# Patient Record
Sex: FEMALE | Race: WHITE | NOT HISPANIC OR LATINO | Employment: FULL TIME | URBAN - METROPOLITAN AREA
[De-identification: names, ages, dates, MRNs, and addresses within clinical notes are randomized per-mention and may not be internally consistent; named-entity substitution may affect disease eponyms.]

---

## 2017-02-28 ENCOUNTER — CONVERSION ENCOUNTER (OUTPATIENT)
Dept: FAMILY MEDICINE CLINIC | Facility: CLINIC | Age: 47
End: 2017-02-28

## 2017-03-01 ENCOUNTER — CONVERSION ENCOUNTER (OUTPATIENT)
Dept: FAMILY MEDICINE CLINIC | Facility: CLINIC | Age: 47
End: 2017-03-01

## 2017-03-01 LAB
BASOPHILS # BLD AUTO: ABNORMAL 10*3/MM3 (ref 0–200)
BASOPHILS NFR BLD AUTO: 0.5 %
BUN SERPL-MCNC: 11 MG/DL (ref 7–25)
BUN/CREAT SERPL: ABNORMAL (ref 6–22)
CALCIUM SERPL-MCNC: 9 MG/DL (ref 8.6–10.2)
CHLORIDE SERPL-SCNC: 103 MMOL/L (ref 98–110)
CO2 CONTENT VENOUS: 27 MMOL/L (ref 20–31)
CONV NEUTROPHILS/100 LEUKOCYTES IN BODY FLUID BY MANUAL COUNT: 55.1 %
CREAT UR-MCNC: 0.57 MG/DL (ref 0.5–1.1)
EOSINOPHIL # BLD AUTO: 1.7 %
EOSINOPHIL # BLD AUTO: ABNORMAL 10*3/MM3 (ref 15–500)
ERYTHROCYTE [DISTWIDTH] IN BLOOD BY AUTOMATED COUNT: 13.6 % (ref 11–15)
FERRITIN SERPL-MCNC: 70 NG/ML (ref 10–232)
GLUCOSE SERPL-MCNC: 105 MG/DL (ref 65–99)
HCT VFR BLD AUTO: 40.5 % (ref 35–45)
HGB BLD-MCNC: 13.1 G/DL (ref 11.7–15.5)
LYMPHOCYTES # BLD AUTO: ABNORMAL 10*3/MM3 (ref 850–3900)
LYMPHOCYTES NFR BLD AUTO: 36.9 %
MCH RBC QN AUTO: 29.1 PG (ref 27–33)
MCHC RBC AUTO-ENTMCNC: ABNORMAL % (ref 32–36)
MCV RBC AUTO: 89.9 FL (ref 80–100)
MONOCYTES # BLD AUTO: ABNORMAL 10*3/MICROLITER (ref 200–950)
MONOCYTES NFR BLD AUTO: 5.8 %
NEUTROPHILS # BLD AUTO: ABNORMAL 10*3/MM3 (ref 1500–7800)
PLATELET # BLD AUTO: ABNORMAL 10*3/MM3 (ref 140–400)
PMV BLD AUTO: 10.7 FL (ref 7.5–12.5)
POTASSIUM SERPL-SCNC: 4.3 MMOL/L (ref 3.5–5.3)
RBC # BLD AUTO: ABNORMAL 10*6/MM3 (ref 3.8–5.1)
SODIUM SERPL-SCNC: 138 MMOL/L (ref 135–146)
TSH SERPL-ACNC: 0.59 MICROINTL UNITS/ML
WBC # BLD AUTO: ABNORMAL K/UL (ref 3.8–10.8)

## 2018-01-29 ENCOUNTER — HOSPITAL ENCOUNTER (OUTPATIENT)
Dept: MAMMOGRAPHY | Facility: HOSPITAL | Age: 48
Discharge: HOME | End: 2018-01-29
Attending: OBSTETRICS & GYNECOLOGY | Admitting: OBSTETRICS & GYNECOLOGY

## 2018-02-07 ENCOUNTER — HOSPITAL ENCOUNTER (OUTPATIENT)
Dept: MAMMOGRAPHY | Facility: HOSPITAL | Age: 48
Discharge: HOME | End: 2018-02-07
Attending: OBSTETRICS & GYNECOLOGY | Admitting: OBSTETRICS & GYNECOLOGY

## 2018-03-12 ENCOUNTER — CONVERSION ENCOUNTER (OUTPATIENT)
Dept: FAMILY MEDICINE CLINIC | Facility: CLINIC | Age: 48
End: 2018-03-12

## 2018-10-02 ENCOUNTER — CONVERSION ENCOUNTER (OUTPATIENT)
Dept: FAMILY MEDICINE CLINIC | Facility: CLINIC | Age: 48
End: 2018-10-02

## 2018-11-09 ENCOUNTER — ON CAMPUS - OUTPATIENT (AMBULATORY)
Dept: URBAN - METROPOLITAN AREA HOSPITAL 2 | Facility: HOSPITAL | Age: 48
End: 2018-11-09

## 2018-11-09 ENCOUNTER — OFFICE (AMBULATORY)
Dept: URBAN - METROPOLITAN AREA PATHOLOGY 4 | Facility: PATHOLOGY | Age: 48
End: 2018-11-09

## 2018-11-09 VITALS
SYSTOLIC BLOOD PRESSURE: 132 MMHG | DIASTOLIC BLOOD PRESSURE: 95 MMHG | SYSTOLIC BLOOD PRESSURE: 111 MMHG | OXYGEN SATURATION: 98 % | RESPIRATION RATE: 16 BRPM | RESPIRATION RATE: 18 BRPM | OXYGEN SATURATION: 99 % | HEART RATE: 94 BPM | DIASTOLIC BLOOD PRESSURE: 52 MMHG | SYSTOLIC BLOOD PRESSURE: 115 MMHG | SYSTOLIC BLOOD PRESSURE: 122 MMHG | HEART RATE: 76 BPM | DIASTOLIC BLOOD PRESSURE: 76 MMHG | OXYGEN SATURATION: 100 % | DIASTOLIC BLOOD PRESSURE: 27 MMHG | DIASTOLIC BLOOD PRESSURE: 59 MMHG | SYSTOLIC BLOOD PRESSURE: 92 MMHG | HEART RATE: 84 BPM | HEART RATE: 92 BPM | TEMPERATURE: 97.5 F | SYSTOLIC BLOOD PRESSURE: 107 MMHG | HEIGHT: 66 IN | WEIGHT: 172 LBS | HEART RATE: 81 BPM | DIASTOLIC BLOOD PRESSURE: 92 MMHG

## 2018-11-09 DIAGNOSIS — K20.0 EOSINOPHILIC ESOPHAGITIS: ICD-10-CM

## 2018-11-09 DIAGNOSIS — K29.50 UNSPECIFIED CHRONIC GASTRITIS WITHOUT BLEEDING: ICD-10-CM

## 2018-11-09 DIAGNOSIS — K31.7 POLYP OF STOMACH AND DUODENUM: ICD-10-CM

## 2018-11-09 DIAGNOSIS — R13.10 DYSPHAGIA, UNSPECIFIED: ICD-10-CM

## 2018-11-09 LAB
GI HISTOLOGY: A. SELECT: (no result)
GI HISTOLOGY: B. UNSPECIFIED: (no result)
GI HISTOLOGY: PDF REPORT: (no result)

## 2018-11-09 PROCEDURE — 43450 DILATE ESOPHAGUS 1/MULT PASS: CPT | Performed by: INTERNAL MEDICINE

## 2018-11-09 PROCEDURE — 43239 EGD BIOPSY SINGLE/MULTIPLE: CPT | Performed by: INTERNAL MEDICINE

## 2018-11-09 PROCEDURE — 88305 TISSUE EXAM BY PATHOLOGIST: CPT | Performed by: INTERNAL MEDICINE

## 2018-11-09 RX ORDER — PANTOPRAZOLE SODIUM 40 MG/1
40 TABLET, DELAYED RELEASE ORAL
Qty: 90 | Refills: 3 | Status: COMPLETED
Start: 2018-11-09 | End: 2024-06-10

## 2018-12-31 ENCOUNTER — CONVERSION ENCOUNTER (OUTPATIENT)
Dept: FAMILY MEDICINE CLINIC | Facility: CLINIC | Age: 48
End: 2018-12-31

## 2019-02-19 ENCOUNTER — CONVERSION ENCOUNTER (OUTPATIENT)
Dept: FAMILY MEDICINE CLINIC | Facility: CLINIC | Age: 49
End: 2019-02-19

## 2019-02-19 ENCOUNTER — HOSPITAL ENCOUNTER (OUTPATIENT)
Dept: FAMILY MEDICINE CLINIC | Facility: CLINIC | Age: 49
Setting detail: SPECIMEN
Discharge: HOME | End: 2019-02-19
Attending: NURSE PRACTITIONER | Admitting: NURSE PRACTITIONER

## 2019-02-19 LAB
ALBUMIN SERPL-MCNC: 3.7 G/DL (ref 3.5–4.8)
ALBUMIN/GLOB SERPL: 1.2 {RATIO} (ref 1–1.7)
ALP SERPL-CCNC: 69 IU/L (ref 32–91)
ALT SERPL-CCNC: 18 IU/L (ref 14–54)
ANION GAP SERPL CALC-SCNC: 12.4 MMOL/L (ref 10–20)
AST SERPL-CCNC: 16 IU/L (ref 15–41)
BASOPHILS # BLD AUTO: 0.1 10*3/UL (ref 0–0.2)
BASOPHILS NFR BLD AUTO: 1 % (ref 0–2)
BILIRUB SERPL-MCNC: 0.2 MG/DL (ref 0.3–1.2)
BUN SERPL-MCNC: 9 MG/DL (ref 8–20)
BUN/CREAT SERPL: 15 (ref 5.4–26.2)
CALCIUM SERPL-MCNC: 8.9 MG/DL (ref 8.9–10.3)
CHLORIDE SERPL-SCNC: 102 MMOL/L (ref 101–111)
CONV CO2: 25 MMOL/L (ref 22–32)
CONV TOTAL PROTEIN: 6.9 G/DL (ref 6.1–7.9)
CREAT UR-MCNC: 0.6 MG/DL (ref 0.4–1)
DIFFERENTIAL METHOD BLD: (no result)
EOSINOPHIL # BLD AUTO: 0.1 10*3/UL (ref 0–0.3)
EOSINOPHIL # BLD AUTO: 2 % (ref 0–3)
ERYTHROCYTE [DISTWIDTH] IN BLOOD BY AUTOMATED COUNT: 14.3 % (ref 11.5–14.5)
GLOBULIN UR ELPH-MCNC: 3.2 G/DL (ref 2.5–3.8)
GLUCOSE SERPL-MCNC: 99 MG/DL (ref 65–99)
HCT VFR BLD AUTO: 40.7 % (ref 35–49)
HGB BLD-MCNC: 13.3 G/DL (ref 12–15)
LYMPHOCYTES # BLD AUTO: 1.8 10*3/UL (ref 0.8–4.8)
LYMPHOCYTES NFR BLD AUTO: 20 % (ref 18–42)
MCH RBC QN AUTO: 30.4 PG (ref 26–32)
MCHC RBC AUTO-ENTMCNC: 32.7 G/DL (ref 32–36)
MCV RBC AUTO: 93 FL (ref 80–94)
MONOCYTES # BLD AUTO: 0.5 10*3/UL (ref 0.1–1.3)
MONOCYTES NFR BLD AUTO: 6 % (ref 2–11)
NEUTROPHILS # BLD AUTO: 6.2 10*3/UL (ref 2.3–8.6)
NEUTROPHILS NFR BLD AUTO: 71 % (ref 50–75)
NRBC BLD AUTO-RTO: 0 /100{WBCS}
NRBC/RBC NFR BLD MANUAL: 0 10*3/UL
PLATELET # BLD AUTO: 309 10*3/UL (ref 150–450)
PMV BLD AUTO: 9.5 FL (ref 7.4–10.4)
POTASSIUM SERPL-SCNC: 4.4 MMOL/L (ref 3.6–5.1)
RBC # BLD AUTO: 4.37 10*6/UL (ref 4–5.4)
SODIUM SERPL-SCNC: 135 MMOL/L (ref 136–144)
WBC # BLD AUTO: 8.7 10*3/UL (ref 4.5–11.5)

## 2019-02-20 LAB
ANA SER QL IA: ABNORMAL

## 2019-06-04 VITALS
HEART RATE: 88 BPM | DIASTOLIC BLOOD PRESSURE: 81 MMHG | BODY MASS INDEX: 28.19 KG/M2 | OXYGEN SATURATION: 97 % | HEIGHT: 66 IN | WEIGHT: 173 LBS | HEART RATE: 79 BPM | DIASTOLIC BLOOD PRESSURE: 80 MMHG | SYSTOLIC BLOOD PRESSURE: 124 MMHG | HEART RATE: 96 BPM | HEART RATE: 76 BPM | WEIGHT: 175.4 LBS | RESPIRATION RATE: 16 BRPM | SYSTOLIC BLOOD PRESSURE: 110 MMHG | DIASTOLIC BLOOD PRESSURE: 86 MMHG | SYSTOLIC BLOOD PRESSURE: 110 MMHG | RESPIRATION RATE: 16 BRPM | RESPIRATION RATE: 16 BRPM | BODY MASS INDEX: 28.16 KG/M2 | RESPIRATION RATE: 16 BRPM | BODY MASS INDEX: 27.8 KG/M2 | HEIGHT: 66 IN | HEART RATE: 87 BPM | BODY MASS INDEX: 28.28 KG/M2 | DIASTOLIC BLOOD PRESSURE: 86 MMHG | SYSTOLIC BLOOD PRESSURE: 110 MMHG | HEIGHT: 66 IN | SYSTOLIC BLOOD PRESSURE: 139 MMHG | WEIGHT: 175.2 LBS | WEIGHT: 155.5 LBS | WEIGHT: 176 LBS | DIASTOLIC BLOOD PRESSURE: 82 MMHG | RESPIRATION RATE: 12 BRPM | HEIGHT: 66 IN

## 2019-07-22 ENCOUNTER — TELEPHONE (OUTPATIENT)
Dept: FAMILY MEDICINE CLINIC | Facility: CLINIC | Age: 49
End: 2019-07-22

## 2019-07-22 ENCOUNTER — CLINICAL SUPPORT (OUTPATIENT)
Dept: FAMILY MEDICINE CLINIC | Facility: CLINIC | Age: 49
End: 2019-07-22

## 2019-07-22 DIAGNOSIS — M54.5 LOW BACK PAIN, UNSPECIFIED BACK PAIN LATERALITY, UNSPECIFIED CHRONICITY, WITH SCIATICA PRESENCE UNSPECIFIED: Primary | ICD-10-CM

## 2019-07-22 DIAGNOSIS — M54.9 BACK PAIN, UNSPECIFIED BACK LOCATION, UNSPECIFIED BACK PAIN LATERALITY, UNSPECIFIED CHRONICITY: Primary | ICD-10-CM

## 2019-07-22 PROCEDURE — 96372 THER/PROPH/DIAG INJ SC/IM: CPT | Performed by: NURSE PRACTITIONER

## 2019-07-22 RX ORDER — DULOXETIN HYDROCHLORIDE 60 MG/1
CAPSULE, DELAYED RELEASE ORAL
Qty: 30 CAPSULE | Refills: 0 | Status: SHIPPED | OUTPATIENT
Start: 2019-07-22 | End: 2019-09-10 | Stop reason: SDUPTHER

## 2019-07-22 RX ORDER — METHYLPREDNISOLONE ACETATE 80 MG/ML
120 INJECTION, SUSPENSION INTRA-ARTICULAR; INTRALESIONAL; INTRAMUSCULAR; SOFT TISSUE ONCE
Status: DISCONTINUED | OUTPATIENT
Start: 2019-07-22 | End: 2020-08-16

## 2019-07-22 RX ORDER — METHYLPREDNISOLONE 4 MG/1
TABLET ORAL
Qty: 1 EACH | Refills: 0 | Status: SHIPPED | OUTPATIENT
Start: 2019-07-22 | End: 2019-11-13

## 2019-07-22 RX ORDER — CYCLOBENZAPRINE HCL 10 MG
10 TABLET ORAL 3 TIMES DAILY PRN
Qty: 30 TABLET | Refills: 0 | Status: SHIPPED | OUTPATIENT
Start: 2019-07-22 | End: 2019-11-13

## 2019-07-22 RX ORDER — METHYLPREDNISOLONE ACETATE 80 MG/ML
120 INJECTION, SUSPENSION INTRA-ARTICULAR; INTRALESIONAL; INTRAMUSCULAR; SOFT TISSUE ONCE
Status: COMPLETED | OUTPATIENT
Start: 2019-07-22 | End: 2019-07-22

## 2019-07-22 RX ADMIN — METHYLPREDNISOLONE ACETATE 120 MG: 80 INJECTION, SUSPENSION INTRA-ARTICULAR; INTRALESIONAL; INTRAMUSCULAR; SOFT TISSUE at 17:02

## 2019-07-22 NOTE — TELEPHONE ENCOUNTER
Gave message to patient at 3:56pm and put on MISC schedule for today.      Elizabeth - please send rx's in.

## 2019-07-22 NOTE — TELEPHONE ENCOUNTER
Patient wanted to be seen today but you are full.  Her back has gone out and she has had to leave work.  Can she come in today under MISC for a steroid shot?  She said that and steroid pills and pain medication usually help, with the shot helping the most.  Please advise.

## 2019-08-29 ENCOUNTER — TELEPHONE (OUTPATIENT)
Dept: FAMILY MEDICINE CLINIC | Facility: CLINIC | Age: 49
End: 2019-08-29

## 2019-08-29 NOTE — TELEPHONE ENCOUNTER
Олег has bronchitis.  She has had it so many times in the past that she knows that is what is wrong.  She has a productive cough of yellow/green mucus, sinus headache, chest hurts, cannot stop coughing and she is wheezing.  Usually you give her a Z-Pack and Hydromet.  Can you call something in for her?  She already has an inhaler.

## 2019-08-30 RX ORDER — AZITHROMYCIN 250 MG/1
TABLET, FILM COATED ORAL
Qty: 60 TABLET | Refills: 0 | Status: SHIPPED | OUTPATIENT
Start: 2019-08-30 | End: 2019-11-13

## 2019-09-10 RX ORDER — DULOXETIN HYDROCHLORIDE 60 MG/1
CAPSULE, DELAYED RELEASE ORAL
Qty: 90 CAPSULE | Refills: 0 | Status: SHIPPED | OUTPATIENT
Start: 2019-09-10 | End: 2020-02-12

## 2019-11-11 ENCOUNTER — TELEPHONE (OUTPATIENT)
Dept: FAMILY MEDICINE CLINIC | Facility: CLINIC | Age: 49
End: 2019-11-11

## 2019-11-11 ENCOUNTER — CLINICAL SUPPORT (OUTPATIENT)
Dept: FAMILY MEDICINE CLINIC | Facility: CLINIC | Age: 49
End: 2019-11-11

## 2019-11-11 DIAGNOSIS — M54.9 BACK PAIN, UNSPECIFIED BACK LOCATION, UNSPECIFIED BACK PAIN LATERALITY, UNSPECIFIED CHRONICITY: Primary | ICD-10-CM

## 2019-11-11 PROCEDURE — 96372 THER/PROPH/DIAG INJ SC/IM: CPT | Performed by: NURSE PRACTITIONER

## 2019-11-11 RX ORDER — METHYLPREDNISOLONE ACETATE 80 MG/ML
80 INJECTION, SUSPENSION INTRA-ARTICULAR; INTRALESIONAL; INTRAMUSCULAR; SOFT TISSUE ONCE
Status: COMPLETED | OUTPATIENT
Start: 2019-11-11 | End: 2019-11-11

## 2019-11-11 RX ADMIN — METHYLPREDNISOLONE ACETATE 80 MG: 80 INJECTION, SUSPENSION INTRA-ARTICULAR; INTRALESIONAL; INTRAMUSCULAR; SOFT TISSUE at 16:40

## 2019-11-11 NOTE — TELEPHONE ENCOUNTER
Patient is wanting to come in under MISC today for a steroid shot for her back.  Is this okay?  What do you want her to have?

## 2019-11-13 ENCOUNTER — OFFICE VISIT (OUTPATIENT)
Dept: FAMILY MEDICINE CLINIC | Facility: CLINIC | Age: 49
End: 2019-11-13

## 2019-11-13 VITALS
SYSTOLIC BLOOD PRESSURE: 120 MMHG | RESPIRATION RATE: 16 BRPM | TEMPERATURE: 97.8 F | DIASTOLIC BLOOD PRESSURE: 78 MMHG | BODY MASS INDEX: 27.8 KG/M2 | HEIGHT: 66 IN | HEART RATE: 76 BPM | WEIGHT: 173 LBS

## 2019-11-13 DIAGNOSIS — M54.32 LEFT SIDED SCIATICA: ICD-10-CM

## 2019-11-13 DIAGNOSIS — M54.16 LUMBAR BACK PAIN WITH RADICULOPATHY AFFECTING LEFT LOWER EXTREMITY: Primary | ICD-10-CM

## 2019-11-13 PROBLEM — J45.901 ASTHMA EXACERBATION: Status: ACTIVE | Noted: 2017-02-28

## 2019-11-13 PROBLEM — R42 DIZZINESS: Status: ACTIVE | Noted: 2017-02-28

## 2019-11-13 PROBLEM — L25.5 CONTACT DERMATITIS DUE TO PLANTS, EXCEPT FOOD: Status: ACTIVE | Noted: 2018-10-02

## 2019-11-13 PROBLEM — M51.27 LUMBAGO-SCIATICA DUE TO DISPLACEMENT OF LUMBAR INTERVERTEBRAL DISC: Status: ACTIVE | Noted: 2018-12-31

## 2019-11-13 PROBLEM — R09.81 HEAD CONGESTION: Status: ACTIVE | Noted: 2019-01-14

## 2019-11-13 PROBLEM — R23.3 PETECHIAE: Status: ACTIVE | Noted: 2019-02-19

## 2019-11-13 PROBLEM — M54.6 THORACIC BACK PAIN: Status: ACTIVE | Noted: 2018-03-12

## 2019-11-13 PROBLEM — R52 PAIN: Status: ACTIVE | Noted: 2018-03-12

## 2019-11-13 PROBLEM — R21 SKIN RASH: Status: ACTIVE | Noted: 2019-02-19

## 2019-11-13 PROBLEM — R13.10 DYSPHAGIA: Status: ACTIVE | Noted: 2018-10-02

## 2019-11-13 PROBLEM — F41.9 ANXIETY: Status: ACTIVE | Noted: 2018-10-02

## 2019-11-13 PROBLEM — K20.90 ESOPHAGITIS: Status: ACTIVE | Noted: 2018-10-05

## 2019-11-13 PROBLEM — J06.9 UPPER RESPIRATORY TRACT INFECTION: Status: ACTIVE | Noted: 2017-02-28

## 2019-11-13 PROCEDURE — 99214 OFFICE O/P EST MOD 30 MIN: CPT | Performed by: NURSE PRACTITIONER

## 2019-11-13 RX ORDER — PREDNISONE 20 MG/1
TABLET ORAL
Qty: 25 TABLET | Refills: 0 | Status: SHIPPED | OUTPATIENT
Start: 2019-11-13 | End: 2020-07-28

## 2019-11-13 RX ORDER — NORGESTIMATE AND ETHINYL ESTRADIOL 7DAYSX3 28
1 KIT ORAL DAILY
Refills: 4 | COMMUNITY
Start: 2019-08-14

## 2019-11-13 RX ORDER — BACLOFEN 10 MG/1
10 TABLET ORAL 3 TIMES DAILY
Qty: 30 TABLET | Refills: 0 | Status: SHIPPED | OUTPATIENT
Start: 2019-11-13 | End: 2020-01-24

## 2019-11-13 RX ORDER — BUSPIRONE HYDROCHLORIDE 5 MG/1
5 TABLET ORAL 2 TIMES DAILY PRN
COMMUNITY
Start: 2018-10-02

## 2019-11-13 RX ORDER — HYDROCODONE BITARTRATE AND ACETAMINOPHEN 7.5; 325 MG/1; MG/1
1 TABLET ORAL EVERY 6 HOURS PRN
Qty: 20 TABLET | Refills: 0 | Status: SHIPPED | OUTPATIENT
Start: 2019-11-13 | End: 2020-10-02 | Stop reason: SDUPTHER

## 2019-11-13 NOTE — PROGRESS NOTES
"Subjective   Gene KAREN Fields is a 48 y.o. female.     Chief Complaint   Patient presents with   • Back Pain       /78 (BP Location: Left arm, Patient Position: Sitting, Cuff Size: Adult)   Pulse 76   Temp 97.8 °F (36.6 °C) (Oral)   Resp 16   Ht 167.6 cm (66\")   Wt 78.5 kg (173 lb)   BMI 27.92 kg/m²     BP Readings from Last 3 Encounters:   11/13/19 120/78   02/19/19 110/86   12/31/18 139/81       Wt Readings from Last 3 Encounters:   11/13/19 78.5 kg (173 lb)   02/19/19 79.5 kg (175 lb 3.2 oz)   12/31/18 79.8 kg (175 lb 16 oz)       Pt comes in today with c/o low back pain and sciatica. Pain is worse on left. Pain is worse when walking and sitting. No specific injury. Started on Friday (about 5 days ago). Flares up on her about every 4-5 months.   Had x-ray done last year.  Has been to chiropractor in the past  Went to PT years ago when she was pregnant.  Has a TENS unit at home, but doesn't seem to help much  She came in on Monday and received a steroid injection. Usually has to take oral steroids with it.   We have discussed PT and MRI in the past, but she is concerned about cost due to high deductible insurance.        The following portions of the patient's history were reviewed and updated as appropriate: allergies, current medications, past family history, past medical history, past social history, past surgical history and problem list.    Review of Systems   Musculoskeletal: Positive for back pain.   Neurological: Positive for numbness.       Objective   Physical Exam   Constitutional: She is oriented to person, place, and time. She appears well-developed and well-nourished.   Eyes: Pupils are equal, round, and reactive to light.   Cardiovascular: Normal rate and regular rhythm.   Pulmonary/Chest: Effort normal and breath sounds normal.   Musculoskeletal:        Lumbar back: She exhibits decreased range of motion and tenderness.   Straight leg pos   Neurological: She is alert and oriented to person, " place, and time.         Diagnoses and all orders for this visit:    1. Lumbar back pain with radiculopathy affecting left lower extremity (Primary)  -     predniSONE (DELTASONE) 20 MG tablet; 3 tabs daily x 4 days, 2 tabs daily x 4 days, 1 tab daily x 4 days  Dispense: 25 tablet; Refill: 0  -     HYDROcodone-acetaminophen (NORCO) 7.5-325 MG per tablet; Take 1 tablet by mouth Every 6 (Six) Hours As Needed for Moderate Pain .  Dispense: 20 tablet; Refill: 0  -     baclofen (LIORESAL) 10 MG tablet; Take 1 tablet by mouth 3 (Three) Times a Day.  Dispense: 30 tablet; Refill: 0    2. Left sided sciatica  -     predniSONE (DELTASONE) 20 MG tablet; 3 tabs daily x 4 days, 2 tabs daily x 4 days, 1 tab daily x 4 days  Dispense: 25 tablet; Refill: 0  -     HYDROcodone-acetaminophen (NORCO) 7.5-325 MG per tablet; Take 1 tablet by mouth Every 6 (Six) Hours As Needed for Moderate Pain .  Dispense: 20 tablet; Refill: 0  -     baclofen (LIORESAL) 10 MG tablet; Take 1 tablet by mouth 3 (Three) Times a Day.  Dispense: 30 tablet; Refill: 0    prednisone taper  Will try baclofen. Didn't tolerate flexeril in the past  norco prn  She is going to check on cost of MRI  Cont. Stretches  Ice/moist heat  During this office visit, we discussed the pertinent aspects of the visit and treatment recommendations. Pt verbalizes understanding. Follow up was discussed. Patient was given the opportunity to ask questions and discuss other concerns.       Return if symptoms worsen or fail to improve.

## 2020-01-21 ENCOUNTER — TELEPHONE (OUTPATIENT)
Dept: FAMILY MEDICINE CLINIC | Facility: CLINIC | Age: 50
End: 2020-01-21

## 2020-01-21 RX ORDER — AMOXICILLIN 875 MG/1
875 TABLET, COATED ORAL 2 TIMES DAILY
Qty: 14 TABLET | Refills: 0 | Status: SHIPPED | OUTPATIENT
Start: 2020-01-21 | End: 2020-07-31

## 2020-01-21 NOTE — TELEPHONE ENCOUNTER
Patient left vm stating that she has a sinus infection and ear infection. Symptoms: sinus pressure, ear pain, headache, congestion, colored mucus. Patient is requesting antibiotics.

## 2020-01-24 DIAGNOSIS — M54.16 LUMBAR BACK PAIN WITH RADICULOPATHY AFFECTING LEFT LOWER EXTREMITY: ICD-10-CM

## 2020-01-24 DIAGNOSIS — M54.32 LEFT SIDED SCIATICA: ICD-10-CM

## 2020-01-24 RX ORDER — BACLOFEN 10 MG/1
TABLET ORAL
Qty: 30 TABLET | Refills: 0 | Status: SHIPPED | OUTPATIENT
Start: 2020-01-24 | End: 2020-07-31 | Stop reason: ALTCHOICE

## 2020-02-12 RX ORDER — DULOXETIN HYDROCHLORIDE 60 MG/1
CAPSULE, DELAYED RELEASE ORAL
Qty: 90 CAPSULE | Refills: 0 | Status: SHIPPED | OUTPATIENT
Start: 2020-02-12 | End: 2020-07-01

## 2020-07-01 RX ORDER — DULOXETIN HYDROCHLORIDE 60 MG/1
CAPSULE, DELAYED RELEASE ORAL
Qty: 90 CAPSULE | Refills: 0 | Status: SHIPPED | OUTPATIENT
Start: 2020-07-01 | End: 2020-10-01

## 2020-07-27 ENCOUNTER — APPOINTMENT (OUTPATIENT)
Dept: GENERAL RADIOLOGY | Facility: HOSPITAL | Age: 50
End: 2020-07-27

## 2020-07-27 ENCOUNTER — HOSPITAL ENCOUNTER (EMERGENCY)
Facility: HOSPITAL | Age: 50
Discharge: HOME | End: 2020-07-27
Admitting: EMERGENCY MEDICINE

## 2020-07-27 VITALS
BODY MASS INDEX: 28.31 KG/M2 | TEMPERATURE: 99 F | HEART RATE: 92 BPM | OXYGEN SATURATION: 97 % | DIASTOLIC BLOOD PRESSURE: 49 MMHG | HEIGHT: 66 IN | RESPIRATION RATE: 18 BRPM | WEIGHT: 176.15 LBS | SYSTOLIC BLOOD PRESSURE: 113 MMHG

## 2020-07-27 DIAGNOSIS — R05.9 COUGH: ICD-10-CM

## 2020-07-27 DIAGNOSIS — Z20.822 EXPOSURE TO COVID-19 VIRUS: Primary | ICD-10-CM

## 2020-07-27 DIAGNOSIS — R50.9 FEVER, UNSPECIFIED FEVER CAUSE: ICD-10-CM

## 2020-07-27 DIAGNOSIS — B34.9 VIRAL ILLNESS: ICD-10-CM

## 2020-07-27 DIAGNOSIS — J02.9 PHARYNGITIS, UNSPECIFIED ETIOLOGY: ICD-10-CM

## 2020-07-27 LAB
ALBUMIN SERPL-MCNC: 3.7 G/DL (ref 3.5–5.2)
ALBUMIN/GLOB SERPL: 1.3 G/DL
ALP SERPL-CCNC: 110 U/L (ref 39–117)
ALT SERPL W P-5'-P-CCNC: 14 U/L (ref 1–33)
ANION GAP SERPL CALCULATED.3IONS-SCNC: 12 MMOL/L (ref 5–15)
AST SERPL-CCNC: 17 U/L (ref 1–32)
BASOPHILS # BLD AUTO: 0 10*3/MM3 (ref 0–0.2)
BASOPHILS NFR BLD AUTO: 0.7 % (ref 0–1.5)
BILIRUB SERPL-MCNC: 0.3 MG/DL (ref 0–1.2)
BUN SERPL-MCNC: 7 MG/DL (ref 6–20)
BUN SERPL-MCNC: ABNORMAL MG/DL
BUN/CREAT SERPL: ABNORMAL
CALCIUM SPEC-SCNC: 8.7 MG/DL (ref 8.6–10.5)
CHLORIDE SERPL-SCNC: 99 MMOL/L (ref 98–107)
CO2 SERPL-SCNC: 23 MMOL/L (ref 22–29)
CREAT SERPL-MCNC: 0.63 MG/DL (ref 0.57–1)
CRP SERPL-MCNC: 6.39 MG/DL (ref 0–0.5)
D DIMER PPP FEU-MCNC: 0.5 MCGFEU/ML (ref 0.17–0.59)
DEPRECATED RDW RBC AUTO: 44.2 FL (ref 37–54)
EOSINOPHIL # BLD AUTO: 0.1 10*3/MM3 (ref 0–0.4)
EOSINOPHIL NFR BLD AUTO: 0.8 % (ref 0.3–6.2)
ERYTHROCYTE [DISTWIDTH] IN BLOOD BY AUTOMATED COUNT: 14.1 % (ref 12.3–15.4)
GFR SERPL CREATININE-BSD FRML MDRD: 100 ML/MIN/1.73
GLOBULIN UR ELPH-MCNC: 2.8 GM/DL
GLUCOSE SERPL-MCNC: 109 MG/DL (ref 65–99)
HCT VFR BLD AUTO: 35.7 % (ref 34–46.6)
HGB BLD-MCNC: 11.8 G/DL (ref 12–15.9)
LDH SERPL-CCNC: 168 U/L (ref 135–214)
LYMPHOCYTES # BLD AUTO: 1.6 10*3/MM3 (ref 0.7–3.1)
LYMPHOCYTES NFR BLD AUTO: 25.1 % (ref 19.6–45.3)
MCH RBC QN AUTO: 29.7 PG (ref 26.6–33)
MCHC RBC AUTO-ENTMCNC: 33.1 G/DL (ref 31.5–35.7)
MCV RBC AUTO: 89.9 FL (ref 79–97)
MONOCYTES # BLD AUTO: 0.7 10*3/MM3 (ref 0.1–0.9)
MONOCYTES NFR BLD AUTO: 11.4 % (ref 5–12)
NEUTROPHILS NFR BLD AUTO: 4.1 10*3/MM3 (ref 1.7–7)
NEUTROPHILS NFR BLD AUTO: 62 % (ref 42.7–76)
NRBC BLD AUTO-RTO: 0 /100 WBC (ref 0–0.2)
NT-PROBNP SERPL-MCNC: 270.3 PG/ML (ref 0–450)
PLATELET # BLD AUTO: 204 10*3/MM3 (ref 140–450)
PMV BLD AUTO: 9.2 FL (ref 6–12)
POTASSIUM SERPL-SCNC: 3.8 MMOL/L (ref 3.5–5.2)
PROCALCITONIN SERPL-MCNC: 0.05 NG/ML (ref 0–0.25)
PROT SERPL-MCNC: 6.5 G/DL (ref 6–8.5)
RBC # BLD AUTO: 3.98 10*6/MM3 (ref 3.77–5.28)
S PYO AG THROAT QL: NEGATIVE
SARS-COV-2 RNA PNL SPEC NAA+PROBE: NOT DETECTED
SODIUM SERPL-SCNC: 134 MMOL/L (ref 136–145)
TROPONIN T SERPL-MCNC: <0.01 NG/ML (ref 0–0.03)
WBC # BLD AUTO: 6.6 10*3/MM3 (ref 3.4–10.8)

## 2020-07-27 PROCEDURE — 86140 C-REACTIVE PROTEIN: CPT | Performed by: PHYSICIAN ASSISTANT

## 2020-07-27 PROCEDURE — 83880 ASSAY OF NATRIURETIC PEPTIDE: CPT | Performed by: PHYSICIAN ASSISTANT

## 2020-07-27 PROCEDURE — 80053 COMPREHEN METABOLIC PANEL: CPT | Performed by: PHYSICIAN ASSISTANT

## 2020-07-27 PROCEDURE — 84145 PROCALCITONIN (PCT): CPT | Performed by: PHYSICIAN ASSISTANT

## 2020-07-27 PROCEDURE — 85025 COMPLETE CBC W/AUTO DIFF WBC: CPT | Performed by: PHYSICIAN ASSISTANT

## 2020-07-27 PROCEDURE — 84484 ASSAY OF TROPONIN QUANT: CPT | Performed by: PHYSICIAN ASSISTANT

## 2020-07-27 PROCEDURE — 99284 EMERGENCY DEPT VISIT MOD MDM: CPT

## 2020-07-27 PROCEDURE — 71045 X-RAY EXAM CHEST 1 VIEW: CPT

## 2020-07-27 PROCEDURE — 87040 BLOOD CULTURE FOR BACTERIA: CPT | Performed by: PHYSICIAN ASSISTANT

## 2020-07-27 PROCEDURE — 83615 LACTATE (LD) (LDH) ENZYME: CPT | Performed by: PHYSICIAN ASSISTANT

## 2020-07-27 PROCEDURE — 85379 FIBRIN DEGRADATION QUANT: CPT | Performed by: PHYSICIAN ASSISTANT

## 2020-07-27 PROCEDURE — 93005 ELECTROCARDIOGRAM TRACING: CPT | Performed by: PHYSICIAN ASSISTANT

## 2020-07-27 PROCEDURE — 87651 STREP A DNA AMP PROBE: CPT | Performed by: PHYSICIAN ASSISTANT

## 2020-07-27 PROCEDURE — 87635 SARS-COV-2 COVID-19 AMP PRB: CPT | Performed by: PHYSICIAN ASSISTANT

## 2020-07-27 RX ORDER — ALBUTEROL SULFATE 90 UG/1
2 AEROSOL, METERED RESPIRATORY (INHALATION) EVERY 4 HOURS PRN
Qty: 1 INHALER | Refills: 0 | Status: SHIPPED | OUTPATIENT
Start: 2020-07-27 | End: 2021-03-02 | Stop reason: SDUPTHER

## 2020-07-27 RX ORDER — SODIUM CHLORIDE 0.9 % (FLUSH) 0.9 %
10 SYRINGE (ML) INJECTION AS NEEDED
Status: DISCONTINUED | OUTPATIENT
Start: 2020-07-27 | End: 2020-07-27 | Stop reason: HOSPADM

## 2020-07-27 RX ORDER — BENZONATATE 200 MG/1
200 CAPSULE ORAL 3 TIMES DAILY PRN
Qty: 30 CAPSULE | Refills: 0 | Status: SHIPPED | OUTPATIENT
Start: 2020-07-27 | End: 2020-07-31 | Stop reason: ALTCHOICE

## 2020-07-27 RX ORDER — BROMPHENIRAMINE MALEATE, PSEUDOEPHEDRINE HYDROCHLORIDE, AND DEXTROMETHORPHAN HYDROBROMIDE 2; 30; 10 MG/5ML; MG/5ML; MG/5ML
5 SYRUP ORAL 4 TIMES DAILY PRN
Qty: 473 ML | Refills: 0 | Status: SHIPPED | OUTPATIENT
Start: 2020-07-27 | End: 2020-07-31 | Stop reason: ALTCHOICE

## 2020-07-27 RX ADMIN — SODIUM CHLORIDE 500 ML: 900 INJECTION, SOLUTION INTRAVENOUS at 11:04

## 2020-07-27 NOTE — ED PROVIDER NOTES
Subjective   History of Present Illness  Patient is a 49-year-old female who presents with complaints of sore throat for the past week.  Patient states it is worse when she tries to swallow she denies any voice change or trouble handling her oral secretions.  Patient ports over the past several days she has had body aches and chills and fever T-max 102 yesterday.  Patient states she last took ibuprofen at around 4 AM this morning.  Patient also reports associated dry cough intermittently but denies any hemoptysis.  Patient also reports a minute intermittent gradual onset generalized headache denies thunderclap or worst headache of her life denies any one-sided numbness weakness slurred speech or facial droop.  Patient also reports last night she started getting some chest tightness with deep breaths.  Denies any heart palpitations or leg swelling.  Patient states she did recently travel to [place] to visit with her daughter.  Pertinent negatives include abdominal pain, nausea or vomiting, diarrhea or constipation, urinary symptoms include dysuria or hematuria.  Patient denies any other alleviating or exacerbating factors of her symptoms.      Review of Systems   Constitutional: Positive for chills, fatigue and fever. Negative for activity change, appetite change, diaphoresis and unexpected weight change.   HENT: Positive for sore throat. Negative for congestion, ear discharge, ear pain, postnasal drip, sinus pressure, sinus pain, sneezing, trouble swallowing and voice change.    Eyes: Negative for photophobia and visual disturbance.   Respiratory: Positive for cough, chest tightness and shortness of breath. Negative for apnea, choking, wheezing and stridor.    Cardiovascular: Negative.    Gastrointestinal: Negative.    Genitourinary: Negative.    Musculoskeletal: Positive for myalgias. Negative for neck stiffness.   Skin: Negative for rash and wound.   Neurological: Positive for headaches. Negative for  dizziness, tremors, seizures, syncope, facial asymmetry, speech difficulty, weakness, light-headedness and numbness.       Past Medical History:   Diagnosis Date   • Asthma 12/9/2013   • Hyperlipidemia 12/9/2013   • Lumbar radiculopathy 3/12/2018   • Thoracic back pain 3/12/2018       No Known Allergies    History reviewed. No pertinent surgical history.    No family history on file.    Social History     Socioeconomic History   • Marital status:      Spouse name: Not on file   • Number of children: Not on file   • Years of education: Not on file   • Highest education level: Not on file   Substance and Sexual Activity   • Alcohol use: Yes   • Drug use: No   • Sexual activity: Defer           Objective   Physical Exam   Constitutional: She is oriented to person, place, and time. She appears well-developed and well-nourished.  Non-toxic appearance. She does not appear ill. No distress.   HENT:   Head: Normocephalic and atraumatic.   Right Ear: Tympanic membrane, external ear and ear canal normal.   Left Ear: Tympanic membrane, external ear and ear canal normal.   Nose: Nose normal.   Mouth/Throat: Uvula is midline and mucous membranes are normal. Normal dentition. Posterior oropharyngeal erythema present. No oropharyngeal exudate, posterior oropharyngeal edema or tonsillar abscesses. No tonsillar exudate.   Eyes: Pupils are equal, round, and reactive to light. EOM are normal. No scleral icterus.   Neck: Normal range of motion. No neck rigidity.   Cardiovascular: Normal rate, regular rhythm, normal heart sounds and intact distal pulses. Exam reveals no gallop and no friction rub.   No murmur heard.  Pulmonary/Chest: Effort normal and breath sounds normal. No stridor. No respiratory distress. She has no wheezes. She has no rales. She exhibits no tenderness.   Abdominal: Soft. Bowel sounds are normal. She exhibits no distension and no mass. There is no tenderness. There is no rebound and no guarding. No hernia.  "  Lymphadenopathy:        Head (right side): No submental, no submandibular, no tonsillar, no preauricular, no posterior auricular and no occipital adenopathy present.        Head (left side): No submental, no submandibular, no tonsillar, no preauricular, no posterior auricular and no occipital adenopathy present.     She has no cervical adenopathy.   Neurological: She is alert and oriented to person, place, and time. No cranial nerve deficit or sensory deficit. GCS eye subscore is 4. GCS verbal subscore is 5. GCS motor subscore is 6.   Normal and equal sensation strength throughout moving all extremities freely.   Skin: Skin is warm. Capillary refill takes less than 2 seconds. No rash noted. No erythema. No pallor.   Psychiatric: She has a normal mood and affect. Her behavior is normal.   Nursing note and vitals reviewed.      Procedures           ED Course    /75   Pulse 98   Temp 99 °F (37.2 °C) (Oral)   Resp 18   Ht 167.6 cm (66\")   Wt 79.9 kg (176 lb 2.4 oz)   LMP 07/20/2020   SpO2 97%   BMI 28.43 kg/m²   Medications   sodium chloride 0.9 % flush 10 mL (has no administration in time range)   sodium chloride 0.9 % bolus 500 mL (500 mL Intravenous New Bag 7/27/20 1104)     Labs Reviewed   COMPREHENSIVE METABOLIC PANEL - Abnormal; Notable for the following components:       Result Value    Glucose 109 (*)     Sodium 134 (*)     All other components within normal limits    Narrative:     GFR Normal >60  Chronic Kidney Disease <60  Kidney Failure <15     C-REACTIVE PROTEIN - Abnormal; Notable for the following components:    C-Reactive Protein 6.39 (*)     All other components within normal limits   CBC WITH AUTO DIFFERENTIAL - Abnormal; Notable for the following components:    Hemoglobin 11.8 (*)     All other components within normal limits   RAPID STREP A SCREEN - Normal   COVID-19,ABBOTT IN-HOUSE,NP SWAB (NO TRANSPORT MEDIA) 2 HR TAT - Normal    Narrative:     Fact sheet for providers: " "https://www.fda.gov/media/011214/download     Fact sheet for patients: https://www.fda.gov/media/354394/download   LACTATE DEHYDROGENASE - Normal   PROCALCITONIN - Normal    Narrative:     As a Marker for Sepsis (Non-Neonates):   1. <0.5 ng/mL represents a low risk of severe sepsis and/or septic shock.  1. >2 ng/mL represents a high risk of severe sepsis and/or septic shock.    As a Marker for Lower Respiratory Tract Infections that require antibiotic therapy:  PCT on Admission     Antibiotic Therapy             6-12 Hrs later  > 0.5                Strongly Recommended            >0.25 - <0.5         Recommended  0.1 - 0.25           Discouraged                   Remeasure/reassess PCT  <0.1                 Strongly Discouraged          Remeasure/reassess PCT      As 28 day mortality risk marker: \"Change in Procalcitonin Result\" (> 80 % or <=80 %) if Day 0 (or Day 1) and Day 4 values are available. Refer to http://www.[employer]calculator[employer]/   Change in PCT <=80 %   A decrease of PCT levels below or equal to 80 % defines a positive change in PCT test result representing a higher risk for 28-day all-cause mortality of patients diagnosed with severe sepsis or septic shock.  Change in PCT > 80 %   A decrease of PCT levels of more than 80 % defines a negative change in PCT result representing a lower risk for 28-day all-cause mortality of patients diagnosed with severe sepsis or septic shock.                Results may be falsely decreased if patient taking Biotin.    TROPONIN (IN-HOUSE) - Normal    Narrative:     Troponin T Reference Range:  <= 0.03 ng/mL-   Negative for AMI  >0.03 ng/mL-     Abnormal for myocardial necrosis.  Clinicians would have to utilize clinical acumen, EKG, Troponin and serial changes to determine if it is an Acute Myocardial Infarction or myocardial injury due to an underlying chronic condition.       Results may be falsely decreased if patient taking Biotin.     BNP (IN-HOUSE) - Normal    " Narrative:     Among patients with dyspnea, NT-proBNP is highly sensitive for the detection of acute congestive heart failure. In addition NT-proBNP of <300 pg/ml effectively rules out acute congestive heart failure with 99% negative predictive value.    Results may be falsely decreased if patient taking Biotin.     BUN - Normal   D-DIMER, QUANTITATIVE - Normal    Narrative:     Reference Range  --------------------------------------------------------------------     < 0.50   Negative Predictive Value  0.50-0.59   Indeterminate    >= 0.60   Probable VTE             A very low percentage of patients with DVT may yield D-Dimer results   below the cut-off of 0.50 MCGFEU/mL.  This is known to be more   prevalent in patients with distal DVT.             Results of this test should always be interpreted in conjunction with   the patient's medical history, clinical presentation and other   findings.  Clinical diagnosis should not be based on the result of   INNOVANCE D-Dimer alone.   BLOOD CULTURE   BLOOD CULTURE   CBC AND DIFFERENTIAL    Narrative:     The following orders were created for panel order CBC & Differential.  Procedure                               Abnormality         Status                     ---------                               -----------         ------                     CBC Auto Differential[[id number]]        Abnormal            Final result                 Please view results for these tests on the individual orders.     Xr Chest Ap    Result Date: 7/27/2020  No acute cardiopulmonary abnormality.  Electronically Signed ByJann Ribeiro On:7/27/2020 9:11 AM This report was finalized on [id number] by  Vimal Ribeiro, .                                           [facility]  Number of Diagnoses or Management Options  Cough:   Exposure to COVID-19 virus:   Fever, unspecified fever cause:   Pharyngitis, unspecified etiology:   Viral illness:   Diagnosis management comments: Chart Review:  Comorbidity: Asthma,  hyperlipidemia, anxiety  Differentials: Strep pharyngitis, peritonsillar abscess, pneumonia, bronchitis, asthma exacerbation, viral illness, PE, ACS, effusion, pneumothorax     ;this list is not all inclusive and does not constitute the entirety of considered causes  ECG: Interpreted by myself and Dr. Mcallister shows sinus Tachycardia rate 114 probable left atrial enlargement no previous EKG to review  Labs: C shows RBC 6.6 hemoglobin 1.8 hematocrit 35.7 platelets 204.  CMP shows glucose 109 BUN 7 creatinine 0.63 sodium 134 potassium 3.8.  Troponin within normal limits.  .  Procalcitonin 0.05.  COVID not detected on swab rapid strep was negative.  CRP 6.39. blood cultures pending.  D-dimer within normal limits as above 0.50  Imaging: Was interpreted by physician and reviewed by myself:  Xr Chest Ap  Result Date: 7/27/2020  No acute cardiopulmonary abnormality.  Electronically Signed By-Vimal Ribeiro On:7/27/2020 9:11 AM This report was finalized on [id number] by  Vimal Ribeiro, .      Disposition/Treatment:  Appropriate PPE was worn during exam and throughout all encounters with the patient.  While in the ED IV was placed and labs were obtained.  Patient was afebrile and appeared nontoxic while in the ED lab results were fairly unremarkable COVID and strep are both negative CBC showed no signs of severe infection with a normal WBC.  Chest x-ray showed no signs of pneumonia or other cardiopulmonary abnormalities.  Troponin and proBNP were unremarkable.  EKG showed sinus tachycardia otherwise fairly unremarkable.  Calcitonin LDH were within normal limits CRP was elevated at 6.39.  D-dimer within normal limits.  Patient symptoms likely secondary to viral illness patient was advised to do warm salt water gargles 4 times a day as needed for sore throat and follow-up with advanced ENT if it continues.  Patient will be given Tessalon Perles of Bromfed and albuterol inhaler for home.  She was advised to self  quarantining until she is fever free for 24 hours.  Lab results and findings were discussed with the patient and family at bedside who voiced understanding of discharge instructions along with signs and symptoms requiring return to the ED.  Upon discharged patient was in stable condition with followup for a revaluation.        Amount and/or Complexity of Data Reviewed  Clinical lab tests: reviewed  Tests in the radiology section of CPT®: reviewed  Tests in the medicine section of CPT®: reviewed        Final diagnoses:   Exposure to COVID-19 virus   Viral illness   Pharyngitis, unspecified etiology   Cough   Fever, unspecified fever cause            Kylee Giron PA  07/27/20 1142

## 2020-07-27 NOTE — DISCHARGE INSTRUCTIONS
Use Bromfed and Tessalon Perles as needed for cough.  Use albuterol inhaler as needed for shortness of breath.  Drink plenty of clear fluids and get plenty of rest over the next 2 to 3 days.    Do warm salt water gargles 2-3 times daily as needed for sore throat.  You may also use over-the-counter throat lozenges or Chloraseptic throat spray to help with your pain.    Take Tylenol or ibuprofen as needed for fever or pain.  Self quarantine until you are fever free for 24 hours without help of any medication.    Follow-up with your primary care provider in 3-5 days.  If you do not have a primary care provider call [phone] for help in finding one, or you may follow up with [facility] at [phone].    Return to ED for any new or worsening symptoms.

## 2020-07-28 ENCOUNTER — EPISODE CHANGES (OUTPATIENT)
Dept: CASE MANAGEMENT | Facility: OTHER | Age: 50
End: 2020-07-28

## 2020-07-28 ENCOUNTER — E-VISIT (OUTPATIENT)
Dept: FAMILY MEDICINE CLINIC | Facility: CLINIC | Age: 50
End: 2020-07-28

## 2020-07-28 ENCOUNTER — TELEPHONE (OUTPATIENT)
Dept: FAMILY MEDICINE CLINIC | Facility: CLINIC | Age: 50
End: 2020-07-28

## 2020-07-28 ENCOUNTER — PATIENT OUTREACH (OUTPATIENT)
Dept: CASE MANAGEMENT | Facility: OTHER | Age: 50
End: 2020-07-28

## 2020-07-28 DIAGNOSIS — J45.41 MODERATE PERSISTENT ASTHMA WITH EXACERBATION: Primary | ICD-10-CM

## 2020-07-28 PROCEDURE — 99442 PR PHYS/QHP TELEPHONE EVALUATION 11-20 MIN: CPT | Performed by: INTERNAL MEDICINE

## 2020-07-28 RX ORDER — PREDNISONE 10 MG/1
TABLET ORAL
Qty: 40 TABLET | Refills: 0 | Status: SHIPPED | OUTPATIENT
Start: 2020-07-28 | End: 2020-08-20 | Stop reason: SDUPTHER

## 2020-07-28 NOTE — TELEPHONE ENCOUNTER
Gave message to patient at 5:35pm and put on Dr Lizarraga's schedule for Friday at 8:45am.  She said she might have to go back to the ER in the meantime.

## 2020-07-28 NOTE — PROGRESS NOTES
Yes, sorry, I was trying to figure out where to see her.  I will send in Rxs for steroids and hydromet today, and let's have her follow up Friday at 8:45am if she can make it.            Documentation        Ciara Cornelius RegSched Rep routed conversation to You 55 minutes ago (4:32 PM)      Ciara Cornelius, Andres Grayson 55 minutes ago (4:32 PM)         Patient called back because she hasn't heard anything from us.  She said she is having trouble taking a deep breath.  She was tested at ER for Covid yesterday and it was negative.  Said the Bromfed the ER gave her is not the same as the Hydromet that you usually give her.  Can you send in Hydromet?  Also, can you send in a steroid for her?  Is there a time you can see her this week?  She wants to see you too.         Documentation        Elizabeth Maldonado MA routed conversation to You 7 hours ago (9:57 AM)       Georgette Mccollum routed conversation to [facility] 8 hours ago (9:20 AM)      Georgette Mccollum 8 hours ago (9:20 AM)         PATIENT WENT TO THE ER YESTERDAY AND WAS HAVING TROUBLE BREATHING. SHE IS STILL HAVING ISSUES AND IS CONCERNED, SHE TESTED NEGATIVE FOR COVID, SHE WOULD LIKE TO KNOW IF SHE CAN COME IN FOR AN APPOINTMENT TODAY AND IF DR. BROOKS WILL CALL HER IN STERO[place] TO HELP WITH HER BREATHING.      PLEASE ADVISE      PATIENT CALLBACK [phone]

## 2020-07-28 NOTE — TELEPHONE ENCOUNTER
Patient called back because she hasn't heard anything from us.  She said she is having trouble taking a deep breath.  She was tested at ER for Covid yesterday and it was negative.  Said the Bromfed the ER gave her is not the same as the Hydromet that you usually give her.  Can you send in Hydromet?  Also, can you send in a steroid for her?  Is there a time you can see her this week?  She wants to see you too.

## 2020-07-28 NOTE — TELEPHONE ENCOUNTER
PATIENT WENT TO THE ER YESTERDAY AND WAS HAVING TROUBLE BREATHING. SHE IS STILL HAVING ISSUES AND IS CONCERNED, SHE TESTED NEGATIVE FOR COVID, SHE WOULD LIKE TO KNOW IF SHE CAN COME IN FOR AN APPOINTMENT TODAY AND IF DR. BROOKS WILL CALL HER IN STERO[place] TO HELP WITH HER BREATHING.     PLEASE ADVISE     PATIENT CALLBACK [phone]

## 2020-07-28 NOTE — TELEPHONE ENCOUNTER
Yes, sorry, I was trying to figure out where to see her.  I will send in Rxs for steroids and hydromet today, and let's have her follow up Friday at 8:45am if she can make it.

## 2020-07-28 NOTE — OUTREACH NOTE
"ED Potential Covid Discharge Follow-up  Talked with patient. Discussed 7/27/20 ED visit regarding viral illness; fever; pharyngitis and exposure to COVID.  Patient received COVID 19 test results as negative. Patient compliant with ED recommendations; using albuterol inhaler as directed and has contacted PCP for further recommendations.  Patient reports symptoms of:Pressure to chest; sharp pain when attempting deep breath; episodes of SOB (using inhaler but does not alleviate SOB); fever of 99.0 (improved from 102 yesterday/taking Tylenol); body aches; back pain; sore throat; decrease in appetite(tolerataing fluids as she is \"extremely thirsty\").   Patient reports no barriers in obtaining : food; medication and has transportation.   Patient lives with spouse and family; independent with ADL's; receiving assistance from spouse as needed.Patient states to be compliant with medications; monitoring of temperature and symptoms.    Reviewed with patient: ED recommendations; COVID precautions; quarantine education; education regarding being free of fever for 24  hours without use of Tylenol; hydration;  24/7 Nurse Triage Line; COVID 19 information telephone line;  ACM contact information;  My Chart; Telehealth appointment availability; monitor symptoms and be reevaluated as needed; and PCP contact  for follow up and recommendations.Patient verbalized understanding. She has contacted PCP and awaiting return call. She states to appreciate phone call. No further questions or concerns voiced at this time.     Tabitha Velásquez RN  Ambulatory     7/28/2020, 13:51      "

## 2020-07-31 ENCOUNTER — OFFICE VISIT (OUTPATIENT)
Dept: FAMILY MEDICINE CLINIC | Facility: CLINIC | Age: 50
End: 2020-07-31

## 2020-07-31 VITALS
WEIGHT: 173.8 LBS | RESPIRATION RATE: 18 BRPM | SYSTOLIC BLOOD PRESSURE: 136 MMHG | BODY MASS INDEX: 28.05 KG/M2 | HEART RATE: 94 BPM | OXYGEN SATURATION: 96 % | DIASTOLIC BLOOD PRESSURE: 84 MMHG | TEMPERATURE: 97.3 F

## 2020-07-31 DIAGNOSIS — E55.9 VITAMIN D DEFICIENCY: ICD-10-CM

## 2020-07-31 DIAGNOSIS — J45.41 MODERATE PERSISTENT ASTHMA WITH EXACERBATION: Primary | ICD-10-CM

## 2020-07-31 DIAGNOSIS — Z00.00 PREVENTATIVE HEALTH CARE: ICD-10-CM

## 2020-07-31 DIAGNOSIS — K21.9 GASTROESOPHAGEAL REFLUX DISEASE, ESOPHAGITIS PRESENCE NOT SPECIFIED: ICD-10-CM

## 2020-07-31 PROCEDURE — 99214 OFFICE O/P EST MOD 30 MIN: CPT | Performed by: INTERNAL MEDICINE

## 2020-07-31 RX ORDER — BACLOFEN 10 MG/1
10 TABLET ORAL 3 TIMES DAILY PRN
COMMUNITY
End: 2020-10-02 | Stop reason: SDUPTHER

## 2020-07-31 RX ORDER — OMEPRAZOLE 40 MG/1
40 CAPSULE, DELAYED RELEASE ORAL DAILY
Qty: 90 CAPSULE | Refills: 1 | Status: ON HOLD | OUTPATIENT
Start: 2020-07-31 | End: 2022-02-10

## 2020-08-01 LAB
BACTERIA SPEC AEROBE CULT: NORMAL
BACTERIA SPEC AEROBE CULT: NORMAL

## 2020-08-16 NOTE — PROGRESS NOTES
Subjective   Gideon Fields is a 49 y.o. female.     Pt has had gradually worsening cough, SOA, chest tightness  She went to ED, was tested for covid  But was not given any other treatment  She was discharged home  And called here the next day bc she was feeling worse  Pt was prescribed steroid taper, hydromet  And advised to follow up today  But go back to ER if worsens in meantime    Pt has improved - less SOA  Less cough, less chest tightness  And voice is starting to come back  She was starting to have panic attacks bc she couldn't breathe  But that is improving as well    However, in the meantime  She's had more trouble with acid reflux       The following portions of the patient's history were reviewed and updated as appropriate: allergies, current medications, past family history, past medical history, past social history, past surgical history and problem list.    Review of Systems   Constitutional: Negative for fatigue and fever.   HENT: Negative for congestion, ear pain, rhinorrhea and sore throat.    Eyes: Negative for blurred vision and itching.   Respiratory: Negative for cough and shortness of breath.    Cardiovascular: Negative for chest pain and palpitations.   Gastrointestinal: Negative for abdominal pain, diarrhea and vomiting.   Endocrine: Negative for polydipsia and polyuria.   Genitourinary: Negative for dysuria, frequency, hematuria and urgency.   Musculoskeletal: Negative for joint swelling and myalgias.   Skin: Negative for rash and skin lesions.   Neurological: Negative for dizziness, numbness and headache.   Psychiatric/Behavioral: Negative for depressed mood. The patient is not nervous/anxious.          Current Outpatient Medications:   •  albuterol sulfate  (90 Base) MCG/ACT inhaler, Inhale 2 puffs Every 4 (Four) Hours As Needed for Wheezing., Disp: 1 inhaler, Rfl: 0  •  baclofen (LIORESAL) 10 MG tablet, Take 10 mg by mouth 3 (Three) Times a Day As Needed for Muscle Spasms., Disp: ,  Rfl:   •  busPIRone (BUSPAR) 5 MG tablet, Take 5 mg by mouth 2 (Two) Times a Day As Needed., Disp: , Rfl:   •  DULoxetine (CYMBALTA) 60 MG capsule, TAKE 1 CAPSULE BY MOUTH DAILY, Disp: 90 capsule, Rfl: 0  •  HYDROcodone-acetaminophen (NORCO) 7.5-325 MG per tablet, Take 1 tablet by mouth Every 6 (Six) Hours As Needed for Moderate Pain ., Disp: 20 tablet, Rfl: 0  •  HYDROcodone-homatropine (HYCODAN) 5-1.5 MG/5ML syrup, Take 5 mL by mouth Every 8 (Eight) Hours As Needed for Cough., Disp: 240 mL, Rfl: 0  •  predniSONE (DELTASONE) 10 MG tablet, 40mg daily x 4 days, 30mg daily x 4 days, 20mg daily x 4 days, 10mg daily x 4 days, Disp: 40 tablet, Rfl: 0  •  TRI-SPRINTEC 0.18/0.215/0.25 MG-35 MCG per tablet, Take 1 tablet by mouth Daily., Disp: , Rfl: 4  •  omeprazole (PrilOSEC) 40 MG capsule, Take 1 capsule by mouth Daily., Disp: 90 capsule, Rfl: 1  No current facility-administered medications for this visit.     Objective   /84 (BP Location: Left arm, Patient Position: Sitting, Cuff Size: Adult)   Pulse 94   Temp 97.3 °F (36.3 °C) (Temporal)   Resp 18   Wt 78.8 kg (173 lb 12.8 oz)   LMP 07/20/2020   SpO2 96%   BMI 28.05 kg/m²   Physical Exam   Constitutional: She is oriented to person, place, and time. She appears well-developed and well-nourished. No distress.   HENT:   Head: Normocephalic and atraumatic.   Right Ear: External ear normal.   Left Ear: External ear normal.   Mouth/Throat: Oropharynx is clear and moist. No oropharyngeal exudate.   Eyes: Conjunctivae and EOM are normal. Right eye exhibits no discharge. Left eye exhibits no discharge. No scleral icterus.   Neck: Normal range of motion. Neck supple. No thyromegaly present.   Cardiovascular: Normal rate, regular rhythm and normal heart sounds. Exam reveals no gallop and no friction rub.   No murmur heard.  Pulmonary/Chest: Effort normal and breath sounds normal. No respiratory distress. She has no wheezes. She has no rales.   Abdominal: Soft. Bowel  sounds are normal. She exhibits no distension. There is no tenderness. There is no guarding.   Musculoskeletal: Normal range of motion. She exhibits no edema or deformity.   Lymphadenopathy:     She has no cervical adenopathy.   Neurological: She is alert and oriented to person, place, and time. No cranial nerve deficit.   Skin: Skin is warm and dry. No rash noted. She is not diaphoretic. No erythema.   Psychiatric: She has a normal mood and affect. Her behavior is normal. Thought content normal.   Vitals reviewed.        Assessment/Plan   Problems Addressed this Visit        Respiratory    Asthma exacerbation - Primary      Other Visit Diagnoses     Gastroesophageal reflux disease, esophagitis presence not specified        Relevant Medications    omeprazole (PrilOSEC) 40 MG capsule    Preventative health care        Relevant Orders    CBC Auto Differential    Comprehensive Metabolic Panel    Lipid Panel    TSH    Vitamin D 25 Hydroxy    Vitamin D deficiency        Relevant Orders    Vitamin D 25 Hydroxy        Pt is improving  Continue albuterol prn  Finish steroids  rx prilosec - gerd may be because of steroids  Pt is due for physical - would like to get labs done today since she has been fasting         Procedures

## 2020-08-18 ENCOUNTER — TELEPHONE (OUTPATIENT)
Dept: FAMILY MEDICINE CLINIC | Facility: CLINIC | Age: 50
End: 2020-08-18

## 2020-08-18 NOTE — TELEPHONE ENCOUNTER
GENE CALLED IN AND STATED SHE SAY HER 2 WEEKS AGO AND GIVEN A PRESCRIPTION FOR A STEROID AND SYMPTOMS ARE BACK  COUGH , SHORTNESS IN BREATH , SORE THROAT AND WANTED TO KNOW IF SHE COULD GET AN ANTIBIOTIC  OR IF SHE NEEDS ANOTHER STEROID .       SENT TO [facility] #[id number], [place] - [address] AT SEC OF [facility] [address]  - [phone]  - [phone]   [phone]      PATIENT CALL BACK [phone]

## 2020-08-20 ENCOUNTER — TELEPHONE (OUTPATIENT)
Dept: FAMILY MEDICINE CLINIC | Facility: CLINIC | Age: 50
End: 2020-08-20

## 2020-08-20 ENCOUNTER — E-VISIT (OUTPATIENT)
Dept: FAMILY MEDICINE CLINIC | Facility: CLINIC | Age: 50
End: 2020-08-20

## 2020-08-20 DIAGNOSIS — J45.41 MODERATE PERSISTENT ASTHMA WITH EXACERBATION: Primary | ICD-10-CM

## 2020-08-20 PROCEDURE — 99423 OL DIG E/M SVC 21+ MIN: CPT | Performed by: INTERNAL MEDICINE

## 2020-08-20 RX ORDER — MONTELUKAST SODIUM 10 MG/1
10 TABLET ORAL NIGHTLY
Qty: 90 TABLET | Refills: 0 | Status: SHIPPED | OUTPATIENT
Start: 2020-08-20 | End: 2020-10-02 | Stop reason: SDUPTHER

## 2020-08-20 RX ORDER — PREDNISONE 10 MG/1
TABLET ORAL
Qty: 40 TABLET | Refills: 0 | Status: SHIPPED | OUTPATIENT
Start: 2020-08-20 | End: 2020-10-02

## 2020-08-20 RX ORDER — BUDESONIDE AND FORMOTEROL FUMARATE DIHYDRATE 160; 4.5 UG/1; UG/1
2 AEROSOL RESPIRATORY (INHALATION)
Qty: 10.2 G | Refills: 1 | Status: SHIPPED | OUTPATIENT
Start: 2020-08-20 | End: 2020-08-20

## 2020-08-20 NOTE — PROGRESS NOTES
GENE CALLED IN AND STATED SHE SAY HER 2 WEEKS AGO AND GIVEN A PRESCRIPTION FOR A STEROID AND SYMPTOMS ARE BACK  COUGH , SHORTNESS IN BREATH , SORE THROAT AND WANTED TO KNOW IF SHE COULD GET AN ANTIBIOTIC  OR IF SHE NEEDS ANOTHER STEROID .         SENT TO [facility] #[id number] - [place], [place] - [address] AT SEC OF [place] [address] & [facility] [address]  - [phone]  - [phone]   [phone]        PATIENT CALL BACK [phone]                     1:12 PM   Cara Almanza RegSched Rep routed this conversation to [facility]               2:06 PM   Elizabeth Maldonado MA routed this conversation to Me    August 20, 2020   Me           9:13 AM   Note      Addended by: CAL BROOKS on: 8/20/2020 09:13 AM       Modules accepted: Orders        Me   to Ciara Cornelius RegSched Rep          9:14 AM   Note      Let's try steroids since her last episode was an asthma exacerbation.  Also, i'm going to start her on singulair and symbicort to see if that will help prevent asthma exacerbations.  She may not need to be on those meds forever, but for right now, if they work, would be better than what she's going through now.  Make sure to brush teeth/tongue after using symbicort inhaler.

## 2020-08-20 NOTE — TELEPHONE ENCOUNTER
Patient said her insurance is not covering the Symbicort without a PA and even then she said it would be too expensive.  Over $200 and she cannot do that.  Is there a different inhaler you can call in?  She has Proventil that she is using.

## 2020-08-21 NOTE — TELEPHONE ENCOUNTER
Gave message to patient at 9:10am.  She said it is still over $100.  She is going to try to find a coupon for it today.  Otherwise, she might have to go without.

## 2020-08-28 ENCOUNTER — TELEPHONE (OUTPATIENT)
Dept: FAMILY MEDICINE CLINIC | Facility: CLINIC | Age: 50
End: 2020-08-28

## 2020-08-28 RX ORDER — BENZONATATE 200 MG/1
200 CAPSULE ORAL 3 TIMES DAILY PRN
Qty: 30 CAPSULE | Refills: 0 | Status: SHIPPED | OUTPATIENT
Start: 2020-08-28 | End: 2020-10-02

## 2020-08-28 RX ORDER — AZITHROMYCIN 250 MG/1
TABLET, FILM COATED ORAL
Qty: 6 TABLET | Refills: 0 | Status: SHIPPED | OUTPATIENT
Start: 2020-08-28 | End: 2020-10-02

## 2020-08-28 NOTE — TELEPHONE ENCOUNTER
PATIENT IS CALLING BACK IN SHE STATES THAT DOCTOR TODD USUALLY PRESCRIBES THE HYDROMET COUGH SYRUP FOR HER.    THIS IS WHAT SEEMS TO HELP THE COUGHING. SHE MEANT TO SPECIFY THIS WHEN SHE CALLED EARLIER.    CAN SOMEONE PLEASE CALL THIS IN TO HER PHARMACY.    CALLBACK NUMBER IS:  [phone]       CONFIRMED PHARMACY:  [facility] #[id number] [place] [address] [address] AT SEC OF [facility] [address] [facility] [address]  - [phone] [facility] [phone] FX

## 2020-08-28 NOTE — TELEPHONE ENCOUNTER
Tell them we called in an antibiotic and a cough pill, if not improving will need to see her physician

## 2020-09-30 ENCOUNTER — TELEPHONE (OUTPATIENT)
Dept: FAMILY MEDICINE CLINIC | Facility: CLINIC | Age: 50
End: 2020-09-30

## 2020-10-01 RX ORDER — DULOXETIN HYDROCHLORIDE 60 MG/1
CAPSULE, DELAYED RELEASE ORAL
Qty: 90 CAPSULE | Refills: 0 | Status: SHIPPED | OUTPATIENT
Start: 2020-10-01 | End: 2021-01-03

## 2020-10-02 ENCOUNTER — OFFICE VISIT (OUTPATIENT)
Dept: FAMILY MEDICINE CLINIC | Facility: CLINIC | Age: 50
End: 2020-10-02

## 2020-10-02 ENCOUNTER — LAB (OUTPATIENT)
Dept: LAB | Facility: HOSPITAL | Age: 50
End: 2020-10-02

## 2020-10-02 ENCOUNTER — HOSPITAL ENCOUNTER (OUTPATIENT)
Dept: CT IMAGING | Facility: HOSPITAL | Age: 50
Discharge: HOME | End: 2020-10-02

## 2020-10-02 VITALS
SYSTOLIC BLOOD PRESSURE: 138 MMHG | DIASTOLIC BLOOD PRESSURE: 80 MMHG | OXYGEN SATURATION: 98 % | HEIGHT: 66 IN | RESPIRATION RATE: 16 BRPM | BODY MASS INDEX: 28.12 KG/M2 | TEMPERATURE: 96.6 F | WEIGHT: 175 LBS | HEART RATE: 97 BPM

## 2020-10-02 DIAGNOSIS — M51.27 LUMBAGO-SCIATICA DUE TO DISPLACEMENT OF LUMBAR INTERVERTEBRAL DISC: ICD-10-CM

## 2020-10-02 DIAGNOSIS — F33.1 MODERATE EPISODE OF RECURRENT MAJOR DEPRESSIVE DISORDER (HCC): ICD-10-CM

## 2020-10-02 DIAGNOSIS — M54.16 LUMBAR BACK PAIN WITH RADICULOPATHY AFFECTING LEFT LOWER EXTREMITY: ICD-10-CM

## 2020-10-02 DIAGNOSIS — M54.32 LEFT SIDED SCIATICA: ICD-10-CM

## 2020-10-02 DIAGNOSIS — J45.41 MODERATE PERSISTENT ASTHMA WITH EXACERBATION: ICD-10-CM

## 2020-10-02 DIAGNOSIS — Z00.00 PREVENTATIVE HEALTH CARE: ICD-10-CM

## 2020-10-02 DIAGNOSIS — R53.82 CHRONIC FATIGUE: ICD-10-CM

## 2020-10-02 DIAGNOSIS — F41.9 ANXIETY: ICD-10-CM

## 2020-10-02 DIAGNOSIS — R05.9 COUGH: ICD-10-CM

## 2020-10-02 DIAGNOSIS — R05.9 COUGH: Primary | ICD-10-CM

## 2020-10-02 LAB
BASOPHILS # BLD AUTO: 0.05 10*3/MM3 (ref 0–0.2)
BASOPHILS NFR BLD AUTO: 0.5 % (ref 0–1.5)
CHROMATIN AB SERPL-ACNC: <10 IU/ML (ref 0–14)
CRP SERPL-MCNC: 0.44 MG/DL (ref 0–0.5)
D-LACTATE SERPL-SCNC: 1.2 MMOL/L (ref 0.5–2)
DEPRECATED RDW RBC AUTO: 43.5 FL (ref 37–54)
EOSINOPHIL # BLD AUTO: 0.39 10*3/MM3 (ref 0–0.4)
EOSINOPHIL NFR BLD AUTO: 4.2 % (ref 0.3–6.2)
ERYTHROCYTE [DISTWIDTH] IN BLOOD BY AUTOMATED COUNT: 13.5 % (ref 12.3–15.4)
ERYTHROCYTE [SEDIMENTATION RATE] IN BLOOD: 40 MM/HR (ref 0–20)
HCT VFR BLD AUTO: 38.6 % (ref 34–46.6)
HGB BLD-MCNC: 13 G/DL (ref 12–15.9)
IMM GRANULOCYTES # BLD AUTO: 0.03 10*3/MM3 (ref 0–0.05)
IMM GRANULOCYTES NFR BLD AUTO: 0.3 % (ref 0–0.5)
LYMPHOCYTES # BLD AUTO: 2.03 10*3/MM3 (ref 0.7–3.1)
LYMPHOCYTES NFR BLD AUTO: 22.1 % (ref 19.6–45.3)
MCH RBC QN AUTO: 29.5 PG (ref 26.6–33)
MCHC RBC AUTO-ENTMCNC: 33.7 G/DL (ref 31.5–35.7)
MCV RBC AUTO: 87.7 FL (ref 79–97)
MONOCYTES # BLD AUTO: 0.68 10*3/MM3 (ref 0.1–0.9)
MONOCYTES NFR BLD AUTO: 7.4 % (ref 5–12)
NEUTROPHILS NFR BLD AUTO: 6.02 10*3/MM3 (ref 1.7–7)
NEUTROPHILS NFR BLD AUTO: 65.5 % (ref 42.7–76)
NRBC BLD AUTO-RTO: 0 /100 WBC (ref 0–0.2)
PLATELET # BLD AUTO: 295 10*3/MM3 (ref 140–450)
PMV BLD AUTO: 12.8 FL (ref 6–12)
PROCALCITONIN SERPL-MCNC: 0.02 NG/ML (ref 0–0.25)
RBC # BLD AUTO: 4.4 10*6/MM3 (ref 3.77–5.28)
WBC # BLD AUTO: 9.2 10*3/MM3 (ref 3.4–10.8)

## 2020-10-02 PROCEDURE — 86235 NUCLEAR ANTIGEN ANTIBODY: CPT

## 2020-10-02 PROCEDURE — 86431 RHEUMATOID FACTOR QUANT: CPT

## 2020-10-02 PROCEDURE — 84145 PROCALCITONIN (PCT): CPT

## 2020-10-02 PROCEDURE — 36415 COLL VENOUS BLD VENIPUNCTURE: CPT

## 2020-10-02 PROCEDURE — 86140 C-REACTIVE PROTEIN: CPT

## 2020-10-02 PROCEDURE — C9803 HOPD COVID-19 SPEC COLLECT: HCPCS

## 2020-10-02 PROCEDURE — 71250 CT THORAX DX C-: CPT

## 2020-10-02 PROCEDURE — 83605 ASSAY OF LACTIC ACID: CPT

## 2020-10-02 PROCEDURE — 85652 RBC SED RATE AUTOMATED: CPT

## 2020-10-02 PROCEDURE — 86225 DNA ANTIBODY NATIVE: CPT

## 2020-10-02 PROCEDURE — 85025 COMPLETE CBC W/AUTO DIFF WBC: CPT

## 2020-10-02 PROCEDURE — U0004 COV-19 TEST NON-CDC HGH THRU: HCPCS

## 2020-10-02 PROCEDURE — 86038 ANTINUCLEAR ANTIBODIES: CPT

## 2020-10-02 PROCEDURE — 86200 CCP ANTIBODY: CPT

## 2020-10-02 PROCEDURE — 99214 OFFICE O/P EST MOD 30 MIN: CPT | Performed by: INTERNAL MEDICINE

## 2020-10-02 PROCEDURE — 83516 IMMUNOASSAY NONANTIBODY: CPT

## 2020-10-02 RX ORDER — DULOXETIN HYDROCHLORIDE 30 MG/1
30 CAPSULE, DELAYED RELEASE ORAL DAILY
Qty: 90 CAPSULE | Refills: 0 | Status: SHIPPED | OUTPATIENT
Start: 2020-10-02 | End: 2020-10-15

## 2020-10-02 RX ORDER — BACLOFEN 10 MG/1
10 TABLET ORAL 3 TIMES DAILY PRN
Qty: 90 TABLET | Refills: 0 | Status: SHIPPED | OUTPATIENT
Start: 2020-10-02 | End: 2021-03-02 | Stop reason: SDUPTHER

## 2020-10-02 RX ORDER — HYDROCODONE BITARTRATE AND ACETAMINOPHEN 7.5; 325 MG/1; MG/1
1 TABLET ORAL EVERY 6 HOURS PRN
Qty: 20 TABLET | Refills: 0 | Status: SHIPPED | OUTPATIENT
Start: 2020-10-02 | End: 2021-01-13

## 2020-10-02 RX ORDER — MONTELUKAST SODIUM 10 MG/1
10 TABLET ORAL NIGHTLY
Qty: 90 TABLET | Refills: 0 | Status: SHIPPED | OUTPATIENT
Start: 2020-10-02 | End: 2021-03-02 | Stop reason: SDUPTHER

## 2020-10-02 NOTE — PROGRESS NOTES
Subjective   Gene KAREN Fields is a 49 y.o. female.     Pt is here for follow up of cough  She was last seen here in July  Did better with steroids, but then cough came back  She was given another round of steroids, advair, and singulair  And still had cough, so was then sent in a zpak  hydromet does help  Tessalon perles does not help    She had a clear cxr in the past  No hemoptysis    She has a sister and a half sister with autoimmune diseases  And so she is worried that she has developed one as well    Her current medications:  Vit d  Vit c  Multivitamin  Zyrtec  singulair  Omeprazole  Duloxetine  Inhaler  hydromet  Does have albuterol neb    Unfortunately, pt's  was laid off  And her last day of ins is 11/1  Her mood has not been great - would like to increase duloxetine    No fever  No chest pain - does still have tightness  Does still have wheezing  Good PO intake  Trying to stay hydrated    She hasn't been having as much trouble with back pain  bc steroids have helped  But would like refill to have in case it does flare, and if she doesn't have ins during this time       The following portions of the patient's history were reviewed and updated as appropriate: allergies, current medications, past family history, past medical history, past social history, past surgical history and problem list.    Review of Systems   Constitutional: Positive for activity change and fatigue. Negative for fever.   HENT: Negative for congestion, ear pain, rhinorrhea and sore throat.    Eyes: Negative for blurred vision and itching.   Respiratory: Positive for cough, shortness of breath and wheezing.    Cardiovascular: Negative for chest pain and palpitations.   Gastrointestinal: Negative for abdominal pain, diarrhea and vomiting.   Endocrine: Negative for polydipsia and polyuria.   Genitourinary: Negative for dysuria, frequency, hematuria and urgency.   Musculoskeletal: Negative for joint swelling and myalgias.   Skin: Positive  "for rash. Negative for skin lesions.   Neurological: Negative for dizziness, numbness and headache.   Psychiatric/Behavioral: Negative for depressed mood. The patient is not nervous/anxious.          Current Outpatient Medications:   •  albuterol sulfate  (90 Base) MCG/ACT inhaler, Inhale 2 puffs Every 4 (Four) Hours As Needed for Wheezing., Disp: 1 inhaler, Rfl: 0  •  baclofen (LIORESAL) 10 MG tablet, Take 10 mg by mouth 3 (Three) Times a Day As Needed for Muscle Spasms., Disp: , Rfl:   •  busPIRone (BUSPAR) 5 MG tablet, Take 5 mg by mouth 2 (Two) Times a Day As Needed., Disp: , Rfl:   •  DULoxetine (CYMBALTA) 60 MG capsule, TAKE 1 CAPSULE BY MOUTH DAILY, Disp: 90 capsule, Rfl: 0  •  fluticasone-salmeterol (Advair Diskus) 250-50 MCG/DOSE DISKUS, Inhale 1 puff 2 (Two) Times a Day., Disp: 60 each, Rfl: 2  •  HYDROcodone-acetaminophen (NORCO) 7.5-325 MG per tablet, Take 1 tablet by mouth Every 6 (Six) Hours As Needed for Moderate Pain ., Disp: 20 tablet, Rfl: 0  •  HYDROcodone-homatropine (HYCODAN) 5-1.5 MG/5ML syrup, Take 5 mL by mouth Every 8 (Eight) Hours As Needed for Cough., Disp: 240 mL, Rfl: 0  •  montelukast (Singulair) 10 MG tablet, Take 1 tablet by mouth Every Night., Disp: 90 tablet, Rfl: 0  •  omeprazole (PrilOSEC) 40 MG capsule, Take 1 capsule by mouth Daily., Disp: 90 capsule, Rfl: 1  •  TRI-SPRINTEC 0.18/0.215/0.25 MG-35 MCG per tablet, Take 1 tablet by mouth Daily., Disp: , Rfl: 4    Objective   /80 (BP Location: Left arm, Patient Position: Sitting, Cuff Size: Adult)   Pulse 97   Temp 96.6 °F (35.9 °C) (Temporal)   Resp 16   Ht 167.6 cm (66\")   Wt 79.4 kg (175 lb)   LMP 10/01/2020 (Exact Date)   SpO2 98%   Breastfeeding No   BMI 28.25 kg/m²   Physical Exam  Vitals signs reviewed.   Constitutional:       General: She is not in acute distress.     Appearance: She is well-developed. She is not diaphoretic.   HENT:      Head: Normocephalic and atraumatic.      Right Ear: Tympanic " membrane, ear canal and external ear normal.      Left Ear: Tympanic membrane, ear canal and external ear normal.      Nose: Nose normal.      Mouth/Throat:      Pharynx: No oropharyngeal exudate.   Eyes:      General: No scleral icterus.        Right eye: No discharge.         Left eye: No discharge.      Conjunctiva/sclera: Conjunctivae normal.   Neck:      Musculoskeletal: Normal range of motion and neck supple.      Thyroid: No thyromegaly.   Cardiovascular:      Rate and Rhythm: Normal rate and regular rhythm.      Heart sounds: Normal heart sounds. No murmur. No friction rub. No gallop.    Pulmonary:      Effort: Pulmonary effort is normal. No respiratory distress.      Breath sounds: Normal breath sounds. No wheezing or rales.   Musculoskeletal: Normal range of motion.         General: No deformity.   Lymphadenopathy:      Cervical: No cervical adenopathy.   Skin:     General: Skin is warm and dry.      Findings: No erythema or rash.   Neurological:      Mental Status: She is alert and oriented to person, place, and time.      Cranial Nerves: No cranial nerve deficit.   Psychiatric:         Behavior: Behavior normal.         Thought Content: Thought content normal.           Assessment/Plan   Problems Addressed this Visit        Respiratory    Asthma exacerbation    Relevant Medications    montelukast (Singulair) 10 MG tablet    HYDROcodone-homatropine (HYCODAN) 5-1.5 MG/5ML syrup       Nervous and Auditory    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    Relevant Orders    C-reactive Protein (Completed)    Sedimentation Rate (Completed)    Cyclic Citrul Peptide Antibody, IgG / IgA    Rheumatoid Factor (Completed)    VENKATA       Other    Anxiety    Depression    Relevant Medications    DULoxetine (CYMBALTA) 30 MG capsule    Fatigue    Relevant Orders    C-reactive Protein (Completed)    Sedimentation Rate (Completed)    Cyclic Citrul Peptide Antibody, IgG / IgA    Rheumatoid Factor  (Completed)    VENKATA    CT Chest Without Contrast (Completed)      Other Visit Diagnoses     Cough    -  Primary    Relevant Medications    HYDROcodone-homatropine (HYCODAN) 5-1.5 MG/5ML syrup    Other Relevant Orders    C-reactive Protein (Completed)    Sedimentation Rate (Completed)    Lactic Acid, Plasma (Completed)    Procalcitonin (Completed)    COVID-19,[facility], NP SWAB IN [facility] VIRAL TRANSPORT MEDIA 24-30 HR TAT - Swab, Nasopharynx    CT Chest Without Contrast (Completed)    Lumbar back pain with radiculopathy affecting left lower extremity        Relevant Medications    HYDROcodone-acetaminophen (NORCO) 7.5-325 MG per tablet    Left sided sciatica        Relevant Medications    HYDROcodone-acetaminophen (NORCO) 7.5-325 MG per tablet      Diagnoses       Codes Comments    Cough    -  Primary ICD-10-CM: R05  ICD-9-CM: 786.2     Chronic fatigue     ICD-10-CM: R53.82  ICD-9-CM: 780.79     Moderate persistent asthma with exacerbation     ICD-10-CM: J45.41  ICD-9-CM: 493.92     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     ICD-10-CM: M51.27  ICD-9-CM: 722.10     Lumbar back pain with radiculopathy affecting left lower extremity     ICD-10-CM: M54.16  ICD-9-CM: 724.4     Left sided sciatica     ICD-10-CM: M54.32  ICD-9-CM: 724.3     Moderate episode of recurrent major depressive disorder (CMS/HCC)     ICD-10-CM: F33.1  ICD-9-CM: 296.32     Anxiety     ICD-10-CM: F41.9  ICD-9-CM: 300.00         Check labs  Check CT  Refill hydromet and singulair  Increase duloxetine to 90mg  Refill hydrocodone and baclofen  May need to refer pt to pulm  I do not think this is autoimmune related  I would have expected prednisone to help more  If that were the case         Procedures

## 2020-10-03 LAB — SARS-COV-2 RNA NOSE QL NAA+PROBE: NOT DETECTED

## 2020-10-04 LAB — CCP IGA+IGG SERPL IA-ACNC: 3 UNITS (ref 0–19)

## 2020-10-05 ENCOUNTER — TELEPHONE (OUTPATIENT)
Dept: FAMILY MEDICINE CLINIC | Facility: CLINIC | Age: 50
End: 2020-10-05

## 2020-10-05 DIAGNOSIS — R91.1 LUNG NODULE: ICD-10-CM

## 2020-10-05 DIAGNOSIS — J45.909 ASTHMA, UNSPECIFIED ASTHMA SEVERITY, UNSPECIFIED WHETHER COMPLICATED, UNSPECIFIED WHETHER PERSISTENT: ICD-10-CM

## 2020-10-05 DIAGNOSIS — R05.3 CHRONIC COUGH: Primary | ICD-10-CM

## 2020-10-05 LAB — ANA SER QL: POSITIVE

## 2020-10-05 NOTE — TELEPHONE ENCOUNTER
SPOKE WITH PT. SHE SAID IT IS HARD TO GET IN AND HER INSURANCE IS ABOUT TO BE OUT. SHE REALLY HAD QUESTIONS REGARDING LAB RESULTS. I TOLD HER SHE STILL HAD 1 PENDING AND AS SOON AS YOU MADE NOTES ON IT I WOULD CALL WITH THE RESULTS. SHE VERBALIZED UNDERSTANDING. INFORMED DR. BROOKS VERBALLY

## 2020-10-05 NOTE — TELEPHONE ENCOUNTER
PATIENT CALLED STATING THAT SHE SAW THE RESULTS, VIA [employer], OF THE BLOOD WORK AND THE CT SCAN OF HER CHEST THAT SHE HAD PERFORMED LAST FRIDAY (10/2/20), AND SHE IS PARTICULARLY CONCERNED ABOUT SOME OF THE THINGS SHE SAW AND WOULD LIKE A CALL BACK FROM DR. BROOKS AS SOON AS POSSIBLE TO FURTHER DISCUSS THESE RESULTS.    THE PATIENT STATED THAT SHE IS ESPECIALLY CONCERNED ABOUT THE RESULTS OF HER CHEST CT, WHICH SHE SAW HAD A NOTATION ON IT THAT A BRONCHOSCOPY WITH A TISSUE SAMPLE IS RECOMMENDED.    PLEASE CALL THE PATIENT [phone] WHEN THIS MESSAGE HAS BEEN RECEIVED AND ADVISE.

## 2020-10-05 NOTE — TELEPHONE ENCOUNTER
I already added a result note to the CT - please convey those results  I am waiting for the labs to all come back so I have a better idea of what is going on  If she wants to talk with me personally, please have her make another appt

## 2020-10-06 NOTE — TELEPHONE ENCOUNTER
PATIENT CALLED COMPLAINING OF PRODUCTIVE COUGH FOR A MONTH, TRIED  STEROIDS BUT DON'T SEEM TO WORK. IF COULD BE POSSIBLE TO PRESCRIBE AN ANTIBIOTIC AND SOMETHING ELSE TO HELP WITH COUGH    [id number] [address] AT [facility] & [address] - [phone] [facility] [phone] FX          no

## 2020-10-07 LAB
CENTROMERE B AB SER-ACNC: <0.2 AI (ref 0–0.9)
CHROMATIN AB SERPL-ACNC: 0.3 AI (ref 0–0.9)
DSDNA AB SER-ACNC: 2 IU/ML (ref 0–9)
ENA JO1 AB SER-ACNC: <0.2 AI (ref 0–0.9)
ENA RNP AB SER-ACNC: <0.2 AI (ref 0–0.9)
ENA SCL70 AB SER-ACNC: <0.2 AI (ref 0–0.9)
ENA SM AB SER-ACNC: <0.2 AI (ref 0–0.9)
ENA SM+RNP AB SER-ACNC: <0.2 AI (ref 0–0.9)
ENA SS-A AB SER-ACNC: <0.2 AI (ref 0–0.9)
ENA SS-B AB SER-ACNC: <0.2 AI (ref 0–0.9)
Lab: NORMAL
RIBOSOMAL P AB SER-ACNC: <0.2 AI (ref 0–0.9)

## 2020-10-07 NOTE — TELEPHONE ENCOUNTER
I literally just got the final results of her labs.  I added a result note to it already.  Yes, she should be concerned about the CT and so needs to be seen within the next few months.  I would encourage her to call a few times a week for cancellations and also apply for a different ins, possibly through marketplace ASAP

## 2020-10-07 NOTE — TELEPHONE ENCOUNTER
Patient called back today since she didn't hear back from us.  She does not need to speak to you directly since she was just seen and your next available is not until November.  She would like to know how concerned she should be about the CT results?  Cannot get in to see Pulmonologist until November. Also does she need to do anything about her abnormal blood test?  Emily can call her back at [phone].

## 2020-10-08 ENCOUNTER — PATIENT MESSAGE (OUTPATIENT)
Dept: FAMILY MEDICINE CLINIC | Facility: CLINIC | Age: 50
End: 2020-10-08

## 2020-10-08 ENCOUNTER — TELEPHONE (OUTPATIENT)
Dept: FAMILY MEDICINE CLINIC | Facility: CLINIC | Age: 50
End: 2020-10-08

## 2020-10-08 DIAGNOSIS — R91.1 LUNG NODULE: Primary | ICD-10-CM

## 2020-10-08 NOTE — TELEPHONE ENCOUNTER
----- Message from Gideon Fields sent at 10/8/2020 10:34 AM EDT -----  Regarding: RE:Referral to Pulmonary  Contact: [phone]  I called [place] [facility] appointment desk - they didn't have any notes yet on the call - they need to know which doctor you spoke with and a referral to their office before they can schedule.  Thanks Gideon Fields

## 2020-10-08 NOTE — TELEPHONE ENCOUNTER
I sent the pt a message through the portal this morning, but if you want to also call the patient - I spoke with one of the doctors at [facility], and they'll work her in to be seen soon. I told him about the loss of ins by 11/1. I don't know if that group is in her ins network, but that's the only way I know how to get her seen sooner.

## 2020-10-09 ENCOUNTER — TELEPHONE (OUTPATIENT)
Dept: FAMILY MEDICINE CLINIC | Facility: CLINIC | Age: 50
End: 2020-10-09

## 2020-10-09 NOTE — TELEPHONE ENCOUNTER
Emily, The hospital is the only place that does the whole body scan. And the order has been sent to the hospital scheduling should be calling the patient to schedule. If she wants to call and schedule she can call [phone].  And on her insurance she doesn't need preauthorization for it.                Thanks, Fariha

## 2020-10-09 NOTE — TELEPHONE ENCOUNTER
PATIENT IS CALLING CLARA NEWBY TO GIVE HER A CALL BACK ABOUT  HER PET SCAN THAT SHE NEEDS DONE    PATIENT IS NEEDING TO KNOW WERE THE APPOINTMENT IS SCHEDULE AND WHEN      PATIENT IS ALSO NEEDING TO KNOW IF HER INSURANCE COMPANY WAS CALLED AND IF THE PET SCAN WAS APPROVED AND WHAT IS THE APPROVAL CODE    PATIENT NEEDS THE PET SCAN DONE BEFORE 10-16-20      PLEASE CONTACT @[phone]

## 2020-10-13 ENCOUNTER — HOSPITAL ENCOUNTER (OUTPATIENT)
Dept: PET IMAGING | Facility: HOSPITAL | Age: 50
Discharge: HOME | End: 2020-10-13
Admitting: INTERNAL MEDICINE

## 2020-10-13 DIAGNOSIS — R91.1 LUNG NODULE: ICD-10-CM

## 2020-10-13 PROCEDURE — A9552 F18 FDG: HCPCS | Performed by: INTERNAL MEDICINE

## 2020-10-13 PROCEDURE — 78815 PET IMAGE W/CT SKULL-THIGH: CPT

## 2020-10-13 PROCEDURE — 0 FLUDEOXYGLUCOSE F18 SOLUTION: Performed by: INTERNAL MEDICINE

## 2020-10-13 RX ADMIN — FLUDEOXYGLUCOSE F18 1 DOSE: 300 INJECTION INTRAVENOUS at 11:44

## 2020-10-14 PROBLEM — R91.1 LUNG NODULE SEEN ON IMAGING STUDY: Status: ACTIVE | Noted: 2020-10-02

## 2020-10-15 ENCOUNTER — TELEPHONE (OUTPATIENT)
Dept: FAMILY MEDICINE CLINIC | Facility: CLINIC | Age: 50
End: 2020-10-15

## 2020-10-15 ENCOUNTER — OFFICE VISIT (OUTPATIENT)
Dept: FAMILY MEDICINE CLINIC | Facility: CLINIC | Age: 50
End: 2020-10-15

## 2020-10-15 VITALS
DIASTOLIC BLOOD PRESSURE: 84 MMHG | OXYGEN SATURATION: 99 % | HEIGHT: 66 IN | TEMPERATURE: 97.3 F | RESPIRATION RATE: 18 BRPM | BODY MASS INDEX: 28.25 KG/M2 | SYSTOLIC BLOOD PRESSURE: 148 MMHG | WEIGHT: 175.8 LBS | HEART RATE: 103 BPM

## 2020-10-15 DIAGNOSIS — R91.1 LUNG NODULE: Primary | ICD-10-CM

## 2020-10-15 DIAGNOSIS — F41.9 ANXIETY: ICD-10-CM

## 2020-10-15 DIAGNOSIS — F33.1 MODERATE EPISODE OF RECURRENT MAJOR DEPRESSIVE DISORDER (HCC): ICD-10-CM

## 2020-10-15 DIAGNOSIS — R05.3 CHRONIC COUGH: ICD-10-CM

## 2020-10-15 PROCEDURE — 99214 OFFICE O/P EST MOD 30 MIN: CPT | Performed by: INTERNAL MEDICINE

## 2020-10-15 RX ORDER — VITAMIN B COMPLEX
1000 TABLET ORAL DAILY
COMMUNITY

## 2020-10-15 RX ORDER — DIAZEPAM 2 MG/1
2-4 TABLET ORAL EVERY 12 HOURS PRN
Qty: 30 TABLET | Refills: 1 | Status: SHIPPED | OUTPATIENT
Start: 2020-10-15 | End: 2020-11-03

## 2020-10-15 RX ORDER — MULTIPLE VITAMINS W/ MINERALS TAB 9MG-400MCG
1 TAB ORAL DAILY
COMMUNITY

## 2020-10-15 RX ORDER — ASCORBIC ACID 500 MG
500 TABLET ORAL DAILY
COMMUNITY

## 2020-10-15 NOTE — PROGRESS NOTES
Subjective   Gideon Fields is a 49 y.o. female.     Pt is here for follow up of cough, depression  CT scan showed an endobronchial lesion  13 mm right upper lobe nodule  Pt has appt with pulm tomorrow  Had PET scan earlier this week  Cough has actually improved  No fever or night sweats    Since her last visit, she decided to just stay on cymbalta 60mg  She is going to stay on COBRA so she'll have insurance  She has developed a lot of anxiety and insomnia  Though she actually does feel a little better knowing there's a plan       The following portions of the patient's history were reviewed and updated as appropriate: allergies, current medications, past family history, past medical history, past social history, past surgical history and problem list.    Review of Systems   Constitutional: Negative for fatigue and fever.   HENT: Negative for congestion, ear pain, rhinorrhea and sore throat.    Eyes: Negative for blurred vision and itching.   Respiratory: Positive for cough. Negative for shortness of breath.    Cardiovascular: Negative for chest pain and palpitations.   Gastrointestinal: Negative for abdominal pain, diarrhea and vomiting.   Endocrine: Negative for polydipsia and polyuria.   Genitourinary: Negative for dysuria, frequency, hematuria and urgency.   Musculoskeletal: Negative for joint swelling and myalgias.   Skin: Negative for rash and skin lesions.   Neurological: Negative for dizziness, numbness and headache.   Psychiatric/Behavioral: Positive for sleep disturbance, depressed mood and stress. The patient is nervous/anxious.          Current Outpatient Medications:   •  albuterol sulfate  (90 Base) MCG/ACT inhaler, Inhale 2 puffs Every 4 (Four) Hours As Needed for Wheezing., Disp: 1 inhaler, Rfl: 0  •  baclofen (LIORESAL) 10 MG tablet, Take 1 tablet by mouth 3 (Three) Times a Day As Needed for Muscle Spasms., Disp: 90 tablet, Rfl: 0  •  busPIRone (BUSPAR) 5 MG tablet, Take 5 mg by mouth 2 (Two)  "Times a Day As Needed., Disp: , Rfl:   •  Calcium Acetate, Phos Binder, (CALCIUM ACETATE PO), Take 1 tablet by mouth Daily., Disp: , Rfl:   •  Cholecalciferol (Vitamin D) 25 MCG (1000 UT) tablet, Take 1,000 Units by mouth Daily., Disp: , Rfl:   •  DULoxetine (CYMBALTA) 60 MG capsule, TAKE 1 CAPSULE BY MOUTH DAILY, Disp: 90 capsule, Rfl: 0  •  fluticasone-salmeterol (Advair Diskus) 250-50 MCG/DOSE DISKUS, Inhale 1 puff 2 (Two) Times a Day., Disp: 60 each, Rfl: 2  •  HYDROcodone-acetaminophen (NORCO) 7.5-325 MG per tablet, Take 1 tablet by mouth Every 6 (Six) Hours As Needed for Moderate Pain ., Disp: 20 tablet, Rfl: 0  •  HYDROcodone-homatropine (HYCODAN) 5-1.5 MG/5ML syrup, Take 5 mL by mouth Every 8 (Eight) Hours As Needed for Cough., Disp: 240 mL, Rfl: 0  •  montelukast (Singulair) 10 MG tablet, Take 1 tablet by mouth Every Night., Disp: 90 tablet, Rfl: 0  •  Multiple Vitamins-Minerals (multivitamin with minerals) tablet tablet, Take 1 tablet by mouth Daily., Disp: , Rfl:   •  omeprazole (PrilOSEC) 40 MG capsule, Take 1 capsule by mouth Daily., Disp: 90 capsule, Rfl: 1  •  TRI-SPRINTEC 0.18/0.215/0.25 MG-35 MCG per tablet, Take 1 tablet by mouth Daily., Disp: , Rfl: 4  •  vitamin C (ASCORBIC ACID) 500 MG tablet, Take 500 mg by mouth Daily., Disp: , Rfl:     Objective   /84 (BP Location: Left arm, Patient Position: Sitting, Cuff Size: Adult)   Pulse 103   Temp 97.3 °F (36.3 °C) (Temporal)   Resp 18   Ht 167.6 cm (66\")   Wt 79.7 kg (175 lb 12.8 oz)   LMP 10/01/2020 (Exact Date)   SpO2 99%   Breastfeeding No   BMI 28.37 kg/m²   Physical Exam  Vitals signs reviewed.   Constitutional:       General: She is not in acute distress.     Appearance: She is well-developed. She is not diaphoretic.   HENT:      Head: Normocephalic and atraumatic.      Right Ear: External ear normal.      Left Ear: External ear normal.      Nose: Nose normal.      Mouth/Throat:      Pharynx: No oropharyngeal exudate.   Eyes:      " General: No scleral icterus.        Right eye: No discharge.         Left eye: No discharge.      Conjunctiva/sclera: Conjunctivae normal.   Neck:      Musculoskeletal: Normal range of motion and neck supple.      Thyroid: No thyromegaly.   Cardiovascular:      Rate and Rhythm: Normal rate and regular rhythm.      Heart sounds: Normal heart sounds. No murmur. No friction rub. No gallop.    Pulmonary:      Effort: Pulmonary effort is normal. No respiratory distress.      Breath sounds: Normal breath sounds. No wheezing or rales.   Musculoskeletal: Normal range of motion.         General: No deformity.   Lymphadenopathy:      Cervical: No cervical adenopathy.   Skin:     General: Skin is warm and dry.      Findings: No erythema or rash.   Neurological:      Mental Status: She is alert and oriented to person, place, and time.      Cranial Nerves: No cranial nerve deficit.   Psychiatric:         Behavior: Behavior normal.         Thought Content: Thought content normal.           Assessment/Plan   Problems Addressed this Visit        Other    Anxiety    Relevant Medications    diazePAM (Valium) 2 MG tablet    Depression    Relevant Medications    diazePAM (Valium) 2 MG tablet      Other Visit Diagnoses     Lung nodule    -  Primary    Relevant Orders    Ambulatory Referral to Oncology    Chronic cough          Diagnoses       Codes Comments    Lung nodule    -  Primary ICD-10-CM: R91.1  ICD-9-CM: 793.11     Chronic cough     ICD-10-CM: R05  ICD-9-CM: 786.2     Moderate episode of recurrent major depressive disorder (CMS/HCC)     ICD-10-CM: F33.1  ICD-9-CM: 296.32     Anxiety     ICD-10-CM: F41.9  ICD-9-CM: 300.00           PET scan is suggestive of primary lung malignancy  No metastases  Discussed that PET is not definitive - will need bronch and biopsy  If it is cancer, pt would like to go to [facility]  So will go ahead and place referral  Continue cymbalta  Pt has had valium in the past for dental appts -  will send in Rx for valium to use PRN       Procedures

## 2020-10-16 ENCOUNTER — OFFICE VISIT (OUTPATIENT)
Dept: PULMONOLOGY | Facility: HOSPITAL | Age: 50
End: 2020-10-16

## 2020-10-16 VITALS
HEIGHT: 66 IN | WEIGHT: 175 LBS | HEART RATE: 90 BPM | TEMPERATURE: 98.3 F | OXYGEN SATURATION: 99 % | RESPIRATION RATE: 17 BRPM | DIASTOLIC BLOOD PRESSURE: 78 MMHG | SYSTOLIC BLOOD PRESSURE: 130 MMHG | BODY MASS INDEX: 28.12 KG/M2

## 2020-10-16 DIAGNOSIS — R91.1 NODULE OF UPPER LOBE OF RIGHT LUNG: Primary | ICD-10-CM

## 2020-10-16 PROCEDURE — G0463 HOSPITAL OUTPT CLINIC VISIT: HCPCS

## 2020-10-16 NOTE — PROGRESS NOTES
10/16/2020     Gideon Fields  1970      Chief Complaint   Patient presents with   • Lung Nodule     New Pt (Ref by Dr. Lizarraga)         Subjective   This is a 49-year-old female lifelong non-smoker with a history of asthma who is referred to us for the evaluation of right upper lobe nodule.  Patient apparently has been struggling with a worsening cough for the last 2 months along with mucus.  She denies any fever or chills or any weight loss.  Patient was started on Advair as well as albuterol for worsening of asthma however has not felt any significant relief.  A CT scan of the chest was done on 10/2/2020 that showed a 13 mm right upper lobe nodule appears to be endobronchial without any other concerning nodules or adenopathy or effusion.  Patient had a PET scan that showed FDG of 6.2 SUV involving the above-mentioned nodule with no other uptake.  Patient is a lifelong non-smoker and denies any other personal history of malignancy.        Review of System  General: Denies fevers or chills.  Denies any weakness or fatigue.  Denies any weight loss or weight gain.  HEENT: Denies sore throat, rhinorrhea, ear pain.  Denies any change in vision.   LUNGS: As above.  Denies any hemoptysis.  CARDIAC: Denies any chest pain, palpitations. Denies edema  ABD: Denies any abdominal pain.  Denies any N/V/D  PSYCH: Negative for suicidal ideas. Denies anxiety or depression  All other systems reviewed and negative unless stated in HPI       Objective    Vitals:    10/16/20 1241   BP: 130/78   Pulse: 90   Resp: 17   Temp: 98.3 °F (36.8 °C)   SpO2: 99%       General: NAD, alert and oriented x 4  Cardiac: S1, S2, RRR, no murmur, no edema  Pulmonary: CTA bilaterally, no wheezing   Abdomen: soft, non-tender, non-distended  : Voids independently, no CVA tenderness   Musculoskeletal: Moves all extremities, equal strength bilaterally  Neuro: alert and oriented x 3, CN 2 - 12 grossly intact  Skin: warm, dry, and intact          Current  Outpatient Medications:   •  albuterol sulfate  (90 Base) MCG/ACT inhaler, Inhale 2 puffs Every 4 (Four) Hours As Needed for Wheezing., Disp: 1 inhaler, Rfl: 0  •  baclofen (LIORESAL) 10 MG tablet, Take 1 tablet by mouth 3 (Three) Times a Day As Needed for Muscle Spasms., Disp: 90 tablet, Rfl: 0  •  busPIRone (BUSPAR) 5 MG tablet, Take 5 mg by mouth 2 (Two) Times a Day As Needed., Disp: , Rfl:   •  Calcium Acetate, Phos Binder, (CALCIUM ACETATE PO), Take 1 tablet by mouth Daily., Disp: , Rfl:   •  Cholecalciferol (Vitamin D) 25 MCG (1000 UT) tablet, Take 1,000 Units by mouth Daily., Disp: , Rfl:   •  diazePAM (Valium) 2 MG tablet, Take 1-2 tablets by mouth Every 12 (Twelve) Hours As Needed for Anxiety., Disp: 30 tablet, Rfl: 1  •  DULoxetine (CYMBALTA) 60 MG capsule, TAKE 1 CAPSULE BY MOUTH DAILY, Disp: 90 capsule, Rfl: 0  •  fluticasone-salmeterol (Advair Diskus) 250-50 MCG/DOSE DISKUS, Inhale 1 puff 2 (Two) Times a Day., Disp: 60 each, Rfl: 2  •  HYDROcodone-acetaminophen (NORCO) 7.5-325 MG per tablet, Take 1 tablet by mouth Every 6 (Six) Hours As Needed for Moderate Pain ., Disp: 20 tablet, Rfl: 0  •  HYDROcodone-homatropine (HYCODAN) 5-1.5 MG/5ML syrup, Take 5 mL by mouth Every 8 (Eight) Hours As Needed for Cough., Disp: 240 mL, Rfl: 0  •  montelukast (Singulair) 10 MG tablet, Take 1 tablet by mouth Every Night., Disp: 90 tablet, Rfl: 0  •  Multiple Vitamins-Minerals (multivitamin with minerals) tablet tablet, Take 1 tablet by mouth Daily., Disp: , Rfl:   •  omeprazole (PrilOSEC) 40 MG capsule, Take 1 capsule by mouth Daily., Disp: 90 capsule, Rfl: 1  •  TRI-SPRINTEC 0.18/0.215/0.25 MG-35 MCG per tablet, Take 1 tablet by mouth Daily., Disp: , Rfl: 4  •  vitamin C (ASCORBIC ACID) 500 MG tablet, Take 500 mg by mouth Daily., Disp: , Rfl:     Social History     Socioeconomic History   • Marital status:      Spouse name: Not on file   • Number of children: Not on file   • Years of education: Not on file    • Highest education level: Not on file   Tobacco Use   • Smoking status: Never Smoker   • Smokeless tobacco: Never Used   Substance and Sexual Activity   • Alcohol use: Yes     Comment: Couple times a week   • Drug use: No   • Sexual activity: Defer       Past Medical History:   Diagnosis Date   • Asthma 12/9/2013   • Hyperlipidemia 12/9/2013   • Lumbar radiculopathy 3/12/2018   • Lung nodule seen on imaging study 10/2/2020   • Thoracic back pain 3/12/2018               Diagnoses and all orders for this visit:    1. Nodule of upper lobe of right lung (Primary)  -     Case Request  -     CT chest wo contrast; Future    -Reviewed the CT scan of the chest as well as PET scan with the patient in detail.  Images reviewed with the patient.  [facility] SPN malignant CV risk score is 54.1% which is intermediate probability for malignancy.  At this point would recommend bronchoscopy and biopsy of the nodule under navigational guidance.  Patient is instructed to have a CT scan of the chest under navigational protocol on the day of procedure.  Further management after biopsy results.  All questions answered.    2.  Asthma  -Currently using albuterol as needed and was prescribed Advair by primary care however not taking Advair consistently.  Will evaluate asthma after right upper lobe nodule evaluation is completed    Return to clinic in 2 weeks      Nichol Jaquez MD

## 2020-10-19 ENCOUNTER — LAB (OUTPATIENT)
Dept: LAB | Facility: HOSPITAL | Age: 50
End: 2020-10-19

## 2020-10-19 PROBLEM — R91.1 NODULE OF UPPER LOBE OF RIGHT LUNG: Status: ACTIVE | Noted: 2020-10-19

## 2020-10-19 PROCEDURE — C9803 HOPD COVID-19 SPEC COLLECT: HCPCS

## 2020-10-19 PROCEDURE — U0004 COV-19 TEST NON-CDC HGH THRU: HCPCS

## 2020-10-20 LAB — SARS-COV-2 RNA RESP QL NAA+PROBE: NOT DETECTED

## 2020-10-21 ENCOUNTER — ANESTHESIA EVENT (OUTPATIENT)
Dept: GASTROENTEROLOGY | Facility: HOSPITAL | Age: 50
End: 2020-10-21

## 2020-10-21 ENCOUNTER — ANESTHESIA (OUTPATIENT)
Dept: GASTROENTEROLOGY | Facility: HOSPITAL | Age: 50
End: 2020-10-21

## 2020-10-21 ENCOUNTER — HOSPITAL ENCOUNTER (OUTPATIENT)
Facility: HOSPITAL | Age: 50
Setting detail: HOSPITAL OUTPATIENT SURGERY
Discharge: HOME | End: 2020-10-21
Attending: INTERNAL MEDICINE | Admitting: INTERNAL MEDICINE

## 2020-10-21 ENCOUNTER — HOSPITAL ENCOUNTER (OUTPATIENT)
Dept: CT IMAGING | Facility: HOSPITAL | Age: 50
Discharge: HOME | End: 2020-10-21

## 2020-10-21 ENCOUNTER — HOSPITAL ENCOUNTER (OUTPATIENT)
Dept: GENERAL RADIOLOGY | Facility: HOSPITAL | Age: 50
Discharge: HOME | End: 2020-10-21

## 2020-10-21 VITALS
BODY MASS INDEX: 28.12 KG/M2 | HEIGHT: 66 IN | RESPIRATION RATE: 15 BRPM | SYSTOLIC BLOOD PRESSURE: 126 MMHG | DIASTOLIC BLOOD PRESSURE: 64 MMHG | HEART RATE: 87 BPM | WEIGHT: 175 LBS | OXYGEN SATURATION: 99 % | TEMPERATURE: 97.9 F

## 2020-10-21 DIAGNOSIS — R91.1 NODULE OF UPPER LOBE OF RIGHT LUNG: ICD-10-CM

## 2020-10-21 DIAGNOSIS — Z98.890 S/P BRONCHOSCOPY WITH BRONCHOALVEOLAR LAVAGE: ICD-10-CM

## 2020-10-21 LAB

## 2020-10-21 PROCEDURE — 88172 CYTP DX EVAL FNA 1ST EA SITE: CPT | Performed by: INTERNAL MEDICINE

## 2020-10-21 PROCEDURE — 25010000002 DEXAMETHASONE PER 1 MG: Performed by: ANESTHESIOLOGY

## 2020-10-21 PROCEDURE — 87116 MYCOBACTERIA CULTURE: CPT | Performed by: INTERNAL MEDICINE

## 2020-10-21 PROCEDURE — 71250 CT THORAX DX C-: CPT

## 2020-10-21 PROCEDURE — 88305 TISSUE EXAM BY PATHOLOGIST: CPT | Performed by: INTERNAL MEDICINE

## 2020-10-21 PROCEDURE — 87798 DETECT AGENT NOS DNA AMP: CPT | Performed by: INTERNAL MEDICINE

## 2020-10-21 PROCEDURE — 87252 VIRUS INOCULATION TISSUE: CPT | Performed by: INTERNAL MEDICINE

## 2020-10-21 PROCEDURE — 25010000002 MIDAZOLAM PER 1 MG: Performed by: ANESTHESIOLOGY

## 2020-10-21 PROCEDURE — 25010000002 FENTANYL CITRATE (PF) 250 MCG/5ML SOLUTION: Performed by: ANESTHESIOLOGY

## 2020-10-21 PROCEDURE — 87176 TISSUE HOMOGENIZATION CULTR: CPT | Performed by: INTERNAL MEDICINE

## 2020-10-21 PROCEDURE — 88177 CYTP FNA EVAL EA ADDL: CPT | Performed by: INTERNAL MEDICINE

## 2020-10-21 PROCEDURE — 87496 CYTOMEG DNA AMP PROBE: CPT | Performed by: INTERNAL MEDICINE

## 2020-10-21 PROCEDURE — 88173 CYTOPATH EVAL FNA REPORT: CPT | Performed by: INTERNAL MEDICINE

## 2020-10-21 PROCEDURE — 87102 FUNGUS ISOLATION CULTURE: CPT | Performed by: INTERNAL MEDICINE

## 2020-10-21 PROCEDURE — 25010000002 PROPOFOL 200 MG/20ML EMULSION: Performed by: ANESTHESIOLOGY

## 2020-10-21 PROCEDURE — 87205 SMEAR GRAM STAIN: CPT | Performed by: INTERNAL MEDICINE

## 2020-10-21 PROCEDURE — 87071 CULTURE AEROBIC QUANT OTHER: CPT | Performed by: INTERNAL MEDICINE

## 2020-10-21 PROCEDURE — 87206 SMEAR FLUORESCENT/ACID STAI: CPT | Performed by: INTERNAL MEDICINE

## 2020-10-21 PROCEDURE — 94640 AIRWAY INHALATION TREATMENT: CPT

## 2020-10-21 PROCEDURE — 0100U HC BIOFIRE FILMARRAY RESP PANEL 2: CPT | Performed by: INTERNAL MEDICINE

## 2020-10-21 PROCEDURE — 25010000002 ONDANSETRON PER 1 MG: Performed by: ANESTHESIOLOGY

## 2020-10-21 PROCEDURE — 87070 CULTURE OTHR SPECIMN AEROBIC: CPT | Performed by: INTERNAL MEDICINE

## 2020-10-21 PROCEDURE — 88108 CYTOPATH CONCENTRATE TECH: CPT | Performed by: INTERNAL MEDICINE

## 2020-10-21 PROCEDURE — 76000 FLUOROSCOPY <1 HR PHYS/QHP: CPT

## 2020-10-21 RX ORDER — LIDOCAINE 50 MG/G
OINTMENT TOPICAL AS NEEDED
Status: DISCONTINUED | OUTPATIENT
Start: 2020-10-21 | End: 2020-10-21 | Stop reason: HOSPADM

## 2020-10-21 RX ORDER — MIDAZOLAM HYDROCHLORIDE 1 MG/ML
INJECTION INTRAMUSCULAR; INTRAVENOUS AS NEEDED
Status: DISCONTINUED | OUTPATIENT
Start: 2020-10-21 | End: 2020-10-21 | Stop reason: SURG

## 2020-10-21 RX ORDER — EPINEPHRINE 0.1 MG/ML
SYRINGE (ML) INJECTION
Status: DISCONTINUED
Start: 2020-10-21 | End: 2020-10-21 | Stop reason: WASHOUT

## 2020-10-21 RX ORDER — SODIUM CHLORIDE 9 MG/ML
1000 INJECTION, SOLUTION INTRAVENOUS CONTINUOUS
Status: DISCONTINUED | OUTPATIENT
Start: 2020-10-21 | End: 2020-10-21 | Stop reason: HOSPADM

## 2020-10-21 RX ORDER — SODIUM CHLORIDE 0.9 % (FLUSH) 0.9 %
10 SYRINGE (ML) INJECTION AS NEEDED
Status: DISCONTINUED | OUTPATIENT
Start: 2020-10-21 | End: 2020-10-21 | Stop reason: HOSPADM

## 2020-10-21 RX ORDER — OXYCODONE HYDROCHLORIDE 5 MG/1
5 TABLET ORAL ONCE AS NEEDED
Status: DISCONTINUED | OUTPATIENT
Start: 2020-10-21 | End: 2020-10-21 | Stop reason: HOSPADM

## 2020-10-21 RX ORDER — FENTANYL CITRATE 50 UG/ML
50 INJECTION, SOLUTION INTRAMUSCULAR; INTRAVENOUS
Status: DISCONTINUED | OUTPATIENT
Start: 2020-10-21 | End: 2020-10-21 | Stop reason: HOSPADM

## 2020-10-21 RX ORDER — MAGNESIUM HYDROXIDE 1200 MG/15ML
LIQUID ORAL
Status: DISCONTINUED
Start: 2020-10-21 | End: 2020-10-21 | Stop reason: HOSPADM

## 2020-10-21 RX ORDER — LIDOCAINE 50 MG/G
OINTMENT TOPICAL
Status: DISCONTINUED
Start: 2020-10-21 | End: 2020-10-21 | Stop reason: HOSPADM

## 2020-10-21 RX ORDER — ROCURONIUM BROMIDE 10 MG/ML
INJECTION, SOLUTION INTRAVENOUS AS NEEDED
Status: DISCONTINUED | OUTPATIENT
Start: 2020-10-21 | End: 2020-10-21 | Stop reason: SURG

## 2020-10-21 RX ORDER — ONDANSETRON 2 MG/ML
4 INJECTION INTRAMUSCULAR; INTRAVENOUS ONCE AS NEEDED
Status: COMPLETED | OUTPATIENT
Start: 2020-10-21 | End: 2020-10-21

## 2020-10-21 RX ORDER — PROPOFOL 10 MG/ML
INJECTION, EMULSION INTRAVENOUS AS NEEDED
Status: DISCONTINUED | OUTPATIENT
Start: 2020-10-21 | End: 2020-10-21 | Stop reason: SURG

## 2020-10-21 RX ORDER — LIDOCAINE HYDROCHLORIDE 20 MG/ML
INJECTION, SOLUTION EPIDURAL; INFILTRATION; INTRACAUDAL; PERINEURAL AS NEEDED
Status: DISCONTINUED | OUTPATIENT
Start: 2020-10-21 | End: 2020-10-21 | Stop reason: SURG

## 2020-10-21 RX ORDER — LIDOCAINE HYDROCHLORIDE 10 MG/ML
INJECTION, SOLUTION EPIDURAL; INFILTRATION; INTRACAUDAL; PERINEURAL
Status: DISCONTINUED
Start: 2020-10-21 | End: 2020-10-21 | Stop reason: WASHOUT

## 2020-10-21 RX ORDER — DIPHENHYDRAMINE HYDROCHLORIDE 50 MG/ML
12.5 INJECTION INTRAMUSCULAR; INTRAVENOUS
Status: DISCONTINUED | OUTPATIENT
Start: 2020-10-21 | End: 2020-10-21 | Stop reason: HOSPADM

## 2020-10-21 RX ORDER — DEXAMETHASONE SODIUM PHOSPHATE 4 MG/ML
INJECTION, SOLUTION INTRA-ARTICULAR; INTRALESIONAL; INTRAMUSCULAR; INTRAVENOUS; SOFT TISSUE AS NEEDED
Status: DISCONTINUED | OUTPATIENT
Start: 2020-10-21 | End: 2020-10-21 | Stop reason: SURG

## 2020-10-21 RX ORDER — FENTANYL CITRATE 50 UG/ML
INJECTION, SOLUTION INTRAMUSCULAR; INTRAVENOUS AS NEEDED
Status: DISCONTINUED | OUTPATIENT
Start: 2020-10-21 | End: 2020-10-21 | Stop reason: SURG

## 2020-10-21 RX ORDER — IPRATROPIUM BROMIDE AND ALBUTEROL SULFATE 2.5; .5 MG/3ML; MG/3ML
3 SOLUTION RESPIRATORY (INHALATION) ONCE
Status: COMPLETED | OUTPATIENT
Start: 2020-10-21 | End: 2020-10-21

## 2020-10-21 RX ADMIN — FENTANYL CITRATE 100 MCG: 50 INJECTION INTRAMUSCULAR; INTRAVENOUS at 12:11

## 2020-10-21 RX ADMIN — IPRATROPIUM BROMIDE AND ALBUTEROL SULFATE 3 ML: .5; 3 SOLUTION RESPIRATORY (INHALATION) at 14:15

## 2020-10-21 RX ADMIN — PROPOFOL 30 MG: 10 INJECTION, EMULSION INTRAVENOUS at 12:13

## 2020-10-21 RX ADMIN — ROCURONIUM BROMIDE 40 MG: 10 INJECTION, SOLUTION INTRAVENOUS at 12:12

## 2020-10-21 RX ADMIN — LIDOCAINE HYDROCHLORIDE 80 MG: 20 INJECTION, SOLUTION EPIDURAL; INFILTRATION; INTRACAUDAL; PERINEURAL at 12:12

## 2020-10-21 RX ADMIN — PROPOFOL 120 MG: 10 INJECTION, EMULSION INTRAVENOUS at 12:12

## 2020-10-21 RX ADMIN — DEXAMETHASONE SODIUM PHOSPHATE 4 MG: 4 INJECTION, SOLUTION INTRAMUSCULAR; INTRAVENOUS at 12:37

## 2020-10-21 RX ADMIN — GLYCOPYRROLATE 0.4 MG: 0.2 INJECTION, SOLUTION INTRAMUSCULAR; INTRAVITREAL at 12:45

## 2020-10-21 RX ADMIN — MIDAZOLAM 2 MG: 1 INJECTION INTRAMUSCULAR; INTRAVENOUS at 12:11

## 2020-10-21 RX ADMIN — PROPOFOL 50 MG: 10 INJECTION, EMULSION INTRAVENOUS at 12:19

## 2020-10-21 RX ADMIN — ONDANSETRON 4 MG: 2 INJECTION INTRAMUSCULAR; INTRAVENOUS at 13:16

## 2020-10-21 NOTE — OP NOTE
Pre-op Diagnosis: Nodule of upper lobe of right lung [R91.1]    Post-op Diagnosis: Nodule of upper lobe of right lung [R91.1]    Surgeon: Nichol Jaquez MD    Anesthesia: General anesthesia    Operation: Flexible fiberoptic bronchoscopy, diagnostic BAL, biopsy right upper lobe under navigational guidance     Findings: See below    Specimen:   Right upper lobe FNA  Right upper lobe BAL    Estimated Blood Loss: Minimal      Complications: None immediately    Indications and History:  The patient with right upper lobe nodule diagnosed as part of work-up of chronic cough.  The risks, benefits, complications, treatment options and expected outcomes were discussed with the patient and informed consent was obtained. The possibilities of reaction to medication, pulmonary aspiration, pneumothorax, bleeding, respiratory failure and failure to diagnose a condition and creating a complication requiring transfusion or operation were discussed with the patient who freely signed the consent.      Description of Procedure:  The navigational mapping was completed prior to the procedure using navigational software.    After the induction of anesthesia, the bronchoscope was passed through the endotracheal tube . The scope was then passed into the trachea. Careful inspection of the tracheal lumen was accomplished.     An entire bronchial exam was performed including the right and left bronchi with the following findings:     Endobronchial findings:   Trachea: Normal mucosa  Deidre: Normal mucosa  Right main bronchus: Normal mucosa  Right upper lobe bronchus: Mildly edematous mucosa with clear secretions that were suctioned clear.  Easy to bleed and suctioning.  No endobronchial lesions noted.  Right middle lobe bronchus: Normal mucosa.  Increased secretions that were suctioned clear.  Mucosa easy to bleed.  Right lower lobe bronchus: Normal mucosa.  Clear secretions were noted that were suctioned clear.  Left main bronchus: Normal  mucosa.  Creations were noted in the left mainstem that were suctioned clear.  Left upper lobe bronchus: Edematous mucosa which is easy to bleed.  Left lower lobe bronchus: Mildly edematous mucosa which is easy to bleed.      After inspection the navigational probe was inserted.  Virtual registration was completed in all segments.  After completion of registration the locating guide was advanced following the navigational directions until the target was reached. ( It was technically difficult due to acute angle in the apical segment).  Radial EBUS probe was inserted and was noted to have some abnormal mucosa at the target site.  Using size 19 FNA needle, multiple biopsies were obtained under fluoroscopy.  The specimen was sent for cell block as well as cultures.  After biopsy specimen was obtained a bronchoalveolar lavage was done at right upper lobe by instilling total of 90 cc normal saline and 30 cc of bloody return was obtained.    Any residual blood was suctioned clear and the scope was removed.    The Patient tolerated the procedure well. There were no immediate complications.    THIS DOCUMENT HAS BEEN ELECTRONICALLY SIGNED BY  Nichol Jaquez MD  13:50 EDT

## 2020-10-21 NOTE — H&P
PULMONARY/ CRITICAL CARE/ SLEEP MEDICINE ADMISSION H&P NOTE        Patient Name:  Gideon Fields    :  1970    Medical Record:  [id number]    PRIMARY CARE PHYSICIAN     Kenyetta Lizarraga MD [facility]OPI  Gideon Fields is a 49 y.o. female who was referred for PET positive RUL nodule.   Patient is a lifelong non-smoker.  Patient has a history of asthma.      REVIEW OF SYSTEMS    Constitutional:  Denies fever or chills   Eyes:  Denies change in visual acuity   HENT:  Denies nasal congestion or sore throat   Respiratory:  Denies cough or shortness of breath   Cardiovascular:  Denies chest pain or edema   GI:  Denies abdominal pain, nausea, vomiting, bloody stools or diarrhea   :  Denies dysuria   Musculoskeletal:  Denies back pain or joint pain   Integument:  Denies rash   Neurologic:  Denies headache, focal weakness or sensory changes   Endocrine:  Denies polyuria or polydipsia   Lymphatic:  Denies swollen glands   Psychiatric:  Denies depression or anxiety     MEDICAL HISTORY    Past Medical History:   Diagnosis Date   • Anxiety    • Asthma 2013   • Lumbar radiculopathy 3/12/2018   • Lung nodule seen on imaging study 10/2/2020   • Thoracic back pain 3/12/2018        SURGICAL HISTORY    Past Surgical History:   Procedure Laterality Date   •  SECTION     • NASAL SEPTUM SURGERY      20 Years Ago        FAMILY HISTORY    Family History   Problem Relation Age of Onset   • Hypertension Mother    • Lung cancer Father    • Lupus Sister    • Asthma Other        SOCIAL HISTORY    Social History     Tobacco Use   • Smoking status: Never Smoker   • Smokeless tobacco: Never Used   Substance Use Topics   • Alcohol use: Yes     Comment: Couple times a week        ALLERGIES    No Known Allergies    MEDICATIONS    Scheduled Meds:  Continuous Infusions:No current facility-administered medications for this encounter.     PRN Meds:.      PHYSICAL EXAM    tMax 24 hrs:  Temp (24hrs), Av.9 °F (36.6 °C), Min:97.9 °F  (36.6 °C), Max:97.9 °F (36.6 °C)    Vitals Ranges:  Temp:  [97.9 °F (36.6 °C)] 97.9 °F (36.6 °C)  Heart Rate:  [93] 93  Resp:  [17] 17  BP: (130)/(55) 130/55  Intake and Output Last 3 Shifts:  No intake/output data recorded.    Constitutional:  Well developed, well nourished, no acute distress, non-toxic appearance   Eyes:  PERRL, conjunctivae normal   HENT:  Atraumatic, external ears normal, nose normal, oropharynx moist, no pharyngeal exudates. Neck- normal range of motion, no tenderness, supple   Respiratory:  No respiratory distress, normal breath sounds, no rales, no wheezing   Cardiovascular:  Normal rate, normal rhythm, no murmurs, no gallops, no rubs   GI:  Soft, nondistended, normal bowel sounds, nontender, no organomegaly, no mass, no rebound, no guarding   :  No costovertebral angle tenderness   Musculoskeletal:  No edema, no tenderness, no deformities. Back- no tenderness  Integument:  Well hydrated, no rash   Lymphatic:  No lymphadenopathy noted   Neurologic:  Alert & oriented x 3, CN 2-12 normal, normal motor function, normal sensory function, no focal deficits noted   Psychiatric:  Speech and behavior appropriate     LABS       Microbiology Results (last 10 days)     Procedure Component Value - Date/Time    COVID PRE-OP / PRE-PROCEDURE SCREENING ORDER (NO ISOLATION) - Swab, Nasopharynx [[id number]] Collected: 10/19/20 1432    Lab Status: Final result Specimen: Swab from Nasopharynx Updated: 10/20/20 1258    Narrative:      The following orders were created for panel order COVID PRE-OP / PRE-PROCEDURE SCREENING ORDER (NO ISOLATION) - Swab, Nasopharynx.  Procedure                               Abnormality         Status                     ---------                               -----------         ------                     DAJAID-YOLETTE Alston, NP ...[[id number]]                      Final result                 Please view results for these tests on the individual orders.    ALFREDO-YOLETTE Alston, NP  SWAB IN LEXAR VIRAL TRANSPORT MEDIA 24-30 HR TAT - Swab, Nasopharynx [[id number]] Collected: 10/19/20 1432    Lab Status: Final result Specimen: Swab from Nasopharynx Updated: 10/20/20 1258     SARS-CoV-2 MEDHAT Not Detected         CBC        BMP        CMP       TROPONIN        CoAg        Creatinine Clearance  CrCl cannot be calculated (Patient's most recent lab result is older than the maximum 30 days allowed.).    ABG      IMAGING & OTHER STUDIES    Imaging Results (Last 72 Hours)     ** No results found for the last 72 hours. **          ASSESSMENT      Nodule of upper lobe of right lung      1.  Right upper lobe solitary nodule with SPN malignancy score in the intermediate range  2.  Asthma    PLAN    See orders. The plan was discussed with the patient and/or family.   We will proceed with navigational guided bronchoscopy for the biopsy of the right upper lobe nodule today.  All questions answered.    THIS DOCUMENT HAS BEEN ELECTRONICALLY SIGNED BY  Nichol Jaquez MD  11:49 EDT

## 2020-10-21 NOTE — ANESTHESIA POSTPROCEDURE EVALUATION
Patient: Gideon Fields    Procedure Summary     Date: 10/21/20 Room / Location: [facility] ENDOSCOPY 2 / [facility] ENDOSCOPY    Anesthesia Start: 1210 Anesthesia Stop: 1354    Procedure: BRONCHOSCOPY WITH BRONCHOALVEOLAR LAVAGE, NAVIGATION WITH FINE NEEDLE ASPIRATION (N/A Bronchus) Diagnosis:       Nodule of upper lobe of right lung      (Nodule of upper lobe of right lung [R91.1])    Surgeon: Nichol Jaquez MD Provider: Danny Cardoso MD    Anesthesia Type: general ASA Status: 3          Anesthesia Type: general    Vitals  Vitals Value Taken Time   /53 10/21/20 1430   Temp 97.9 °F (36.6 °C) 10/21/20 1359   Pulse 81 10/21/20 1430   Resp 12 10/21/20 1430   SpO2 100 % 10/21/20 1430           Post Anesthesia Care and Evaluation    Patient location during evaluation: bedside  Patient participation: complete - patient participated  Level of consciousness: awake  Pain scale: See nurse's notes for pain score.  Pain management: adequate  Airway patency: patent  Anesthetic complications: No anesthetic complications  PONV Status: none  Cardiovascular status: acceptable  Respiratory status: acceptable  Hydration status: acceptable    Comments: Patient seen and examined postoperatively; vital signs stable; SpO2 greater than or equal to 90%; cardiopulmonary status stable; nausea/vomiting adequately controlled; pain adequately controlled; no apparent anesthesia complications; patient discharged from anesthesia care when discharge criteria were met

## 2020-10-21 NOTE — DISCHARGE INSTRUCTIONS
Do not drink alcohol, drive, operate any heavy machinery or power tools, or make any important/legal decisions for the next 24 hours.    Call you doctor immediately if you experience severe chest pain, shortness of breath, bleeding or coughing up blood, or fever over 101 F.    Diet: Nothing by mouth until      After a bronchoscopy, you may experience a scratchy throat. This will gradually get better. You may gargle with warm salt water for this after the time noted above is over.     A responsible adult should stay with you and you should rest quietly for the rest of the day. Follow up with MD as instructed.     No food or drink until 3:50pm

## 2020-10-21 NOTE — ANESTHESIA PREPROCEDURE EVALUATION
Anesthesia Evaluation     Patient summary reviewed and Nursing notes reviewed   NPO Solid Status: > 8 hours  NPO Liquid Status: > 8 hours           Airway   Mallampati: II  Comment: Small mouth, slightly short chin, decreased ROM AO joint  Dental      Pulmonary - normal exam   (+) asthma,recent URI,     ROS comment: Pulmonary nodule  Cardiovascular     Rhythm: regular  Rate: normal    (+) hyperlipidemia,       Neuro/Psych  (+) dizziness/light headedness, numbness,       ROS Comment: Lumbar radiculopathy  GI/Hepatic/Renal/Endo      Musculoskeletal     Abdominal    Substance History       Comment: narc-tolerant   OB/GYN          Other                        Anesthesia Plan    ASA 3     general     intravenous induction     Anesthetic plan, all risks, benefits, and alternatives have been provided, discussed and informed consent has been obtained with: patient.

## 2020-10-21 NOTE — ANESTHESIA PROCEDURE NOTES
Airway  Urgency: elective    Date/Time: 10/21/2020 12:14 PM  End Time:10/21/2020 12:15 PM    General Information and Staff    Patient location during procedure: OR    Indications and Patient Condition  Indications for airway management: airway protection    Preoxygenated: yes  Mask difficulty assessment: 1 - vent by mask    Final Airway Details  Final airway type: endotracheal airway      Successful airway: ETT    Successful intubation technique: direct laryngoscopy  Facilitating devices/methods: intubating stylet and cricoid pressure  Endotracheal tube insertion site: oral  Blade: Demar  Blade size: 3  ETT size (mm): 8.5  Cormack-Lehane Classification: grade IIb - view of arytenoids or posterior of glottis only  Measured from: lips  Number of attempts at approach: 1  Assessment: lips, teeth, and gum same as pre-op and atraumatic intubation

## 2020-10-22 LAB
LAB AP CASE REPORT: NORMAL
LAB AP CASE REPORT: NORMAL
Lab: NORMAL
PATH REPORT.FINAL DX SPEC: NORMAL
PATH REPORT.FINAL DX SPEC: NORMAL
PATH REPORT.GROSS SPEC: NORMAL
PATH REPORT.GROSS SPEC: NORMAL

## 2020-10-23 LAB
BACTERIA SPEC AEROBE CULT: NORMAL
GRAM STN SPEC: NORMAL

## 2020-10-24 LAB
BACTERIA SPEC AEROBE CULT: NORMAL
GRAM STN SPEC: NORMAL
GRAM STN SPEC: NORMAL

## 2020-10-26 ENCOUNTER — TELEPHONE (OUTPATIENT)
Dept: PULMONOLOGY | Facility: HOSPITAL | Age: 50
End: 2020-10-26

## 2020-10-26 LAB
P JIROVECII DNA # SPEC NAA+PROBE: NEGATIVE {COPIES}/ML
SPECIMEN SOURCE: NORMAL

## 2020-10-26 NOTE — TELEPHONE ENCOUNTER
LM on pt's vm to return call.  Biopsy showed no evidence of malignancy.  Culture results are preliminary, but as of now, no infection/fungus identified.    Patient returned call, discussed above results.  She will keep scheduled f/up appt.

## 2020-10-27 DIAGNOSIS — R91.1 LUNG NODULE: Primary | ICD-10-CM

## 2020-10-27 LAB
CMV DNA SPEC QL NAA+PROBE: NEGATIVE
SPECIMEN SOURCE: NORMAL

## 2020-10-30 LAB — VIRUS SPEC CULT: NORMAL

## 2020-11-03 ENCOUNTER — TELEPHONE (OUTPATIENT)
Dept: FAMILY MEDICINE CLINIC | Facility: CLINIC | Age: 50
End: 2020-11-03

## 2020-11-03 DIAGNOSIS — F41.9 ANXIETY: Primary | ICD-10-CM

## 2020-11-03 RX ORDER — DIAZEPAM 5 MG/1
5 TABLET ORAL DAILY PRN
Qty: 30 TABLET | Refills: 1 | Status: ON HOLD | OUTPATIENT
Start: 2020-11-03 | End: 2022-02-11

## 2020-11-03 RX ORDER — ONDANSETRON 4 MG/1
4 TABLET, FILM COATED ORAL EVERY 8 HOURS PRN
Qty: 20 TABLET | Refills: 1 | Status: ON HOLD | OUTPATIENT
Start: 2020-11-03 | End: 2022-02-10

## 2020-11-03 NOTE — TELEPHONE ENCOUNTER
We can increase valium to 5mg once a day prn and send in zofran  Just let me know if she's ok with that and i'll send in Rxs

## 2020-11-03 NOTE — TELEPHONE ENCOUNTER
Patient wanted to advise she is seeing the surgeon for the spot on her lung on 11/9/20. Would like someone to call her back to discuss taking an increased dose of Valium and possibly see about taking something for nausea.    Callback# [phone]

## 2020-11-17 ENCOUNTER — TRANSCRIBE ORDERS (OUTPATIENT)
Dept: ADMINISTRATIVE | Facility: HOSPITAL | Age: 50
End: 2020-11-17

## 2020-11-17 DIAGNOSIS — Z01.818 OTHER SPECIFIED PRE-OPERATIVE EXAMINATION: Primary | ICD-10-CM

## 2020-11-17 DIAGNOSIS — R91.1 LUNG NODULE: Primary | ICD-10-CM

## 2020-11-18 DIAGNOSIS — J45.41 MODERATE PERSISTENT ASTHMA WITH EXACERBATION: ICD-10-CM

## 2020-11-18 DIAGNOSIS — R05.9 COUGH: ICD-10-CM

## 2020-11-18 LAB
FUNGUS WND CULT: NORMAL
FUNGUS WND CULT: NORMAL

## 2020-12-02 LAB
MYCOBACTERIUM SPEC CULT: NORMAL
MYCOBACTERIUM SPEC CULT: NORMAL
NIGHT BLUE STAIN TISS: NORMAL
NIGHT BLUE STAIN TISS: NORMAL

## 2020-12-04 ENCOUNTER — APPOINTMENT (OUTPATIENT)
Dept: LAB | Facility: HOSPITAL | Age: 50
End: 2020-12-04

## 2020-12-07 ENCOUNTER — APPOINTMENT (OUTPATIENT)
Dept: RESPIRATORY THERAPY | Facility: HOSPITAL | Age: 50
End: 2020-12-07

## 2021-01-03 RX ORDER — DULOXETIN HYDROCHLORIDE 60 MG/1
CAPSULE, DELAYED RELEASE ORAL
Qty: 90 CAPSULE | Refills: 0 | Status: SHIPPED | OUTPATIENT
Start: 2021-01-03 | End: 2021-01-13

## 2021-01-13 ENCOUNTER — OFFICE VISIT (OUTPATIENT)
Dept: FAMILY MEDICINE CLINIC | Facility: CLINIC | Age: 51
End: 2021-01-13

## 2021-01-13 ENCOUNTER — HOSPITAL ENCOUNTER (OUTPATIENT)
Dept: GENERAL RADIOLOGY | Facility: HOSPITAL | Age: 51
Discharge: HOME | End: 2021-01-13

## 2021-01-13 ENCOUNTER — TRANSCRIBE ORDERS (OUTPATIENT)
Dept: ADMINISTRATIVE | Facility: HOSPITAL | Age: 51
End: 2021-01-13

## 2021-01-13 VITALS
SYSTOLIC BLOOD PRESSURE: 120 MMHG | DIASTOLIC BLOOD PRESSURE: 67 MMHG | WEIGHT: 163 LBS | TEMPERATURE: 96.9 F | HEIGHT: 65 IN | HEART RATE: 116 BPM | RESPIRATION RATE: 17 BRPM | OXYGEN SATURATION: 98 % | BODY MASS INDEX: 27.16 KG/M2

## 2021-01-13 DIAGNOSIS — F33.1 MODERATE EPISODE OF RECURRENT MAJOR DEPRESSIVE DISORDER (HCC): ICD-10-CM

## 2021-01-13 DIAGNOSIS — Z80.1 FHX: LUNG CANCER: Primary | ICD-10-CM
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
## 2021-01-13 DIAGNOSIS — M54.6 ACUTE RIGHT-SIDED THORACIC BACK PAIN: ICD-10-CM

## 2021-01-13 DIAGNOSIS — Z90.2 HISTORY OF LOBECTOMY OF LUNG: Primary | ICD-10-CM

## 2021-01-13 PROCEDURE — 71100 X-RAY EXAM RIBS UNI 2 VIEWS: CPT

## 2021-01-13 PROCEDURE — 73502 X-RAY EXAM HIP UNI 2-3 VIEWS: CPT

## 2021-01-13 PROCEDURE — 99214 OFFICE O/P EST MOD 30 MIN: CPT | Performed by: INTERNAL MEDICINE

## 2021-01-13 RX ORDER — DULOXETIN HYDROCHLORIDE 60 MG/1
60 CAPSULE, DELAYED RELEASE ORAL 2 TIMES DAILY
Qty: 180 CAPSULE | Refills: 2 | Status: SHIPPED | OUTPATIENT
Start: 2021-01-13 | End: 2021-03-02 | Stop reason: SDUPTHER

## 2021-02-01 ENCOUNTER — HOSPITAL ENCOUNTER (EMERGENCY)
Facility: HOSPITAL | Age: 51
Discharge: HOME | End: 2021-02-02
Admitting: EMERGENCY MEDICINE

## 2021-02-01 DIAGNOSIS — R10.9 RIGHT FLANK PAIN: Primary | ICD-10-CM

## 2021-02-01 PROCEDURE — 85025 COMPLETE CBC W/AUTO DIFF WBC: CPT | Performed by: NURSE PRACTITIONER

## 2021-02-01 PROCEDURE — 99283 EMERGENCY DEPT VISIT LOW MDM: CPT

## 2021-02-01 PROCEDURE — 80053 COMPREHEN METABOLIC PANEL: CPT | Performed by: NURSE PRACTITIONER

## 2021-02-01 RX ORDER — ONDANSETRON 2 MG/ML
4 INJECTION INTRAMUSCULAR; INTRAVENOUS ONCE
Status: COMPLETED | OUTPATIENT
Start: 2021-02-01 | End: 2021-02-02

## 2021-02-01 RX ORDER — HYDROMORPHONE HCL 110MG/55ML
0.5 PATIENT CONTROLLED ANALGESIA SYRINGE INTRAVENOUS ONCE
Status: COMPLETED | OUTPATIENT
Start: 2021-02-01 | End: 2021-02-02

## 2021-02-01 RX ORDER — LIDOCAINE 50 MG/G
1 PATCH TOPICAL ONCE
Status: DISCONTINUED | OUTPATIENT
Start: 2021-02-01 | End: 2021-02-02 | Stop reason: HOSPADM

## 2021-02-01 RX ORDER — SODIUM CHLORIDE 0.9 % (FLUSH) 0.9 %
10 SYRINGE (ML) INJECTION AS NEEDED
Status: DISCONTINUED | OUTPATIENT
Start: 2021-02-01 | End: 2021-02-02 | Stop reason: HOSPADM

## 2021-02-02 ENCOUNTER — APPOINTMENT (OUTPATIENT)
Dept: CT IMAGING | Facility: HOSPITAL | Age: 51
End: 2021-02-02

## 2021-02-02 VITALS
BODY MASS INDEX: 26.22 KG/M2 | TEMPERATURE: 98 F | WEIGHT: 163.14 LBS | DIASTOLIC BLOOD PRESSURE: 54 MMHG | HEART RATE: 84 BPM | RESPIRATION RATE: 16 BRPM | SYSTOLIC BLOOD PRESSURE: 118 MMHG | HEIGHT: 66 IN | OXYGEN SATURATION: 98 %

## 2021-02-02 LAB
ALBUMIN SERPL-MCNC: 4.1 G/DL (ref 3.5–5.2)
ALBUMIN/GLOB SERPL: 1.2 G/DL
ALP SERPL-CCNC: 286 U/L (ref 39–117)
ALT SERPL W P-5'-P-CCNC: 20 U/L (ref 1–33)
ANION GAP SERPL CALCULATED.3IONS-SCNC: 11 MMOL/L (ref 5–15)
AST SERPL-CCNC: 22 U/L (ref 1–32)
B-HCG UR QL: NEGATIVE
BASOPHILS # BLD AUTO: 0.1 10*3/MM3 (ref 0–0.2)
BASOPHILS NFR BLD AUTO: 1.3 % (ref 0–1.5)
BILIRUB SERPL-MCNC: 0.3 MG/DL (ref 0–1.2)
BILIRUB UR QL STRIP: ABNORMAL
BUN SERPL-MCNC: 10 MG/DL (ref 6–20)
BUN/CREAT SERPL: 14.1 (ref 7–25)
CALCIUM SPEC-SCNC: 9.3 MG/DL (ref 8.6–10.5)
CHLORIDE SERPL-SCNC: 100 MMOL/L (ref 98–107)
CLARITY UR: CLEAR
CO2 SERPL-SCNC: 26 MMOL/L (ref 22–29)
COLOR UR: ABNORMAL
CREAT SERPL-MCNC: 0.71 MG/DL (ref 0.57–1)
DEPRECATED RDW RBC AUTO: 41.1 FL (ref 37–54)
EOSINOPHIL # BLD AUTO: 1 10*3/MM3 (ref 0–0.4)
EOSINOPHIL NFR BLD AUTO: 11.1 % (ref 0.3–6.2)
ERYTHROCYTE [DISTWIDTH] IN BLOOD BY AUTOMATED COUNT: 13.4 % (ref 12.3–15.4)
GFR SERPL CREATININE-BSD FRML MDRD: 87 ML/MIN/1.73
GLOBULIN UR ELPH-MCNC: 3.5 GM/DL
GLUCOSE SERPL-MCNC: 119 MG/DL (ref 65–99)
GLUCOSE UR STRIP-MCNC: NEGATIVE MG/DL
HCT VFR BLD AUTO: 38.2 % (ref 34–46.6)
HGB BLD-MCNC: 12.9 G/DL (ref 12–15.9)
HGB UR QL STRIP.AUTO: NEGATIVE
HOLD SPECIMEN: NORMAL
KETONES UR QL STRIP: ABNORMAL
LEUKOCYTE ESTERASE UR QL STRIP.AUTO: NEGATIVE
LYMPHOCYTES # BLD AUTO: 2 10*3/MM3 (ref 0.7–3.1)
LYMPHOCYTES NFR BLD AUTO: 21.9 % (ref 19.6–45.3)
MCH RBC QN AUTO: 29.4 PG (ref 26.6–33)
MCHC RBC AUTO-ENTMCNC: 33.7 G/DL (ref 31.5–35.7)
MCV RBC AUTO: 87.3 FL (ref 79–97)
MONOCYTES # BLD AUTO: 0.6 10*3/MM3 (ref 0.1–0.9)
MONOCYTES NFR BLD AUTO: 7.2 % (ref 5–12)
NEUTROPHILS NFR BLD AUTO: 5.3 10*3/MM3 (ref 1.7–7)
NEUTROPHILS NFR BLD AUTO: 58.5 % (ref 42.7–76)
NITRITE UR QL STRIP: NEGATIVE
NRBC BLD AUTO-RTO: 0.1 /100 WBC (ref 0–0.2)
PH UR STRIP.AUTO: 6 [PH] (ref 5–8)
PLATELET # BLD AUTO: 329 10*3/MM3 (ref 140–450)
PMV BLD AUTO: 9.2 FL (ref 6–12)
POTASSIUM SERPL-SCNC: 3.9 MMOL/L (ref 3.5–5.2)
PROT SERPL-MCNC: 7.6 G/DL (ref 6–8.5)
PROT UR QL STRIP: NEGATIVE
RBC # BLD AUTO: 4.38 10*6/MM3 (ref 3.77–5.28)
SODIUM SERPL-SCNC: 137 MMOL/L (ref 136–145)
SP GR UR STRIP: 1.03 (ref 1–1.03)
UROBILINOGEN UR QL STRIP: ABNORMAL
WBC # BLD AUTO: 9 10*3/MM3 (ref 3.4–10.8)

## 2021-02-02 PROCEDURE — 96375 TX/PRO/DX INJ NEW DRUG ADDON: CPT

## 2021-02-02 PROCEDURE — 81003 URINALYSIS AUTO W/O SCOPE: CPT | Performed by: NURSE PRACTITIONER

## 2021-02-02 PROCEDURE — 25010000002 ONDANSETRON PER 1 MG: Performed by: NURSE PRACTITIONER

## 2021-02-02 PROCEDURE — 96374 THER/PROPH/DIAG INJ IV PUSH: CPT

## 2021-02-02 PROCEDURE — 81025 URINE PREGNANCY TEST: CPT | Performed by: NURSE PRACTITIONER

## 2021-02-02 PROCEDURE — 0 IOPAMIDOL PER 1 ML: Performed by: NURSE PRACTITIONER

## 2021-02-02 PROCEDURE — 25010000002 HYDROMORPHONE PER 4 MG: Performed by: NURSE PRACTITIONER

## 2021-02-02 PROCEDURE — 74177 CT ABD & PELVIS W/CONTRAST: CPT

## 2021-02-02 RX ORDER — LIDOCAINE 50 MG/G
1 PATCH TOPICAL EVERY 24 HOURS
Qty: 6 EACH | Refills: 0 | Status: SHIPPED | OUTPATIENT
Start: 2021-02-02 | End: 2022-11-11

## 2021-02-02 RX ORDER — HYDROCODONE BITARTRATE AND ACETAMINOPHEN 7.5; 325 MG/1; MG/1
1 TABLET ORAL EVERY 6 HOURS PRN
Qty: 10 TABLET | Refills: 0 | Status: SHIPPED | OUTPATIENT
Start: 2021-02-02 | End: 2021-05-06 | Stop reason: SDUPTHER

## 2021-02-02 RX ADMIN — ONDANSETRON 4 MG: 2 INJECTION, SOLUTION INTRAMUSCULAR; INTRAVENOUS at 00:05

## 2021-02-02 RX ADMIN — LIDOCAINE 1 PATCH: 50 PATCH TOPICAL at 00:05

## 2021-02-02 RX ADMIN — IOPAMIDOL 100 ML: 755 INJECTION, SOLUTION INTRAVENOUS at 01:27

## 2021-02-02 RX ADMIN — HYDROMORPHONE HYDROCHLORIDE 0.5 MG: 2 INJECTION, SOLUTION INTRAMUSCULAR; INTRAVENOUS; SUBCUTANEOUS at 00:05

## 2021-02-02 NOTE — ED PROVIDER NOTES
Subjective   Patient is a 50-year-old white female with history of asthma, right lung nodule with recent partial lobectomy in the right lung 2 months ago.  Patient states since her surgery she has had persistent pain in her right lower back and flank that has gradually worsened.  She reports her pain is worse with movement and palpation.  She states the pain is caused her to be nauseous.  She denies any fever chills, chest pain or shortness of breath.  She denies any cough or hemoptysis.  She denies any vomiting diarrhea dysuria frequency urgency or difficulty urinating.  She denies any radiation of her pain.  She states that she has been taking some pain medication with minor relief.  She also states that she has been using a heating pad with moderate relief.          Review of Systems   Constitutional: Negative for chills and fever.   Respiratory: Negative for cough and shortness of breath.    Cardiovascular: Negative for chest pain and leg swelling.   Gastrointestinal: Positive for nausea. Negative for abdominal pain, diarrhea and vomiting.   Genitourinary: Positive for flank pain. Negative for decreased urine volume, difficulty urinating, dysuria, frequency and urgency.   Musculoskeletal: Positive for back pain.       Past Medical History:   Diagnosis Date   • Anxiety    • Asthma 2013   • Lumbar radiculopathy 3/12/2018   • Lung nodule seen on imaging study 10/2/2020   • Thoracic back pain 3/12/2018       No Known Allergies    Past Surgical History:   Procedure Laterality Date   • BRONCHOSCOPY N/A 10/21/2020    Procedure: BRONCHOSCOPY WITH BRONCHOALVEOLAR LAVAGE, NAVIGATION WITH FINE NEEDLE ASPIRATION;  Surgeon: Nichol Jaquez MD;  Location: [facility];  Service: Pulmonary;  Laterality: N/A;  POST RUL NODULE   •  SECTION     • NASAL SEPTUM SURGERY      20 Years Ago       Family History   Problem Relation Age of Onset   • Hypertension Mother    • Lung cancer Father    • Lupus Sister    •  Asthma Other        Social History     Socioeconomic History   • Marital status:      Spouse name: Not on file   • Number of children: Not on file   • Years of education: Not on file   • Highest education level: Not on file   Tobacco Use   • Smoking status: Never Smoker   • Smokeless tobacco: Never Used   Substance and Sexual Activity   • Alcohol use: Yes     Comment: Couple times a week   • Drug use: No   • Sexual activity: Defer           Objective   Physical Exam  Vital signs and triage nurse note reviewed.  Constitutional: Awake, alert; well-developed and well-nourished. No acute distress is noted.  HEENT: Normocephalic, atraumatic; pupils are PERRL with intact EOM; oropharynx is pink and moist without exudate or erythema.  No drooling or pooling of oral secretions.  Neck: Supple, full range of motion without pain; no cervical lymphadenopathy. Normal phonation.  Cardiovascular: Regular rate and rhythm, normal S1-S2.  No murmur noted.  Pulmonary: Respiratory effort regular nonlabored, breath sounds clear to auscultation all fields.  Abdomen: Soft, nontender, nondistended with normoactive bowel sounds; no rebound or guarding.  Musculoskeletal: Independent range of motion of all extremities with no palpable tenderness or edema.  There is tenderness over the right lower back and flank area that reproduces the patient's pain.  There is also pain elicited with range of motion.  There is no crepitus or subcutaneous emphysema noted.  No erythema or rash.  Neuro: Alert oriented x3, speech is clear and appropriate, GCS 15.    Skin: Flesh tone, warm, dry, intact; no erythematous or petechial rash or lesion.  There are well approximated well-healing surgical incisions noted over the right posterior thorax.  No redness or drainage.      Procedures           ED Course      Labs Reviewed   COMPREHENSIVE METABOLIC PANEL - Abnormal; Notable for the following components:       Result Value    Glucose 119 (*)     Alkaline  Phosphatase 286 (*)     All other components within normal limits    Narrative:     GFR Normal >60  Chronic Kidney Disease <60  Kidney Failure <15     URINALYSIS W/ CULTURE IF INDICATED - Abnormal; Notable for the following components:    Color, UA Dark Yellow (*)     Ketones, UA Trace (*)     Bilirubin, UA Small (1+) (*)     All other components within normal limits    Narrative:     Urine microscopic not indicated.   CBC WITH AUTO DIFFERENTIAL - Abnormal; Notable for the following components:    Eosinophil % 11.1 (*)     Eosinophils, Absolute 1.00 (*)     All other components within normal limits   PREGNANCY, URINE - Normal   CBC AND DIFFERENTIAL    Narrative:     The following orders were created for panel order CBC & Differential.  Procedure                               Abnormality         Status                     ---------                               -----------         ------                     CBC Auto Differential[[id number]]        Abnormal            Final result                 Please view results for these tests on the individual orders.   EXTRA TUBES    Narrative:     The following orders were created for panel order Extra Tubes.  Procedure                               Abnormality         Status                     ---------                               -----------         ------                     Gold Top - SST[[id number]]                                   Final result                 Please view results for these tests on the individual orders.   GOLD TOP - SST     No radiology results for the last day  Medications   sodium chloride 0.9 % flush 10 mL (has no administration in time range)   lidocaine (LIDODERM) 5 % 1 patch (1 patch Transdermal Medication Applied 2/2/21 0005)   HYDROmorphone (DILAUDID) injection 0.5 mg (0.5 mg Intravenous Given 2/2/21 0005)   ondansetron (ZOFRAN) injection 4 mg (4 mg Intravenous Given 2/2/21 0005)   iopamidol (ISOVUE-370) 76 % injection 100 mL (100 mL  Intravenous Given 2/2/21 0127)                                          MDM  Number of Diagnoses or Management Options  Right flank pain:   Diagnosis management comments: Comorbidities: Asthma, right lung nodule with recent partial lobectomy in the right lobe 2 months ago  Differentials: Muscle strain or spasm, infection, abscess, kidney stone, pyelonephritis, UTI,;this list is not all inclusive and does not constitute the entirety of considered causes  Discussion with provider:  Radiology interpretation: X-rays reviewed by me and interpreted by radiologist: As above  Lab interpretation: Labs viewed by me significant for: As above    Patient had IV established.  She had labs and CT obtained.  She was given Dilaudid for pain as well as a Lidoderm patch placed.    She has a grossly unremarkable ED work-up today.  She reports improvement in her symptoms after pain medication was given.  She is remained well-appearing throughout her ED stay.  She is in no acute distress.  She has stable vital signs.  She is afebrile.    Diagnosis and treatment plan discussed with patient.  Patient agreeable to plan.   I discussed findings with patient who voices understanding of discharge instructions, signs and symptoms requiring return to ED; discharged improved and in stable condition with follow up for re-evaluation.  Prescription for Lidoderm patches.         Amount and/or Complexity of Data Reviewed  Clinical lab tests: reviewed and ordered  Tests in the radiology section of CPT®: reviewed and ordered    Patient Progress  Patient progress: stable      Final diagnoses:   Right flank pain            Gail Stephens APRN  02/02/21 0322

## 2021-02-02 NOTE — DISCHARGE INSTRUCTIONS
Take medication as prescribed.  Drink plenty of fluids.  Follow-up with your thoracic surgeon and your primary care provider.  Return for new or worsening symptoms.

## 2021-02-07 PROBLEM — Z90.2 HISTORY OF LOBECTOMY OF LUNG: Status: ACTIVE | Noted: 2021-02-07

## 2021-03-02 ENCOUNTER — OFFICE VISIT (OUTPATIENT)
Dept: FAMILY MEDICINE CLINIC | Facility: CLINIC | Age: 51
End: 2021-03-02

## 2021-03-02 VITALS
TEMPERATURE: 96.9 F | OXYGEN SATURATION: 96 % | HEART RATE: 100 BPM | HEIGHT: 66 IN | RESPIRATION RATE: 17 BRPM | DIASTOLIC BLOOD PRESSURE: 62 MMHG | WEIGHT: 162.6 LBS | SYSTOLIC BLOOD PRESSURE: 100 MMHG | BODY MASS INDEX: 26.13 KG/M2

## 2021-03-02 DIAGNOSIS — M54.6 CHRONIC RIGHT-SIDED THORACIC BACK PAIN: Primary | ICD-10-CM

## 2021-03-02 DIAGNOSIS — G89.29 CHRONIC RIGHT-SIDED THORACIC BACK PAIN: Primary | ICD-10-CM

## 2021-03-02 DIAGNOSIS — M54.50 LUMBAR BACK PAIN: ICD-10-CM

## 2021-03-02 PROCEDURE — 96372 THER/PROPH/DIAG INJ SC/IM: CPT | Performed by: NURSE PRACTITIONER

## 2021-03-02 PROCEDURE — 99214 OFFICE O/P EST MOD 30 MIN: CPT | Performed by: NURSE PRACTITIONER

## 2021-03-02 RX ORDER — DULOXETIN HYDROCHLORIDE 60 MG/1
60 CAPSULE, DELAYED RELEASE ORAL 2 TIMES DAILY
Qty: 180 CAPSULE | Refills: 2 | Status: SHIPPED | OUTPATIENT
Start: 2021-03-02 | End: 2021-12-01

## 2021-03-02 RX ORDER — CHLORAL HYDRATE 500 MG
1000 CAPSULE ORAL
COMMUNITY

## 2021-03-02 RX ORDER — MONTELUKAST SODIUM 10 MG/1
10 TABLET ORAL NIGHTLY
Qty: 90 TABLET | Refills: 0 | Status: SHIPPED | OUTPATIENT
Start: 2021-03-02 | End: 2021-05-03

## 2021-03-02 RX ORDER — BACLOFEN 10 MG/1
10 TABLET ORAL 3 TIMES DAILY PRN
Qty: 90 TABLET | Refills: 0 | Status: SHIPPED | OUTPATIENT
Start: 2021-03-02 | End: 2021-03-29

## 2021-03-02 RX ORDER — METHYLPREDNISOLONE ACETATE 80 MG/ML
120 INJECTION, SUSPENSION INTRA-ARTICULAR; INTRALESIONAL; INTRAMUSCULAR; SOFT TISSUE ONCE
Status: COMPLETED | OUTPATIENT
Start: 2021-03-02 | End: 2021-03-02

## 2021-03-02 RX ORDER — ALBUTEROL SULFATE 90 UG/1
2 AEROSOL, METERED RESPIRATORY (INHALATION) EVERY 4 HOURS PRN
Qty: 18 G | Refills: 3 | Status: SHIPPED | OUTPATIENT
Start: 2021-03-02 | End: 2021-07-15

## 2021-03-02 RX ORDER — VIT C/B6/B5/MAGNESIUM/HERB 173 50-5-6-5MG
500 CAPSULE ORAL DAILY
COMMUNITY

## 2021-03-02 RX ADMIN — METHYLPREDNISOLONE ACETATE 120 MG: 80 INJECTION, SUSPENSION INTRA-ARTICULAR; INTRALESIONAL; INTRAMUSCULAR; SOFT TISSUE at 08:55

## 2021-03-02 NOTE — PROGRESS NOTES
"Subjective   Gene KAREN Fields is a 50 y.o. female.     Chief Complaint   Patient presents with   • Back Pain       /62   Pulse 100   Temp 96.9 °F (36.1 °C)   Resp 17   Ht 167.6 cm (66\")   Wt 73.8 kg (162 lb 9.6 oz)   LMP 02/02/2021   SpO2 96%   BMI 26.24 kg/m²     BP Readings from Last 3 Encounters:   03/02/21 100/62   02/02/21 118/54   01/13/21 120/67       Wt Readings from Last 3 Encounters:   03/02/21 73.8 kg (162 lb 9.6 oz)   02/01/21 74 kg (163 lb 2.3 oz)   01/13/21 73.9 kg (163 lb)       Pt comes in today with c/o ongoing right sided thoracic pain. Pain has been ongoing since surgery 12/3/20. Had right upper lobectomy.   Called the surgeon, but never heard back from them. Went to ER on 2/1 and was elevated more for flank pain and had normal CT abd/pelvis. Xray of the back was not done.   Pt is interested in getting x-ray.   Sitting and heating pad helps with pain relief. Anything can exacerbate. Pain ranges from 2/10-8/10. Takes baclofen prn, norco prn, and using otc lidocaine patches.   After surgery was having a lot of upper back and shoulder pain, related to nerve pain. Was started on gabapentin, but didn't tolerate it.   Also with chronic lower back pain. Typically gets sciatica pain, but that hasn't been bothersome lately.        The following portions of the patient's history were reviewed and updated as appropriate: allergies, current medications, past family history, past medical history, past social history, past surgical history and problem list.    Review of Systems   Respiratory: Negative for cough and shortness of breath.    Musculoskeletal: Positive for back pain.   Neurological: Negative for numbness.       Objective   Physical Exam  Constitutional:       Appearance: She is well-developed.   Eyes:      Pupils: Pupils are equal, round, and reactive to light.   Cardiovascular:      Rate and Rhythm: Normal rate and regular rhythm.   Pulmonary:      Effort: Pulmonary effort is normal.      " Breath sounds: Normal breath sounds.   Musculoskeletal:      Thoracic back: She exhibits tenderness. She exhibits normal range of motion and no bony tenderness.      Lumbar back: She exhibits decreased range of motion and tenderness.        Arms:    Neurological:      Mental Status: She is alert and oriented to person, place, and time.           Diagnoses and all orders for this visit:    1. Chronic right-sided thoracic back pain (Primary)  -     XR Spine Thoracic 2 View (In Office)  -     methylPREDNISolone acetate (DEPO-medrol) injection 120 mg    2. Lumbar back pain  -     XR Spine Lumbar 2 or 3 View; Future  -     methylPREDNISolone acetate (DEPO-medrol) injection 120 mg    Other orders  -     DULoxetine (Cymbalta) 60 MG capsule; Take 1 capsule by mouth 2 (Two) Times a Day.  Dispense: 180 capsule; Refill: 2  -     montelukast (Singulair) 10 MG tablet; Take 1 tablet by mouth Every Night.  Dispense: 90 tablet; Refill: 0  -     baclofen (LIORESAL) 10 MG tablet; Take 1 tablet by mouth 3 (Three) Times a Day As Needed for Muscle Spasms.  Dispense: 90 tablet; Refill: 0  -     albuterol sulfate  (90 Base) MCG/ACT inhaler; Inhale 2 puffs Every 4 (Four) Hours As Needed for Wheezing.  Dispense: 18 g; Refill: 3      Refill meds  xrays at [facility]  Depo medrol inj today  Cont meds as prescribed  Heat  Follow up with surgeon.   During this office visit, we discussed the pertinent aspects of the visit and treatment recommendations. Pt verbalizes understanding. Follow up was discussed. Patient was given the opportunity to ask questions and discuss other concerns.     Return if symptoms worsen or fail to improve.

## 2021-03-04 ENCOUNTER — TELEPHONE (OUTPATIENT)
Dept: FAMILY MEDICINE CLINIC | Facility: CLINIC | Age: 51
End: 2021-03-04

## 2021-03-04 DIAGNOSIS — Z12.11 SCREEN FOR COLON CANCER: Primary | ICD-10-CM

## 2021-03-04 NOTE — PROGRESS NOTES
Let pt know that I haven't seen results of her thoracic spine x-ray, but lumbar spine showed some degenerative changes with joint space narrowing.

## 2021-03-04 NOTE — TELEPHONE ENCOUNTER
Caller: Gideon Fields    Relationship: Self    Best call back number: [phone]      What test was performed: X RAY OF BACK    When was the test performed: 3/2/2021    Where was the test performed: [facility]

## 2021-03-04 NOTE — TELEPHONE ENCOUNTER
PT STATED SHE FORGOT TO MENTION A REFERRAL TO HAVE A COLONOSCOPY WHILE AT HER 3/2/2021 APPT AND WOULD LIKE ONE ENTERED.     GENE ALSO WOULD LIKE TO DISCUSS THE COVID VACCINE AND IF THIS IS GOOD FOR HER TO HAVE.    [phone]

## 2021-03-08 ENCOUNTER — TELEPHONE (OUTPATIENT)
Dept: FAMILY MEDICINE CLINIC | Facility: CLINIC | Age: 51
End: 2021-03-08

## 2021-03-09 NOTE — TELEPHONE ENCOUNTER
Caller: Gideon Fields    Relationship: Self    Best call back number: [phone]     Caller requesting test results: XRAY    What test was performed: XRAY OF LUMBAR    When was the test performed: LAST WEEK       Where was the test performed: [facility]    Additional notes:    MS. FIELDS WOULD LIKE A CALL BACK REGARDING RESULTS

## 2021-03-11 ENCOUNTER — PATIENT MESSAGE (OUTPATIENT)
Dept: FAMILY MEDICINE CLINIC | Facility: CLINIC | Age: 51
End: 2021-03-11

## 2021-03-11 DIAGNOSIS — M54.50 LUMBAR BACK PAIN: Primary | ICD-10-CM

## 2021-03-11 DIAGNOSIS — M54.6 CHRONIC RIGHT-SIDED THORACIC BACK PAIN: ICD-10-CM

## 2021-03-11 DIAGNOSIS — G89.29 CHRONIC RIGHT-SIDED THORACIC BACK PAIN: ICD-10-CM

## 2021-03-11 NOTE — TELEPHONE ENCOUNTER
From: Gideon Fields  To: NEO Juarez  Sent: 3/11/2021 12:57 PM EST  Subject: Test Results Question    Kathy called me on Monday 3/8 with results from the xrays on my back. I had some more questions but never heard back. The lumbar shows mild levoconvex scoliosis and this is new to me. Sacralizaiton of the left L5 is also new to me. The degenerative disk and narrowing were on my last xray 2 years ago. Could this be causing my increased back pain? And now that we have the xrays what are the next steps as far as treatment? Also I had requested a referral for colonoscopy and haven't heard back on that. My main concern is the back pain it is more intense and frequent than it has ever been and I need to get it under control. Thank you for your time and I look forward to your call [phone] Gideon Fields

## 2021-03-12 NOTE — TELEPHONE ENCOUNTER
Spoke with pt-she received paperwork from [facility] and is good to go there and is awaiting call from PT-she will let us know if she does not hear from them by tues of next week.

## 2021-03-17 ENCOUNTER — TREATMENT (OUTPATIENT)
Dept: PHYSICAL THERAPY | Facility: CLINIC | Age: 51
End: 2021-03-17

## 2021-03-17 ENCOUNTER — APPOINTMENT (OUTPATIENT)
Dept: CT IMAGING | Facility: HOSPITAL | Age: 51
End: 2021-03-17

## 2021-03-17 DIAGNOSIS — M54.6 CHRONIC RIGHT-SIDED THORACIC BACK PAIN: Primary | ICD-10-CM

## 2021-03-17 DIAGNOSIS — G89.29 CHRONIC RIGHT-SIDED THORACIC BACK PAIN: Primary | ICD-10-CM

## 2021-03-17 DIAGNOSIS — M54.40 BILATERAL LOW BACK PAIN WITH SCIATICA, SCIATICA LATERALITY UNSPECIFIED, UNSPECIFIED CHRONICITY: ICD-10-CM

## 2021-03-17 PROCEDURE — 97162 PT EVAL MOD COMPLEX 30 MIN: CPT | Performed by: PHYSICAL THERAPIST

## 2021-03-17 NOTE — PROGRESS NOTES
Physical Therapy Initial Evaluation and Plan of Care    Patient: Gideon Fields   : 1970  Diagnosis/ICD-10 Code:  Chronic right-sided thoracic back pain [M54.6, G89.29]  Referring practitioner: NEO Recinos  Date of Initial Visit: 3/17/2021  Today's Date: 3/17/2021  Patient seen for 1 sessions           Subjective Questionnaire: Oswestry: 40%      Subjective Evaluation    History of Present Illness  Onset date: LBP - several yrs; thoracic pain 6 months ago.  Mechanism of injury: 49 y/o female with hx of chronic LBP and recent onset of thoracic pain beginning ~6 months ago with worsening of condition after lung surgery (removal of upper R Lobes secondary to CA).   C/o pain across LB (swithches from R to L side and goes down back of leg intermittently -  describes as shooting pains or electrical shocks). Thoracic pain mid to upper back and more to the right side and shoots up into Neck and R shoulder - much worse after surgery. Reports pain can run from L-S region up to thoracic spine and R shoulder.      Patient Occupation:  Quality of life: good    Pain  Current pain ratin  At best pain ratin  At worst pain rating: 10  Quality: sharp, discomfort and burning (electrical, shooting pain)  Relieving factors: medications, ice, heat, rest and change in position (baclofen)  Aggravating factors: keyboarding, prolonged positioning, standing and movement (bending over; prolonged standing or sitting)  Progression: worsening (in last 3 months)    Social Support  Lives in: multiple-level home (steps: bi-level 12 steps)  Lives with: spouse (one son at home and two dtrs)    Hand dominance: right    Diagnostic Tests  MRI studies: abnormal  CT scan: abnormal    Treatments  Previous treatment: physical therapy and chiropractic (13 yrs ago)  Patient Goals  Patient goals for therapy: decreased pain and increased motion  Patient goal: be able to get back to normal life            Objective         Special Questions      Additional Special Questions  Night sweats - MD aware - may be menopausal? Being monitored.      Postural Observations  Seated posture: poor  Standing posture: poor  Correction of posture: makes symptoms better    Additional Postural Observation Details  Well-healed incisions from previous lung surgery at lateral torso and ribcage R     Tenderness     Additional Tenderness Details  Tender from sacrum and surrounding tissue, paraspinals from lumbar to thoracic spine, R ribcage and surrounding tissue.    Neurological Testing     Sensation   Cervical/Thoracic   Left   Intact: light touch    Right   Intact: light touch    Lumbar   Left   Intact: light touch    Right   Intact: light touch    Reflexes   Left   Patellar (L4): normal (2+)  Achilles (S1): trace (1+)    Right   Patellar (L4): normal (2+)  Achilles (S1): trace (1+)    Active Range of Motion   Cervical/Thoracic Spine     Thoracic   Flexion: Active thoracic flexion: min to mod. with pain  Extension: Active thoracic extension: min to mod.   Left rotation: Active left thoracic rotation: mod.   Right rotation: Active right thoracic rotation: min. with pain    Lumbar   Flexion: Active lumbar flexion: mod. with pain  Extension: Active lumbar extension: min to mod.   Left lateral flexion: Active left lumbar lateral flexion: min.   Right lateral flexion: Active right lumbar lateral flexion: min  with pain  Left rotation: Active left lumbar rotation: min.   Right rotation: Active right lumbar rotation: min. with pain    Additional Active Range of Motion Details  Thoracic rotation : reproduced pain on opposite side  Lumbar extension decreases LBP and thoracic pain.    Muscle Activation   Patient able to activate left transverse abdominals and right transverse abdominals.     Additional Muscle Activation Details  Fair activation with functional transfers and in hooklying.     Tests     Additional Tests Details  Lumbar distraction reduced lumbar  pain in hooklying          Assessment & Plan     Assessment  Impairments: abnormal muscle firing, abnormal muscle tone, abnormal or restricted ROM, activity intolerance, lacks appropriate home exercise program, pain with function and weight-bearing intolerance  Assessment details: Pt presents to PT with symptoms consistent with thoracic and lumbar dysfunction. Thoracic pain may be exacerbated from recent surgery. Pt is appropriate for the skilled PT interventions to address the deficits noted above; has the potential to benefit from PT. Will be seen until patient plateaus, goals met, or is d/c'd per MD.      Prognosis: good  Functional Limitations: carrying objects, lifting, sleeping, walking, pulling, uncomfortable because of pain, moving in bed, sitting, standing, stooping and unable to perform repetitive tasks  Goals  Plan Goals: SHORT TERM GOALS: Time for Goal Achievement: 4 weeks    1.  Patient to be compliant w/ the HEP and tolerate progression.                            2.  Pain level < 6/10 at worst with mentioned activities to improve function.  3.  Increased lumbar and thoracic AROM to by 25% in all planes to allow for increased ease with sit-stand transfers and functional activities    LONG TERM GOALS: Time for Goal Achievement: D/C  1.  Outcome survey to show significant improvement  2.  Pain level < 2/10 with all listed activities to return to normal.  3.  Lumbar and thoracic AROM to WFL to allow for return to household, work & recreational activities w/o increase in symptoms.  4.  (B) LE and lower abdominal strength to 5/5 to allow for pushing, pulling and activities to occur without pain (driving, sitting, household  & Job requirements)        Plan  Therapy options: will be seen for skilled physical therapy services  Planned modality interventions: electrical stimulation/Russian stimulation, cryotherapy, TENS, traction, ultrasound and thermotherapy (hydrocollator packs)  Other planned modality  interventions: dry needling; aquatic  Planned therapy interventions: manual therapy, abdominal trunk stabilization, ADL retraining, neuromuscular re-education, spinal/joint mobilization, soft tissue mobilization, strengthening, stretching, therapeutic activities, body mechanics training, flexibility, functional ROM exercises, home exercise program and postural training  Frequency: 2x week  Duration in visits: 12  Treatment plan discussed with: patient        History # of Personal Factors and/or Comorbidities: MODERATE (1-2)  Examination of Body System(s): # of elements: MODERATE (3)  Clinical Presentation: EVOLVING  Clinical Decision Making: MODERATE      Timed:         Manual Therapy:         mins  71195;     Therapeutic Exercise:         mins  71502;     Neuromuscular Maggie:        mins  46135;    Therapeutic Activity:          mins  00967;     Gait Training:           mins  58244;     Ultrasound:          mins  85900;    Ionto                                  mins   62036  Self Care                            mins   07300  Aquatic                               mins 29058      Un-Timed:  Electrical Stimulation:        mins  30718 ( );  Dry Needling          mins self-pay  Traction          mins 56859  Low Eval         Mins  06254  Mod Eval     45     Mins  39228  High Eval                            Mins  54080  Re-Eval                               mins  93882        Timed Treatment:      mins   Total Treatment:     45   mins    PT SIGNATURE: José Luis Kingsley, YOAN   DATE TREATMENT INITIATED: 3/17/2021    Initial Certification  Certification Period: 6/15/2021  I certify that the therapy services are furnished while this patient is under my care.  The services outlined above are required by this patient, and will be reviewed every 90 days.     PHYSICIAN: Junie Le, APRN      DATE:     Please sign and return via fax to [phone].. Thank you, [facility].

## 2021-03-23 ENCOUNTER — HOSPITAL ENCOUNTER (OUTPATIENT)
Dept: MAMMOGRAPHY | Facility: HOSPITAL | Age: 51
Discharge: HOME | End: 2021-03-23

## 2021-03-23 DIAGNOSIS — R92.1 CALCIFICATION OF RIGHT BREAST: ICD-10-CM

## 2021-03-23 DIAGNOSIS — R92.8 ABNORMAL MAMMOGRAM OF RIGHT BREAST: ICD-10-CM

## 2021-03-23 PROCEDURE — 25010000003 LIDOCAINE 1 % SOLUTION: Performed by: OBSTETRICS & GYNECOLOGY

## 2021-03-23 PROCEDURE — 88305 TISSUE EXAM BY PATHOLOGIST: CPT | Performed by: OBSTETRICS & GYNECOLOGY

## 2021-03-23 PROCEDURE — A4648 IMPLANTABLE TISSUE MARKER: HCPCS

## 2021-03-23 PROCEDURE — 76098 X-RAY EXAM SURGICAL SPECIMEN: CPT

## 2021-03-23 RX ORDER — LIDOCAINE HYDROCHLORIDE AND EPINEPHRINE 10; 10 MG/ML; UG/ML
15 INJECTION, SOLUTION INFILTRATION; PERINEURAL ONCE
Status: COMPLETED | OUTPATIENT
Start: 2021-03-23 | End: 2021-03-23

## 2021-03-23 RX ORDER — LIDOCAINE HYDROCHLORIDE 10 MG/ML
3 INJECTION, SOLUTION INFILTRATION; PERINEURAL ONCE
Status: COMPLETED | OUTPATIENT
Start: 2021-03-23 | End: 2021-03-23

## 2021-03-23 RX ADMIN — LIDOCAINE HYDROCHLORIDE 2.5 ML: 10 INJECTION, SOLUTION INFILTRATION; PERINEURAL at 13:25

## 2021-03-23 RX ADMIN — LIDOCAINE HYDROCHLORIDE AND EPINEPHRINE 5 ML: 10; 10 INJECTION, SOLUTION INFILTRATION; PERINEURAL at 13:26

## 2021-03-24 LAB
LAB AP CASE REPORT: NORMAL
PATH REPORT.FINAL DX SPEC: NORMAL
PATH REPORT.GROSS SPEC: NORMAL

## 2021-03-26 ENCOUNTER — TREATMENT (OUTPATIENT)
Dept: PHYSICAL THERAPY | Facility: CLINIC | Age: 51
End: 2021-03-26

## 2021-03-26 DIAGNOSIS — M54.40 BILATERAL LOW BACK PAIN WITH SCIATICA, SCIATICA LATERALITY UNSPECIFIED, UNSPECIFIED CHRONICITY: ICD-10-CM

## 2021-03-26 DIAGNOSIS — G89.29 CHRONIC RIGHT-SIDED THORACIC BACK PAIN: Primary | ICD-10-CM

## 2021-03-26 DIAGNOSIS — M54.6 CHRONIC RIGHT-SIDED THORACIC BACK PAIN: Primary | ICD-10-CM

## 2021-03-26 PROCEDURE — 97110 THERAPEUTIC EXERCISES: CPT | Performed by: PHYSICAL THERAPIST

## 2021-03-26 PROCEDURE — 97014 ELECTRIC STIMULATION THERAPY: CPT | Performed by: PHYSICAL THERAPIST

## 2021-03-26 NOTE — PROGRESS NOTES
Physical Therapy Daily Progress Note    VISIT#: 2    Subjective   Gideon Fields reports: performing elbow press up and has been helping: pain level down; walking more.      Objective     See Exercise, Manual, and Modality Logs for complete treatment.     Patient Education: updated HEP    Access Code: [email or web address]  URL: [email or web address]  Date: 03/26/2021  Prepared by: Russell Kingsley    Exercises  Supine Piriformis Stretch with Foot on Ground - 1 x daily - 7 x weekly - 1 sets - 3 reps - 20 sec hold  Hooklying Hamstring Stretch - 1 x daily - 7 x weekly - 3 sets - 10 reps - 20 sec hold    Assessment/Plan - no complications from HEP or interventions; decreased pain post-therapy.      Progress per Plan of Care            Timed:         Manual Therapy:         mins  13447;     Therapeutic Exercise:    30     mins  43099;     Neuromuscular Maggie:        mins  66295;    Therapeutic Activity:          mins  87134;     Gait Training:           mins  29535;     Ultrasound:          mins  04794;    Ionto                                   mins   62145  Self Care                            mins   85525  Canalith Repos                   mins  4209  Aquatic                               mins 83933    Un-Timed:  Electrical Stimulation:    15     mins  83681 ( );  Dry Needling         mins self-pay  Traction          mins 85014  Low Eval          Mins  95795  Mod Eval          Mins  71581  High Eval                            Mins  36796  Re-Eval                               mins  13039    Timed Treatment:  30    mins   Total Treatment:     45   mins    José Luis Kingsley PT

## 2021-03-29 RX ORDER — BACLOFEN 10 MG/1
TABLET ORAL
Qty: 90 TABLET | Refills: 0 | Status: SHIPPED | OUTPATIENT
Start: 2021-03-29 | End: 2022-09-21 | Stop reason: ALTCHOICE

## 2021-04-08 ENCOUNTER — TREATMENT (OUTPATIENT)
Dept: PHYSICAL THERAPY | Facility: CLINIC | Age: 51
End: 2021-04-08

## 2021-04-08 DIAGNOSIS — M54.40 BILATERAL LOW BACK PAIN WITH SCIATICA, SCIATICA LATERALITY UNSPECIFIED, UNSPECIFIED CHRONICITY: ICD-10-CM

## 2021-04-08 DIAGNOSIS — M54.6 CHRONIC RIGHT-SIDED THORACIC BACK PAIN: Primary | ICD-10-CM

## 2021-04-08 DIAGNOSIS — G89.29 CHRONIC RIGHT-SIDED THORACIC BACK PAIN: Primary | ICD-10-CM

## 2021-04-08 PROCEDURE — 97014 ELECTRIC STIMULATION THERAPY: CPT | Performed by: PHYSICAL THERAPIST

## 2021-04-08 PROCEDURE — 97110 THERAPEUTIC EXERCISES: CPT | Performed by: PHYSICAL THERAPIST

## 2021-04-08 NOTE — PROGRESS NOTES
Physical Therapy Daily Progress Note    VISIT#: 3    Subjective   Gideon Fields reports: stretches and everything helping tremendously. More soreness R thoracic around around surgery - would like to decrease visits due to progress and spread out if possible.     Objective     See Exercise, Manual, and Modality Logs for complete treatment.     Patient Education: updated HEP    Access Code: [email or web address]  URL: [email or web address]  Date: 04/08/2021  Prepared by: Russell Kingsley    Exercises  Standing Row with Anchored Resistance - 1 x daily - 3 x weekly - 2 sets - 10 reps  Shoulder extension with resistance - Neutral - 1 x daily - 3 x weekly - 2 sets - 10 reps  Standing Shoulder External Rotation with Resistance - 1 x daily - 3 x weekly - 2 sets - 10 reps  Standing Lat Pull Down with Resistance - Elbows Bent - 1 x daily - 3 x weekly - 2 sets - 10 reps    Assessment/Plan - mild discomfort with strengthening but resolved with IFC and MH.      Progress per Plan of Care - see x 1 visit and place on hold with HEP x 2 weeks if patient continues to improve.            Timed:         Manual Therapy:         mins  26839;     Therapeutic Exercise:    30     mins  10880;     Neuromuscular Maggie:        mins  98320;    Therapeutic Activity:          mins  08492;     Gait Training:           mins  26363;     Ultrasound:          mins  77329;    Ionto                                   mins   59893  Self Care                            mins   22243  Canalith Repos                   mins  4209  Aquatic                               mins 39789    Un-Timed:  Electrical Stimulation:    15     mins  26983 ( );  Dry Needling         mins self-pay  Traction          mins 91483  Low Eval          Mins  56436  Mod Eval          Mins  87953  High Eval                            Mins  85421  Re-Eval                               mins  59113    Timed Treatment:  30    mins   Total Treatment:     45   mins    José Luis Kingsley, PT

## 2021-04-14 ENCOUNTER — TREATMENT (OUTPATIENT)
Dept: PHYSICAL THERAPY | Facility: CLINIC | Age: 51
End: 2021-04-14

## 2021-04-14 DIAGNOSIS — M54.6 CHRONIC RIGHT-SIDED THORACIC BACK PAIN: Primary | ICD-10-CM

## 2021-04-14 DIAGNOSIS — M54.40 BILATERAL LOW BACK PAIN WITH SCIATICA, SCIATICA LATERALITY UNSPECIFIED, UNSPECIFIED CHRONICITY: ICD-10-CM

## 2021-04-14 DIAGNOSIS — G89.29 CHRONIC RIGHT-SIDED THORACIC BACK PAIN: Primary | ICD-10-CM

## 2021-04-14 PROCEDURE — 97014 ELECTRIC STIMULATION THERAPY: CPT | Performed by: PHYSICAL THERAPIST

## 2021-04-14 PROCEDURE — 97110 THERAPEUTIC EXERCISES: CPT | Performed by: PHYSICAL THERAPIST

## 2021-04-14 NOTE — PROGRESS NOTES
Physical Therapy Daily Progress Note    VISIT#: 4    Subjective   Gideon Fields reports: both upper and lower back feeling better: resolved with exercise.     Objective     See Exercise, Manual, and Modality Logs for complete treatment.     Patient Education: updated     Access Code: [email or web address]  URL: [email or web address]  Date: 04/14/2021  Prepared by: Russell Kingsley    Exercises  Thoracic Extension Mobilization with Noodle - 1 x daily - 3 x weekly - 1 sets - 1 reps - 1 min hold  Supine Thoracic Mobilization Towel Roll Vertical with Arm Stretch - 1 x daily - 3 x weekly - 1 sets - 1 reps - 1 min hold    Assessment/Plan - mild discomfort with strengthening but resolved with IFC and MH. Added thoracic mobilizations to HEP. Less tenderness to palpation in thoracic and lumbar regions.       Progress per Plan of Care -             Timed:         Manual Therapy:         mins  25819;     Therapeutic Exercise:    30     mins  13759;     Neuromuscular Maggie:        mins  30056;    Therapeutic Activity:          mins  55119;     Gait Training:           mins  52870;     Ultrasound:          mins  75181;    Ionto                                   mins   08522  Self Care                            mins   47954  Canalith Repos                   mins  4209  Aquatic                               mins 05076    Un-Timed:  Electrical Stimulation:    15     mins  88485 ( );  Dry Needling         mins self-pay  Traction          mins 53475  Low Eval          Mins  12266  Mod Eval          Mins  40769  High Eval                            Mins  00879  Re-Eval                               mins  42660    Timed Treatment:  30    mins   Total Treatment:     45   mins    José Luis Kingsley, PT

## 2021-04-27 ENCOUNTER — DOCUMENTATION (OUTPATIENT)
Dept: PHYSICAL THERAPY | Facility: CLINIC | Age: 51
End: 2021-04-27

## 2021-05-03 RX ORDER — MONTELUKAST SODIUM 10 MG/1
TABLET ORAL
Qty: 30 TABLET | Refills: 2 | Status: SHIPPED | OUTPATIENT
Start: 2021-05-03 | End: 2021-07-29

## 2021-05-06 ENCOUNTER — LAB (OUTPATIENT)
Dept: FAMILY MEDICINE CLINIC | Facility: CLINIC | Age: 51
End: 2021-05-06

## 2021-05-06 ENCOUNTER — OFFICE VISIT (OUTPATIENT)
Dept: FAMILY MEDICINE CLINIC | Facility: CLINIC | Age: 51
End: 2021-05-06

## 2021-05-06 VITALS
RESPIRATION RATE: 20 BRPM | HEART RATE: 99 BPM | TEMPERATURE: 97.1 F | BODY MASS INDEX: 25.78 KG/M2 | HEIGHT: 66 IN | WEIGHT: 160.4 LBS | OXYGEN SATURATION: 99 % | DIASTOLIC BLOOD PRESSURE: 84 MMHG | SYSTOLIC BLOOD PRESSURE: 138 MMHG

## 2021-05-06 DIAGNOSIS — M54.9 UPPER BACK PAIN: ICD-10-CM

## 2021-05-06 DIAGNOSIS — Z00.00 PREVENTATIVE HEALTH CARE: Primary | ICD-10-CM

## 2021-05-06 DIAGNOSIS — Z00.00 PREVENTATIVE HEALTH CARE: ICD-10-CM

## 2021-05-06 DIAGNOSIS — G89.18 PAIN ASSOCIATED WITH SURGICAL PROCEDURE: ICD-10-CM

## 2021-05-06 DIAGNOSIS — Z23 IMMUNIZATION DUE: ICD-10-CM

## 2021-05-06 DIAGNOSIS — E55.9 VITAMIN D DEFICIENCY: ICD-10-CM

## 2021-05-06 DIAGNOSIS — E78.5 HYPERLIPIDEMIA, UNSPECIFIED HYPERLIPIDEMIA TYPE: ICD-10-CM

## 2021-05-06 PROBLEM — R91.1 NODULE OF UPPER LOBE OF RIGHT LUNG: Status: RESOLVED | Noted: 2020-10-19 | Resolved: 2021-05-06

## 2021-05-06 PROBLEM — R13.10 DYSPHAGIA: Status: RESOLVED | Noted: 2018-10-02 | Resolved: 2021-05-06

## 2021-05-06 PROBLEM — R52 PAIN: Status: RESOLVED | Noted: 2018-03-12 | Resolved: 2021-05-06
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
## 2021-05-06 PROBLEM — K21.9 GASTROESOPHAGEAL REFLUX DISEASE: Status: ACTIVE | Noted: 2021-05-06

## 2021-05-06 PROBLEM — R09.81 HEAD CONGESTION: Status: RESOLVED | Noted: 2019-01-14 | Resolved: 2021-05-06

## 2021-05-06 PROBLEM — J45.901 ASTHMA EXACERBATION: Status: RESOLVED | Noted: 2017-02-28 | Resolved: 2021-05-06

## 2021-05-06 PROBLEM — R91.1 LUNG NODULE SEEN ON IMAGING STUDY: Status: RESOLVED | Noted: 2020-10-02 | Resolved: 2021-05-06

## 2021-05-06 PROBLEM — L25.5 CONTACT DERMATITIS DUE TO PLANTS, EXCEPT FOOD: Status: RESOLVED | Noted: 2018-10-02 | Resolved: 2021-05-06

## 2021-05-06 PROBLEM — R21 SKIN RASH: Status: RESOLVED | Noted: 2019-02-19 | Resolved: 2021-05-06

## 2021-05-06 PROBLEM — C34.11 MALIGNANT NEOPLASM OF UPPER LOBE, RIGHT BRONCHUS OR LUNG: Status: ACTIVE | Noted: 2021-03-24

## 2021-05-06 PROBLEM — K20.90 ESOPHAGITIS: Status: RESOLVED | Noted: 2018-10-05 | Resolved: 2021-05-06

## 2021-05-06 PROBLEM — R42 DIZZINESS: Status: RESOLVED | Noted: 2017-02-28 | Resolved: 2021-05-06

## 2021-05-06 PROBLEM — J06.9 UPPER RESPIRATORY TRACT INFECTION: Status: RESOLVED | Noted: 2017-02-28 | Resolved: 2021-05-06

## 2021-05-06 PROBLEM — R23.3 PETECHIAE: Status: RESOLVED | Noted: 2019-02-19 | Resolved: 2021-05-06

## 2021-05-06 PROBLEM — F41.8 MIXED ANXIETY DEPRESSIVE DISORDER: Status: ACTIVE | Noted: 2021-05-06

## 2021-05-06 LAB
25(OH)D3 SERPL-MCNC: 39.3 NG/ML (ref 30–100)
ALBUMIN SERPL-MCNC: 4 G/DL (ref 3.5–5.2)
ALBUMIN/GLOB SERPL: 1.2 G/DL
ALP SERPL-CCNC: 190 U/L (ref 39–117)
ALT SERPL W P-5'-P-CCNC: 25 U/L (ref 1–33)
ANION GAP SERPL CALCULATED.3IONS-SCNC: 10.2 MMOL/L (ref 5–15)
AST SERPL-CCNC: 23 U/L (ref 1–32)
BACTERIA UR QL AUTO: ABNORMAL /HPF
BASOPHILS # BLD AUTO: 0.06 10*3/MM3 (ref 0–0.2)
BASOPHILS NFR BLD AUTO: 0.7 % (ref 0–1.5)
BILIRUB SERPL-MCNC: <0.2 MG/DL (ref 0–1.2)
BILIRUB UR QL STRIP: NEGATIVE
BUN SERPL-MCNC: 10 MG/DL (ref 6–20)
BUN/CREAT SERPL: 15.9 (ref 7–25)
CALCIUM SPEC-SCNC: 9.3 MG/DL (ref 8.6–10.5)
CHLORIDE SERPL-SCNC: 102 MMOL/L (ref 98–107)
CHOLEST SERPL-MCNC: 234 MG/DL (ref 0–200)
CLARITY UR: CLEAR
CO2 SERPL-SCNC: 25.8 MMOL/L (ref 22–29)
COLOR UR: YELLOW
CREAT SERPL-MCNC: 0.63 MG/DL (ref 0.57–1)
DEPRECATED RDW RBC AUTO: 46.7 FL (ref 37–54)
EOSINOPHIL # BLD AUTO: 0.37 10*3/MM3 (ref 0–0.4)
EOSINOPHIL NFR BLD AUTO: 4.5 % (ref 0.3–6.2)
ERYTHROCYTE [DISTWIDTH] IN BLOOD BY AUTOMATED COUNT: 14 % (ref 12.3–15.4)
GFR SERPL CREATININE-BSD FRML MDRD: 100 ML/MIN/1.73
GLOBULIN UR ELPH-MCNC: 3.4 GM/DL
GLUCOSE SERPL-MCNC: 97 MG/DL (ref 65–99)
GLUCOSE UR STRIP-MCNC: NEGATIVE MG/DL
HCT VFR BLD AUTO: 41.1 % (ref 34–46.6)
HCV AB SER DONR QL: NORMAL
HDLC SERPL-MCNC: 64 MG/DL (ref 40–60)
HGB BLD-MCNC: 13.4 G/DL (ref 12–15.9)
HGB UR QL STRIP.AUTO: ABNORMAL
HYALINE CASTS UR QL AUTO: ABNORMAL /LPF
IMM GRANULOCYTES # BLD AUTO: 0.03 10*3/MM3 (ref 0–0.05)
IMM GRANULOCYTES NFR BLD AUTO: 0.4 % (ref 0–0.5)
KETONES UR QL STRIP: NEGATIVE
LDLC SERPL CALC-MCNC: 131 MG/DL (ref 0–100)
LDLC/HDLC SERPL: 1.97 {RATIO}
LEUKOCYTE ESTERASE UR QL STRIP.AUTO: NEGATIVE
LYMPHOCYTES # BLD AUTO: 1.94 10*3/MM3 (ref 0.7–3.1)
LYMPHOCYTES NFR BLD AUTO: 23.4 % (ref 19.6–45.3)
MCH RBC QN AUTO: 29.6 PG (ref 26.6–33)
MCHC RBC AUTO-ENTMCNC: 32.6 G/DL (ref 31.5–35.7)
MCV RBC AUTO: 90.7 FL (ref 79–97)
MONOCYTES # BLD AUTO: 0.6 10*3/MM3 (ref 0.1–0.9)
MONOCYTES NFR BLD AUTO: 7.2 % (ref 5–12)
NEUTROPHILS NFR BLD AUTO: 5.3 10*3/MM3 (ref 1.7–7)
NEUTROPHILS NFR BLD AUTO: 63.8 % (ref 42.7–76)
NITRITE UR QL STRIP: NEGATIVE
NRBC BLD AUTO-RTO: 0 /100 WBC (ref 0–0.2)
PH UR STRIP.AUTO: 5.5 [PH] (ref 5–8)
PLATELET # BLD AUTO: 354 10*3/MM3 (ref 140–450)
PMV BLD AUTO: 11.6 FL (ref 6–12)
POTASSIUM SERPL-SCNC: 4.7 MMOL/L (ref 3.5–5.2)
PROT SERPL-MCNC: 7.4 G/DL (ref 6–8.5)
PROT UR QL STRIP: NEGATIVE
RBC # BLD AUTO: 4.53 10*6/MM3 (ref 3.77–5.28)
RBC # UR: ABNORMAL /HPF
REF LAB TEST METHOD: ABNORMAL
SODIUM SERPL-SCNC: 138 MMOL/L (ref 136–145)
SP GR UR STRIP: 1.02 (ref 1–1.03)
SQUAMOUS #/AREA URNS HPF: ABNORMAL /HPF
TRIGL SERPL-MCNC: 221 MG/DL (ref 0–150)
TSH SERPL DL<=0.05 MIU/L-ACNC: 1.45 UIU/ML (ref 0.27–4.2)
UROBILINOGEN UR QL STRIP: ABNORMAL
VLDLC SERPL-MCNC: 39 MG/DL (ref 5–40)
WBC # BLD AUTO: 8.3 10*3/MM3 (ref 3.4–10.8)
WBC UR QL AUTO: ABNORMAL /HPF

## 2021-05-06 PROCEDURE — 36415 COLL VENOUS BLD VENIPUNCTURE: CPT

## 2021-05-06 PROCEDURE — 84443 ASSAY THYROID STIM HORMONE: CPT | Performed by: INTERNAL MEDICINE

## 2021-05-06 PROCEDURE — 82306 VITAMIN D 25 HYDROXY: CPT | Performed by: INTERNAL MEDICINE

## 2021-05-06 PROCEDURE — 99396 PREV VISIT EST AGE 40-64: CPT | Performed by: NURSE PRACTITIONER

## 2021-05-06 PROCEDURE — 90732 PPSV23 VACC 2 YRS+ SUBQ/IM: CPT | Performed by: NURSE PRACTITIONER

## 2021-05-06 PROCEDURE — 85025 COMPLETE CBC W/AUTO DIFF WBC: CPT | Performed by: NURSE PRACTITIONER

## 2021-05-06 PROCEDURE — 80061 LIPID PANEL: CPT | Performed by: INTERNAL MEDICINE

## 2021-05-06 PROCEDURE — 81001 URINALYSIS AUTO W/SCOPE: CPT

## 2021-05-06 PROCEDURE — 86803 HEPATITIS C AB TEST: CPT | Performed by: INTERNAL MEDICINE

## 2021-05-06 PROCEDURE — 80053 COMPREHEN METABOLIC PANEL: CPT | Performed by: INTERNAL MEDICINE

## 2021-05-06 PROCEDURE — 90471 IMMUNIZATION ADMIN: CPT | Performed by: NURSE PRACTITIONER

## 2021-05-06 RX ORDER — NORGESTIMATE AND ETHINYL ESTRADIOL 0.25-0.035
1 KIT ORAL DAILY
COMMUNITY
End: 2021-05-06

## 2021-05-06 RX ORDER — HYDROCODONE BITARTRATE AND ACETAMINOPHEN 7.5; 325 MG/1; MG/1
1 TABLET ORAL EVERY 6 HOURS PRN
Qty: 30 TABLET | Refills: 0 | Status: ON HOLD | OUTPATIENT
Start: 2021-05-06 | End: 2022-02-11

## 2021-05-06 NOTE — PROGRESS NOTES
"Subjective   Gideon Fields is a 50 y.o. female.     Chief Complaint   Patient presents with   • Annual Exam       /84 (BP Location: Right arm, Patient Position: Sitting, Cuff Size: Adult)   Pulse 99   Temp 97.1 °F (36.2 °C) (Temporal)   Resp 20   Ht 167.6 cm (66\")   Wt 72.8 kg (160 lb 6.4 oz)   SpO2 99%   BMI 25.89 kg/m²     BP Readings from Last 3 Encounters:   21 138/84   21 100/62   21 118/54       Wt Readings from Last 3 Encounters:   21 72.8 kg (160 lb 6.4 oz)   21 73.8 kg (162 lb 9.6 oz)   21 74 kg (163 lb 2.3 oz)       Pt comes in today for routine physical.   She had right upper lobectomy done last year and overall doing well. Still having some nerve pain. Was having pain in upper back as well but that has since improved greatly after going to PT.   No specific concerns today.   She is UTD on pap and mammogram  Colonoscopy is pending.        Past Surgical History:   Procedure Laterality Date   • BREAST BIOPSY Right     benign   • BRONCHOSCOPY N/A 10/21/2020    Procedure: BRONCHOSCOPY WITH BRONCHOALVEOLAR LAVAGE, NAVIGATION WITH FINE NEEDLE ASPIRATION;  Surgeon: Nichol Jaquez MD;  Location: [facility] ENDOSCOPY;  Service: Pulmonary;  Laterality: N/A;  POST RUL NODULE   •  SECTION      x3    • LUNG REMOVAL, PARTIAL Right 2020    right upper lobe    • NASAL SEPTUM SURGERY      20 Years Ago       Social History     Socioeconomic History   • Marital status:      Spouse name: Not on file   • Number of children: 3   • Years of education: Not on file   • Highest education level: Not on file   Tobacco Use   • Smoking status: Never Smoker   • Smokeless tobacco: Never Used   Vaping Use   • Vaping Use: Never used   Substance and Sexual Activity   • Alcohol use: Yes     Comment: Couple times a week   • Drug use: No   • Sexual activity: Yes         The following portions of the patient's history were reviewed and updated as appropriate: allergies, current " medications, past family history, past medical history, past social history, past surgical history and problem list.    Review of Systems   Constitutional: Negative for activity change, unexpected weight gain and unexpected weight loss.   Eyes: Negative for visual disturbance.   Respiratory: Negative for chest tightness and shortness of breath.    Cardiovascular: Negative for chest pain, palpitations and leg swelling.   Gastrointestinal: Negative for abdominal pain, blood in stool, constipation, diarrhea and indigestion.   Endocrine: Negative for polydipsia and polyuria.   Genitourinary: Negative for difficulty urinating.   Skin: Negative for skin lesions.   Neurological: Negative for headache.   Psychiatric/Behavioral: Negative for agitation, sleep disturbance and stress.       Objective   Physical Exam  Constitutional:       Appearance: She is well-developed.   HENT:      Head: Normocephalic.   Eyes:      Conjunctiva/sclera: Conjunctivae normal.      Pupils: Pupils are equal, round, and reactive to light.   Neck:      Thyroid: No thyromegaly.   Cardiovascular:      Rate and Rhythm: Normal rate and regular rhythm.      Heart sounds: No murmur heard.     Pulmonary:      Effort: Pulmonary effort is normal.      Breath sounds: Normal breath sounds.   Abdominal:      General: Bowel sounds are normal.      Palpations: Abdomen is soft. There is no mass.      Tenderness: There is no abdominal tenderness.   Musculoskeletal:      Cervical back: Neck supple.   Skin:     General: Skin is warm and dry.      Findings: No lesion.   Neurological:      Mental Status: She is alert and oriented to person, place, and time.   Psychiatric:         Behavior: Behavior normal.         Diagnoses and all orders for this visit:    1. Preventative health care (Primary)  -     Discontinue: pneumococcal polysaccharide 23-valent (PNEUMOVAX-23) vaccine 0.5 mL  -     Hepatitis C antibody; Future  -     Lipid panel; Future  -     Comprehensive  Metabolic Panel; Future  -     TSH; Future  -     CBC & Differential; Future  -     Vitamin D 25 Hydroxy; Future  -     Urinalysis With Culture If Indicated -; Future    2. Hyperlipidemia, unspecified hyperlipidemia type  -     Lipid panel; Future    3. Immunization due  -     Discontinue: pneumococcal polysaccharide 23-valent (PNEUMOVAX-23) vaccine 0.5 mL  -     Pneumococcal Polysaccharide Vaccine 23-Valent Greater Than or Equal To 3yo Subcutaneous / IM    4. Pain associated with surgical procedure  -     HYDROcodone-acetaminophen (NORCO) 7.5-325 MG per tablet; Take 1 tablet by mouth Every 6 (Six) Hours As Needed for Moderate Pain .  Dispense: 30 tablet; Refill: 0    5. Upper back pain  -     HYDROcodone-acetaminophen (NORCO) 7.5-325 MG per tablet; Take 1 tablet by mouth Every 6 (Six) Hours As Needed for Moderate Pain .  Dispense: 30 tablet; Refill: 0    Other orders  -     Cancel: Tdap Vaccine Greater Than or Equal To 6yo IM    PNA 23 today  Colonoscopy  Check labs  During this visit for their annual exam, we reviewed their personal history, social history and family history. We went over their medications and all the recommended health maintenance items for their age group. They were given the opportunity to ask questions and discuss other concerns.       Return in about 6 months (around 11/6/2021).

## 2021-05-09 NOTE — PROGRESS NOTES
Lipids were elevated. TC was 234 (should be below 200), trigs were 221 (should be below 150), and LDL was 131 (should be below 100).   Work on diet and avoid fried/fatty foods.   Lets repeat lipid panel and CMP  in 6 months.

## 2021-05-11 DIAGNOSIS — R74.8 ELEVATED ALKALINE PHOSPHATASE LEVEL: ICD-10-CM

## 2021-05-11 DIAGNOSIS — R31.9 HEMATURIA, UNSPECIFIED TYPE: Primary | ICD-10-CM

## 2021-05-11 NOTE — PROGRESS NOTES
Her alk phos has actually gone down. Sometime associated with liver disease, but her liver enzymes were normal.  We will cont to monitor this.     I would recommend she repeat her UA in about 1 month to see if the blood has resolved.

## 2021-07-15 RX ORDER — ALBUTEROL SULFATE 90 UG/1
AEROSOL, METERED RESPIRATORY (INHALATION)
Qty: 18 G | Refills: 3 | Status: SHIPPED | OUTPATIENT
Start: 2021-07-15 | End: 2022-02-10

## 2021-07-29 RX ORDER — MONTELUKAST SODIUM 10 MG/1
TABLET ORAL
Qty: 90 TABLET | Refills: 1 | Status: SHIPPED | OUTPATIENT
Start: 2021-07-29 | End: 2022-01-21

## 2021-10-22 NOTE — TELEPHONE ENCOUNTER
Gave message to patient and scheduled an appt with Dr Lizarraga on 10/2/2020 at 10am - 30 minutes.  
Patient has had productive cough for 2 months, now has sore throat.    Patient would like to be seen in office, sending for approval due to Covid symptoms.    Neg Covid test 7/27/20 in chart    Please advise patient if can be seen in office    [phone]    
Yes, that's fine. Looks like I have openings tomorrow and Friday. 30 min please.  
no

## 2021-10-27 ENCOUNTER — OFFICE VISIT (OUTPATIENT)
Dept: FAMILY MEDICINE CLINIC | Facility: CLINIC | Age: 51
End: 2021-10-27

## 2021-10-27 VITALS
DIASTOLIC BLOOD PRESSURE: 84 MMHG | TEMPERATURE: 98 F | HEART RATE: 100 BPM | SYSTOLIC BLOOD PRESSURE: 122 MMHG | BODY MASS INDEX: 26.03 KG/M2 | WEIGHT: 162 LBS | HEIGHT: 66 IN | OXYGEN SATURATION: 98 %

## 2021-10-27 DIAGNOSIS — C34.11 MALIGNANT NEOPLASM OF UPPER LOBE, RIGHT BRONCHUS OR LUNG (HCC): ICD-10-CM

## 2021-10-27 DIAGNOSIS — M54.50 LUMBAR BACK PAIN: ICD-10-CM

## 2021-10-27 DIAGNOSIS — R05.3 CHRONIC COUGH: Primary | ICD-10-CM

## 2021-10-27 PROCEDURE — 99214 OFFICE O/P EST MOD 30 MIN: CPT | Performed by: NURSE PRACTITIONER

## 2021-10-27 RX ORDER — METHYLPREDNISOLONE 4 MG/1
TABLET ORAL
Qty: 21 TABLET | Refills: 0 | Status: ON HOLD | OUTPATIENT
Start: 2021-10-27 | End: 2022-02-10

## 2021-10-27 RX ORDER — METHYLPREDNISOLONE 4 MG/1
TABLET ORAL
Qty: 21 TABLET | Refills: 0 | Status: SHIPPED | OUTPATIENT
Start: 2021-10-27 | End: 2021-10-27

## 2021-10-27 NOTE — PROGRESS NOTES
"Chief Complaint  persisitent cough for over a month (non product with only occasion yellow phlem) and Back Pain (mid to lower bacl with right sided spasms)  Subjective        Gideon Fields presents to [facility]  Pt comes in today with c/o ongoing cough. Had right upper lobectomy done in dec 2020, and has been dealing with both pain and cough since then. The surgeon feels her pain is associated with nerve damage. Has tried gabapentin in the past, and didn't tolerate it. Had negative side effects.  Cough is persistent. Worse at night. Takes hydromet to help her sleep. Just had a follow up with surgeon about 1 week ago and had repeat CT scan that was normal.   Also with pain in lumbar spine. Radiating across lower back. No pain in legs. No numbness or tingling. No bowel or bladder dysfunction.   Does take norco on occasion for upper back pain.   Has baclofen, but hasn't been taking it.   No recent injuries.        Objective     Vital Signs:   /84 (BP Location: Right arm, Patient Position: Sitting, Cuff Size: Adult)   Pulse 100   Temp 98 °F (36.7 °C) (Skin)   Ht 167.6 cm (66\")   Wt 73.5 kg (162 lb)   SpO2 98%   BMI 26.15 kg/m²       BP Readings from Last 3 Encounters:   10/27/21 122/84   05/06/21 138/84   03/02/21 100/62       Wt Readings from Last 3 Encounters:   10/27/21 73.5 kg (162 lb)   05/06/21 72.8 kg (160 lb 6.4 oz)   03/02/21 73.8 kg (162 lb 9.6 oz)     Physical Exam  Constitutional:       Appearance: She is well-developed.   Eyes:      Pupils: Pupils are equal, round, and reactive to light.   Cardiovascular:      Rate and Rhythm: Normal rate and regular rhythm.   Pulmonary:      Effort: Pulmonary effort is normal.      Breath sounds: Normal breath sounds.   Musculoskeletal:      Lumbar back: Spasms, tenderness and bony tenderness present. No swelling. Decreased range of motion. Negative right straight leg raise test and negative left straight leg raise test. "   Neurological:      Mental Status: She is alert and oriented to person, place, and time.        Result Review :                 Assessment and Plan    Diagnoses and all orders for this visit:    1. Chronic cough (Primary)  -     Ambulatory Referral to Pulmonology  -     Discontinue: methylPREDNISolone (MEDROL) 4 MG dose pack; Take as directed on package instructions.  Dispense: 21 tablet; Refill: 0  -     methylPREDNISolone (MEDROL) 4 MG dose pack; Take as directed on package instructions.  Dispense: 21 tablet; Refill: 0  -     HYDROcodone-homatropine (Hydromet) 5-1.5 MG/5ML syrup; Take 5 mL by mouth Every 6 (Six) Hours As Needed for Cough.  Dispense: 120 mL; Refill: 0    2. Malignant neoplasm of upper lobe, right bronchus or lung (HCC)  -     Ambulatory Referral to Pulmonology    3. Lumbar back pain  -     Discontinue: methylPREDNISolone (MEDROL) 4 MG dose pack; Take as directed on package instructions.  Dispense: 21 tablet; Refill: 0  -     methylPREDNISolone (MEDROL) 4 MG dose pack; Take as directed on package instructions.  Dispense: 21 tablet; Refill: 0    referral to pulm  Medrol dose pack  Restart baclofen  Stretches  Ice  During this office visit, we discussed the pertinent aspects of the visit and treatment recommendations. Pt verbalizes understanding. Follow up was discussed. Patient was given the opportunity to ask questions and discuss other concerns.         Follow Up   Return if symptoms worsen or fail to improve.  Patient was given instructions and counseling regarding her condition or for health maintenance advice. Please see specific information pulled into the AVS if appropriate.

## 2021-12-01 RX ORDER — DULOXETIN HYDROCHLORIDE 60 MG/1
CAPSULE, DELAYED RELEASE ORAL
Qty: 180 CAPSULE | Refills: 2 | Status: SHIPPED | OUTPATIENT
Start: 2021-12-01

## 2021-12-08 ENCOUNTER — OFFICE VISIT (OUTPATIENT)
Dept: PULMONOLOGY | Facility: HOSPITAL | Age: 51
End: 2021-12-08

## 2021-12-08 VITALS
HEART RATE: 101 BPM | BODY MASS INDEX: 26.03 KG/M2 | WEIGHT: 162 LBS | DIASTOLIC BLOOD PRESSURE: 79 MMHG | TEMPERATURE: 98.2 F | OXYGEN SATURATION: 98 % | HEIGHT: 66 IN | SYSTOLIC BLOOD PRESSURE: 119 MMHG | RESPIRATION RATE: 15 BRPM

## 2021-12-08 DIAGNOSIS — C34.11 MALIGNANT NEOPLASM OF UPPER LOBE OF RIGHT LUNG (HCC): ICD-10-CM

## 2021-12-08 DIAGNOSIS — Z90.2 HISTORY OF LOBECTOMY OF LUNG: ICD-10-CM

## 2021-12-08 DIAGNOSIS — Z01.812 PRE-PROCEDURE LAB EXAM: Primary | ICD-10-CM

## 2021-12-08 DIAGNOSIS — J45.909 ASTHMA, UNSPECIFIED ASTHMA SEVERITY, UNSPECIFIED WHETHER COMPLICATED, UNSPECIFIED WHETHER PERSISTENT: Primary | ICD-10-CM

## 2021-12-08 PROCEDURE — G0463 HOSPITAL OUTPT CLINIC VISIT: HCPCS

## 2021-12-08 RX ORDER — BUDESONIDE AND FORMOTEROL FUMARATE DIHYDRATE 160; 4.5 UG/1; UG/1
2 AEROSOL RESPIRATORY (INHALATION) 2 TIMES DAILY
Qty: 1 EACH | Refills: 11 | Status: SHIPPED | OUTPATIENT
Start: 2021-12-08 | End: 2023-01-27 | Stop reason: SDUPTHER

## 2021-12-08 NOTE — PROGRESS NOTES
SLEEP/PULMONARY  CLINIC NOTE      PATIENT IDENTIFICATION:  Name: Gideon Fields  Age: 50 y.o.  Sex: female  :  1970  MRN: [id number]    DATE OF CONSULTATION:  2021                     CHIEF COMPLAINT: Shortness of breath    History of Present Illness:   Gideon Fields is a 50 y.o. female unfortunate secondhand smoking has lung cancer non-small cell lung right upper lobe resected a year ago did not require to have any radiation or chemo, presented for follow-up  Patient states over the last few months having increasing shortness of breath and coughing and dyspnea on exertion no sputum hemoptysis no fever no chills no dizziness no lightheadedness  No change in his weight    Review of Systems:   Constitutional:  As above   Eyes: negative   ENT/oropharynx: negative   Cardiovascular: negative   Respiratory:  As above   Gastrointestinal: negative   Genitourinary: negative   Neurological: negative   Musculoskeletal: negative   Integument/breast: negative   Endocrine: negative   Allergic/Immunologic: negative     Past Medical History:  Past Medical History:   Diagnosis Date   • Anxiety    • Asthma 2013   • Lumbar radiculopathy 3/12/2018   • Lung nodule seen on imaging study 10/2/2020   • Thoracic back pain 3/12/2018       Past Surgical History:  Past Surgical History:   Procedure Laterality Date   • BREAST BIOPSY Right     benign   • BRONCHOSCOPY N/A 10/21/2020    Procedure: BRONCHOSCOPY WITH BRONCHOALVEOLAR LAVAGE, NAVIGATION WITH FINE NEEDLE ASPIRATION;  Surgeon: Nichol Jaquez MD;  Location: [facility] ENDOSCOPY;  Service: Pulmonary;  Laterality: N/A;  POST RUL NODULE   •  SECTION      x3    • LUNG REMOVAL, PARTIAL Right 2020    right upper lobe    • NASAL SEPTUM SURGERY      20 Years Ago        Family History:  Family History   Problem Relation Age of Onset   • Hypertension Mother    • Alzheimer's disease Mother    • Thyroid disease Mother    • Lung cancer Father    • Lupus Sister    •  "Asthma Other    • No Known Problems Brother    • Lupus Sister         Social History:   Social History     Tobacco Use   • Smoking status: Never Smoker   • Smokeless tobacco: Never Used   Substance Use Topics   • Alcohol use: Yes     Comment: Couple times a week        Allergies:  Allergies   Allergen Reactions   • Bee Venom Itching       Home Meds:  (Not in a hospital admission)      Objective:    Vitals Ranges:   Temp:  [98.2 °F (36.8 °C)] 98.2 °F (36.8 °C)  Heart Rate:  [101] 101  Resp:  [15] 15  BP: (119)/(79) 119/79  Body mass index is 26.15 kg/m².     Exam:  General Appearance:  WDWN    HEENT:   without obvious abnormality,  Conjunctiva/corneas clear,  Normal external ear canals, no drainage    Clear orsalmucosa,  Mallampati score 3    Neck:  Supple, symmetrical, trachea midline. No JVD.  Lungs:   Bilateral basal rhonchi bilaterally, respirations unlabored symmetrical wall movement.    Chest wall:  No tenderness or deformity.    Heart:  Regular rate and rhythm, S1 and S2 normal.  Extremities: Trace edema no clubbing or Cyanosis        Data Review:  All labs (24hrs): No results found for this or any previous visit (from the past 24 hour(s)).     Imaging:  Mammo Stereotactic Breast Specimen Right  Addendum: Pathology addendum:       Final pathology of \"Fibrocystic change with columnar cell alteration and   microcalcifications, Negative for carcinoma in situ and invasive   carcinoma\" is Concordant with imaging findings.       Recommend patient resume annual bilateral screening mammography and   clinical breast exams.       Electronically Signed By-Zay Aguilera MD On:3/26/2021 9:30 AM   This report was finalized on [id number] by  Zay Aguilera MD.  Narrative: PROCEDURE:  1. Biopsy of the right breast, percutaneous, vacuum assisted biopsy  device, using imaging guidance.  2. Upright stereotactic guidance for biopsy, imaging supervision and  interpretation.  3. Radiologic examination, surgical specimen.  4. " Image guided placement, metallic localization clip, percutaneous,  during breast biopsy.  5. Post clip right digital diagnostic mammogram     INDICATION: 50-year-old female presents for stereotactic guided core  needle biopsy of a grouping of suspicious calcifications within the  upper central right breast.     PHYSICIAN: Zay Aguilera M.D.     DESCRIPTION:  Written, informed consent was obtained, including  discussion of the risks, benefits and alternatives. A time out was  performed to verify correct patient and procedure.       Target: microcalcifications.  Location: Upper central right breast anterior third depth.  Approach: Superior..  Anesthesia: Local buffered Lidocaine, with and without epinephrine.  Needle: 8 G Mammotome Revolve vacuum-assisted core needle biopsy device.  Sample number:  6.  Specimen:  Positive.  Marker migration on post-procedure mammogram: The postbiopsy clip is  appropriately placed on the CC view, however there is partly 1-2 cm of  inferior clip migration on the LM view attributed to the accordion  effect.     Specimen was sent to surgical pathology.     The patient tolerated the procedure without complication and was given  instructions regarding post-procedure wound care.     Impression: 1. Successful right breast stereotactic, vacuum assisted core needle  biopsy.  2. Successful marker clip placement with confirmation by right digital  diagnostic mammogram, which demonstrates approximately 1-2 cm inferior  clip migration attributed to the accordion effect.  3. See addendum for radiology pathology correlation.     BI-RADS Final Assessment Category:  Post Procedure Mammograms for Marker  Placement.  Management Recommendation: Assess radiologic/pathologic concordance.     Electronically Signed By-Zay Aguilera MD On:3/23/2021 1:46 PM  This report was finalized on [id number] by  Zay Aguilera MD.  Mammo Post Clip Placement Right  Addendum: Pathology addendum:       Final pathology  "of \"Fibrocystic change with columnar cell alteration and   microcalcifications, Negative for carcinoma in situ and invasive   carcinoma\" is Concordant with imaging findings.       Recommend patient resume annual bilateral screening mammography and   clinical breast exams.       Electronically Signed By-Zay Aguilera MD On:3/26/2021 9:30 AM   This report was finalized on [id number] by  Zay Aguilera MD.  Narrative: PROCEDURE:  1. Biopsy of the right breast, percutaneous, vacuum assisted biopsy  device, using imaging guidance.  2. Upright stereotactic guidance for biopsy, imaging supervision and  interpretation.  3. Radiologic examination, surgical specimen.  4. Image guided placement, metallic localization clip, percutaneous,  during breast biopsy.  5. Post clip right digital diagnostic mammogram     INDICATION: 50-year-old female presents for stereotactic guided core  needle biopsy of a grouping of suspicious calcifications within the  upper central right breast.     PHYSICIAN: Zay Aguilera M.D.     DESCRIPTION:  Written, informed consent was obtained, including  discussion of the risks, benefits and alternatives. A time out was  performed to verify correct patient and procedure.       Target: microcalcifications.  Location: Upper central right breast anterior third depth.  Approach: Superior..  Anesthesia: Local buffered Lidocaine, with and without epinephrine.  Needle: 8 G Mammotome Revolve vacuum-assisted core needle biopsy device.  Sample number:  6.  Specimen:  Positive.  Marker migration on post-procedure mammogram: The postbiopsy clip is  appropriately placed on the CC view, however there is partly 1-2 cm of  inferior clip migration on the LM view attributed to the accordion  effect.     Specimen was sent to surgical pathology.     The patient tolerated the procedure without complication and was given  instructions regarding post-procedure wound care.     Impression: 1. Successful right breast " "stereotactic, vacuum assisted core needle  biopsy.  2. Successful marker clip placement with confirmation by right digital  diagnostic mammogram, which demonstrates approximately 1-2 cm inferior  clip migration attributed to the accordion effect.  3. See addendum for radiology pathology correlation.     BI-RADS Final Assessment Category:  Post Procedure Mammograms for Marker  Placement.  Management Recommendation: Assess radiologic/pathologic concordance.     Electronically Signed By-Zay Aguilera MD On:3/23/2021 1:46 PM  This report was finalized on [id number] by  Zay Aguilera MD.  Mammo Stereotactic Breast Biopsy Initial With & Without Device  Addendum: Pathology addendum:       Final pathology of \"Fibrocystic change with columnar cell alteration and   microcalcifications, Negative for carcinoma in situ and invasive   carcinoma\" is Concordant with imaging findings.       Recommend patient resume annual bilateral screening mammography and   clinical breast exams.       Electronically Signed By-Zay Aguilera MD On:3/26/2021 9:30 AM   This report was finalized on [id number] by  Zay Aguilera MD.  Narrative: PROCEDURE:  1. Biopsy of the right breast, percutaneous, vacuum assisted biopsy  device, using imaging guidance.  2. Upright stereotactic guidance for biopsy, imaging supervision and  interpretation.  3. Radiologic examination, surgical specimen.  4. Image guided placement, metallic localization clip, percutaneous,  during breast biopsy.  5. Post clip right digital diagnostic mammogram     INDICATION: 50-year-old female presents for stereotactic guided core  needle biopsy of a grouping of suspicious calcifications within the  upper central right breast.     PHYSICIAN: Zay Aguilera M.D.     DESCRIPTION:  Written, informed consent was obtained, including  discussion of the risks, benefits and alternatives. A time out was  performed to verify correct patient and procedure.       Target: " microcalcifications.  Location: Upper central right breast anterior third depth.  Approach: Superior..  Anesthesia: Local buffered Lidocaine, with and without epinephrine.  Needle: 8 G Mammotome Revolve vacuum-assisted core needle biopsy device.  Sample number:  6.  Specimen:  Positive.  Marker migration on post-procedure mammogram: The postbiopsy clip is  appropriately placed on the CC view, however there is partly 1-2 cm of  inferior clip migration on the LM view attributed to the accordion  effect.     Specimen was sent to surgical pathology.     The patient tolerated the procedure without complication and was given  instructions regarding post-procedure wound care.     Impression: 1. Successful right breast stereotactic, vacuum assisted core needle  biopsy.  2. Successful marker clip placement with confirmation by right digital  diagnostic mammogram, which demonstrates approximately 1-2 cm inferior  clip migration attributed to the accordion effect.  3. See addendum for radiology pathology correlation.     BI-RADS Final Assessment Category:  Post Procedure Mammograms for Marker  Placement.  Management Recommendation: Assess radiologic/pathologic concordance.     Electronically Signed By-Zay Aguilera MD On:3/23/2021 1:46 PM  This report was finalized on [id number] by  Zay Aguilera MD.       ASSESSMENT:  Diagnoses and all orders for this visit:    Asthma, unspecified asthma severity, unspecified whether complicated, unspecified whether persistent  -     Pulmonary Function Test; Future    History of lobectomy of lung    Malignant neoplasm of upper lobe of right lung (HCC)    Other orders  -     budesonide-formoterol (SYMBICORT) 160-4.5 MCG/ACT inhaler; Inhale 2 puffs 2 (Two) Times a Day.        PLAN:  We will get a PFT    Bronchodilator inhaled corticosteroid Symbicort    Education how to use inhalers    Encouraged to use incentive spirometer    Continue to exercise slowly as tolerated    Monitor for any  change in the color of the sputum    Avoid any exposure to fumes, gas or any irritant        Follow-up *2 weeks    Blanca Samayoa MD. D, ABSM.  12/8/2021  15:42 EST

## 2022-01-04 ENCOUNTER — LAB (OUTPATIENT)
Dept: LAB | Facility: HOSPITAL | Age: 52
End: 2022-01-04

## 2022-01-04 DIAGNOSIS — Z01.812 PRE-PROCEDURE LAB EXAM: ICD-10-CM

## 2022-01-04 LAB — SARS-COV-2 ORF1AB RESP QL NAA+PROBE: NOT DETECTED

## 2022-01-04 PROCEDURE — C9803 HOPD COVID-19 SPEC COLLECT: HCPCS

## 2022-01-04 PROCEDURE — U0004 COV-19 TEST NON-CDC HGH THRU: HCPCS

## 2022-01-04 NOTE — NURSING NOTE
Exercise Oximetry    Patient Name:Gideon Fields   MRN: [id number]   Date: 01/04/22             ROOM AIR BASELINE   SpO2% 98   Heart Rate 102   Blood Pressure      EXERCISE ON ROOM AIR SpO2% EXERCISE ON O2 @  LPM SpO2%   1 MINUTE 98 1 MINUTE    2 MINUTES 99 2 MINUTES    3 MINUTES 98 3 MINUTES    4 MINUTES 100 4 MINUTES    5 MINUTES 99 5 MINUTES    6 MINUTES 99 6 MINUTES               Distance Walked   Distance Walked   Dyspnea (Derick Scale)   Dyspnea (Derick Scale)   Fatigue (Derick Scale)   Fatigue (Derick Scale)   SpO2% Post Exercise  99 SpO2% Post Exercise   HR Post Exercise  103 HR Post Exercise   Time to Recovery  IMMEDIATELY Time to Recovery     Comments: PATIENT WALKED ON ROOM AIR, O2 SATS REMAINED 98% AND ABOVE THRU-OUT THE WALK

## 2022-01-06 ENCOUNTER — HOSPITAL ENCOUNTER (OUTPATIENT)
Dept: RESPIRATORY THERAPY | Facility: HOSPITAL | Age: 52
Discharge: HOME | End: 2022-01-06
Admitting: INTERNAL MEDICINE

## 2022-01-06 DIAGNOSIS — J45.909 ASTHMA, UNSPECIFIED ASTHMA SEVERITY, UNSPECIFIED WHETHER COMPLICATED, UNSPECIFIED WHETHER PERSISTENT: ICD-10-CM

## 2022-01-06 PROCEDURE — 94729 DIFFUSING CAPACITY: CPT

## 2022-01-06 PROCEDURE — 94060 EVALUATION OF WHEEZING: CPT

## 2022-01-06 PROCEDURE — 94727 GAS DIL/WSHOT DETER LNG VOL: CPT

## 2022-01-06 RX ORDER — ALBUTEROL SULFATE 90 UG/1
2 AEROSOL, METERED RESPIRATORY (INHALATION) ONCE
Status: COMPLETED | OUTPATIENT
Start: 2022-01-06 | End: 2022-01-06

## 2022-01-06 RX ADMIN — ALBUTEROL SULFATE 2 PUFF: 108 INHALANT RESPIRATORY (INHALATION) at 08:27

## 2022-01-21 RX ORDER — MONTELUKAST SODIUM 10 MG/1
TABLET ORAL
Qty: 90 TABLET | Refills: 1 | Status: SHIPPED | OUTPATIENT
Start: 2022-01-21 | End: 2022-08-10

## 2022-02-10 ENCOUNTER — APPOINTMENT (OUTPATIENT)
Dept: CT IMAGING | Facility: HOSPITAL | Age: 52
End: 2022-02-10

## 2022-02-10 ENCOUNTER — HOSPITAL ENCOUNTER (OUTPATIENT)
Facility: HOSPITAL | Age: 52
Discharge: HOME | End: 2022-02-12
Attending: EMERGENCY MEDICINE | Admitting: HOSPITALIST

## 2022-02-10 ENCOUNTER — APPOINTMENT (OUTPATIENT)
Dept: GENERAL RADIOLOGY | Facility: HOSPITAL | Age: 52
End: 2022-02-10

## 2022-02-10 ENCOUNTER — OFFICE VISIT (OUTPATIENT)
Dept: PULMONOLOGY | Facility: HOSPITAL | Age: 52
End: 2022-02-10

## 2022-02-10 VITALS
WEIGHT: 162 LBS | RESPIRATION RATE: 11 BRPM | OXYGEN SATURATION: 100 % | TEMPERATURE: 98.5 F | BODY MASS INDEX: 26.03 KG/M2 | HEART RATE: 101 BPM | SYSTOLIC BLOOD PRESSURE: 131 MMHG | DIASTOLIC BLOOD PRESSURE: 66 MMHG | HEIGHT: 66 IN

## 2022-02-10 DIAGNOSIS — R07.9 CHEST PAIN, UNSPECIFIED TYPE: ICD-10-CM

## 2022-02-10 DIAGNOSIS — J45.909 ASTHMA, UNSPECIFIED ASTHMA SEVERITY, UNSPECIFIED WHETHER COMPLICATED, UNSPECIFIED WHETHER PERSISTENT: Primary | ICD-10-CM

## 2022-02-10 DIAGNOSIS — C34.11 MALIGNANT NEOPLASM OF UPPER LOBE, RIGHT BRONCHUS OR LUNG: ICD-10-CM

## 2022-02-10 DIAGNOSIS — K21.9 GASTROESOPHAGEAL REFLUX DISEASE WITHOUT ESOPHAGITIS: ICD-10-CM

## 2022-02-10 DIAGNOSIS — R94.39 ABNORMAL NUCLEAR STRESS TEST: Primary | ICD-10-CM

## 2022-02-10 LAB
ANION GAP SERPL CALCULATED.3IONS-SCNC: 13 MMOL/L (ref 5–15)
APTT PPP: 28.2 SECONDS (ref 24–31)
BASOPHILS # BLD AUTO: 0.1 10*3/MM3 (ref 0–0.2)
BASOPHILS NFR BLD AUTO: 0.8 % (ref 0–1.5)
BUN SERPL-MCNC: 8 MG/DL (ref 6–20)
BUN/CREAT SERPL: 13.1 (ref 7–25)
CALCIUM SPEC-SCNC: 9.5 MG/DL (ref 8.6–10.5)
CHLORIDE SERPL-SCNC: 99 MMOL/L (ref 98–107)
CO2 SERPL-SCNC: 23 MMOL/L (ref 22–29)
CREAT SERPL-MCNC: 0.61 MG/DL (ref 0.57–1)
D DIMER PPP FEU-MCNC: 0.91 MG/L (FEU) (ref 0–0.59)
DEPRECATED RDW RBC AUTO: 43.8 FL (ref 37–54)
EOSINOPHIL # BLD AUTO: 0.2 10*3/MM3 (ref 0–0.4)
EOSINOPHIL NFR BLD AUTO: 1.9 % (ref 0.3–6.2)
ERYTHROCYTE [DISTWIDTH] IN BLOOD BY AUTOMATED COUNT: 14.1 % (ref 12.3–15.4)
GFR SERPL CREATININE-BSD FRML MDRD: 103 ML/MIN/1.73
GLUCOSE SERPL-MCNC: 98 MG/DL (ref 65–99)
HCT VFR BLD AUTO: 36.9 % (ref 34–46.6)
HGB BLD-MCNC: 12.8 G/DL (ref 12–15.9)
INR PPP: <0.93 (ref 0.93–1.1)
LYMPHOCYTES # BLD AUTO: 1.7 10*3/MM3 (ref 0.7–3.1)
LYMPHOCYTES NFR BLD AUTO: 18.7 % (ref 19.6–45.3)
MCH RBC QN AUTO: 30.4 PG (ref 26.6–33)
MCHC RBC AUTO-ENTMCNC: 34.7 G/DL (ref 31.5–35.7)
MCV RBC AUTO: 87.6 FL (ref 79–97)
MONOCYTES # BLD AUTO: 0.7 10*3/MM3 (ref 0.1–0.9)
MONOCYTES NFR BLD AUTO: 7.6 % (ref 5–12)
NEUTROPHILS NFR BLD AUTO: 6.6 10*3/MM3 (ref 1.7–7)
NEUTROPHILS NFR BLD AUTO: 71 % (ref 42.7–76)
NRBC BLD AUTO-RTO: 0.1 /100 WBC (ref 0–0.2)
PLATELET # BLD AUTO: 316 10*3/MM3 (ref 140–450)
PMV BLD AUTO: 8.4 FL (ref 6–12)
POTASSIUM SERPL-SCNC: 4 MMOL/L (ref 3.5–5.2)
PROTHROMBIN TIME: 10.1 SECONDS (ref 9.6–11.7)
RBC # BLD AUTO: 4.21 10*6/MM3 (ref 3.77–5.28)
SARS-COV-2 RNA PNL SPEC NAA+PROBE: NOT DETECTED
SODIUM SERPL-SCNC: 135 MMOL/L (ref 136–145)
TROPONIN T SERPL-MCNC: <0.01 NG/ML (ref 0–0.03)
TROPONIN T SERPL-MCNC: <0.01 NG/ML (ref 0–0.03)
WBC NRBC COR # BLD: 9.3 10*3/MM3 (ref 3.4–10.8)

## 2022-02-10 PROCEDURE — 25010000002 MORPHINE PER 10 MG: Performed by: EMERGENCY MEDICINE

## 2022-02-10 PROCEDURE — 80048 BASIC METABOLIC PNL TOTAL CA: CPT | Performed by: EMERGENCY MEDICINE

## 2022-02-10 PROCEDURE — 99284 EMERGENCY DEPT VISIT MOD MDM: CPT

## 2022-02-10 PROCEDURE — 94799 UNLISTED PULMONARY SVC/PX: CPT

## 2022-02-10 PROCEDURE — 85730 THROMBOPLASTIN TIME PARTIAL: CPT | Performed by: EMERGENCY MEDICINE

## 2022-02-10 PROCEDURE — 0 MORPHINE SULFATE 4 MG/ML SOLUTION: Performed by: EMERGENCY MEDICINE

## 2022-02-10 PROCEDURE — G0378 HOSPITAL OBSERVATION PER HR: HCPCS

## 2022-02-10 PROCEDURE — 71275 CT ANGIOGRAPHY CHEST: CPT

## 2022-02-10 PROCEDURE — 87635 SARS-COV-2 COVID-19 AMP PRB: CPT | Performed by: EMERGENCY MEDICINE

## 2022-02-10 PROCEDURE — 0 IOPAMIDOL PER 1 ML: Performed by: EMERGENCY MEDICINE

## 2022-02-10 PROCEDURE — 25010000002 ONDANSETRON PER 1 MG: Performed by: EMERGENCY MEDICINE

## 2022-02-10 PROCEDURE — 93005 ELECTROCARDIOGRAM TRACING: CPT

## 2022-02-10 PROCEDURE — 94640 AIRWAY INHALATION TREATMENT: CPT

## 2022-02-10 PROCEDURE — 96375 TX/PRO/DX INJ NEW DRUG ADDON: CPT

## 2022-02-10 PROCEDURE — 36415 COLL VENOUS BLD VENIPUNCTURE: CPT | Performed by: EMERGENCY MEDICINE

## 2022-02-10 PROCEDURE — G0463 HOSPITAL OUTPT CLINIC VISIT: HCPCS

## 2022-02-10 PROCEDURE — 84484 ASSAY OF TROPONIN QUANT: CPT | Performed by: EMERGENCY MEDICINE

## 2022-02-10 PROCEDURE — 96374 THER/PROPH/DIAG INJ IV PUSH: CPT

## 2022-02-10 PROCEDURE — 93005 ELECTROCARDIOGRAM TRACING: CPT | Performed by: EMERGENCY MEDICINE

## 2022-02-10 PROCEDURE — 96376 TX/PRO/DX INJ SAME DRUG ADON: CPT

## 2022-02-10 PROCEDURE — 85025 COMPLETE CBC W/AUTO DIFF WBC: CPT | Performed by: EMERGENCY MEDICINE

## 2022-02-10 PROCEDURE — 85379 FIBRIN DEGRADATION QUANT: CPT | Performed by: EMERGENCY MEDICINE

## 2022-02-10 PROCEDURE — C9803 HOPD COVID-19 SPEC COLLECT: HCPCS

## 2022-02-10 PROCEDURE — 85610 PROTHROMBIN TIME: CPT | Performed by: EMERGENCY MEDICINE

## 2022-02-10 RX ORDER — NALOXONE HCL 0.4 MG/ML
0.4 VIAL (ML) INJECTION
Status: DISCONTINUED | OUTPATIENT
Start: 2022-02-10 | End: 2022-02-12 | Stop reason: HOSPADM

## 2022-02-10 RX ORDER — NITROGLYCERIN 0.4 MG/1
0.4 TABLET SUBLINGUAL
Status: DISCONTINUED | OUTPATIENT
Start: 2022-02-10 | End: 2022-02-12 | Stop reason: HOSPADM

## 2022-02-10 RX ORDER — ALBUTEROL SULFATE 2.5 MG/3ML
2.5 SOLUTION RESPIRATORY (INHALATION) EVERY 4 HOURS PRN
Status: DISCONTINUED | OUTPATIENT
Start: 2022-02-10 | End: 2022-02-12 | Stop reason: HOSPADM

## 2022-02-10 RX ORDER — ALBUTEROL SULFATE 90 UG/1
AEROSOL, METERED RESPIRATORY (INHALATION)
Qty: 18 G | Refills: 3 | Status: SHIPPED | OUTPATIENT
Start: 2022-02-10 | End: 2022-11-11 | Stop reason: SDUPTHER

## 2022-02-10 RX ORDER — NITROGLYCERIN 0.4 MG/1
0.4 TABLET SUBLINGUAL
Status: DISCONTINUED | OUTPATIENT
Start: 2022-02-10 | End: 2022-02-10 | Stop reason: SDUPTHER

## 2022-02-10 RX ORDER — BUDESONIDE AND FORMOTEROL FUMARATE DIHYDRATE 160; 4.5 UG/1; UG/1
2 AEROSOL RESPIRATORY (INHALATION)
Status: DISCONTINUED | OUTPATIENT
Start: 2022-02-10 | End: 2022-02-12 | Stop reason: HOSPADM

## 2022-02-10 RX ORDER — DIAZEPAM 5 MG/1
5 TABLET ORAL DAILY PRN
Status: DISCONTINUED | OUTPATIENT
Start: 2022-02-10 | End: 2022-02-12

## 2022-02-10 RX ORDER — MORPHINE SULFATE 2 MG/ML
1 INJECTION, SOLUTION INTRAMUSCULAR; INTRAVENOUS EVERY 4 HOURS PRN
Status: DISCONTINUED | OUTPATIENT
Start: 2022-02-10 | End: 2022-02-11

## 2022-02-10 RX ORDER — ONDANSETRON 2 MG/ML
4 INJECTION INTRAMUSCULAR; INTRAVENOUS EVERY 6 HOURS PRN
Status: DISCONTINUED | OUTPATIENT
Start: 2022-02-10 | End: 2022-02-12 | Stop reason: HOSPADM

## 2022-02-10 RX ORDER — MORPHINE SULFATE 4 MG/ML
4 INJECTION, SOLUTION INTRAMUSCULAR; INTRAVENOUS ONCE
Status: COMPLETED | OUTPATIENT
Start: 2022-02-10 | End: 2022-02-10

## 2022-02-10 RX ORDER — SODIUM CHLORIDE 0.9 % (FLUSH) 0.9 %
10 SYRINGE (ML) INJECTION AS NEEDED
Status: DISCONTINUED | OUTPATIENT
Start: 2022-02-10 | End: 2022-02-12 | Stop reason: HOSPADM

## 2022-02-10 RX ORDER — ACETAMINOPHEN 325 MG/1
650 TABLET ORAL EVERY 4 HOURS PRN
Status: DISCONTINUED | OUTPATIENT
Start: 2022-02-10 | End: 2022-02-12 | Stop reason: HOSPADM

## 2022-02-10 RX ORDER — ASPIRIN 325 MG
325 TABLET ORAL ONCE
Status: COMPLETED | OUTPATIENT
Start: 2022-02-10 | End: 2022-02-10

## 2022-02-10 RX ORDER — DULOXETIN HYDROCHLORIDE 30 MG/1
60 CAPSULE, DELAYED RELEASE ORAL 2 TIMES DAILY
Status: DISCONTINUED | OUTPATIENT
Start: 2022-02-10 | End: 2022-02-12 | Stop reason: HOSPADM

## 2022-02-10 RX ORDER — CHOLECALCIFEROL (VITAMIN D3) 125 MCG
5 CAPSULE ORAL NIGHTLY PRN
Status: DISCONTINUED | OUTPATIENT
Start: 2022-02-10 | End: 2022-02-12 | Stop reason: HOSPADM

## 2022-02-10 RX ORDER — ONDANSETRON 2 MG/ML
4 INJECTION INTRAMUSCULAR; INTRAVENOUS ONCE
Status: COMPLETED | OUTPATIENT
Start: 2022-02-10 | End: 2022-02-10

## 2022-02-10 RX ORDER — BACLOFEN 10 MG/1
10 TABLET ORAL 3 TIMES DAILY PRN
Status: DISCONTINUED | OUTPATIENT
Start: 2022-02-10 | End: 2022-02-12 | Stop reason: HOSPADM

## 2022-02-10 RX ADMIN — MORPHINE SULFATE 1 MG: 2 INJECTION, SOLUTION INTRAMUSCULAR; INTRAVENOUS at 23:24

## 2022-02-10 RX ADMIN — ASPIRIN 325 MG ORAL TABLET 325 MG: 325 PILL ORAL at 17:23

## 2022-02-10 RX ADMIN — ONDANSETRON 4 MG: 2 INJECTION INTRAMUSCULAR; INTRAVENOUS at 23:24

## 2022-02-10 RX ADMIN — NITROGLYCERIN 0.4 MG: 0.4 TABLET SUBLINGUAL at 17:23

## 2022-02-10 RX ADMIN — DIAZEPAM 5 MG: 5 TABLET ORAL at 20:11

## 2022-02-10 RX ADMIN — DULOXETINE 60 MG: 30 CAPSULE, DELAYED RELEASE ORAL at 20:11

## 2022-02-10 RX ADMIN — IOPAMIDOL 100 ML: 755 INJECTION, SOLUTION INTRAVENOUS at 16:25

## 2022-02-10 RX ADMIN — MORPHINE SULFATE 4 MG: 4 INJECTION INTRAVENOUS at 15:33

## 2022-02-10 RX ADMIN — Medication 10 ML: at 20:11

## 2022-02-10 RX ADMIN — ONDANSETRON 4 MG: 2 INJECTION INTRAMUSCULAR; INTRAVENOUS at 15:33

## 2022-02-10 RX ADMIN — BACLOFEN 10 MG: 10 TABLET ORAL at 20:11

## 2022-02-10 RX ADMIN — BUDESONIDE AND FORMOTEROL FUMARATE DIHYDRATE 2 PUFF: 160; 4.5 AEROSOL RESPIRATORY (INHALATION) at 20:34

## 2022-02-10 NOTE — PROGRESS NOTES
SLEEP/PULMONARY  CLINIC NOTE      PATIENT IDENTIFICATION:  Name: Gideon Fields  Age: 51 y.o.  Sex: female  :  1970  MRN: [id number]    DATE OF CONSULTATION:  2/10/2022                     CHIEF COMPLAINT: Chest pain    History of Present Illness:   Gideon Fields is a 51 y.o. female patient woke up this morning at 4:00 with left-sided chest pain radiating to her shoulder and her arm, increased with movement, and improved some with Tylenol 8 7 to 6 out of 10 and feeling nauseous no vomiting no dizziness no lightheadedness or potation no abdominal pain  Patient has been coughing for a while, but no sputum's  Patient history of asthma she had a PFT which showed relatively normal except improved with bronchodilator      Review of Systems:   Constitutional: negative   Eyes: negative   ENT/oropharynx: negative   Cardiovascular:  As above   Respiratory:  As above   Gastrointestinal: negative   Genitourinary: negative   Neurological: negative   Musculoskeletal: negative   Integument/breast: negative   Endocrine: negative   Allergic/Immunologic: negative     Past Medical History:  Past Medical History:   Diagnosis Date   • Anxiety    • Asthma 2013   • Lumbar radiculopathy 3/12/2018   • Lung nodule seen on imaging study 10/2/2020   • Malignant neoplasm of upper lobe of right lung (HCC) 2021   • Thoracic back pain 3/12/2018       Past Surgical History:  Past Surgical History:   Procedure Laterality Date   • BREAST BIOPSY Right     benign   • BRONCHOSCOPY N/A 10/21/2020    Procedure: BRONCHOSCOPY WITH BRONCHOALVEOLAR LAVAGE, NAVIGATION WITH FINE NEEDLE ASPIRATION;  Surgeon: Nichol Jaquez MD;  Location: [facility] ENDOSCOPY;  Service: Pulmonary;  Laterality: N/A;  POST RUL NODULE   •  SECTION      x3    • LUNG REMOVAL, PARTIAL Right 2020    right upper lobe    • NASAL SEPTUM SURGERY      20 Years Ago        Family History:  Family History   Problem Relation Age of Onset   • Hypertension Mother   "  • Alzheimer's disease Mother    • Thyroid disease Mother    • Lung cancer Father    • Lupus Sister    • Asthma Other    • No Known Problems Brother    • Lupus Sister         Social History:   Social History     Tobacco Use   • Smoking status: Never Smoker   • Smokeless tobacco: Never Used   Substance Use Topics   • Alcohol use: Yes     Comment: Couple times a week        Allergies:  Allergies   Allergen Reactions   • Bee Venom Itching       Home Meds:  (Not in a hospital admission)      Objective:    Vitals Ranges:   Temp:  [98.5 °F (36.9 °C)-98.7 °F (37.1 °C)] 98.7 °F (37.1 °C)  Heart Rate:  [100-101] 100  Resp:  [11-20] 20  BP: (131-147)/(62-66) 147/62  Body mass index is 26.15 kg/m².     Exam:  General Appearance:  WDWN    HEENT:   without obvious abnormality,  Conjunctiva/corneas clear,  Normal external ear canals, no drainage    Clear orsalmucosa,  Mallampati score 3    Neck:  Supple, symmetrical, trachea midline. No JVD.  Lungs:   Bilateral basal rhonchi bilaterally, respirations unlabored symmetrical wall movement.    Chest wall:  No tenderness or deformity.    Heart:  Regular rate and rhythm, S1 and S2 normal.  Extremities: Trace edema no clubbing or Cyanosis        Data Review:  All labs (24hrs):   Recent Results (from the past 24 hour(s))   ECG 12 Lead    Collection Time: 02/10/22  1:22 PM   Result Value Ref Range    QT Interval 351 ms        Imaging:  Pulmonary Function Test  Interpretation pulmonary function test    PATIENT IDENTIFICATION:  Name: Gideon Fields  Age: 51 y.o.  Sex: female  :  1970  MRN: [id number]  Date Of Test: 2022  Indication: Asthma     Weight 166 lb Height 60\" BSA 1.85    1. The spirometry showing the patient has   normal FEV1 FVC ratio a normal   value, significant improvement with bronchodilator matching ATS criteria    2. Lung volumes within normal limits.    3. Diffusion capacity  decreased to.  77 of predicted corrected to normal  For ventilated alveoli   4. " Volume loops normal    Impression: This pulmonary function test showing that  the patient has   significant improvement with bronchodilator which reflect reversible   airway obstruction consistent with asthma    Blanca Samayoa MD. MICHELLE, ABSM.  1/19/2022  17:02 EST            ASSESSMENT:  Diagnoses and all orders for this visit:    Asthma, unspecified asthma severity, unspecified whether complicated, unspecified whether persistent    Malignant neoplasm of upper lobe, right bronchus or lung (HCC)    Gastroesophageal reflux disease without esophagitis        PLAN:  Patient was sent to the ER for further evaluation in the meanwhile continue with    Bronchodilator inhaled corticosteroid    Education how to use inhalers    Encouraged to use incentive spirometer    Continue to exercise slowly as tolerated    Monitor for any change in the color of the sputum    Avoid any exposure to fumes, gas or any irritant        Treating FÉLIX will improve BP control    It is recommended to keep thyroid function optimized to improve quality of sleep    Follow-up 6 months    Blanca Samayoa MD. D, ABSM.  2/10/2022  13:34 EST

## 2022-02-10 NOTE — ED PROVIDER NOTES
Subjective   Chief complaint: Chest pain    51-year-old female presents with chest pain.  Patient states the pain started around 4 AM this morning and has been constant.  Pain is all throughout her chest and goes into the left shoulder and left arm.  She has had associated shortness of breath.  Pain is worse with deep breaths as well.  She has had diaphoresis and nausea.  She denies any other alleviating or exacerbating factors.  She denies any cardiac history.      History provided by:  Patient      Review of Systems   Constitutional: Positive for diaphoresis. Negative for fever.   HENT: Negative for congestion and sore throat.    Eyes: Negative for redness.   Respiratory: Positive for shortness of breath. Negative for cough.    Cardiovascular: Positive for chest pain.   Gastrointestinal: Positive for nausea. Negative for abdominal pain, diarrhea and vomiting.   Genitourinary: Negative for dysuria.   Musculoskeletal: Negative for back pain.   Skin: Negative for rash.   Neurological: Negative for dizziness and headaches.   Psychiatric/Behavioral: Negative for confusion.       Past Medical History:   Diagnosis Date   • Anxiety    • Asthma 2013   • Lumbar radiculopathy 3/12/2018   • Lung nodule seen on imaging study 10/2/2020   • Malignant neoplasm of upper lobe of right lung (HCC) 2021   • Thoracic back pain 3/12/2018       Allergies   Allergen Reactions   • Bee Venom Itching       Past Surgical History:   Procedure Laterality Date   • BREAST BIOPSY Right     benign   • BRONCHOSCOPY N/A 10/21/2020    Procedure: BRONCHOSCOPY WITH BRONCHOALVEOLAR LAVAGE, NAVIGATION WITH FINE NEEDLE ASPIRATION;  Surgeon: Nichol Jaquez MD;  Location: [facility] ENDOSCOPY;  Service: Pulmonary;  Laterality: N/A;  POST RUL NODULE   •  SECTION      x3    • LUNG REMOVAL, PARTIAL Right 2020    right upper lobe    • NASAL SEPTUM SURGERY      20 Years Ago       Family History   Problem Relation Age of Onset   •  "Hypertension Mother    • Alzheimer's disease Mother    • Thyroid disease Mother    • Lung cancer Father    • Lupus Sister    • Asthma Other    • No Known Problems Brother    • Lupus Sister        Social History     Socioeconomic History   • Marital status:    • Number of children: 3   Tobacco Use   • Smoking status: Never Smoker   • Smokeless tobacco: Never Used   Vaping Use   • Vaping Use: Never used   Substance and Sexual Activity   • Alcohol use: Yes     Comment: Couple times a week   • Drug use: No   • Sexual activity: Yes       /68   Pulse 91   Temp 98.7 °F (37.1 °C) (Oral)   Resp 20   Ht 167.6 cm (66\")   Wt 74 kg (163 lb 2.3 oz)   LMP 01/30/2022   SpO2 98%   BMI 26.33 kg/m²       Objective   Physical Exam  Vitals and nursing note reviewed.   Constitutional:       Appearance: She is well-developed.   HENT:      Head: Normocephalic and atraumatic.   Eyes:      Pupils: Pupils are equal, round, and reactive to light.   Cardiovascular:      Rate and Rhythm: Normal rate and regular rhythm.      Heart sounds: Normal heart sounds.   Pulmonary:      Effort: Pulmonary effort is normal. No respiratory distress.      Breath sounds: Normal breath sounds.   Abdominal:      General: Bowel sounds are normal.      Palpations: Abdomen is soft.      Tenderness: There is no abdominal tenderness.   Musculoskeletal:         General: Normal range of motion.      Cervical back: Normal range of motion and neck supple.   Skin:     General: Skin is warm and dry.   Neurological:      General: No focal deficit present.      Mental Status: She is alert and oriented to person, place, and time.         Procedures           ED Course      My interpretation of EKG shows sinus rhythm, rate of 98, no ST elevation     Results for orders placed or performed during the hospital encounter of 02/10/22   Basic Metabolic Panel    Specimen: Blood   Result Value Ref Range    Glucose 98 65 - 99 mg/dL    BUN 8 6 - 20 mg/dL    " Creatinine 0.61 0.57 - 1.00 mg/dL    Sodium 135 (L) 136 - 145 mmol/L    Potassium 4.0 3.5 - 5.2 mmol/L    Chloride 99 98 - 107 mmol/L    CO2 23.0 22.0 - 29.0 mmol/L    Calcium 9.5 8.6 - 10.5 mg/dL    eGFR Non African Amer 103 >60 mL/min/1.73    BUN/Creatinine Ratio 13.1 7.0 - 25.0    Anion Gap 13.0 5.0 - 15.0 mmol/L   Protime-INR    Specimen: Blood   Result Value Ref Range    Protime 10.1 9.6 - 11.7 Seconds    INR <0.93 (L) 0.93 - 1.10   aPTT    Specimen: Blood   Result Value Ref Range    PTT 28.2 24.0 - 31.0 seconds   Troponin    Specimen: Blood   Result Value Ref Range    Troponin T <0.010 0.000 - 0.030 ng/mL   D-dimer, Quantitative    Specimen: Blood   Result Value Ref Range    D-Dimer, Quantitative 0.91 (H) 0.00 - 0.59 mg/L (FEU)   CBC Auto Differential    Specimen: Blood   Result Value Ref Range    WBC 9.30 3.40 - 10.80 10*3/mm3    RBC 4.21 3.77 - 5.28 10*6/mm3    Hemoglobin 12.8 12.0 - 15.9 g/dL    Hematocrit 36.9 34.0 - 46.6 %    MCV 87.6 79.0 - 97.0 fL    MCH 30.4 26.6 - 33.0 pg    MCHC 34.7 31.5 - 35.7 g/dL    RDW 14.1 12.3 - 15.4 %    RDW-SD 43.8 37.0 - 54.0 fl    MPV 8.4 6.0 - 12.0 fL    Platelets 316 140 - 450 10*3/mm3    Neutrophil % 71.0 42.7 - 76.0 %    Lymphocyte % 18.7 (L) 19.6 - 45.3 %    Monocyte % 7.6 5.0 - 12.0 %    Eosinophil % 1.9 0.3 - 6.2 %    Basophil % 0.8 0.0 - 1.5 %    Neutrophils, Absolute 6.60 1.70 - 7.00 10*3/mm3    Lymphocytes, Absolute 1.70 0.70 - 3.10 10*3/mm3    Monocytes, Absolute 0.70 0.10 - 0.90 10*3/mm3    Eosinophils, Absolute 0.20 0.00 - 0.40 10*3/mm3    Basophils, Absolute 0.10 0.00 - 0.20 10*3/mm3    nRBC 0.1 0.0 - 0.2 /100 WBC   ECG 12 Lead   Result Value Ref Range    QT Interval 351 ms     CT Chest Pulmonary Embolism    Result Date: 2/10/2022  1.There are no findings of pulmonary embolus. 2.There is a small right pleural effusion with subsegmental atelectasis and possible wedge resection of right upper lobe nodule. 3.There is a single mildly enlarged mediastinal lymph  node. This is nonspecific. A follow-up CT chest in 6 months time should be performed.    Electronically Signed By-Josette Rosales MD On:2/10/2022 4:37 PM This report was finalized on [id number] by  Josette Rosales MD.                                          MDM   Patient had the above evaluation.  Results were discussed with the patient.  EKG shows no acute ischemia.  Troponin is negative.  D-dimer was mildly elevated at 0.91.  CT PE protocol was obtained which showed no PE.  There is a small right pleural effusion and a single mildly enlarged mediastinal lymph node.  Patient states she has never had a cardiac work-up before.  She will be placed in observation for further cardiac monitoring, serial troponins, stress test in the morning.  Patient is agreeable to this plan.      Final diagnoses:   Chest pain, unspecified type       ED Disposition  ED Disposition     ED Disposition Condition Comment    Decision to Admit            No follow-up provider specified.       Medication List      No changes were made to your prescriptions during this visit.          Marty Mcallister MD  02/10/22 8524

## 2022-02-11 ENCOUNTER — APPOINTMENT (OUTPATIENT)
Dept: NUCLEAR MEDICINE | Facility: HOSPITAL | Age: 52
End: 2022-02-11

## 2022-02-11 PROBLEM — R94.39 ABNORMAL NUCLEAR STRESS TEST: Status: ACTIVE | Noted: 2022-02-10

## 2022-02-11 PROBLEM — J30.2 SEASONAL ALLERGIES: Status: ACTIVE | Noted: 2021-10-05

## 2022-02-11 LAB
ANION GAP SERPL CALCULATED.3IONS-SCNC: 12 MMOL/L (ref 5–15)
BASOPHILS # BLD AUTO: 0 10*3/MM3 (ref 0–0.2)
BASOPHILS NFR BLD AUTO: 0.7 % (ref 0–1.5)
BH CV REST NUCLEAR ISOTOPE DOSE: 7.8 MCI
BH CV STRESS BP STAGE 2: NORMAL
BH CV STRESS BP STAGE 3: NORMAL
BH CV STRESS COMMENTS STAGE 1: NORMAL
BH CV STRESS COMMENTS STAGE 2: NORMAL
BH CV STRESS DOSE REGADENOSON STAGE 1: 0.4
BH CV STRESS DURATION MIN STAGE 1: 0
BH CV STRESS DURATION MIN STAGE 2: 4
BH CV STRESS DURATION SEC STAGE 1: 10
BH CV STRESS DURATION SEC STAGE 2: 0
BH CV STRESS HR STAGE 1: 116
BH CV STRESS HR STAGE 2: 118
BH CV STRESS HR STAGE 3: 117
BH CV STRESS HR STAGE 4: 112
BH CV STRESS NUCLEAR ISOTOPE DOSE: 21.5 MCI
BH CV STRESS PROTOCOL 1: NORMAL
BH CV STRESS RECOVERY BP: NORMAL MMHG
BH CV STRESS RECOVERY HR: 105 BPM
BH CV STRESS STAGE 1: 1
BH CV STRESS STAGE 2: 2
BH CV STRESS STAGE 3: 3
BH CV STRESS STAGE 4: 4
BUN SERPL-MCNC: 8 MG/DL (ref 6–20)
BUN/CREAT SERPL: 12.5 (ref 7–25)
CALCIUM SPEC-SCNC: 9.2 MG/DL (ref 8.6–10.5)
CHLORIDE SERPL-SCNC: 102 MMOL/L (ref 98–107)
CO2 SERPL-SCNC: 23 MMOL/L (ref 22–29)
CREAT SERPL-MCNC: 0.64 MG/DL (ref 0.57–1)
DEPRECATED RDW RBC AUTO: 42.4 FL (ref 37–54)
EOSINOPHIL # BLD AUTO: 0.3 10*3/MM3 (ref 0–0.4)
EOSINOPHIL NFR BLD AUTO: 3.5 % (ref 0.3–6.2)
ERYTHROCYTE [DISTWIDTH] IN BLOOD BY AUTOMATED COUNT: 13.7 % (ref 12.3–15.4)
GFR SERPL CREATININE-BSD FRML MDRD: 98 ML/MIN/1.73
GLUCOSE SERPL-MCNC: 97 MG/DL (ref 65–99)
HCT VFR BLD AUTO: 34.9 % (ref 34–46.6)
HGB BLD-MCNC: 11.7 G/DL (ref 12–15.9)
LYMPHOCYTES # BLD AUTO: 1.9 10*3/MM3 (ref 0.7–3.1)
LYMPHOCYTES NFR BLD AUTO: 27.2 % (ref 19.6–45.3)
MAGNESIUM SERPL-MCNC: 2 MG/DL (ref 1.6–2.6)
MAXIMAL PREDICTED HEART RATE: 169 BPM
MCH RBC QN AUTO: 29.4 PG (ref 26.6–33)
MCHC RBC AUTO-ENTMCNC: 33.5 G/DL (ref 31.5–35.7)
MCV RBC AUTO: 87.8 FL (ref 79–97)
MONOCYTES # BLD AUTO: 0.8 10*3/MM3 (ref 0.1–0.9)
MONOCYTES NFR BLD AUTO: 11.5 % (ref 5–12)
NEUTROPHILS NFR BLD AUTO: 4.1 10*3/MM3 (ref 1.7–7)
NEUTROPHILS NFR BLD AUTO: 57.1 % (ref 42.7–76)
NRBC BLD AUTO-RTO: 0.1 /100 WBC (ref 0–0.2)
PERCENT MAX PREDICTED HR: 69.82 %
PLATELET # BLD AUTO: 321 10*3/MM3 (ref 140–450)
PMV BLD AUTO: 8.9 FL (ref 6–12)
POTASSIUM SERPL-SCNC: 4 MMOL/L (ref 3.5–5.2)
RBC # BLD AUTO: 3.97 10*6/MM3 (ref 3.77–5.28)
SODIUM SERPL-SCNC: 137 MMOL/L (ref 136–145)
STRESS BASELINE BP: NORMAL MMHG
STRESS BASELINE HR: 103 BPM
STRESS PERCENT HR: 82 %
STRESS POST PEAK BP: NORMAL MMHG
STRESS POST PEAK HR: 118 BPM
STRESS TARGET HR: 144 BPM
TROPONIN T SERPL-MCNC: <0.01 NG/ML (ref 0–0.03)
WBC NRBC COR # BLD: 7.2 10*3/MM3 (ref 3.4–10.8)

## 2022-02-11 PROCEDURE — 83735 ASSAY OF MAGNESIUM: CPT | Performed by: PHYSICIAN ASSISTANT

## 2022-02-11 PROCEDURE — 25010000002 ONDANSETRON PER 1 MG: Performed by: INTERNAL MEDICINE

## 2022-02-11 PROCEDURE — 99205 OFFICE O/P NEW HI 60 MIN: CPT | Performed by: INTERNAL MEDICINE

## 2022-02-11 PROCEDURE — G0378 HOSPITAL OBSERVATION PER HR: HCPCS

## 2022-02-11 PROCEDURE — A9502 TC99M TETROFOSMIN: HCPCS | Performed by: EMERGENCY MEDICINE

## 2022-02-11 PROCEDURE — 96376 TX/PRO/DX INJ SAME DRUG ADON: CPT

## 2022-02-11 PROCEDURE — 94799 UNLISTED PULMONARY SVC/PX: CPT

## 2022-02-11 PROCEDURE — 93005 ELECTROCARDIOGRAM TRACING: CPT | Performed by: INTERNAL MEDICINE

## 2022-02-11 PROCEDURE — 80048 BASIC METABOLIC PNL TOTAL CA: CPT | Performed by: EMERGENCY MEDICINE

## 2022-02-11 PROCEDURE — 93017 CV STRESS TEST TRACING ONLY: CPT

## 2022-02-11 PROCEDURE — 25010000002 REGADENOSON 0.4 MG/5ML SOLUTION: Performed by: EMERGENCY MEDICINE

## 2022-02-11 PROCEDURE — 25010000002 ONDANSETRON PER 1 MG: Performed by: EMERGENCY MEDICINE

## 2022-02-11 PROCEDURE — 93010 ELECTROCARDIOGRAM REPORT: CPT | Performed by: INTERNAL MEDICINE

## 2022-02-11 PROCEDURE — 78452 HT MUSCLE IMAGE SPECT MULT: CPT | Performed by: INTERNAL MEDICINE

## 2022-02-11 PROCEDURE — 78452 HT MUSCLE IMAGE SPECT MULT: CPT

## 2022-02-11 PROCEDURE — 93018 CV STRESS TEST I&R ONLY: CPT | Performed by: INTERNAL MEDICINE

## 2022-02-11 PROCEDURE — 25010000002 MORPHINE PER 10 MG: Performed by: EMERGENCY MEDICINE

## 2022-02-11 PROCEDURE — 25010000002 ONDANSETRON PER 1 MG: Performed by: PHYSICIAN ASSISTANT

## 2022-02-11 PROCEDURE — 85025 COMPLETE CBC W/AUTO DIFF WBC: CPT | Performed by: EMERGENCY MEDICINE

## 2022-02-11 PROCEDURE — 0 TECHNETIUM TETROFOSMIN KIT: Performed by: EMERGENCY MEDICINE

## 2022-02-11 PROCEDURE — 84484 ASSAY OF TROPONIN QUANT: CPT | Performed by: EMERGENCY MEDICINE

## 2022-02-11 PROCEDURE — 99214 OFFICE O/P EST MOD 30 MIN: CPT | Performed by: PHYSICIAN ASSISTANT

## 2022-02-11 RX ORDER — MONTELUKAST SODIUM 10 MG/1
10 TABLET ORAL NIGHTLY
Status: DISCONTINUED | OUTPATIENT
Start: 2022-02-11 | End: 2022-02-12 | Stop reason: HOSPADM

## 2022-02-11 RX ORDER — ASCORBIC ACID 500 MG
500 TABLET ORAL DAILY
Status: DISCONTINUED | OUTPATIENT
Start: 2022-02-11 | End: 2022-02-12 | Stop reason: HOSPADM

## 2022-02-11 RX ORDER — HYDROCODONE BITARTRATE AND ACETAMINOPHEN 7.5; 325 MG/1; MG/1
1 TABLET ORAL EVERY 6 HOURS PRN
Status: DISCONTINUED | OUTPATIENT
Start: 2022-02-11 | End: 2022-02-12

## 2022-02-11 RX ORDER — ASPIRIN 81 MG/1
81 TABLET ORAL DAILY
Status: DISCONTINUED | OUTPATIENT
Start: 2022-02-11 | End: 2022-02-12 | Stop reason: HOSPADM

## 2022-02-11 RX ORDER — MELATONIN
1000 DAILY
Status: DISCONTINUED | OUTPATIENT
Start: 2022-02-11 | End: 2022-02-12 | Stop reason: HOSPADM

## 2022-02-11 RX ORDER — LIDOCAINE 50 MG/G
1 PATCH TOPICAL DAILY
Status: DISCONTINUED | OUTPATIENT
Start: 2022-02-11 | End: 2022-02-12 | Stop reason: HOSPADM

## 2022-02-11 RX ORDER — MORPHINE SULFATE 2 MG/ML
2 INJECTION, SOLUTION INTRAMUSCULAR; INTRAVENOUS EVERY 4 HOURS PRN
Status: DISCONTINUED | OUTPATIENT
Start: 2022-02-11 | End: 2022-02-12 | Stop reason: HOSPADM

## 2022-02-11 RX ORDER — DIAZEPAM 5 MG/1
5 TABLET ORAL DAILY PRN
Status: DISCONTINUED | OUTPATIENT
Start: 2022-02-11 | End: 2022-02-11 | Stop reason: SDUPTHER

## 2022-02-11 RX ADMIN — HYDROCODONE BITARTRATE AND ACETAMINOPHEN 1 TABLET: 7.5; 325 TABLET ORAL at 22:42

## 2022-02-11 RX ADMIN — OXYCODONE HYDROCHLORIDE AND ACETAMINOPHEN 500 MG: 500 TABLET ORAL at 09:36

## 2022-02-11 RX ADMIN — DIAZEPAM 5 MG: 5 TABLET ORAL at 20:29

## 2022-02-11 RX ADMIN — ONDANSETRON 4 MG: 2 INJECTION INTRAMUSCULAR; INTRAVENOUS at 15:55

## 2022-02-11 RX ADMIN — ASPIRIN 81 MG: 81 TABLET, COATED ORAL at 16:39

## 2022-02-11 RX ADMIN — HYDROCODONE BITARTRATE AND ACETAMINOPHEN 1 TABLET: 7.5; 325 TABLET ORAL at 15:41

## 2022-02-11 RX ADMIN — DULOXETINE 60 MG: 30 CAPSULE, DELAYED RELEASE ORAL at 09:36

## 2022-02-11 RX ADMIN — ONDANSETRON 4 MG: 2 INJECTION INTRAMUSCULAR; INTRAVENOUS at 22:42

## 2022-02-11 RX ADMIN — BUDESONIDE AND FORMOTEROL FUMARATE DIHYDRATE 2 PUFF: 160; 4.5 AEROSOL RESPIRATORY (INHALATION) at 07:42

## 2022-02-11 RX ADMIN — TETROFOSMIN 1 DOSE: 1.38 INJECTION, POWDER, LYOPHILIZED, FOR SOLUTION INTRAVENOUS at 08:49

## 2022-02-11 RX ADMIN — HYDROCODONE BITARTRATE AND ACETAMINOPHEN 1 TABLET: 7.5; 325 TABLET ORAL at 09:36

## 2022-02-11 RX ADMIN — MONTELUKAST 10 MG: 10 TABLET, FILM COATED ORAL at 20:28

## 2022-02-11 RX ADMIN — TETROFOSMIN 1 DOSE: 1.38 INJECTION, POWDER, LYOPHILIZED, FOR SOLUTION INTRAVENOUS at 06:45

## 2022-02-11 RX ADMIN — MORPHINE SULFATE 1 MG: 2 INJECTION, SOLUTION INTRAMUSCULAR; INTRAVENOUS at 05:25

## 2022-02-11 RX ADMIN — LIDOCAINE 1 PATCH: 50 PATCH TOPICAL at 10:19

## 2022-02-11 RX ADMIN — BACLOFEN 10 MG: 10 TABLET ORAL at 09:38

## 2022-02-11 RX ADMIN — Medication 1000 UNITS: at 09:36

## 2022-02-11 RX ADMIN — NITROGLYCERIN 0.4 MG: 0.4 TABLET SUBLINGUAL at 12:45

## 2022-02-11 RX ADMIN — REGADENOSON 0.4 MG: 0.08 INJECTION, SOLUTION INTRAVENOUS at 08:49

## 2022-02-11 RX ADMIN — ONDANSETRON 4 MG: 2 INJECTION INTRAMUSCULAR; INTRAVENOUS at 05:25

## 2022-02-11 RX ADMIN — BUDESONIDE AND FORMOTEROL FUMARATE DIHYDRATE 2 PUFF: 160; 4.5 AEROSOL RESPIRATORY (INHALATION) at 18:33

## 2022-02-11 RX ADMIN — DULOXETINE 60 MG: 30 CAPSULE, DELAYED RELEASE ORAL at 20:29

## 2022-02-11 NOTE — PLAN OF CARE
Problem: Adult Inpatient Plan of Care  Goal: Plan of Care Review  Outcome: Ongoing, Progressing  Flowsheets (Taken 2/11/2022 1248)  Progress: no change  Plan of Care Reviewed With: patient  Outcome Summary: Patent being evaluated by cardiology for possble cardiac catherization. Vital signs stable. Chest pain unchanged from yesterday. Will continue to monitor.  Goal: Patient-Specific Goal (Individualized)  Outcome: Ongoing, Progressing  Goal: Absence of Hospital-Acquired Illness or Injury  Outcome: Ongoing, Progressing  Intervention: Identify and Manage Fall Risk  Recent Flowsheet Documentation  Taken 2/11/2022 1149 by Al Flores RN  Safety Promotion/Fall Prevention: safety round/check completed  Taken 2/11/2022 0917 by Al Flores RN  Safety Promotion/Fall Prevention: patient off unit  Taken 2/11/2022 0733 by Al Flores RN  Safety Promotion/Fall Prevention:   safety round/check completed   assistive device/personal items within reach  Intervention: Prevent Skin Injury  Recent Flowsheet Documentation  Taken 2/11/2022 1149 by Al Flores RN  Body Position: position changed independently  Taken 2/11/2022 0733 by Al Flores RN  Body Position: position changed independently  Goal: Optimal Comfort and Wellbeing  Outcome: Ongoing, Progressing  Intervention: Provide Person-Centered Care  Recent Flowsheet Documentation  Taken 2/11/2022 1149 by Al Flores RN  Trust Relationship/Rapport:   care explained   choices provided  Goal: Readiness for Transition of Care  Outcome: Ongoing, Progressing   Goal Outcome Evaluation:  Plan of Care Reviewed With: patient        Progress: no change  Outcome Summary: Patent being evaluated by cardiology for possble cardiac catherization. Vital signs stable. Chest pain unchanged from yesterday. Will continue to monitor.

## 2022-02-11 NOTE — CONSULTS
[facility]   CONSULT NOTE    Referring Provider: SHARONA Bradley   Reason for Consultation: Chest pain, abnormal stress test     Patient Care Team:  Junie Le APRN as PCP - General (Nurse Practitioner)  Josh Bowman MD PhD as Surgeon (Cardiothoracic Surgery)  Ajit Toro MD as Consulting Physician (Obstetrics and Gynecology)    Chief complaint- chest pain         History of present illness:  Gideon Fields is a 51 y.o. female with past medical history of  Lung cancer s/p right upper lobectomy (no chemo), chronic nerve pain after surgery, asthma, anxiety  presented to the ED with chest pain.  Patient states that she feels like something is sitting on her chest.  There is radiation into the left neck and left shoulder/back with associated shortness of breath and nausea.  Patient did receive nitroglycerin in the ED which helped briefly but that pain has been constant this morning since her stress test.  Patient underwent pharmacological stress test that was abnormal with mild inferior wall ischemia. Patient denies any cough, fever, chills.   Labs reviewed and showed negative serial troponin, CT PE protocol was negative for PE but showed small pleural effusion on the right and evidence of right upper lobe wedge resection.       Review of Systems   Constitutional: Positive for malaise/fatigue. Negative for fever.   Cardiovascular: Positive for chest pain. Negative for dyspnea on exertion and leg swelling.   Respiratory: Positive for shortness of breath. Negative for cough.    Musculoskeletal: Positive for neck pain.   Gastrointestinal: Positive for nausea. Negative for abdominal pain and vomiting.   All other systems reviewed and are negative.      History  Past Medical History:   Diagnosis Date   • Anxiety    • Asthma 12/9/2013   • Lumbar radiculopathy 3/12/2018   • Lung nodule seen on imaging study 10/2/2020   • Malignant neoplasm of upper lobe of  right lung (HCC) 2021   • Thoracic back pain 3/12/2018       Past Surgical History:   Procedure Laterality Date   • BREAST BIOPSY Right     benign   • BRONCHOSCOPY N/A 10/21/2020    Procedure: BRONCHOSCOPY WITH BRONCHOALVEOLAR LAVAGE, NAVIGATION WITH FINE NEEDLE ASPIRATION;  Surgeon: Nichol Jaquez MD;  Location: [facility] ENDOSCOPY;  Service: Pulmonary;  Laterality: N/A;  POST RUL NODULE   •  SECTION      x3    • LUNG REMOVAL, PARTIAL Right 2020    right upper lobe    • NASAL SEPTUM SURGERY      20 Years Ago       Family History   Problem Relation Age of Onset   • Hypertension Mother    • Alzheimer's disease Mother    • Thyroid disease Mother    • Lung cancer Father    • Lupus Sister    • Asthma Other    • No Known Problems Brother    • Lupus Sister        Social History     Tobacco Use   • Smoking status: Never Smoker   • Smokeless tobacco: Never Used   Vaping Use   • Vaping Use: Never used   Substance Use Topics   • Alcohol use: Yes     Comment: Couple times a week   • Drug use: No        Medications Prior to Admission   Medication Sig Dispense Refill Last Dose   • albuterol sulfate  (90 Base) MCG/ACT inhaler TAKE 2 PUFFS BY MOUTH EVERY 4 HOURS AS NEEDED FOR WHEEZE 18 g 3 2/10/2022 at Unknown time   • baclofen (LIORESAL) 10 MG tablet TAKE 1 TABLET BY MOUTH THREE TIMES A DAY AS NEEDED FOR MUSCLES SPASMS 90 tablet 0 Past Week at Unknown time   • budesonide-formoterol (SYMBICORT) 160-4.5 MCG/ACT inhaler Inhale 2 puffs 2 (Two) Times a Day. 1 each 11 2/10/2022 at Unknown time   • busPIRone (BUSPAR) 5 MG tablet Take 5 mg by mouth 2 (Two) Times a Day As Needed.   Past Month at Unknown time   • Calcium Acetate, Phos Binder, (CALCIUM ACETATE PO) Take 1 tablet by mouth Daily.   2/10/2022 at Unknown time   • Cholecalciferol (Vitamin D) 25 MCG (1000 UT) tablet Take 1,000 Units by mouth Daily.   2/10/2022 at Unknown time   • diazePAM (Valium) 5 MG tablet Take 1 tablet by mouth Daily As Needed for  Anxiety. 30 tablet 1 Past Month at Unknown time   • DULoxetine (CYMBALTA) 60 MG capsule TAKE 1 CAPSULE BY MOUTH TWICE A  capsule 2 2/10/2022 at Unknown time   • HYDROcodone-acetaminophen (NORCO) 7.5-325 MG per tablet Take 1 tablet by mouth Every 6 (Six) Hours As Needed for Moderate Pain . 30 tablet 0 Past Week at Unknown time   • montelukast (SINGULAIR) 10 MG tablet TAKE 1 TABLET BY MOUTH EVERY DAY AT NIGHT (Patient taking differently: Take 10 mg by mouth Daily.) 90 tablet 1 Patient Taking Differently at Unknown time   • Multiple Vitamins-Minerals (multivitamin with minerals) tablet tablet Take 1 tablet by mouth Daily.   2/10/2022 at Unknown time   • Omega-3 Fatty Acids (fish oil) 1000 MG capsule capsule Take  by mouth Daily With Breakfast.   Past Week at Unknown time   • TRI-SPRINTEC 0.18/0.215/0.25 MG-35 MCG per tablet Take 1 tablet by mouth Daily.  4 2/10/2022 at Unknown time   • Turmeric 500 MG capsule Take  by mouth.   Past Week at Unknown time   • vitamin C (ASCORBIC ACID) 500 MG tablet Take 500 mg by mouth Daily.   2/10/2022 at Unknown time   • lidocaine (Lidoderm) 5 % Place 1 patch on the skin as directed by provider Daily. Remove & Discard patch within 12 hours or as directed by MD 6 each 0 More than a month at Unknown time       Allergies:  Bee venom    Scheduled Meds:vitamin C, 500 mg, Oral, Daily  budesonide-formoterol, 2 puff, Inhalation, BID - RT  cholecalciferol, 1,000 Units, Oral, Daily  DULoxetine, 60 mg, Oral, BID  lidocaine, 1 patch, Transdermal, Daily  montelukast, 10 mg, Oral, Nightly      Continuous Infusions:   PRN Meds:.•  acetaminophen  •  albuterol  •  baclofen  •  diazePAM  •  HYDROcodone-acetaminophen  •  melatonin  •  Morphine **AND** naloxone  •  nitroglycerin  •  ondansetron  •  Insert peripheral IV **AND** sodium chloride        VITAL SIGNS  Vitals:    02/11/22 0528 02/11/22 0742 02/11/22 0746 02/11/22 0930   BP: 118/64   118/62   BP Location: Right arm   Right arm   Patient  "Position: Lying   Lying   Pulse: 90 95 106 100   Resp: 16 16 16 17   Temp: 98.8 °F (37.1 °C)   98.1 °F (36.7 °C)   TempSrc: Oral   Oral   SpO2: 97% 95% 95% 100%   Weight:       Height:           Flowsheet Rows      First Filed Value   Admission Height 167.6 cm (66\") Documented at 02/10/2022 1317   Admission Weight 74 kg (163 lb 2.3 oz) Documented at 02/10/2022 1317           TELEMETRY: sinus rhythm     Physical Exam:  Vitals and nursing note reviewed.   Constitutional:       Appearance: Not in distress.   Neck:      Vascular: No JVR. JVD normal.   Pulmonary:      Effort: Pulmonary effort is normal.      Breath sounds: Normal breath sounds. No wheezing. No rhonchi. No rales.   Chest:      Chest wall: Not tender to palpatation.   Cardiovascular:      PMI at left midclavicular line. Normal rate. Regular rhythm. Normal S1. Normal S2.      Murmurs: There is no murmur.      No gallop. No click. No rub.   Pulses:     Intact distal pulses.   Edema:     Peripheral edema absent.   Abdominal:      General: Bowel sounds are normal.      Palpations: Abdomen is soft.      Tenderness: There is no abdominal tenderness.   Musculoskeletal: Normal range of motion.         General: No tenderness. Skin:     General: Skin is warm and dry.   Neurological:      General: No focal deficit present.      Mental Status: Alert and oriented to person, place and time.                  LAB RESULTS (LAST 7 DAYS)    CBC  Results from last 7 days   Lab Units 02/11/22  0442 02/10/22  1528   WBC 10*3/mm3 7.20 9.30   RBC 10*6/mm3 3.97 4.21   HEMOGLOBIN g/dL 11.7* 12.8   HEMATOCRIT % 34.9 36.9   MCV fL 87.8 87.6   PLATELETS 10*3/mm3 321 316       BMP  Results from last 7 days   Lab Units 02/11/22  0442 02/10/22  1528   SODIUM mmol/L 137 135*   POTASSIUM mmol/L 4.0 4.0   CHLORIDE mmol/L 102 99   CO2 mmol/L 23.0 23.0   BUN mg/dL 8 8   CREATININE mg/dL 0.64 0.61   GLUCOSE mg/dL 97 98       CMP   Results from last 7 days   Lab Units 02/11/22  0442 " 02/10/22  1528   SODIUM mmol/L 137 135*   POTASSIUM mmol/L 4.0 4.0   CHLORIDE mmol/L 102 99   CO2 mmol/L 23.0 23.0   BUN mg/dL 8 8   CREATININE mg/dL 0.64 0.61   GLUCOSE mg/dL 97 98         BNP        TROPONIN  Results from last 7 days   Lab Units 22  0442   TROPONIN T ng/mL <0.010       CoAg  Results from last 7 days   Lab Units 02/10/22  1528   INR  <0.93*   APTT seconds 28.2       Creatinine Clearance  Estimated Creatinine Clearance: 106.7 mL/min (by C-G formula based on SCr of 0.64 mg/dL).    ABG        Radiology  CT Chest Pulmonary Embolism    Result Date: 2/10/2022  1.There are no findings of pulmonary embolus. 2.There is a small right pleural effusion with subsegmental atelectasis and possible wedge resection of right upper lobe nodule. 3.There is a single mildly enlarged mediastinal lymph node. This is nonspecific. A follow-up CT chest in 6 months time should be performed.    Electronically Signed By-Josette Rosales MD On:2/10/2022 4:37 PM This report was finalized on [id number] by  Josette Rosales MD.          EKG          I personally viewed and interpreted the patient's EKG/Telemetry data:    ECHOCARDIOGRAM:        STRESS MYOVIEW:    CARDIAC CATHETERIZATION:    OTHER:         Assessment/Plan       Chest pain  Abnormal stress test   Asthma  History of lung CA s/p right upper lobe wedge resection   Anxiety  Non smoker; + EtOH on weekends occasionally    Family history of father with CAD,  of lung CA, Mother- hypertension, brother- afib   Previous nasal surgery,  X3      PLAN:  Patient presented with chest pain  Ruled out for MI with negative serial cardiac enzymes and EKG   EKG showed sinus rhythm without any ST changes   Patient underwent stress test that was abnormal with inferior wall ischemia    Plan for cardiac catheterization-- timing per Dr. Tellez   Will start aspirin   Check lipid panel in am   Echocardiogram to assess LV function and valve abnormalities     I discussed the patients  findings and my recommendations with patient and bedside nurse    Further recommendations per NEO An  02/11/22  12:06 EST   Electronically signed by NEO Harris, 02/11/22, 4:03 PM EST.    The patient was seen and rest of his examination was conducted around 4:15 PM today.  Reviewed and agree with the assessment and plan as documented by nurse practitioner Gracie.  The patient has a history of chest discomfort which is concerning for angina pectoris.  Patient had history of lung cancer status post right upper lobectomy.  Patient has multiple risk factors.  Patient had stress Cardiolite test which was abnormal with inferior ischemia.  Physical exam is essentially normal.  Echocardiogram will be reviewed.  Patient to have cardiac catheterization and coronary arteriography.  Risks and benefits pros and cons of the procedure were discussed with patient and patient's family at bedside.  This included infection bleeding blood clot heart attack stroke allergic reaction to the dye etc.  Have discussed with attending nurse for coordination of care.  Further plan will depend on patient's progress.  Electronically signed by Chandana Tellez MD, 02/11/22, 4:42 PM EST.

## 2022-02-11 NOTE — PLAN OF CARE
Goal Outcome Evaluation:   Pt admitted from ED this shift for chest pain. Pt NSR on continuous cardiac monitor. Received PRN Morphine at 2324 for chest pain 4/10 with relief reported. NPO since midnight for myoview this AM.

## 2022-02-11 NOTE — H&P
[facility] Observation Unit H&P    Patient Name: Gideon Fields  : 1970  MRN: [id number]  Primary Care Physician: Junie Le APRN  Date of admission: 2/10/2022     Patient Care Team:  Junie Le APRN as PCP - General (Nurse Practitioner)  Josh Bowman MD PhD as Surgeon (Cardiothoracic Surgery)  Ajit Toro MD as Consulting Physician (Obstetrics and Gynecology)          Subjective   History Present Illness     Chief Complaint:   Chief Complaint   Patient presents with   • Chest Pain     chest pain since 4 am, comes across arm and down back. SOA, Nausea. Diaphoretic at home         Obtained from admitting provider HPI on 2/10/2022:  51-year-old female presents with chest pain.  Patient states the pain started around 4 AM this morning and has been constant.  Pain is all throughout her chest and goes into the left shoulder and left arm.  She has had associated shortness of breath.  Pain is worse with deep breaths as well.  She has had diaphoresis and nausea.  She denies any other alleviating or exacerbating factors.  She denies any cardiac history.     2022: Patient confirms HPI noted above including being woken from sleep at approximately 4 AM on 2/10/2021 with chest pain described as a heaviness across the anterior portion of her chest radiating toward her left shoulder and arm.  Some associated dyspnea is noted with pain and dyspnea both being worse when lying supine and better when seated.  Pain is also noted is somewhat worse with deep breathing and certain movement.  Some nausea without vomiting has also been present but she denies any cough, fever, peripheral edema, palpitations, syncope or near syncope.  Additionally patient notes that she has had a previous partial lung resection secondary to malignancy with no further treatment including radiation or chemotherapy required.  She is aware of the solitary nodule noted on the CT PE protocol performed in the ED from the  previous night and is very anxious about this finding.      ROS   Review of Systems   Constitutional: Positive for diaphoresis.   HENT: Negative.    Eyes: Negative.    Cardiovascular: Positive for chest pain and orthopnea. Negative for leg swelling, near-syncope, palpitations and syncope.   Respiratory: Positive for shortness of breath. Negative for cough.    Endocrine: Negative.    Skin: Negative.    Musculoskeletal: Negative.    Gastrointestinal: Positive for nausea. Negative for abdominal pain and vomiting.   Genitourinary: Negative.    Neurological: Negative.    Psychiatric/Behavioral: Negative        Personal History     Past Medical History:   Past Medical History:   Diagnosis Date   • Anxiety    • Asthma 2013   • Lumbar radiculopathy 3/12/2018   • Lung nodule seen on imaging study 10/2/2020   • Malignant neoplasm of upper lobe of right lung (HCC) 2021   • Thoracic back pain 3/12/2018       Surgical History:      Past Surgical History:   Procedure Laterality Date   • BREAST BIOPSY Right     benign   • BRONCHOSCOPY N/A 10/21/2020    Procedure: BRONCHOSCOPY WITH BRONCHOALVEOLAR LAVAGE, NAVIGATION WITH FINE NEEDLE ASPIRATION;  Surgeon: Nichol Jaquez MD;  Location: [facility] ENDOSCOPY;  Service: Pulmonary;  Laterality: N/A;  POST RUL NODULE   •  SECTION      x3    • LUNG REMOVAL, PARTIAL Right 2020    right upper lobe    • NASAL SEPTUM SURGERY      20 Years Ago           Family History: family history includes Alzheimer's disease in her mother; Asthma in an other family member; Hypertension in her mother; Lung cancer in her father; Lupus in her sister and sister; No Known Problems in her brother; Thyroid disease in her mother. Otherwise pertinent FHx was reviewed and unremarkable.     Social History:  reports that she has never smoked. She has never used smokeless tobacco. She reports current alcohol use. She reports that she does not use drugs.      Medications:  Prior to Admission  medications    Medication Sig Start Date End Date Taking? Authorizing Provider   albuterol sulfate  (90 Base) MCG/ACT inhaler TAKE 2 PUFFS BY MOUTH EVERY 4 HOURS AS NEEDED FOR WHEEZE 2/10/22  Yes Raisa Johnson MD   baclofen (LIORESAL) 10 MG tablet TAKE 1 TABLET BY MOUTH THREE TIMES A DAY AS NEEDED FOR MUSCLES SPASMS 3/29/21  Yes Junie Le APRN   budesonide-formoterol (SYMBICORT) 160-4.5 MCG/ACT inhaler Inhale 2 puffs 2 (Two) Times a Day. 12/8/21  Yes Blanca Samayoa MD   busPIRone (BUSPAR) 5 MG tablet Take 5 mg by mouth 2 (Two) Times a Day As Needed. 10/2/18  Yes Magdiel Brown MD   Calcium Acetate, Phos Binder, (CALCIUM ACETATE PO) Take 1 tablet by mouth Daily.   Yes Magdiel Brown MD   Cholecalciferol (Vitamin D) 25 MCG (1000 UT) tablet Take 1,000 Units by mouth Daily.   Yes Magdiel Brown MD   diazePAM (Valium) 5 MG tablet Take 1 tablet by mouth Daily As Needed for Anxiety. 11/3/20  Yes Kenyetta Lizarraga MD   DULoxetine (CYMBALTA) 60 MG capsule TAKE 1 CAPSULE BY MOUTH TWICE A DAY 12/1/21  Yes Junie Le APRN   HYDROcodone-acetaminophen (NORCO) 7.5-325 MG per tablet Take 1 tablet by mouth Every 6 (Six) Hours As Needed for Moderate Pain . 5/6/21  Yes Junie Le APRN   montelukast (SINGULAIR) 10 MG tablet TAKE 1 TABLET BY MOUTH EVERY DAY AT NIGHT  Patient taking differently: Take 10 mg by mouth Daily. 1/21/22  Yes Junei Le APRN   Multiple Vitamins-Minerals (multivitamin with minerals) tablet tablet Take 1 tablet by mouth Daily.   Yes Magdiel Brown MD   Omega-3 Fatty Acids (fish oil) 1000 MG capsule capsule Take  by mouth Daily With Breakfast.   Yes Magdiel Brown MD   TRI-SPRINTEC 0.18/0.215/0.25 MG-35 MCG per tablet Take 1 tablet by mouth Daily. 8/14/19  Yes Magdiel Brown MD   Turmeric 500 MG capsule Take  by mouth.   Yes Magdiel Brown, MD   vitamin C (ASCORBIC ACID) 500 MG tablet Take 500 mg by mouth Daily.   Yes  Provider, MD Magdiel   lidocaine (Lidoderm) 5 % Place 1 patch on the skin as directed by provider Daily. Remove & Discard patch within 12 hours or as directed by MD 2/2/21   Gail Stephens APRN       Allergies:    Allergies   Allergen Reactions   • Bee Venom Itching       Objective   Objective     Vital Signs  Temp:  [98 °F (36.7 °C)-98.8 °F (37.1 °C)] 98.1 °F (36.7 °C)  Heart Rate:  [] 100  Resp:  [16-18] 17  BP: (102-138)/(50-72) 119/56  SpO2:  [95 %-100 %] 100 %  on   ;   Device (Oxygen Therapy): room air  Body mass index is 26.18 kg/m².    Physical Exam  Vitals reviewed.   Constitutional:       General: She is not in acute distress.     Appearance: Normal appearance. She is normal weight. She is not ill-appearing, toxic-appearing or diaphoretic.   HENT:      Head: Normocephalic and atraumatic.      Right Ear: External ear normal.      Left Ear: External ear normal.      Nose: Nose normal.      Mouth/Throat:      Mouth: Mucous membranes are moist.   Eyes:      Extraocular Movements: Extraocular movements intact.   Cardiovascular:      Rate and Rhythm: Normal rate and regular rhythm.      Pulses: Normal pulses.      Heart sounds: Normal heart sounds.   Pulmonary:      Effort: Pulmonary effort is normal.      Breath sounds: Normal breath sounds.   Abdominal:      General: Bowel sounds are normal. There is no distension.      Palpations: Abdomen is soft.      Tenderness: There is no abdominal tenderness.   Musculoskeletal:         General: Normal range of motion.      Cervical back: Normal range of motion.   Skin:     General: Skin is warm and dry.      Capillary Refill: Capillary refill takes less than 2 seconds.   Neurological:      General: No focal deficit present.      Mental Status: She is alert and oriented to person, place, and time.   Psychiatric:         Mood and Affect: Mood normal.         Behavior: Behavior normal.         Thought Content: Thought content normal.         Judgment: Judgment  normal.     Results Review:  I have personally reviewed most recent cardiac tracings, lab results and radiology images and interpretations and agree with findings, most notably: Troponin, CBC, CMP, CT PE protocol, EKG and stress test.    Results from last 7 days   Lab Units 02/11/22  0442 02/10/22  1528 02/10/22  1528   WBC 10*3/mm3 7.20   < > 9.30   HEMOGLOBIN g/dL 11.7*   < > 12.8   HEMATOCRIT % 34.9   < > 36.9   PLATELETS 10*3/mm3 321   < > 316   INR   --   --  <0.93*    < > = values in this interval not displayed.     Results from last 7 days   Lab Units 02/11/22  0442 02/10/22  2040 02/10/22  1528 02/10/22  1528   SODIUM mmol/L 137  --    < > 135*   POTASSIUM mmol/L 4.0  --    < > 4.0   CHLORIDE mmol/L 102  --    < > 99   CO2 mmol/L 23.0  --    < > 23.0   BUN mg/dL 8  --    < > 8   CREATININE mg/dL 0.64  --    < > 0.61   GLUCOSE mg/dL 97  --    < > 98   CALCIUM mg/dL 9.2  --    < > 9.5   TROPONIN T ng/mL <0.010 <0.010  --  <0.010    < > = values in this interval not displayed.     Estimated Creatinine Clearance: 106.7 mL/min (by C-G formula based on SCr of 0.64 mg/dL).  Brief Urine Lab Results  (Last result in the past 365 days)      Color   Clarity   Blood   Leuk Est   Nitrite   Protein   CREAT   Urine HCG        05/06/21 0927 Yellow   Clear   Moderate (2+)   Negative   Negative   Negative                 Microbiology Results (last 10 days)     Procedure Component Value - Date/Time    COVID PRE-OP / PRE-PROCEDURE SCREENING ORDER (NO ISOLATION) - Swab, Nasopharynx [[id number]]  (Normal) Collected: 02/10/22 1725    Lab Status: Final result Specimen: Swab from Nasopharynx Updated: 02/10/22 1813    Narrative:      The following orders were created for panel order COVID PRE-OP / PRE-PROCEDURE SCREENING ORDER (NO ISOLATION) - Swab, Nasopharynx.  Procedure                               Abnormality         Status                     ---------                               -----------         ------                      COVID-19,CEPHEID/NIA,CO...[[id number]]  Normal              Final result                 Please view results for these tests on the individual orders.    COVID-19,CEPHEID/NIA,COR/NAHOMI/PAD/SANYA IN-HOUSE(OR EMERGENT/ADD-ON),NP SWAB IN TRANSPORT MEDIA 3-4 HR TAT, RT-PCR - Swab, Nasopharynx [[id number]]  (Normal) Collected: 02/10/22 1725    Lab Status: Final result Specimen: Swab from Nasopharynx Updated: 02/10/22 1813     COVID19 Not Detected    Narrative:      Fact sheet for providers: https://www.fda.gov/media/100005/download     Fact sheet for patients: https://www.fda.gov/media/401638/download  Fact sheet for providers: https://www.fda.gov/media/538946/download    Fact sheet for patients: https://www.fda.gov/media/806212/download    Test performed by PCR.          ECG/EMG Results (most recent)     Procedure Component Value Units Date/Time    ECG 12 Lead [[id number]] Collected: 02/10/22 1322     Updated: 02/10/22 1323     QT Interval 351 ms     Narrative:      HEART RATE= 98  bpm  RR Interval= 616  ms  MA Interval= 100  ms  P Horizontal Axis= -12  deg  P Front Axis= 35  deg  QRSD Interval= 87  ms  QT Interval= 351  ms  QRS Axis= -11  deg  T Wave Axis= 28  deg  - NORMAL ECG -  Sinus rhythm  Electronically Signed By:   Date and Time of Study: 2022-02-10 13:22:28                  CT Chest Pulmonary Embolism    Result Date: 2/10/2022  1.There are no findings of pulmonary embolus. 2.There is a small right pleural effusion with subsegmental atelectasis and possible wedge resection of right upper lobe nodule. 3.There is a single mildly enlarged mediastinal lymph node. This is nonspecific. A follow-up CT chest in 6 months time should be performed.    Electronically Signed By-Josette Rosales MD On:2/10/2022 4:37 PM This report was finalized on [id number] by  Josette Rosales MD.        Estimated Creatinine Clearance: 106.7 mL/min (by C-G formula based on SCr of 0.64 mg/dL).    Assessment/Plan   Assessment/Plan       Active  Hospital Problems    Diagnosis  POA   • Chest pain [R07.9]  Yes      Resolved Hospital Problems   No resolved problems to display.       Chest pain  -Serial troponin: Less than 0.010  -CT PE protocol showed no findings of pulmonary embolism with a small right pleural effusion with subsegmental atelectasis and possible wedge resection of the right upper lobe noted.  A single mildly enlarged mediastinal lymph node is reported noted by radiologist as nonspecific with recommendations for follow-up in 6 months time.  This was discussed with patient who will contact her pulmonologist as well as thoracic surgeon for further guidance*  -EKG showed sinus rhythm at 98 without obvious acute ST changes or ectopy with a QTC of 447 ms  -In the ED pt given 325 mg aspirin, morphine and Zofran  -Stress Test reported with mild inferior ischemia with normal left ventricular size and contractility with an EF of 80%  -Cardiology consulted who are recommending cath  -Telemetry  -NPO  -Hospitalist consulted for further management     Asthma  -Symbicort and albuterol     History of lung cancer with previous resection  -Right upper lobectomy performed on 12/3/2020 with physician at [facility]  -Continue follow-up with pulmonology and thoracic surgery and discuss finding of solitary pulmonary nodule noted on CT     Depression/anxiety  -Cymbalta and Valium     Chronic pain  -Norco and baclofen          VTE Prophylaxis -   Mechanical Order History:      Ordered        02/10/22 1710  Place Sequential Compression Device  Once            02/10/22 1710  Maintain Sequential Compression Device  Continuous                    Pharmalogical Order History:     None          CODE STATUS:    Code Status and Medical Interventions:   Ordered at: 02/10/22 1937     Level Of Support Discussed With:    Patient     Code Status (Patient has no pulse and is not breathing):    CPR (Attempt to Resuscitate)     Medical Interventions (Patient  has pulse or is breathing):    Full Support       This patient has been examined wearing personal protective equipment.     I discussed the patient's findings and my recommendations with patient and nursing staff.      Signature:Electronically signed by Mt Cardoso PA-C, 02/11/22, 3:41 PM EST.

## 2022-02-11 NOTE — CONSULTS
"    [facility] Medicine Services - Consult Note    Patient Name: Gideon Fields  : 1970  MRN: [id number]  Primary Care Physician:  Junie Le APRN  Referring Physician: NEO Recinos  Date of admission: 2/10/2022    Inpatient Hospitalist Consult  Consult performed by: Fariha Vega PA-C  Consult ordered by: Mt Cardoso PA-C          Subjective      Reason for Consult/ Chief Complaint: Chest pain    History of Present Illness: Gideon Fields is a 51 y.o. female  Who was admitted to [facility] observation unit on 2/10/22 for chief complaint of chest pain that started suddenly and woke patient up from sleep at approximately 0400 and has been constant since that time. Patient describes the pain as a \"pressure\" more on the left side of her chest with radiation to left shoulder and left arm and associated with dyspnea, nausea, and diaphoresis and pain is worse with deep breaths. Patient denies any fever/chills, abdominal pain, V/D/C, sputum/cough, peripheral edema, palpitations, syncope/lightheadedness/vertigo, or recent travel. Her troponins remained negative during admission. Patient underwent myoview stress test showed mild inferior ischemia. Her chest pain is persisting through admission and cardiology plans to take the patient for cardiac cath in the AM.     Of note, patient did have a CT PE protocol to r/o PE for her chest pain which was negative for PE, but did show a single, mildly enlarged mediastinal LN. Patient has a a history of partial right lung resection due to malignancy in . Radiology recommendations for follow up imaging in 6 months. Patient follows with pulmonology at [facility] and is aware of this finding and plans to follow with her physician after discharge.     Review of Systems   Cardiovascular: Positive for chest pain.   Gastrointestinal: Positive for nausea.   All other systems reviewed and are negative.       Personal History     Past Medical History: "   Diagnosis Date   • Anxiety    • Asthma 2013   • Lumbar radiculopathy 3/12/2018   • Lung nodule seen on imaging study 10/2/2020   • Malignant neoplasm of upper lobe of right lung (HCC) 2021   • Thoracic back pain 3/12/2018       Past Surgical History:   Procedure Laterality Date   • BREAST BIOPSY Right     benign   • BRONCHOSCOPY N/A 10/21/2020    Procedure: BRONCHOSCOPY WITH BRONCHOALVEOLAR LAVAGE, NAVIGATION WITH FINE NEEDLE ASPIRATION;  Surgeon: Nichol Jaquez MD;  Location: [facility] ENDOSCOPY;  Service: Pulmonary;  Laterality: N/A;  POST RUL NODULE   •  SECTION      x3    • LUNG REMOVAL, PARTIAL Right 2020    right upper lobe    • NASAL SEPTUM SURGERY      20 Years Ago       Family History: family history includes Alzheimer's disease in her mother; Asthma in an other family member; Hypertension in her mother; Lung cancer in her father; Lupus in her sister and sister; No Known Problems in her brother; Thyroid disease in her mother. Otherwise pertinent FHx was reviewed and not pertinent to current issue.    Social History:  reports that she has never smoked. She has never used smokeless tobacco. She reports current alcohol use. She reports that she does not use drugs.    Home Medications:   Calcium Acetate (Phos Binder), DULoxetine, HYDROcodone-acetaminophen, Turmeric, albuterol sulfate HFA, baclofen, budesonide-formoterol, busPIRone, cholecalciferol, diazePAM, fish oil, lidocaine, montelukast, multivitamin with minerals, norgestimate-ethinyl estradiol, and vitamin C    Allergies:  Allergies   Allergen Reactions   • Bee Venom Itching         Objective      Vitals:  Temp:  [98 °F (36.7 °C)-98.8 °F (37.1 °C)] 98.8 °F (37.1 °C)  Heart Rate:  [] 96  Resp:  [16-18] 16  BP: (102-121)/(50-72) 105/65    Physical Exam  Vitals reviewed.   Constitutional:       General: She is not in acute distress.     Appearance: Normal appearance. She is not ill-appearing, toxic-appearing or diaphoretic.    HENT:      Head: Normocephalic and atraumatic.      Right Ear: External ear normal.      Left Ear: External ear normal.      Nose: Nose normal.      Mouth/Throat:      Mouth: Mucous membranes are dry.      Pharynx: Oropharynx is clear.   Eyes:      General: No scleral icterus.        Right eye: No discharge.         Left eye: No discharge.      Extraocular Movements: Extraocular movements intact.      Conjunctiva/sclera: Conjunctivae normal.   Cardiovascular:      Rate and Rhythm: Normal rate and regular rhythm.      Pulses: Normal pulses.      Heart sounds: Normal heart sounds. No murmur heard.      Pulmonary:      Effort: Pulmonary effort is normal. No respiratory distress.      Breath sounds: Normal breath sounds. No wheezing.      Comments: On RA and speaks in full sentences  Chest:      Chest wall: Tenderness present.   Abdominal:      General: Bowel sounds are normal. There is no distension.      Palpations: Abdomen is soft.      Tenderness: There is no abdominal tenderness. There is no guarding.   Musculoskeletal:         General: Normal range of motion.      Cervical back: Normal range of motion and neck supple.      Right lower leg: No edema.      Left lower leg: No edema.   Skin:     General: Skin is warm and dry.   Neurological:      General: No focal deficit present.      Mental Status: She is alert and oriented to person, place, and time.   Psychiatric:         Thought Content: Thought content normal.         Judgment: Judgment normal.      Comments: anxious          Result Review    Result Review:  I have personally reviewed the results from the time of this admission to 2/11/2022 17:18 EST and agree with these findings:  [x]  Laboratory  [x]  Microbiology  [x]  Radiology  [x]  EKG/Telemetry   [x]  Cardiology/Vascular   []  Pathology  [x]  Old records  []  Other:  Most notable findings include:       Assessment/Plan        Active Hospital Problems:  Active Hospital Problems    Diagnosis    • **Chest  pain    • Malignant neoplasm of upper lobe, right bronchus or lung (HCC)    • Anxiety    • Depression    • Asthma      Plan:   Acute Chest Pain, Concern for ACS with unstable Angina:  - Cardiology following after abnormal stress test on 2/11/22  - Plan for Cardiac cath on 2/12/22  - Serial troponins negative since arrival  - EKG personally reviewed and shows NSR  - Continue ASA  - Lipid panel ordered for the AM  - ECHO pending    Incidental finding of single, mildly enlarged mediastinal LN:  - Patient has h/o lung cancer s/p right upper lobe partial resection 12/3/2020  - Patient aware of findings and planning to follow up with her pulmonologist at [facility]   - Radiology recommendation is for follow up imaging in 6 months    Asthma:  - Continue home Symbicort and albuterol    Depression/Anxiety:  - Cymbalta and Valium    Chronic Pain:  - On Norco and Baclofen    DVT PPX: SCDs      This patient has been examined wearing appropriate Personal Protective Equipment and discussed with Consulting Provider. 02/11/22      Signature: Electronically signed by Fariha Vega PA-C, 02/11/22, 5:22 PM EST.

## 2022-02-11 NOTE — H&P (VIEW-ONLY)
[facility]   CONSULT NOTE    Referring Provider: SHARONA Bradley   Reason for Consultation: Chest pain, abnormal stress test     Patient Care Team:  Junie Le APRN as PCP - General (Nurse Practitioner)  Josh Bowman MD PhD as Surgeon (Cardiothoracic Surgery)  Ajit Toro MD as Consulting Physician (Obstetrics and Gynecology)    Chief complaint- chest pain         History of present illness:  Gideon Fields is a 51 y.o. female with past medical history of  Lung cancer s/p right upper lobectomy (no chemo), chronic nerve pain after surgery, asthma, anxiety  presented to the ED with chest pain.  Patient states that she feels like something is sitting on her chest.  There is radiation into the left neck and left shoulder/back with associated shortness of breath and nausea.  Patient did receive nitroglycerin in the ED which helped briefly but that pain has been constant this morning since her stress test.  Patient underwent pharmacological stress test that was abnormal with mild inferior wall ischemia. Patient denies any cough, fever, chills.   Labs reviewed and showed negative serial troponin, CT PE protocol was negative for PE but showed small pleural effusion on the right and evidence of right upper lobe wedge resection.       Review of Systems   Constitutional: Positive for malaise/fatigue. Negative for fever.   Cardiovascular: Positive for chest pain. Negative for dyspnea on exertion and leg swelling.   Respiratory: Positive for shortness of breath. Negative for cough.    Musculoskeletal: Positive for neck pain.   Gastrointestinal: Positive for nausea. Negative for abdominal pain and vomiting.   All other systems reviewed and are negative.      History  Past Medical History:   Diagnosis Date   • Anxiety    • Asthma 12/9/2013   • Lumbar radiculopathy 3/12/2018   • Lung nodule seen on imaging study 10/2/2020   • Malignant neoplasm of upper lobe of  right lung (HCC) 2021   • Thoracic back pain 3/12/2018       Past Surgical History:   Procedure Laterality Date   • BREAST BIOPSY Right     benign   • BRONCHOSCOPY N/A 10/21/2020    Procedure: BRONCHOSCOPY WITH BRONCHOALVEOLAR LAVAGE, NAVIGATION WITH FINE NEEDLE ASPIRATION;  Surgeon: Nichol Jaquez MD;  Location: [facility] ENDOSCOPY;  Service: Pulmonary;  Laterality: N/A;  POST RUL NODULE   •  SECTION      x3    • LUNG REMOVAL, PARTIAL Right 2020    right upper lobe    • NASAL SEPTUM SURGERY      20 Years Ago       Family History   Problem Relation Age of Onset   • Hypertension Mother    • Alzheimer's disease Mother    • Thyroid disease Mother    • Lung cancer Father    • Lupus Sister    • Asthma Other    • No Known Problems Brother    • Lupus Sister        Social History     Tobacco Use   • Smoking status: Never Smoker   • Smokeless tobacco: Never Used   Vaping Use   • Vaping Use: Never used   Substance Use Topics   • Alcohol use: Yes     Comment: Couple times a week   • Drug use: No        Medications Prior to Admission   Medication Sig Dispense Refill Last Dose   • albuterol sulfate  (90 Base) MCG/ACT inhaler TAKE 2 PUFFS BY MOUTH EVERY 4 HOURS AS NEEDED FOR WHEEZE 18 g 3 2/10/2022 at Unknown time   • baclofen (LIORESAL) 10 MG tablet TAKE 1 TABLET BY MOUTH THREE TIMES A DAY AS NEEDED FOR MUSCLES SPASMS 90 tablet 0 Past Week at Unknown time   • budesonide-formoterol (SYMBICORT) 160-4.5 MCG/ACT inhaler Inhale 2 puffs 2 (Two) Times a Day. 1 each 11 2/10/2022 at Unknown time   • busPIRone (BUSPAR) 5 MG tablet Take 5 mg by mouth 2 (Two) Times a Day As Needed.   Past Month at Unknown time   • Calcium Acetate, Phos Binder, (CALCIUM ACETATE PO) Take 1 tablet by mouth Daily.   2/10/2022 at Unknown time   • Cholecalciferol (Vitamin D) 25 MCG (1000 UT) tablet Take 1,000 Units by mouth Daily.   2/10/2022 at Unknown time   • diazePAM (Valium) 5 MG tablet Take 1 tablet by mouth Daily As Needed for  Anxiety. 30 tablet 1 Past Month at Unknown time   • DULoxetine (CYMBALTA) 60 MG capsule TAKE 1 CAPSULE BY MOUTH TWICE A  capsule 2 2/10/2022 at Unknown time   • HYDROcodone-acetaminophen (NORCO) 7.5-325 MG per tablet Take 1 tablet by mouth Every 6 (Six) Hours As Needed for Moderate Pain . 30 tablet 0 Past Week at Unknown time   • montelukast (SINGULAIR) 10 MG tablet TAKE 1 TABLET BY MOUTH EVERY DAY AT NIGHT (Patient taking differently: Take 10 mg by mouth Daily.) 90 tablet 1 Patient Taking Differently at Unknown time   • Multiple Vitamins-Minerals (multivitamin with minerals) tablet tablet Take 1 tablet by mouth Daily.   2/10/2022 at Unknown time   • Omega-3 Fatty Acids (fish oil) 1000 MG capsule capsule Take  by mouth Daily With Breakfast.   Past Week at Unknown time   • TRI-SPRINTEC 0.18/0.215/0.25 MG-35 MCG per tablet Take 1 tablet by mouth Daily.  4 2/10/2022 at Unknown time   • Turmeric 500 MG capsule Take  by mouth.   Past Week at Unknown time   • vitamin C (ASCORBIC ACID) 500 MG tablet Take 500 mg by mouth Daily.   2/10/2022 at Unknown time   • lidocaine (Lidoderm) 5 % Place 1 patch on the skin as directed by provider Daily. Remove & Discard patch within 12 hours or as directed by MD 6 each 0 More than a month at Unknown time       Allergies:  Bee venom    Scheduled Meds:vitamin C, 500 mg, Oral, Daily  budesonide-formoterol, 2 puff, Inhalation, BID - RT  cholecalciferol, 1,000 Units, Oral, Daily  DULoxetine, 60 mg, Oral, BID  lidocaine, 1 patch, Transdermal, Daily  montelukast, 10 mg, Oral, Nightly      Continuous Infusions:   PRN Meds:.•  acetaminophen  •  albuterol  •  baclofen  •  diazePAM  •  HYDROcodone-acetaminophen  •  melatonin  •  Morphine **AND** naloxone  •  nitroglycerin  •  ondansetron  •  Insert peripheral IV **AND** sodium chloride        VITAL SIGNS  Vitals:    02/11/22 0528 02/11/22 0742 02/11/22 0746 02/11/22 0930   BP: 118/64   118/62   BP Location: Right arm   Right arm   Patient  "Position: Lying   Lying   Pulse: 90 95 106 100   Resp: 16 16 16 17   Temp: 98.8 °F (37.1 °C)   98.1 °F (36.7 °C)   TempSrc: Oral   Oral   SpO2: 97% 95% 95% 100%   Weight:       Height:           Flowsheet Rows      First Filed Value   Admission Height 167.6 cm (66\") Documented at 02/10/2022 1317   Admission Weight 74 kg (163 lb 2.3 oz) Documented at 02/10/2022 1317           TELEMETRY: sinus rhythm     Physical Exam:  Vitals and nursing note reviewed.   Constitutional:       Appearance: Not in distress.   Neck:      Vascular: No JVR. JVD normal.   Pulmonary:      Effort: Pulmonary effort is normal.      Breath sounds: Normal breath sounds. No wheezing. No rhonchi. No rales.   Chest:      Chest wall: Not tender to palpatation.   Cardiovascular:      PMI at left midclavicular line. Normal rate. Regular rhythm. Normal S1. Normal S2.      Murmurs: There is no murmur.      No gallop. No click. No rub.   Pulses:     Intact distal pulses.   Edema:     Peripheral edema absent.   Abdominal:      General: Bowel sounds are normal.      Palpations: Abdomen is soft.      Tenderness: There is no abdominal tenderness.   Musculoskeletal: Normal range of motion.         General: No tenderness. Skin:     General: Skin is warm and dry.   Neurological:      General: No focal deficit present.      Mental Status: Alert and oriented to person, place and time.                  LAB RESULTS (LAST 7 DAYS)    CBC  Results from last 7 days   Lab Units 02/11/22  0442 02/10/22  1528   WBC 10*3/mm3 7.20 9.30   RBC 10*6/mm3 3.97 4.21   HEMOGLOBIN g/dL 11.7* 12.8   HEMATOCRIT % 34.9 36.9   MCV fL 87.8 87.6   PLATELETS 10*3/mm3 321 316       BMP  Results from last 7 days   Lab Units 02/11/22  0442 02/10/22  1528   SODIUM mmol/L 137 135*   POTASSIUM mmol/L 4.0 4.0   CHLORIDE mmol/L 102 99   CO2 mmol/L 23.0 23.0   BUN mg/dL 8 8   CREATININE mg/dL 0.64 0.61   GLUCOSE mg/dL 97 98       CMP   Results from last 7 days   Lab Units 02/11/22  0442 " 02/10/22  1528   SODIUM mmol/L 137 135*   POTASSIUM mmol/L 4.0 4.0   CHLORIDE mmol/L 102 99   CO2 mmol/L 23.0 23.0   BUN mg/dL 8 8   CREATININE mg/dL 0.64 0.61   GLUCOSE mg/dL 97 98         BNP        TROPONIN  Results from last 7 days   Lab Units 22  0442   TROPONIN T ng/mL <0.010       CoAg  Results from last 7 days   Lab Units 02/10/22  1528   INR  <0.93*   APTT seconds 28.2       Creatinine Clearance  Estimated Creatinine Clearance: 106.7 mL/min (by C-G formula based on SCr of 0.64 mg/dL).    ABG        Radiology  CT Chest Pulmonary Embolism    Result Date: 2/10/2022  1.There are no findings of pulmonary embolus. 2.There is a small right pleural effusion with subsegmental atelectasis and possible wedge resection of right upper lobe nodule. 3.There is a single mildly enlarged mediastinal lymph node. This is nonspecific. A follow-up CT chest in 6 months time should be performed.    Electronically Signed By-Josette Rosales MD On:2/10/2022 4:37 PM This report was finalized on [id number] by  Josette Rosales MD.          EKG          I personally viewed and interpreted the patient's EKG/Telemetry data:    ECHOCARDIOGRAM:        STRESS MYOVIEW:    CARDIAC CATHETERIZATION:    OTHER:         Assessment/Plan       Chest pain  Abnormal stress test   Asthma  History of lung CA s/p right upper lobe wedge resection   Anxiety  Non smoker; + EtOH on weekends occasionally    Family history of father with CAD,  of lung CA, Mother- hypertension, brother- afib   Previous nasal surgery,  X3      PLAN:  Patient presented with chest pain  Ruled out for MI with negative serial cardiac enzymes and EKG   EKG showed sinus rhythm without any ST changes   Patient underwent stress test that was abnormal with inferior wall ischemia    Plan for cardiac catheterization-- timing per Dr. Tellez   Will start aspirin   Check lipid panel in am   Echocardiogram to assess LV function and valve abnormalities     I discussed the patients  findings and my recommendations with patient and bedside nurse    Further recommendations per NEO An  02/11/22  12:06 EST   Electronically signed by NEO Harris, 02/11/22, 4:03 PM EST.    The patient was seen and rest of his examination was conducted around 4:15 PM today.  Reviewed and agree with the assessment and plan as documented by nurse practitioner Gracie.  The patient has a history of chest discomfort which is concerning for angina pectoris.  Patient had history of lung cancer status post right upper lobectomy.  Patient has multiple risk factors.  Patient had stress Cardiolite test which was abnormal with inferior ischemia.  Physical exam is essentially normal.  Echocardiogram will be reviewed.  Patient to have cardiac catheterization and coronary arteriography.  Risks and benefits pros and cons of the procedure were discussed with patient and patient's family at bedside.  This included infection bleeding blood clot heart attack stroke allergic reaction to the dye etc.  Have discussed with attending nurse for coordination of care.  Further plan will depend on patient's progress.  Electronically signed by Chandana Tellez MD, 02/11/22, 4:42 PM EST.

## 2022-02-11 NOTE — CASE MANAGEMENT/SOCIAL WORK
Discharge Planning Assessment  [facility]     Patient Name: Gideon Fields  MRN: [id number]  Today's Date: 2/11/2022    Admit Date: 2/10/2022     Discharge Needs Assessment     Row Name 02/11/22 1420       Living Environment    Lives With spouse    Current Living Arrangements home/apartment/condo    Primary Care Provided by self    Provides Primary Care For no one    Able to Return to Prior Arrangements yes       Resource/Environmental Concerns    Resource/Environmental Concerns none       Transition Planning    Patient/Family Anticipates Transition to home with family    Patient/Family Anticipated Services at Transition none    Transportation Anticipated family or friend will provide       Discharge Needs Assessment    Equipment Currently Used at Home nebulizer    Concerns to be Addressed denies needs/concerns at this time               Discharge Plan     Row Name 02/11/22 1416       Plan    Plan DC plan: home with spouse    Patient/Family in Agreement with Plan yes    Plan Comments pt lives with spouse; i with adl; currently able to afford food and medications; has a nebulizer at home but does not know what company supplied; pcp and pharm verified.              Continued Care and Services - Admitted Since 2/10/2022    Coordination has not been started for this encounter.          Demographic Summary     Row Name 02/11/22 1420       General Information    Admission Type observation    Referral Source admission list    Reason for Consult discharge planning    Preferred Language English               Functional Status     Row Name 02/11/22 1420       Functional Status    Usual Activity Tolerance moderate    Current Activity Tolerance moderate       Functional Status, IADL    Medications independent    Meal Preparation independent    Housekeeping independent    Laundry independent    Shopping independent              Met with patient in room wearing PPE: mask.      Maintained distance greater than six feet and spent less  than 15 minutes in the room.          Raven Clifford RN

## 2022-02-12 ENCOUNTER — READMISSION MANAGEMENT (OUTPATIENT)
Dept: CALL CENTER | Facility: HOSPITAL | Age: 52
End: 2022-02-12

## 2022-02-12 ENCOUNTER — APPOINTMENT (OUTPATIENT)
Dept: CARDIOLOGY | Facility: HOSPITAL | Age: 52
End: 2022-02-12

## 2022-02-12 VITALS
DIASTOLIC BLOOD PRESSURE: 68 MMHG | OXYGEN SATURATION: 95 % | SYSTOLIC BLOOD PRESSURE: 104 MMHG | WEIGHT: 162 LBS | BODY MASS INDEX: 26.03 KG/M2 | TEMPERATURE: 98.5 F | HEART RATE: 95 BPM | RESPIRATION RATE: 18 BRPM | HEIGHT: 66 IN

## 2022-02-12 LAB
ANION GAP SERPL CALCULATED.3IONS-SCNC: 10 MMOL/L (ref 5–15)
BASOPHILS # BLD AUTO: 0 10*3/MM3 (ref 0–0.2)
BASOPHILS NFR BLD AUTO: 0.4 % (ref 0–1.5)
BH CV ECHO MEAS - AI DEC SLOPE: 229.9 CM/SEC^2
BH CV ECHO MEAS - AI DEC TIME: 1.5 SEC
BH CV ECHO MEAS - AI MAX PG: 42.1 MMHG
BH CV ECHO MEAS - AI MAX VEL: 323.9 CM/SEC
BH CV ECHO MEAS - AI P1/2T: 412.8 MSEC
BH CV ECHO MEAS - AO MAX PG (FULL): 1.7 MMHG
BH CV ECHO MEAS - AO MAX PG: 5.4 MMHG
BH CV ECHO MEAS - AO MEAN PG (FULL): 0.88 MMHG
BH CV ECHO MEAS - AO MEAN PG: 3.1 MMHG
BH CV ECHO MEAS - AO ROOT AREA (BSA CORRECTED): 1.9
BH CV ECHO MEAS - AO ROOT AREA: 10 CM^2
BH CV ECHO MEAS - AO ROOT DIAM: 3.6 CM
BH CV ECHO MEAS - AO V2 MAX: 116.4 CM/SEC
BH CV ECHO MEAS - AO V2 MEAN: 84.4 CM/SEC
BH CV ECHO MEAS - AO V2 VTI: 22.8 CM
BH CV ECHO MEAS - AORTIC HR: 85.7 BPM
BH CV ECHO MEAS - AORTIC R-R: 0.7 SEC
BH CV ECHO MEAS - AVA(I,A): 3.6 CM^2
BH CV ECHO MEAS - AVA(I,D): 3.6 CM^2
BH CV ECHO MEAS - AVA(V,A): 3 CM^2
BH CV ECHO MEAS - AVA(V,D): 3 CM^2
BH CV ECHO MEAS - BSA(HAYCOCK): 1.9 M^2
BH CV ECHO MEAS - BSA: 1.8 M^2
BH CV ECHO MEAS - BZI_BMI: 26.1 KILOGRAMS/M^2
BH CV ECHO MEAS - BZI_METRIC_HEIGHT: 167.6 CM
BH CV ECHO MEAS - BZI_METRIC_WEIGHT: 73.5 KG
BH CV ECHO MEAS - CI(AO): 10.7 L/MIN/M^2
BH CV ECHO MEAS - CI(LVOT): 3.8 L/MIN/M^2
BH CV ECHO MEAS - CO(AO): 19.5 L/MIN
BH CV ECHO MEAS - CO(LVOT): 6.9 L/MIN
BH CV ECHO MEAS - EDV(CUBED): 41.9 ML
BH CV ECHO MEAS - EDV(MOD-SP4): 73.1 ML
BH CV ECHO MEAS - EDV(TEICH): 49.9 ML
BH CV ECHO MEAS - EF(CUBED): 49.5 %
BH CV ECHO MEAS - EF(MOD-BP): 65 %
BH CV ECHO MEAS - EF(MOD-SP4): 64.7 %
BH CV ECHO MEAS - EF(TEICH): 42.6 %
BH CV ECHO MEAS - ESV(CUBED): 21.1 ML
BH CV ECHO MEAS - ESV(MOD-SP4): 25.8 ML
BH CV ECHO MEAS - ESV(TEICH): 28.6 ML
BH CV ECHO MEAS - FS: 20.4 %
BH CV ECHO MEAS - IVS/LVPW: 1.6
BH CV ECHO MEAS - IVSD: 1.7 CM
BH CV ECHO MEAS - LA DIMENSION(2D): 2.9 CM
BH CV ECHO MEAS - LV DIASTOLIC VOL/BSA (35-75): 40 ML/M^2
BH CV ECHO MEAS - LV MASS(C)D: 175.5 GRAMS
BH CV ECHO MEAS - LV MASS(C)DI: 95.9 GRAMS/M^2
BH CV ECHO MEAS - LV MAX PG: 3.7 MMHG
BH CV ECHO MEAS - LV MEAN PG: 2.2 MMHG
BH CV ECHO MEAS - LV SYSTOLIC VOL/BSA (12-30): 14.1 ML/M^2
BH CV ECHO MEAS - LV V1 MAX: 96.6 CM/SEC
BH CV ECHO MEAS - LV V1 MEAN: 70.1 CM/SEC
BH CV ECHO MEAS - LV V1 VTI: 22.4 CM
BH CV ECHO MEAS - LVIDD: 3.5 CM
BH CV ECHO MEAS - LVIDS: 2.8 CM
BH CV ECHO MEAS - LVOT AREA: 3.6 CM^2
BH CV ECHO MEAS - LVOT DIAM: 2.1 CM
BH CV ECHO MEAS - LVPWD: 1.1 CM
BH CV ECHO MEAS - MV A MAX VEL: 89.9 CM/SEC
BH CV ECHO MEAS - MV DEC SLOPE: 465.1 CM/SEC^2
BH CV ECHO MEAS - MV DEC TIME: 0.17 SEC
BH CV ECHO MEAS - MV E MAX VEL: 78.8 CM/SEC
BH CV ECHO MEAS - MV E/A: 0.88
BH CV ECHO MEAS - MV MAX PG: 3.3 MMHG
BH CV ECHO MEAS - MV MEAN PG: 2 MMHG
BH CV ECHO MEAS - MV V2 MAX: 91.4 CM/SEC
BH CV ECHO MEAS - MV V2 MEAN: 69.5 CM/SEC
BH CV ECHO MEAS - MV V2 VTI: 20.4 CM
BH CV ECHO MEAS - MVA(VTI): 4 CM^2
BH CV ECHO MEAS - PA ACC TIME: 0.07 SEC
BH CV ECHO MEAS - PA MAX PG: 3.6 MMHG
BH CV ECHO MEAS - PA MEAN PG: 2.3 MMHG
BH CV ECHO MEAS - PA PR(ACCEL): 46.5 MMHG
BH CV ECHO MEAS - PA V2 MAX: 95.4 CM/SEC
BH CV ECHO MEAS - PA V2 MEAN: 72.2 CM/SEC
BH CV ECHO MEAS - PA V2 VTI: 19.8 CM
BH CV ECHO MEAS - PI END-D VEL: 136 CM/SEC
BH CV ECHO MEAS - PI MAX PG: 13.7 MMHG
BH CV ECHO MEAS - PI MAX VEL: 185 CM/SEC
BH CV ECHO MEAS - PULM A REVS DUR: 0.08 SEC
BH CV ECHO MEAS - PULM A REVS VEL: 28.7 CM/SEC
BH CV ECHO MEAS - PULM DIAS VEL: 55.5 CM/SEC
BH CV ECHO MEAS - PULM S/D: 1.1
BH CV ECHO MEAS - PULM SYS VEL: 59.1 CM/SEC
BH CV ECHO MEAS - RAP SYSTOLE: 3 MMHG
BH CV ECHO MEAS - RVDD: 2.5 CM
BH CV ECHO MEAS - RVSP: 29 MMHG
BH CV ECHO MEAS - SI(AO): 124.5 ML/M^2
BH CV ECHO MEAS - SI(CUBED): 11.3 ML/M^2
BH CV ECHO MEAS - SI(LVOT): 44.3 ML/M^2
BH CV ECHO MEAS - SI(MOD-SP4): 25.8 ML/M^2
BH CV ECHO MEAS - SI(TEICH): 11.6 ML/M^2
BH CV ECHO MEAS - SV(AO): 227.6 ML
BH CV ECHO MEAS - SV(CUBED): 20.7 ML
BH CV ECHO MEAS - SV(LVOT): 80.9 ML
BH CV ECHO MEAS - SV(MOD-SP4): 47.2 ML
BH CV ECHO MEAS - SV(TEICH): 21.3 ML
BH CV ECHO MEAS - TR MAX VEL: 255 CM/SEC
BUN SERPL-MCNC: 7 MG/DL (ref 6–20)
BUN/CREAT SERPL: 14.6 (ref 7–25)
CALCIUM SPEC-SCNC: 8.8 MG/DL (ref 8.6–10.5)
CHLORIDE SERPL-SCNC: 99 MMOL/L (ref 98–107)
CHOLEST SERPL-MCNC: 216 MG/DL (ref 0–200)
CO2 SERPL-SCNC: 26 MMOL/L (ref 22–29)
CREAT SERPL-MCNC: 0.48 MG/DL (ref 0.57–1)
DEPRECATED RDW RBC AUTO: 43.3 FL (ref 37–54)
EOSINOPHIL # BLD AUTO: 0.1 10*3/MM3 (ref 0–0.4)
EOSINOPHIL NFR BLD AUTO: 1.4 % (ref 0.3–6.2)
ERYTHROCYTE [DISTWIDTH] IN BLOOD BY AUTOMATED COUNT: 14 % (ref 12.3–15.4)
GFR SERPL CREATININE-BSD FRML MDRD: 136 ML/MIN/1.73
GLUCOSE SERPL-MCNC: 116 MG/DL (ref 65–99)
HCT VFR BLD AUTO: 33.4 % (ref 34–46.6)
HDLC SERPL-MCNC: 72 MG/DL (ref 40–60)
HGB BLD-MCNC: 11.5 G/DL (ref 12–15.9)
INR PPP: <0.93 (ref 0.93–1.1)
LDLC SERPL CALC-MCNC: 121 MG/DL (ref 0–100)
LDLC/HDLC SERPL: 1.63 {RATIO}
LV EF 2D ECHO EST: 60 %
LYMPHOCYTES # BLD AUTO: 1.7 10*3/MM3 (ref 0.7–3.1)
LYMPHOCYTES NFR BLD AUTO: 18.9 % (ref 19.6–45.3)
MCH RBC QN AUTO: 29.8 PG (ref 26.6–33)
MCHC RBC AUTO-ENTMCNC: 34.4 G/DL (ref 31.5–35.7)
MCV RBC AUTO: 86.6 FL (ref 79–97)
MONOCYTES # BLD AUTO: 1 10*3/MM3 (ref 0.1–0.9)
MONOCYTES NFR BLD AUTO: 11 % (ref 5–12)
NEUTROPHILS NFR BLD AUTO: 6.3 10*3/MM3 (ref 1.7–7)
NEUTROPHILS NFR BLD AUTO: 68.3 % (ref 42.7–76)
NRBC BLD AUTO-RTO: 0.1 /100 WBC (ref 0–0.2)
PLATELET # BLD AUTO: 301 10*3/MM3 (ref 140–450)
PMV BLD AUTO: 8.6 FL (ref 6–12)
POTASSIUM SERPL-SCNC: 3.9 MMOL/L (ref 3.5–5.2)
PROTHROMBIN TIME: 10 SECONDS (ref 9.6–11.7)
QT INTERVAL: 354 MS
QT INTERVAL: 377 MS
RBC # BLD AUTO: 3.86 10*6/MM3 (ref 3.77–5.28)
SODIUM SERPL-SCNC: 135 MMOL/L (ref 136–145)
TRIGL SERPL-MCNC: 132 MG/DL (ref 0–150)
VLDLC SERPL-MCNC: 23 MG/DL (ref 5–40)
WBC NRBC COR # BLD: 9.3 10*3/MM3 (ref 3.4–10.8)

## 2022-02-12 PROCEDURE — 99152 MOD SED SAME PHYS/QHP 5/>YRS: CPT | Performed by: INTERNAL MEDICINE

## 2022-02-12 PROCEDURE — 25010000002 FENTANYL CITRATE (PF) 100 MCG/2ML SOLUTION: Performed by: INTERNAL MEDICINE

## 2022-02-12 PROCEDURE — 25010000002 MIDAZOLAM PER 1 MG: Performed by: INTERNAL MEDICINE

## 2022-02-12 PROCEDURE — 93458 L HRT ARTERY/VENTRICLE ANGIO: CPT | Performed by: INTERNAL MEDICINE

## 2022-02-12 PROCEDURE — 93306 TTE W/DOPPLER COMPLETE: CPT

## 2022-02-12 PROCEDURE — C1894 INTRO/SHEATH, NON-LASER: HCPCS | Performed by: INTERNAL MEDICINE

## 2022-02-12 PROCEDURE — C1760 CLOSURE DEV, VASC: HCPCS | Performed by: INTERNAL MEDICINE

## 2022-02-12 PROCEDURE — 0 IOPAMIDOL PER 1 ML: Performed by: INTERNAL MEDICINE

## 2022-02-12 PROCEDURE — C1769 GUIDE WIRE: HCPCS | Performed by: INTERNAL MEDICINE

## 2022-02-12 PROCEDURE — 94799 UNLISTED PULMONARY SVC/PX: CPT

## 2022-02-12 PROCEDURE — 25010000002 ONDANSETRON PER 1 MG: Performed by: INTERNAL MEDICINE

## 2022-02-12 PROCEDURE — G0378 HOSPITAL OBSERVATION PER HR: HCPCS

## 2022-02-12 PROCEDURE — 96376 TX/PRO/DX INJ SAME DRUG ADON: CPT

## 2022-02-12 PROCEDURE — 93306 TTE W/DOPPLER COMPLETE: CPT | Performed by: INTERNAL MEDICINE

## 2022-02-12 PROCEDURE — 80048 BASIC METABOLIC PNL TOTAL CA: CPT | Performed by: PHYSICIAN ASSISTANT

## 2022-02-12 PROCEDURE — 93010 ELECTROCARDIOGRAM REPORT: CPT | Performed by: INTERNAL MEDICINE

## 2022-02-12 PROCEDURE — 85610 PROTHROMBIN TIME: CPT | Performed by: PHYSICIAN ASSISTANT

## 2022-02-12 PROCEDURE — 80061 LIPID PANEL: CPT | Performed by: PHYSICIAN ASSISTANT

## 2022-02-12 PROCEDURE — 99215 OFFICE O/P EST HI 40 MIN: CPT | Performed by: INTERNAL MEDICINE

## 2022-02-12 PROCEDURE — 93005 ELECTROCARDIOGRAM TRACING: CPT | Performed by: INTERNAL MEDICINE

## 2022-02-12 PROCEDURE — 99217 PR OBSERVATION CARE DISCHARGE MANAGEMENT: CPT | Performed by: HOSPITALIST

## 2022-02-12 PROCEDURE — 94664 DEMO&/EVAL PT USE INHALER: CPT

## 2022-02-12 PROCEDURE — 85025 COMPLETE CBC W/AUTO DIFF WBC: CPT | Performed by: PHYSICIAN ASSISTANT

## 2022-02-12 PROCEDURE — 25010000002 ONDANSETRON PER 1 MG: Performed by: PHYSICIAN ASSISTANT

## 2022-02-12 RX ORDER — SODIUM CHLORIDE 0.9 % (FLUSH) 0.9 %
3 SYRINGE (ML) INJECTION EVERY 12 HOURS SCHEDULED
Status: CANCELLED | OUTPATIENT
Start: 2022-02-12

## 2022-02-12 RX ORDER — ACETAMINOPHEN 325 MG/1
650 TABLET ORAL EVERY 4 HOURS PRN
Status: DISCONTINUED | OUTPATIENT
Start: 2022-02-12 | End: 2022-02-12 | Stop reason: HOSPADM

## 2022-02-12 RX ORDER — SODIUM CHLORIDE 9 MG/ML
INJECTION, SOLUTION INTRAVENOUS CONTINUOUS PRN
Status: COMPLETED | OUTPATIENT
Start: 2022-02-12 | End: 2022-02-12

## 2022-02-12 RX ORDER — FENTANYL CITRATE 50 UG/ML
INJECTION, SOLUTION INTRAMUSCULAR; INTRAVENOUS AS NEEDED
Status: DISCONTINUED | OUTPATIENT
Start: 2022-02-12 | End: 2022-02-12 | Stop reason: HOSPADM

## 2022-02-12 RX ORDER — MELOXICAM 7.5 MG/1
7.5 TABLET ORAL DAILY
Qty: 30 TABLET | Refills: 0 | Status: SHIPPED | OUTPATIENT
Start: 2022-02-12 | End: 2022-02-15

## 2022-02-12 RX ORDER — ONDANSETRON 4 MG/1
4 TABLET, FILM COATED ORAL EVERY 6 HOURS PRN
Status: DISCONTINUED | OUTPATIENT
Start: 2022-02-12 | End: 2022-02-12 | Stop reason: HOSPADM

## 2022-02-12 RX ORDER — LIDOCAINE HYDROCHLORIDE 20 MG/ML
INJECTION, SOLUTION INFILTRATION; PERINEURAL AS NEEDED
Status: DISCONTINUED | OUTPATIENT
Start: 2022-02-12 | End: 2022-02-12 | Stop reason: HOSPADM

## 2022-02-12 RX ORDER — MIDAZOLAM HYDROCHLORIDE 1 MG/ML
INJECTION INTRAMUSCULAR; INTRAVENOUS AS NEEDED
Status: DISCONTINUED | OUTPATIENT
Start: 2022-02-12 | End: 2022-02-12 | Stop reason: HOSPADM

## 2022-02-12 RX ORDER — SODIUM CHLORIDE 9 MG/ML
250 INJECTION, SOLUTION INTRAVENOUS ONCE AS NEEDED
Status: DISCONTINUED | OUTPATIENT
Start: 2022-02-12 | End: 2022-02-12 | Stop reason: HOSPADM

## 2022-02-12 RX ORDER — SODIUM CHLORIDE 0.9 % (FLUSH) 0.9 %
3-10 SYRINGE (ML) INJECTION AS NEEDED
Status: CANCELLED | OUTPATIENT
Start: 2022-02-12

## 2022-02-12 RX ORDER — METOPROLOL SUCCINATE 25 MG/1
25 TABLET, EXTENDED RELEASE ORAL
Status: DISCONTINUED | OUTPATIENT
Start: 2022-02-12 | End: 2022-02-12 | Stop reason: HOSPADM

## 2022-02-12 RX ORDER — ONDANSETRON 2 MG/ML
4 INJECTION INTRAMUSCULAR; INTRAVENOUS EVERY 6 HOURS PRN
Status: DISCONTINUED | OUTPATIENT
Start: 2022-02-12 | End: 2022-02-12 | Stop reason: HOSPADM

## 2022-02-12 RX ORDER — METOPROLOL SUCCINATE 25 MG/1
25 TABLET, EXTENDED RELEASE ORAL
Qty: 30 TABLET | Refills: 0 | Status: SHIPPED | OUTPATIENT
Start: 2022-02-13 | End: 2022-02-15

## 2022-02-12 RX ORDER — MELOXICAM 15 MG/1
7.5 TABLET ORAL DAILY
Status: DISCONTINUED | OUTPATIENT
Start: 2022-02-12 | End: 2022-02-12 | Stop reason: HOSPADM

## 2022-02-12 RX ORDER — DIPHENHYDRAMINE HCL 25 MG
25 CAPSULE ORAL EVERY 6 HOURS PRN
Status: DISCONTINUED | OUTPATIENT
Start: 2022-02-12 | End: 2022-02-12 | Stop reason: HOSPADM

## 2022-02-12 RX ADMIN — HYDROCODONE BITARTRATE AND ACETAMINOPHEN 1 TABLET: 7.5; 325 TABLET ORAL at 05:42

## 2022-02-12 RX ADMIN — ASPIRIN 81 MG: 81 TABLET, COATED ORAL at 09:43

## 2022-02-12 RX ADMIN — Medication 1000 UNITS: at 09:43

## 2022-02-12 RX ADMIN — BUDESONIDE AND FORMOTEROL FUMARATE DIHYDRATE 2 PUFF: 160; 4.5 AEROSOL RESPIRATORY (INHALATION) at 07:33

## 2022-02-12 RX ADMIN — DULOXETINE 60 MG: 30 CAPSULE, DELAYED RELEASE ORAL at 09:43

## 2022-02-12 RX ADMIN — ONDANSETRON 4 MG: 2 INJECTION INTRAMUSCULAR; INTRAVENOUS at 05:42

## 2022-02-12 RX ADMIN — ONDANSETRON 4 MG: 2 INJECTION INTRAMUSCULAR; INTRAVENOUS at 15:05

## 2022-02-12 RX ADMIN — MELOXICAM 7.5 MG: 15 TABLET ORAL at 15:04

## 2022-02-12 RX ADMIN — METOPROLOL SUCCINATE 25 MG: 25 TABLET, EXTENDED RELEASE ORAL at 09:43

## 2022-02-12 RX ADMIN — LIDOCAINE 1 PATCH: 50 PATCH TOPICAL at 09:43

## 2022-02-12 RX ADMIN — OXYCODONE HYDROCHLORIDE AND ACETAMINOPHEN 500 MG: 500 TABLET ORAL at 09:43

## 2022-02-12 NOTE — DISCHARGE SUMMARY
"             [facility] Medicine Services  DISCHARGE SUMMARY    Patient Name: Gideon Fields  : 1970  MRN: [id number]    Date of Admission: 2/10/2022  Date of Discharge: 2022  Primary Care Physician: Junie Le APRN      Presenting Problem:   Chest pain [R07.9]  Chest pain, unspecified type [R07.9]    Active and Resolved Hospital Problems:  Active Hospital Problems    Diagnosis POA   • **Chest pain [R07.9] Yes     Priority: High   • S/P cardiac cath [Z98.890] Not Applicable   • Abnormal nuclear stress test [R94.39] Yes   • Malignant neoplasm of upper lobe, right bronchus or lung (HCC) [C34.11] Yes   • Anxiety [F41.9] Yes   • Depression [F32.A] Yes   • Asthma [J45.909] Yes      Resolved Hospital Problems   No resolved problems to display.         Hospital Course     Hospital Course:  Gideon Fields is a 51 y.o. female admitted to [facility] observation unit on 2/10/22 for chief complaint of chest pain that started suddenly and woke patient up from sleep at approximately 0400 and has been constant since that time. Patient describes the pain as a \"pressure\" more on the left side of her chest with radiation to left shoulder and left arm and associated with dyspnea, nausea, and diaphoresis and pain is worse with deep breaths. Patient denies any fever/chills, abdominal pain, V/D/C, sputum/cough, peripheral edema, palpitations, syncope/lightheadedness/vertigo, or recent travel. Her troponins remained negative during admission. Patient underwent myoview stress test showed mild inferior ischemia. Her chest pain is persisting through admission and cardiology plans to take the patient for cardiac cath in the AM.      Of note, patient did have a CT PE protocol to r/o PE for her chest pain which was negative for PE, but did show a single, mildly enlarged mediastinal LN. Patient has a a history of partial right lung resection due to malignancy in . Radiology recommendations for follow up imaging " in 6 months. Patient follows with pulmonology at [facility] and is aware of this finding and plans to follow with her physician after discharge.   The patient was seen by pulmonary and cardiology.  Stress Myoview was abnormal so cardiac cath was performed which showed no evidence of coronary artery disease.  The etiology of the patient's chest pain is likely musculoskeletal since her chest pain was reproducible with palpation of the chest wall.  She was counseled and reassured.  Patient is now felt to be stable for discharge.    Day of Discharge     Vital Signs:  Temp:  [97.3 °F (36.3 °C)-98.8 °F (37.1 °C)] 98 °F (36.7 °C)  Heart Rate:  [] 83  Resp:  [15-18] 16  BP: ()/(46-75) 104/55    Physical Exam:  Physical Exam   Vital signs and nurses notes reviewed.  Well-developed well-nourished in no acute distress sitting up in bed awake and alert; mucous membranes moist; sclerae anicteric; neck supple; lungs clear to auscultation bilaterally; CV regular rate and rhythm; abdomen soft nontender nondistended with active bowel sounds; extremities with no edema, cyanosis or calf tenderness; palpable pedal pulses bilaterally; neurologic exam grossly nonfocal; no Galeana catheter.      Pertinent  and/or Most Recent Results     LAB RESULTS:      Lab 02/12/22  0443 02/11/22  0442 02/10/22  1528   WBC 9.30 7.20 9.30   HEMOGLOBIN 11.5* 11.7* 12.8   HEMATOCRIT 33.4* 34.9 36.9   PLATELETS 301 321 316   NEUTROS ABS 6.30 4.10 6.60   LYMPHS ABS 1.70 1.90 1.70   MONOS ABS 1.00* 0.80 0.70   EOS ABS 0.10 0.30 0.20   MCV 86.6 87.8 87.6   PROTIME 10.0  --  10.1   APTT  --   --  28.2         Lab 02/12/22 0443 02/11/22  1726 02/11/22  0442 02/10/22  1528   SODIUM 135*  --  137 135*   POTASSIUM 3.9  --  4.0 4.0   CHLORIDE 99  --  102 99   CO2 26.0  --  23.0 23.0   ANION GAP 10.0  --  12.0 13.0   BUN 7  --  8 8   CREATININE 0.48*  --  0.64 0.61   GLUCOSE 116*  --  97 98   CALCIUM 8.8  --  9.2 9.5   MAGNESIUM  --  2.0  --   --               Lab 02/12/22 0443 02/11/22  0442 02/10/22  2040 02/10/22  1528   TROPONIN T  --  <0.010 <0.010 <0.010   PROTIME 10.0  --   --  10.1   INR <0.93*  --   --  <0.93*         Lab 02/12/22 0443   CHOLESTEROL 216*   LDL CHOL 121*   HDL CHOL 72*   TRIGLYCERIDES 132             Brief Urine Lab Results  (Last result in the past 365 days)      Color   Clarity   Blood   Leuk Est   Nitrite   Protein   CREAT   Urine HCG        05/06/21 0927 Yellow   Clear   Moderate (2+)   Negative   Negative   Negative               Microbiology Results (last 10 days)     Procedure Component Value - Date/Time    COVID PRE-OP / PRE-PROCEDURE SCREENING ORDER (NO ISOLATION) - Swab, Nasopharynx [[id number]]  (Normal) Collected: 02/10/22 1725    Lab Status: Final result Specimen: Swab from Nasopharynx Updated: 02/10/22 1813    Narrative:      The following orders were created for panel order COVID PRE-OP / PRE-PROCEDURE SCREENING ORDER (NO ISOLATION) - Swab, Nasopharynx.  Procedure                               Abnormality         Status                     ---------                               -----------         ------                     COVID-19,CEPHEID/NIA,CO...[[id number]]  Normal              Final result                 Please view results for these tests on the individual orders.    COVID-19,CEPHEID/NIA,COR/NAHOMI/PAD/SANYA IN-HOUSE(OR EMERGENT/ADD-ON),NP SWAB IN TRANSPORT MEDIA 3-4 HR TAT, RT-PCR - Swab, Nasopharynx [[id number]]  (Normal) Collected: 02/10/22 1725    Lab Status: Final result Specimen: Swab from Nasopharynx Updated: 02/10/22 1813     COVID19 Not Detected    Narrative:      Fact sheet for providers: https://www.fda.gov/media/181390/download     Fact sheet for patients: https://www.fda.gov/media/706136/download  Fact sheet for providers: https://www.fda.gov/media/558647/download    Fact sheet for patients: https://www.fda.gov/media/630797/download    Test performed by PCR.          CT Chest Pulmonary Embolism    Result Date:  2/10/2022  Impression: 1.There are no findings of pulmonary embolus. 2.There is a small right pleural effusion with subsegmental atelectasis and possible wedge resection of right upper lobe nodule. 3.There is a single mildly enlarged mediastinal lymph node. This is nonspecific. A follow-up CT chest in 6 months time should be performed.    Electronically Signed By-Josette Rosales MD On:2/10/2022 4:37 PM This report was finalized on [id number] by  Josette Rosales MD.              Results for orders placed during the hospital encounter of 02/10/22    Adult Transthoracic Echo Complete W/ Cont if Necessary Per Protocol    Interpretation Summary  · Estimated left ventricular EF = 60% Left ventricular systolic function is normal.    Indications  Chest pain    Technically satisfactory study.  Mitral valve is structurally normal.  Tricuspid valve is structurally normal.  Aortic valve is structurally normal.  Pulmonic valve could not be well visualized.  No evidence for mitral tricuspid or aortic regurgitation is seen by Doppler study.  Left atrium is normal in size.  Right atrium is normal in size.  Left ventricle is normal in size and contractility with ejection fraction of 60%.  Right ventricle is normal in size.  Atrial septum is intact.  Aorta is normal.  No pericardial effusion or intracardiac thrombus is seen.    Impression  Structurally and functionally normal cardiac valves.  Left ventricular size and contractility is normal with ejection fraction of 60%.      Labs Pending at Discharge:      Procedures Performed  Procedure(s):  Left Heart Cath and coronary angiogram         Consults:   Consults     Date and Time Order Name Status Description    2/11/2022  3:41 PM Inpatient Hospitalist Consult Completed     2/11/2022 10:33 AM Inpatient Cardiology Consult Completed             Discharge Details        Discharge Medications      New Medications      Instructions Start Date   meloxicam 7.5 MG tablet  Commonly known as:  Mobic   7.5 mg, Oral, Daily, For musculoskeletal chest wall pain      metoprolol succinate XL 25 MG 24 hr tablet  Commonly known as: TOPROL-XL   25 mg, Oral, Every 24 Hours Scheduled, For high blood pressure.  Please get refills from PCP or Dr. Tellez cardiology.   Start Date: February 13, 2022        Changes to Medications      Instructions Start Date   montelukast 10 MG tablet  Commonly known as: SINGULAIR  What changed: when to take this   TAKE 1 TABLET BY MOUTH EVERY DAY AT NIGHT         Continue These Medications      Instructions Start Date   albuterol sulfate  (90 Base) MCG/ACT inhaler  Commonly known as: PROVENTIL HFA;VENTOLIN HFA;PROAIR HFA   TAKE 2 PUFFS BY MOUTH EVERY 4 HOURS AS NEEDED FOR WHEEZE      baclofen 10 MG tablet  Commonly known as: LIORESAL   TAKE 1 TABLET BY MOUTH THREE TIMES A DAY AS NEEDED FOR MUSCLES SPASMS      budesonide-formoterol 160-4.5 MCG/ACT inhaler  Commonly known as: SYMBICORT   2 puffs, Inhalation, 2 Times Daily      busPIRone 5 MG tablet  Commonly known as: BUSPAR   5 mg, Oral, 2 Times Daily PRN      CALCIUM ACETATE PO   1 tablet, Oral, Daily      cholecalciferol 25 MCG (1000 UT) tablet  Commonly known as: VITAMIN D3   1,000 Units, Oral, Daily      DULoxetine 60 MG capsule  Commonly known as: CYMBALTA   TAKE 1 CAPSULE BY MOUTH TWICE A DAY      fish oil 1000 MG capsule capsule   1,000 mg, Oral, Daily With Breakfast      lidocaine 5 %  Commonly known as: Lidoderm   1 patch, Transdermal, Every 24 Hours, Remove & Discard patch within 12 hours or as directed by MD      multivitamin with minerals tablet tablet   1 tablet, Oral, Daily      Tri-Sprintec 0.18/0.215/0.25 MG-35 MCG per tablet  Generic drug: norgestimate-ethinyl estradiol   1 tablet, Oral, Daily      Turmeric 500 MG capsule   500 mg, Oral, Daily      vitamin C 500 MG tablet  Commonly known as: ASCORBIC ACID   500 mg, Oral, Daily             Allergies   Allergen Reactions   • Bee Venom Itching         Discharge  Disposition:   Home or Self Care    Diet:  Hospital:  Diet Order   Procedures   • Diet Cardiac; Healthy Heart         Discharge Activity:         CODE STATUS:  Code Status and Medical Interventions:   Ordered at: 02/10/22 1937     Level Of Support Discussed With:    Patient     Code Status (Patient has no pulse and is not breathing):    CPR (Attempt to Resuscitate)     Medical Interventions (Patient has pulse or is breathing):    Full Support         No future appointments.        Time spent on Discharge including face to face service:  25 minutes    This patient has been examined wearing appropriate Personal Protective Equipment and discussed with hospital infection control department. 02/12/22      Signature: Electronically signed by Shiela Steen MD, 02/12/22, 2:25 PM EST.

## 2022-02-12 NOTE — PLAN OF CARE
Problem: Adult Inpatient Plan of Care  Goal: Plan of Care Review  Outcome: Ongoing, Progressing  Goal: Patient-Specific Goal (Individualized)  Outcome: Ongoing, Progressing  Goal: Absence of Hospital-Acquired Illness or Injury  Outcome: Ongoing, Progressing  Intervention: Identify and Manage Fall Risk  Recent Flowsheet Documentation  Taken 2/11/2022 2312 by Ruthann Rausch RN  Safety Promotion/Fall Prevention: safety round/check completed  Taken 2/11/2022 2300 by Ruthann Rausch RN  Safety Promotion/Fall Prevention:   safety round/check completed   nonskid shoes/slippers when out of bed  Taken 2/11/2022 2100 by Ruthann Rausch RN  Safety Promotion/Fall Prevention:   safety round/check completed   nonskid shoes/slippers when out of bed  Taken 2/11/2022 1900 by Ruthann Rausch RN  Safety Promotion/Fall Prevention:   safety round/check completed   nonskid shoes/slippers when out of bed  Intervention: Prevent Skin Injury  Recent Flowsheet Documentation  Taken 2/11/2022 1900 by Ruthann Rausch RN  Body Position:   position changed independently   sitting up in bed  Goal: Optimal Comfort and Wellbeing  Outcome: Ongoing, Progressing  Goal: Readiness for Transition of Care  Outcome: Ongoing, Progressing     Problem: Asthma Comorbidity  Goal: Maintenance of Asthma Control  Outcome: Ongoing, Progressing     Problem: Pain Chronic (Persistent) (Comorbidity Management)  Goal: Acceptable Pain Control and Functional Ability  Outcome: Ongoing, Progressing  Intervention: Develop Pain Management Plan  Recent Flowsheet Documentation  Taken 2/11/2022 2242 by Ruthann Rausch RN  Pain Management Interventions: see MAR   Goal Outcome Evaluation:

## 2022-02-12 NOTE — PROGRESS NOTES
Referring Provider: Shiela Steen MD    Reason for follow-up:  Chest pain  Positive stress Cardiolite test     Patient Care Team:  Junie Le APRN as PCP - General (Nurse Practitioner)  Josh Bowman MD PhD as Surgeon (Cardiothoracic Surgery)  Ajit Toro MD as Consulting Physician (Obstetrics and Gynecology)    Subjective .      ROS    Since I have last seen, the patient has been without any chest discomfort ,shortness of breath, palpitations, dizziness or syncope.  Denies having any headache ,abdominal pain ,nausea, vomiting , diarrhea constipation, loss of weight or loss of appetite.  Denies having any excessive bruising ,hematuria or blood in the stool.    Review of all systems negative except as indicated    History  Past Medical History:   Diagnosis Date   • Anxiety    • Asthma 2013   • Lumbar radiculopathy 3/12/2018   • Lung nodule seen on imaging study 10/2/2020   • Malignant neoplasm of upper lobe of right lung (HCC) 2021   • Thoracic back pain 3/12/2018       Past Surgical History:   Procedure Laterality Date   • BREAST BIOPSY Right     benign   • BRONCHOSCOPY N/A 10/21/2020    Procedure: BRONCHOSCOPY WITH BRONCHOALVEOLAR LAVAGE, NAVIGATION WITH FINE NEEDLE ASPIRATION;  Surgeon: Nichol Jaquez MD;  Location: [facility] END[facility]COPY;  Service: Pulmonary;  Laterality: N/A;  POST RUL NODULE   •  SECTION      x3    • LUNG REMOVAL, PARTIAL Right 2020    right upper lobe    • NASAL SEPTUM SURGERY      20 Years Ago       Family History   Problem Relation Age of Onset   • Hypertension Mother    • Alzheimer's disease Mother    • Thyroid disease Mother    • Lung cancer Father    • Lupus Sister    • Asthma Other    • No Known Problems Brother    • Lupus Sister        Social History     Tobacco Use   • Smoking status: Never Smoker   • Smokeless tobacco: Never Used   Vaping Use   • Vaping Use: Never used   Substance Use Topics   • Alcohol use: Yes     Comment: Couple times  a week   • Drug use: No        Medications Prior to Admission   Medication Sig Dispense Refill Last Dose   • albuterol sulfate  (90 Base) MCG/ACT inhaler TAKE 2 PUFFS BY MOUTH EVERY 4 HOURS AS NEEDED FOR WHEEZE 18 g 3 2/10/2022 at Unknown time   • baclofen (LIORESAL) 10 MG tablet TAKE 1 TABLET BY MOUTH THREE TIMES A DAY AS NEEDED FOR MUSCLES SPASMS 90 tablet 0 Past Week at Unknown time   • budesonide-formoterol (SYMBICORT) 160-4.5 MCG/ACT inhaler Inhale 2 puffs 2 (Two) Times a Day. 1 each 11 2/10/2022 at Unknown time   • busPIRone (BUSPAR) 5 MG tablet Take 5 mg by mouth 2 (Two) Times a Day As Needed.   Past Month at Unknown time   • Calcium Acetate, Phos Binder, (CALCIUM ACETATE PO) Take 1 tablet by mouth Daily.   2/10/2022 at Unknown time   • Cholecalciferol (Vitamin D) 25 MCG (1000 UT) tablet Take 1,000 Units by mouth Daily.   2/10/2022 at Unknown time   • DULoxetine (CYMBALTA) 60 MG capsule TAKE 1 CAPSULE BY MOUTH TWICE A  capsule 2 2/10/2022 at Unknown time   • montelukast (SINGULAIR) 10 MG tablet TAKE 1 TABLET BY MOUTH EVERY DAY AT NIGHT (Patient taking differently: Take 10 mg by mouth Daily.) 90 tablet 1 Patient Taking Differently at Unknown time   • Multiple Vitamins-Minerals (multivitamin with minerals) tablet tablet Take 1 tablet by mouth Daily.   2/10/2022 at Unknown time   • Omega-3 Fatty Acids (fish oil) 1000 MG capsule capsule Take 1,000 mg by mouth Daily With Breakfast.   Past Week at Unknown time   • TRI-SPRINTEC 0.18/0.215/0.25 MG-35 MCG per tablet Take 1 tablet by mouth Daily.  4 2/10/2022 at Unknown time   • Turmeric 500 MG capsule Take 500 mg by mouth Daily.   Past Week at Unknown time   • vitamin C (ASCORBIC ACID) 500 MG tablet Take 500 mg by mouth Daily.   2/10/2022 at Unknown time   • lidocaine (Lidoderm) 5 % Place 1 patch on the skin as directed by provider Daily. Remove & Discard patch within 12 hours or as directed by MD 6 each 0 More than a month at Unknown time  "      Allergies  Bee venom    Scheduled Meds:vitamin C, 500 mg, Oral, Daily  aspirin, 81 mg, Oral, Daily  budesonide-formoterol, 2 puff, Inhalation, BID - RT  cholecalciferol, 1,000 Units, Oral, Daily  DULoxetine, 60 mg, Oral, BID  lidocaine, 1 patch, Transdermal, Daily  montelukast, 10 mg, Oral, Nightly      Continuous Infusions:   PRN Meds:.•  acetaminophen  •  albuterol  •  baclofen  •  diazePAM  •  HYDROcodone-acetaminophen  •  melatonin  •  Morphine  •  [DISCONTINUED] Morphine **AND** naloxone  •  nitroglycerin  •  ondansetron  •  Insert peripheral IV **AND** sodium chloride    Objective     VITAL SIGNS  Vitals:    02/11/22 1452 02/11/22 1756 02/11/22 2210 02/12/22 0600   BP: 105/65 106/68 122/75 110/70   BP Location: Right arm Right arm Right arm Left arm   Patient Position: Lying Lying Lying Lying   Pulse: 96 104 100 91   Resp: 16 16 16 16   Temp: 98.8 °F (37.1 °C) 97.3 °F (36.3 °C) 97.5 °F (36.4 °C) 97.5 °F (36.4 °C)   TempSrc: Oral Axillary Axillary Oral   SpO2: 98% 94% 100% 97%   Weight:       Height:           Flowsheet Rows      First Filed Value   Admission Height 167.6 cm (66\") Documented at 02/10/2022 1317   Admission Weight 74 kg (163 lb 2.3 oz) Documented at 02/10/2022 1317            Intake/Output Summary (Last 24 hours) at 2/12/2022 0701  Last data filed at 2/11/2022 1117  Gross per 24 hour   Intake 240 ml   Output --   Net 240 ml        TELEMETRY: Sinus rhythm    Physical Exam:  The patient is alert, oriented and in no distress.  Vital signs as noted above.  Head and neck revealed no carotid bruits or jugular venous distention.  No thyromegaly or lymphadenopathy is present  Lungs clear.  No wheezing.  Breath sounds are normal bilaterally.  Heart normal first and second heart sounds.  No murmur. No precordial rub is present.  No gallop is present.  Abdomen soft and nontender.  No organomegaly is present.  Extremities with good peripheral pulses without any pedal edema.  Skin warm and " dry.  Musculoskeletal system is grossly normal  CNS grossly normal      Results Review:   I reviewed the patient's new clinical results.  Lab Results (last 24 hours)     Procedure Component Value Units Date/Time    Basic Metabolic Panel [[id number]]  (Abnormal) Collected: 02/12/22 0443    Specimen: Blood Updated: 02/12/22 0549     Glucose 116 mg/dL      BUN 7 mg/dL      Creatinine 0.48 mg/dL      Sodium 135 mmol/L      Potassium 3.9 mmol/L      Chloride 99 mmol/L      CO2 26.0 mmol/L      Calcium 8.8 mg/dL      eGFR Non African Amer 136 mL/min/1.73      BUN/Creatinine Ratio 14.6     Anion Gap 10.0 mmol/L     Narrative:      GFR Normal >60  Chronic Kidney Disease <60  Kidney Failure <15      Lipid Panel [[id number]]  (Abnormal) Collected: 02/12/22 0443    Specimen: Blood Updated: 02/12/22 0549     Total Cholesterol 216 mg/dL      Triglycerides 132 mg/dL      HDL Cholesterol 72 mg/dL      LDL Cholesterol  121 mg/dL      VLDL Cholesterol 23 mg/dL      LDL/HDL Ratio 1.63    Narrative:      Cholesterol Reference Ranges  (U.S. Department of Health and Human Services ATP III Classifications)    Desirable          <200 mg/dL  Borderline High    200-239 mg/dL  High Risk          >240 mg/dL      Triglyceride Reference Ranges  (U.S. Department of Health and Human Services ATP III Classifications)    Normal           <150 mg/dL  Borderline High  150-199 mg/dL  High             200-499 mg/dL  Very High        >500 mg/dL    HDL Reference Ranges  (U.S. Department of Health and Human Services ATP III Classifications)    Low     <40 mg/dl (major risk factor for CHD)  High    >60 mg/dl ('negative' risk factor for CHD)        LDL Reference Ranges  (U.S. Department of Health and Human Services ATP III Classifications)    Optimal          <100 mg/dL  Near Optimal     100-129 mg/dL  Borderline High  130-159 mg/dL  High             160-189 mg/dL  Very High        >189 mg/dL    Protime-INR [[id number]]  (Abnormal) Collected: 02/12/22 0443     Specimen: Blood Updated: 02/12/22 0541     Protime 10.0 Seconds      INR <0.93    CBC & Differential [[id number]]  (Abnormal) Collected: 02/12/22 0443    Specimen: Blood Updated: 02/12/22 0517    Narrative:      The following orders were created for panel order CBC & Differential.  Procedure                               Abnormality         Status                     ---------                               -----------         ------                     CBC Auto Differential[[id number]]        Abnormal            Final result                 Please view results for these tests on the individual orders.    CBC Auto Differential [[id number]]  (Abnormal) Collected: 02/12/22 0443    Specimen: Blood Updated: 02/12/22 0517     WBC 9.30 10*3/mm3      RBC 3.86 10*6/mm3      Hemoglobin 11.5 g/dL      Hematocrit 33.4 %      MCV 86.6 fL      MCH 29.8 pg      MCHC 34.4 g/dL      RDW 14.0 %      RDW-SD 43.3 fl      MPV 8.6 fL      Platelets 301 10*3/mm3      Neutrophil % 68.3 %      Lymphocyte % 18.9 %      Monocyte % 11.0 %      Eosinophil % 1.4 %      Basophil % 0.4 %      Neutrophils, Absolute 6.30 10*3/mm3      Lymphocytes, Absolute 1.70 10*3/mm3      Monocytes, Absolute 1.00 10*3/mm3      Eosinophils, Absolute 0.10 10*3/mm3      Basophils, Absolute 0.00 10*3/mm3      nRBC 0.1 /100 WBC     Magnesium [[id number]]  (Normal) Collected: 02/11/22 1726    Specimen: Blood Updated: 02/11/22 1758     Magnesium 2.0 mg/dL           Imaging Results (Last 24 Hours)     ** No results found for the last 24 hours. **      LAB RESULTS (LAST 7 DAYS)    CBC  Results from last 7 days   Lab Units 02/12/22 0443 02/11/22  0442 02/10/22  1528   WBC 10*3/mm3 9.30 7.20 9.30   RBC 10*6/mm3 3.86 3.97 4.21   HEMOGLOBIN g/dL 11.5* 11.7* 12.8   HEMATOCRIT % 33.4* 34.9 36.9   MCV fL 86.6 87.8 87.6   PLATELETS 10*3/mm3 301 321 316       BMP  Results from last 7 days   Lab Units 02/12/22 0443 02/11/22  1726 02/11/22  0442 02/10/22  1528   SODIUM mmol/L 135*  --   137 135*   POTASSIUM mmol/L 3.9  --  4.0 4.0   CHLORIDE mmol/L 99  --  102 99   CO2 mmol/L 26.0  --  23.0 23.0   BUN mg/dL 7  --  8 8   CREATININE mg/dL 0.48*  --  0.64 0.61   GLUCOSE mg/dL 116*  --  97 98   MAGNESIUM mg/dL  --  2.0  --   --        CMP   Results from last 7 days   Lab Units 02/12/22  0443 02/11/22  0442 02/10/22  1528   SODIUM mmol/L 135* 137 135*   POTASSIUM mmol/L 3.9 4.0 4.0   CHLORIDE mmol/L 99 102 99   CO2 mmol/L 26.0 23.0 23.0   BUN mg/dL 7 8 8   CREATININE mg/dL 0.48* 0.64 0.61   GLUCOSE mg/dL 116* 97 98         BNP        TROPONIN  Results from last 7 days   Lab Units 02/11/22  0442   TROPONIN T ng/mL <0.010       CoAg  Results from last 7 days   Lab Units 02/12/22  0443 02/10/22  1528   INR  <0.93* <0.93*   APTT seconds  --  28.2       Creatinine Clearance  Estimated Creatinine Clearance: 142.3 mL/min (A) (by C-G formula based on SCr of 0.48 mg/dL (L)).    ABG        Radiology  CT Chest Pulmonary Embolism    Result Date: 2/10/2022  1.There are no findings of pulmonary embolus. 2.There is a small right pleural effusion with subsegmental atelectasis and possible wedge resection of right upper lobe nodule. 3.There is a single mildly enlarged mediastinal lymph node. This is nonspecific. A follow-up CT chest in 6 months time should be performed.    Electronically Signed By-Josette Rosales MD On:2/10/2022 4:37 PM This report was finalized on [id number] by  Josette Rosales MD.              EKG              I personally viewed and interpreted the patient's EKG/Telemetry data: Sinus rhythm    ECHOCARDIOGRAM:            STRESS MYOVIEW:    Cardiolite (Tc-99m Sestamibi) stress test    CARDIAC CATHETERIZATION:            OTHER:         Assessment/Plan     Principal Problem:    Chest pain  Active Problems:    Abnormal nuclear stress test    Anxiety    Asthma    Depression    Malignant neoplasm of upper lobe, right bronchus or lung (HCC)  ]]]]]]]]]]]]]]]]]]]]]]]  Infiltration  ===========  Chest  pain  Abnormal stress test  EKG showed no acute changes.  Troponin levels are negative.    Cardiac catheterization 2022 revealed  Normal left ventricular size and contractility with ejection fraction of 60%.  Normal epicardial coronary arteries.    -Asthma and anxiety    -History of lung CA s/p right upper lobe wedge resection     -Non smoker; + EtOH on weekends occasionally      -Family history of father with CAD,  of lung CA, Mother- hypertension, brother- afib    -Previous nasal surgery,  X3    -Allergic to bee venom  ========  PLAN:  ========  Patient presented with chest pain  Ruled out for MI with negative serial cardiac enzymes and EKG   EKG showed sinus rhythm without any ST changes   Patient underwent stress test that was abnormal with inferior wall ischemia   Patient to have cardiac catheterization and coronary arteriography.  Risks and benefits pros and cons of the procedure were discussed with patient and patient's family at bedside.  This included infection bleeding blood clot heart attack stroke allergic reaction to the dye etc.  Further plan will depend on patient's progress.  ]]]]]]]]]]]]]]]]]]]]]              Chandana Tellez MD  22  07:01 EST

## 2022-02-12 NOTE — PLAN OF CARE
Goal Outcome Evaluation:  Plan of Care Reviewed With: patient        Progress: improving  Outcome Summary: Patient had a clean cardiac cath. Patient will be dishcarging this evening after bedrest. Patient started on meloxicam for chest wall pain per Dr. Samayoa. Patient stable, will continue to monitor.

## 2022-02-13 LAB — QT INTERVAL: 351 MS

## 2022-02-13 NOTE — OUTREACH NOTE
Prep Survey      Responses   [facility] facility patient discharged from? [facility]   Is LACE score < 7 ? No   Emergency Room discharge w/ pulse ox? No   Eligibility TCM   [facility]   Date of Admission 02/10/22   Date of Discharge 02/12/22   Discharge diagnosis Chest pain-heart cath this visit   Does the patient have one of the following disease processes/diagnoses(primary or secondary)? Other   Does the patient have Home health ordered? No   Is there a DME ordered? No   Prep survey completed? Yes          Darlene Carver RN

## 2022-02-14 ENCOUNTER — TRANSITIONAL CARE MANAGEMENT TELEPHONE ENCOUNTER (OUTPATIENT)
Dept: CALL CENTER | Facility: HOSPITAL | Age: 52
End: 2022-02-14

## 2022-02-14 NOTE — OUTREACH NOTE
Call Center TCM Note      Responses   [facility] patient discharged from? [facility]   Does the patient have one of the following disease processes/diagnoses(primary or secondary)? Other   TCM attempt successful? No   Unsuccessful attempts Attempt 2          Sadie Sage RN    2/14/2022, 16:41 EST

## 2022-02-14 NOTE — OUTREACH NOTE
Call Center TCM Note      Responses   [facility] patient discharged from? [facility]   Does the patient have one of the following disease processes/diagnoses(primary or secondary)? Other   TCM attempt successful? No   Unsuccessful attempts Attempt 1          Paulette Da Silva MA    2/14/2022, 16:20 EST

## 2022-02-15 ENCOUNTER — OFFICE VISIT (OUTPATIENT)
Dept: FAMILY MEDICINE CLINIC | Facility: CLINIC | Age: 52
End: 2022-02-15

## 2022-02-15 ENCOUNTER — TRANSITIONAL CARE MANAGEMENT TELEPHONE ENCOUNTER (OUTPATIENT)
Dept: CALL CENTER | Facility: HOSPITAL | Age: 52
End: 2022-02-15

## 2022-02-15 ENCOUNTER — TELEPHONE (OUTPATIENT)
Dept: PULMONOLOGY | Facility: HOSPITAL | Age: 52
End: 2022-02-15

## 2022-02-15 VITALS
RESPIRATION RATE: 18 BRPM | DIASTOLIC BLOOD PRESSURE: 66 MMHG | OXYGEN SATURATION: 99 % | TEMPERATURE: 97.1 F | WEIGHT: 162 LBS | HEART RATE: 99 BPM | BODY MASS INDEX: 26.03 KG/M2 | HEIGHT: 66 IN | SYSTOLIC BLOOD PRESSURE: 100 MMHG

## 2022-02-15 DIAGNOSIS — R07.89 CHEST WALL PAIN: Primary | ICD-10-CM

## 2022-02-15 PROCEDURE — 99214 OFFICE O/P EST MOD 30 MIN: CPT | Performed by: NURSE PRACTITIONER

## 2022-02-15 RX ORDER — HYDROCODONE BITARTRATE AND ACETAMINOPHEN 7.5; 325 MG/1; MG/1
1 TABLET ORAL EVERY 6 HOURS PRN
Qty: 20 TABLET | Refills: 0 | Status: SHIPPED | OUTPATIENT
Start: 2022-02-15 | End: 2022-08-17 | Stop reason: SDUPTHER

## 2022-02-15 RX ORDER — HYDROCODONE BITARTRATE AND ACETAMINOPHEN 7.5; 325 MG/1; MG/1
1 TABLET ORAL EVERY 6 HOURS PRN
COMMUNITY
End: 2022-02-15 | Stop reason: SDUPTHER

## 2022-02-15 RX ORDER — METHYLPREDNISOLONE 4 MG/1
TABLET ORAL
Qty: 21 TABLET | Refills: 0 | Status: SHIPPED | OUTPATIENT
Start: 2022-02-15 | End: 2022-04-26

## 2022-02-15 NOTE — OUTREACH NOTE
Call Center TCM Note      Responses   [facility] [place] patient discharged from? [facility]   Does the patient have one of the following disease processes/diagnoses(primary or secondary)? Other   TCM attempt successful? Yes   Discharge diagnosis Chest pain-heart cath this visit   Wrap up additional comments Pt d/c from [facility] on 02/12/2022, today completed FACE TO FACE TCM [facility] with PCP           Paulette Da Silva MA    2/15/2022, 16:36 EST

## 2022-02-15 NOTE — PROGRESS NOTES
Transitional Care Follow Up Visit  Subjective     Gideon Fields is a 51 y.o. female who presents for a transitional care management visit.    Within 48 business hours after discharge our office contacted her via telephone to coordinate her care and needs.      I reviewed and discussed the details of that call along with the discharge summary, hospital problems, inpatient lab results, inpatient diagnostic studies, and consultation reports with Gideon.     Current outpatient and discharge medications have been reconciled for the patient.  Reviewed by: NEO Recinos      Date of TCM Phone Call 2/12/2022   [facility]   Date of Admission 2/10/2022   Date of Discharge 2/12/2022     Risk for Readmission (LACE) Score: 9 (2/12/2022  6:01 AM)      History of Present Illness   Course During Hospital Stay:  Pt comes in today for follow up from recent hopsital visit. On Thursday 2/10 she woke up around 4AM with CP, nausea, and arm pain. That day she had an appt with Dr. Samayoa and he then sent her to Er for workup. Had abnormal stress test and then had normal cath.   Does have hx of lung cancer. The CT showed a non specific midly enlarged mediastinal lymph node. Recommend f/u CT in 6 months. Pt has hx of right upper lobe wedge resection.  She was discharged with mobic 7.5mg and not helping. Also taking baclofen and using heat. Pain is getting better, but not improved. Was having pain with breathing, coughing, moving.   She has some norco and valium at home. Took 1/2 dose of norco 7.5mg this morning and took pain from 6/10 to 3/10.   She is going to follow up with thoracic surgeon soon as well.      The following portions of the patient's history were reviewed and updated as appropriate: allergies, current medications, past family history, past medical history, past social history, past surgical history and problem list.    Review of Systems    Objective   Physical Exam  Constitutional:       Appearance: She is  well-developed.   Eyes:      Pupils: Pupils are equal, round, and reactive to light.   Cardiovascular:      Rate and Rhythm: Normal rate and regular rhythm.   Pulmonary:      Effort: Pulmonary effort is normal. No respiratory distress.      Breath sounds: Normal breath sounds. No wheezing.   Neurological:      Mental Status: She is alert and oriented to person, place, and time.   Psychiatric:         Mood and Affect: Mood is anxious. Affect is tearful.         Assessment/Plan   Diagnoses and all orders for this visit:    1. Chest wall pain (Primary)  -     methylPREDNISolone (MEDROL) 4 MG dose pack; Take as directed on package instructions.  Dispense: 21 tablet; Refill: 0  -     HYDROcodone-acetaminophen (NORCO) 7.5-325 MG per tablet; Take 1 tablet by mouth Every 6 (Six) Hours As Needed for Mild Pain .  Dispense: 20 tablet; Refill: 0    negative cardiac workup  Will stop mobic and start medrol dose pack  Follow up with pulm and thoracic surgery  During this office visit, we discussed the pertinent aspects of the visit and treatment recommendations. Pt verbalizes understanding. Follow up was discussed. Patient was given the opportunity to ask questions and discuss other concerns.

## 2022-02-22 ENCOUNTER — READMISSION MANAGEMENT (OUTPATIENT)
Dept: CALL CENTER | Facility: HOSPITAL | Age: 52
End: 2022-02-22

## 2022-02-22 NOTE — OUTREACH NOTE
Medical Week 2 Survey      Responses   [facility] patient discharged from? [facility]   Does the patient have one of the following disease processes/diagnoses(primary or secondary)? Other   Week 2 attempt successful? Yes   Call start time 1235   Discharge diagnosis Chest pain-heart cath this visit   Call end time 1251   Person spoke with today (if not patient) and relationship Patient   Meds reviewed with patient/caregiver? Yes   Is the patient having any side effects they believe may be caused by any medication additions or changes? No   Does the patient have all medications ordered at discharge? Yes   Is the patient taking all medications as directed (includes completed medication regime)? Yes   Does the patient have a primary care provider?  Yes   Does the patient have an appointment with their PCP within 7 days of discharge? Yes   Has the patient kept scheduled appointments due by today? N/A   Psychosocial issues? No   What is the patient's perception of their health status since discharge? Improving   Is the patient/caregiver able to teach back signs and symptoms related to disease process for when to call PCP? Yes   Is the patient/caregiver able to teach back signs and symptoms related to disease process for when to call 911? Yes   Is the patient/caregiver able to teach back the hierarchy of who to call/visit for symptoms/problems? PCP, Specialist, Home health nurse, Urgent Care, ED, 911 Yes   If the patient is a current smoker, are they able to teach back resources for cessation? Not a smoker   Week 2 Call Completed? Yes   Wrap up additional comments Pt states her chest pain is better after taking steriods given per PCP. Pt is concerned about Mediastinal lymph node per CT finding, and pt encourged to ask surgeon/pulmonologist about if node is of concern.          Sadie Sage RN

## 2022-02-23 PROBLEM — Z98.890 S/P CARDIAC CATH: Status: ACTIVE | Noted: 2022-02-12

## 2022-03-02 ENCOUNTER — READMISSION MANAGEMENT (OUTPATIENT)
Dept: CALL CENTER | Facility: HOSPITAL | Age: 52
End: 2022-03-02

## 2022-03-02 NOTE — OUTREACH NOTE
Medical Week 3 Survey      Responses   [facility] patient discharged from? [facility]   Does the patient have one of the following disease processes/diagnoses(primary or secondary)? Other   Week 3 attempt successful? Yes   Call start time 1838   Call end time 1839   Discharge diagnosis Chest pain-heart cath this visit   Is the patient taking all medications as directed (includes completed medication regime)? Yes   Does the patient have a primary care provider?  Yes   Comments regarding PCP seen her PCP on 2/15   Has the patient kept scheduled appointments due by today? N/A   Has home health visited the patient within 72 hours of discharge? N/A   Psychosocial issues? No   What is the patient's perception of their health status since discharge? Improving   Is the patient/caregiver able to teach back signs and symptoms related to disease process for when to call PCP? Yes   Is the patient/caregiver able to teach back signs and symptoms related to disease process for when to call 911? Yes   Is the patient/caregiver able to teach back the hierarchy of who to call/visit for symptoms/problems? PCP, Specialist, Home health nurse, Urgent Care, ED, 911 Yes   Week 3 Call Completed? Yes   Graduated Yes   Is the patient interested in additional calls from an ambulatory ?  NOTE:  applies to high risk patients requiring additional follow-up. No   Did the patient feel the follow up calls were helpful during their recovery period? Yes   Was the number of calls appropriate? Yes   Graduated/Revoked comments Doing well, no further calls needed.          Katrina Vargas RN

## 2022-03-07 RX ORDER — MELOXICAM 7.5 MG/1
7.5 TABLET ORAL DAILY
Qty: 30 TABLET | Refills: 0 | OUTPATIENT
Start: 2022-03-07

## 2022-03-11 NOTE — TELEPHONE ENCOUNTER
S/W pt and scheduled f/u appt. Pt is requesting testing results of her CT & PFT that was done. Dr. Samayoa not in clinic until 3/21/22. I will put message in his folder for review.

## 2022-04-25 ENCOUNTER — TELEPHONE (OUTPATIENT)
Dept: FAMILY MEDICINE CLINIC | Facility: CLINIC | Age: 52
End: 2022-04-25

## 2022-04-25 NOTE — TELEPHONE ENCOUNTER
Caller: Gideon Fields    Relationship: Self    Best call back number: [phone]     What medication are you requesting:STEROID PACK      What are your current symptoms: BACK PAIN    How long have you been experiencing symptoms: 4/23/2022    Have you had these symptoms before:    [x] Yes  []   No    Have you been treated for these symptoms before:   [x] Yes  [] No    If a prescription is needed, what is your preferred pharmacy and phone number:      [id number] - [place] - [address] - [phone]  - [phone]   [phone]

## 2022-04-26 RX ORDER — METHYLPREDNISOLONE 4 MG/1
TABLET ORAL
Qty: 21 TABLET | Refills: 0 | Status: SHIPPED | OUTPATIENT
Start: 2022-04-26 | End: 2022-08-17

## 2022-08-10 RX ORDER — MONTELUKAST SODIUM 10 MG/1
TABLET ORAL
Qty: 90 TABLET | Refills: 1 | Status: SHIPPED | OUTPATIENT
Start: 2022-08-10 | End: 2023-02-06

## 2022-08-16 ENCOUNTER — TELEPHONE (OUTPATIENT)
Dept: FAMILY MEDICINE CLINIC | Facility: CLINIC | Age: 52
End: 2022-08-16

## 2022-08-16 NOTE — TELEPHONE ENCOUNTER
Gave message to patient at 1:50pm and scheduled an appt with [facility] for 8/17/2022 at 4pm.

## 2022-08-16 NOTE — TELEPHONE ENCOUNTER
Hub staff attempted to follow warm transfer process and was unsuccessful     Caller: Gideon Fields    Relationship to patient: Self    Best call back number: [phone]    Patient is needing: APPOINTMENT FOR A STEROID SHOT, AILIN CHAPMAN IS THE PHYSICIAN, HURTING SINCE SUNDAY, SCIATIC NERVE PAIN

## 2022-08-17 ENCOUNTER — OFFICE VISIT (OUTPATIENT)
Dept: FAMILY MEDICINE CLINIC | Facility: CLINIC | Age: 52
End: 2022-08-17

## 2022-08-17 VITALS
HEIGHT: 66 IN | TEMPERATURE: 97.1 F | DIASTOLIC BLOOD PRESSURE: 78 MMHG | RESPIRATION RATE: 15 BRPM | OXYGEN SATURATION: 100 % | BODY MASS INDEX: 26.84 KG/M2 | SYSTOLIC BLOOD PRESSURE: 128 MMHG | WEIGHT: 167 LBS | HEART RATE: 101 BPM

## 2022-08-17 DIAGNOSIS — M54.16 LUMBAR RADICULOPATHY: ICD-10-CM

## 2022-08-17 DIAGNOSIS — M54.42 ACUTE LEFT-SIDED LOW BACK PAIN WITH LEFT-SIDED SCIATICA: Primary | ICD-10-CM

## 2022-08-17 PROCEDURE — 99213 OFFICE O/P EST LOW 20 MIN: CPT | Performed by: NURSE PRACTITIONER

## 2022-08-17 PROCEDURE — 96372 THER/PROPH/DIAG INJ SC/IM: CPT | Performed by: NURSE PRACTITIONER

## 2022-08-17 RX ORDER — PREDNISONE 20 MG/1
TABLET ORAL
Qty: 18 TABLET | Refills: 0 | Status: SHIPPED | OUTPATIENT
Start: 2022-08-17 | End: 2022-11-11

## 2022-08-17 RX ORDER — HYDROCODONE BITARTRATE AND ACETAMINOPHEN 7.5; 325 MG/1; MG/1
1 TABLET ORAL EVERY 6 HOURS PRN
Qty: 20 TABLET | Refills: 0 | Status: SHIPPED | OUTPATIENT
Start: 2022-08-17 | End: 2022-11-11 | Stop reason: SDUPTHER

## 2022-08-17 RX ORDER — TRIAMCINOLONE ACETONIDE 40 MG/ML
80 INJECTION, SUSPENSION INTRA-ARTICULAR; INTRAMUSCULAR ONCE
Status: COMPLETED | OUTPATIENT
Start: 2022-08-17 | End: 2022-08-17

## 2022-08-17 RX ADMIN — TRIAMCINOLONE ACETONIDE 80 MG: 40 INJECTION, SUSPENSION INTRA-ARTICULAR; INTRAMUSCULAR at 16:22

## 2022-08-17 NOTE — PROGRESS NOTES
"Chief Complaint  Back Pain (Left side)  Subjective        Gideon Fields presents to [facility]  Pt comes in today with c/o left sided sciatica. Started on Sunday. Having shooting pains down her left pain. Lower back pain both sides, but worse on the left. No bowel or bladder issues.   Has had issues with sciatica in the past, but usually on right side.   Has rx for baclofen and norco when this flares up.  Has tried heat. Ice seems to make it worse.  She has had issues with chronic back pain over the years. I had ordered MRI in 2019, but never had it done.   Xray done that showed degenerative changes and joint space narrowing.        Objective     Vital Signs:   /78   Pulse 101   Temp 97.1 °F (36.2 °C)   Resp 15   Ht 167.6 cm (66\")   Wt 75.8 kg (167 lb)   SpO2 100%   BMI 26.95 kg/m²       BP Readings from Last 3 Encounters:   08/17/22 128/78   02/15/22 100/66   02/12/22 104/68       Wt Readings from Last 3 Encounters:   08/17/22 75.8 kg (167 lb)   02/15/22 73.5 kg (162 lb)   02/12/22 73.5 kg (162 lb)     Physical Exam  Constitutional:       Appearance: She is well-developed.   Eyes:      Pupils: Pupils are equal, round, and reactive to light.   Cardiovascular:      Rate and Rhythm: Normal rate and regular rhythm.   Pulmonary:      Effort: Pulmonary effort is normal.      Breath sounds: Normal breath sounds.   Musculoskeletal:      Lumbar back: Spasms and tenderness present. Decreased range of motion. Positive right straight leg raise test.   Neurological:      Mental Status: She is alert and oriented to person, place, and time.        Result Review :                 Assessment and Plan    Diagnoses and all orders for this visit:    1. Acute left-sided low back pain with left-sided sciatica (Primary)  -     HYDROcodone-acetaminophen (NORCO) 7.5-325 MG per tablet; Take 1 tablet by mouth Every 6 (Six) Hours As Needed for Mild Pain .  Dispense: 20 tablet; Refill: 0  -     " predniSONE (DELTASONE) 20 MG tablet; 3 po x 3 days, 2 po x 3 days, 1 po x 3 days  Dispense: 18 tablet; Refill: 0  -     MRI Lumbar Spine Without Contrast; Future    2. Lumbar radiculopathy  -     HYDROcodone-acetaminophen (NORCO) 7.5-325 MG per tablet; Take 1 tablet by mouth Every 6 (Six) Hours As Needed for Mild Pain .  Dispense: 20 tablet; Refill: 0  -     MRI Lumbar Spine Without Contrast; Future    kenalog 80mg today  prednisosne taper  Cont baclofen prn  norco prn  MRI lumbar spine  During this office visit, we discussed the pertinent aspects of the visit and treatment recommendations. Pt verbalizes understanding. Follow up was discussed. Patient was given the opportunity to ask questions and discuss other concerns.         Follow Up   Return if symptoms worsen or fail to improve.  Patient was given instructions and counseling regarding her condition or for health maintenance advice. Please see specific information pulled into the AVS if appropriate.

## 2022-09-07 DIAGNOSIS — M54.16 LUMBAR RADICULOPATHY: ICD-10-CM

## 2022-09-07 DIAGNOSIS — M48.061 SPINAL STENOSIS OF LUMBAR REGION, UNSPECIFIED WHETHER NEUROGENIC CLAUDICATION PRESENT: ICD-10-CM

## 2022-09-07 DIAGNOSIS — M54.42 ACUTE LEFT-SIDED LOW BACK PAIN WITH LEFT-SIDED SCIATICA: ICD-10-CM

## 2022-09-07 DIAGNOSIS — M50.20 PROTRUSION OF CERVICAL INTERVERTEBRAL DISC: Primary | ICD-10-CM
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
## 2022-09-21 ENCOUNTER — OFFICE VISIT (OUTPATIENT)
Dept: NEUROSURGERY | Facility: CLINIC | Age: 52
End: 2022-09-21

## 2022-09-21 VITALS
HEART RATE: 91 BPM | HEIGHT: 66 IN | DIASTOLIC BLOOD PRESSURE: 64 MMHG | SYSTOLIC BLOOD PRESSURE: 138 MMHG | BODY MASS INDEX: 26.03 KG/M2 | OXYGEN SATURATION: 100 % | WEIGHT: 162 LBS

## 2022-09-21 DIAGNOSIS — M54.2 NECK PAIN: ICD-10-CM

## 2022-09-21 DIAGNOSIS — M53.3 SI (SACROILIAC) JOINT DYSFUNCTION: ICD-10-CM

## 2022-09-21 DIAGNOSIS — M48.061 SPINAL STENOSIS, LUMBAR REGION, WITHOUT NEUROGENIC CLAUDICATION: Primary | ICD-10-CM

## 2022-09-21 PROCEDURE — 99214 OFFICE O/P EST MOD 30 MIN: CPT

## 2022-09-21 RX ORDER — TIZANIDINE 2 MG/1
4 TABLET ORAL 3 TIMES DAILY PRN
Qty: 30 TABLET | Refills: 1 | Status: SHIPPED | OUTPATIENT
Start: 2022-09-21

## 2022-09-23 ENCOUNTER — PATIENT ROUNDING (BHMG ONLY) (OUTPATIENT)
Dept: NEUROSURGERY | Facility: CLINIC | Age: 52
End: 2022-09-23

## 2022-09-26 ENCOUNTER — HOSPITAL ENCOUNTER (OUTPATIENT)
Dept: PAIN MEDICINE | Facility: HOSPITAL | Age: 52
Discharge: HOME | End: 2022-09-26

## 2022-09-26 VITALS
OXYGEN SATURATION: 97 % | SYSTOLIC BLOOD PRESSURE: 120 MMHG | TEMPERATURE: 97.3 F | BODY MASS INDEX: 26.03 KG/M2 | DIASTOLIC BLOOD PRESSURE: 75 MMHG | RESPIRATION RATE: 16 BRPM | HEIGHT: 66 IN | HEART RATE: 94 BPM | WEIGHT: 162 LBS

## 2022-09-26 DIAGNOSIS — M48.061 SPINAL STENOSIS, LUMBAR REGION, WITHOUT NEUROGENIC CLAUDICATION: ICD-10-CM

## 2022-09-26 DIAGNOSIS — R52 PAIN: ICD-10-CM

## 2022-09-26 PROCEDURE — 77003 FLUOROGUIDE FOR SPINE INJECT: CPT

## 2022-09-26 PROCEDURE — 0 IOPAMIDOL 41 % SOLUTION: Performed by: STUDENT IN AN ORGANIZED HEALTH CARE EDUCATION/TRAINING PROGRAM

## 2022-09-26 PROCEDURE — 25010000002 METHYLPREDNISOLONE PER 40 MG: Performed by: STUDENT IN AN ORGANIZED HEALTH CARE EDUCATION/TRAINING PROGRAM

## 2022-09-26 PROCEDURE — 62323 NJX INTERLAMINAR LMBR/SAC: CPT | Performed by: STUDENT IN AN ORGANIZED HEALTH CARE EDUCATION/TRAINING PROGRAM

## 2022-09-26 RX ORDER — BUPIVACAINE HYDROCHLORIDE 2.5 MG/ML
10 INJECTION, SOLUTION EPIDURAL; INFILTRATION; INTRACAUDAL ONCE
Status: COMPLETED | OUTPATIENT
Start: 2022-09-26 | End: 2022-09-26

## 2022-09-26 RX ORDER — METHYLPREDNISOLONE ACETATE 40 MG/ML
40 INJECTION, SUSPENSION INTRA-ARTICULAR; INTRALESIONAL; INTRAMUSCULAR; SOFT TISSUE ONCE
Status: COMPLETED | OUTPATIENT
Start: 2022-09-26 | End: 2022-09-26

## 2022-09-26 RX ADMIN — BUPIVACAINE HYDROCHLORIDE 3 ML: 2.5 INJECTION, SOLUTION EPIDURAL; INFILTRATION; INTRACAUDAL; PERINEURAL at 15:52

## 2022-09-26 RX ADMIN — METHYLPREDNISOLONE ACETATE 40 MG: 40 INJECTION, SUSPENSION INTRA-ARTICULAR; INTRALESIONAL; INTRAMUSCULAR; INTRASYNOVIAL; SOFT TISSUE at 15:53

## 2022-09-26 RX ADMIN — IOPAMIDOL 3 ML: 408 INJECTION, SOLUTION INTRATHECAL at 15:52

## 2022-09-26 NOTE — PROCEDURES
Lumbar Epidural Steroid Injection  [facility]    PREOPERATIVE DIAGNOSIS:   Chronic low back pain, Lumbar Degenerative Disc Disease and Lumbar Disc Displacement  POSTOPERATIVE DIAGNOSIS:  Same as preop diagnosis    PROCEDURE:   Lumbar Epidural Steroid Injection, Therapeutic Translaminar Injection, with epidurogram, at  L4/L5 level    PRE-PROCEDURE DISCUSSION WITH PATIENT:    Risks and complications were discussed with the patient prior to starting the procedure and informed consent was obtained.  We discussed various topics including but not limited to bleeding, infection, injury, paralysis, nerve injury, dural puncture, coma, death, worsening of clinical picture, lack of pain relief, and postprocedural soreness.    SURGEON:  Dejon Barksdale MD    REASON FOR PROCEDURE:    Diagnostic injection at this level is needed, Degenerative changes are noted in the area. and Stenotic area is noted, and is likely contributing to this chronic &/or recurrent pain.     SEDATION:  Patient declined administration of moderate sedation    ANESTHETIC:  Marcaine 0.25%  STEROID:   Methylprednisolone (DEPO MEDROL) 40mg/ml    DESCRIPTON OF PROCEDURE:    After obtaining informed consent, I.V. was not started in the preop area.   The patient was taken to the operating room and placed in the prone position.  All pressure points were well padded.  The lumbar spine area was prepped with Chloraprep and draped in a sterile fashion.  Under fluoroscopic guidance, the above mentioned interlaminar space was identified. Skin and subcutaneous tissues were anesthetized with 1% lidocaine in the middle of the space. A Tuohy needle was introduced through the skin and advanced to this interlaminar space and into the epidural space under fluoroscopic guidance and verified with loss-of-resistance technique to air.  After confirming the position of the needle with the fluoroscope with all the views, and after aspiration was confirmed negative for blood  and CSF, 1.5 mL of Omnipaque was injected.  After seeing appropriate epidurogram with lateral and PA views, a total of 4 cc solution was injected, consisting of 3cc of local anesthetic as above, with normal saline and injectable steroid as above.     ESTIMATED BLOOD LOSS:  <5 mL  SPECIMENS:  None    COMPLICATIONS:     No complications were noted., There was no indication of vascular uptake on live injection of contrast dye., There was no indication of intrathecal uptake on live injection of contrast dye. and There was not any evidence of dural puncture.      TOLERANCE & DISCHARGE CONDITION:    The patient tolerated the procedure well.  The patient was transported to the recovery area without difficulties.  The patient was discharged to home under the care of family in stable and satisfactory condition.    PLAN OF CARE:  1. The patient was given our standard instruction sheet.  2. The patient will Return to clinic PRN  3. The patient will resume all medications as per the medication reconciliation sheet.

## 2022-09-26 NOTE — DISCHARGE INSTRUCTIONS

## 2022-09-27 ENCOUNTER — TELEPHONE (OUTPATIENT)
Dept: NEUROSURGERY | Facility: CLINIC | Age: 52
End: 2022-09-27

## 2022-09-27 ENCOUNTER — TELEPHONE (OUTPATIENT)
Dept: PAIN MEDICINE | Facility: HOSPITAL | Age: 52
End: 2022-09-27

## 2022-09-27 NOTE — TELEPHONE ENCOUNTER
I called and left message on Gene's voicemail regarding orders have been faxed to [facility].

## 2022-10-17 DIAGNOSIS — M48.061 SPINAL STENOSIS, LUMBAR REGION, WITHOUT NEUROGENIC CLAUDICATION: ICD-10-CM

## 2022-10-27 NOTE — PROGRESS NOTES
"Subjective   History of Present Illness: Gideon Fields is a 51 y.o. female is here today for follow-up. In the office today patient reports very mild improvement with her low back pain since her injection.  She continues to have pain in her left buttock that will radiate into her thigh.  She is able to point to the location of the pain over her SI joint.  This pain did not improve at all with the lumbar STORM.    Previous Treatment: PT, lumbar STORM    The following portions of the patient's history were reviewed and updated as appropriate: allergies, current medications, past family history, past medical history, past social history, past surgical history and problem list.    Review of Systems   Constitutional: Positive for activity change.   Eyes: Negative.    Respiratory: Negative.    Cardiovascular: Negative.    Gastrointestinal: Negative.    Endocrine: Negative.    Genitourinary: Negative.    Musculoskeletal: Positive for back pain (on the left side).   Skin: Negative.    Allergic/Immunologic: Negative.    Neurological: Negative.    Hematological: Negative.    Psychiatric/Behavioral: Positive for sleep disturbance.       Objective     /77   Pulse 91   Ht 167.6 cm (66\")   Wt 77.5 kg (170 lb 12.8 oz)   SpO2 99%   BMI 27.57 kg/m²    Body mass index is 27.57 kg/m².      Neurologic Exam    4 of 4 positive provocative SI joint maneuvers    Assessment & Plan   Independent Review of Radiographic Studies:      I personally reviewed and interpreted the images from the following studies.    MRI lumbar spine: Small disc herniation at L4-5 causing some left-sided lateral recess stenosis    Medical Decision Making:      Gideon Fields is a 51 y.o. female with a long history of pain concerning for radiculopathy versus SI joint dysfunction on the left.  Patient got minimal relief from lumbar STORM.  She does have symptoms and exam consistent with SI joint dysfunction.  We will send her for left SI joint injection for " diagnostic and therapeutic purposes.  If the injection does provide her with relief, she can repeat the injection.  She can follow-up with us after that.  If the injection does not work she can follow-up following the first injection.      Diagnoses and all orders for this visit:    1. Sacroiliitis (HCC) (Primary)    2. Lumbar degenerative disc disease      No follow-ups on file.    This patient was examined wearing appropriate personal protective equipment.                      Dr. John Go IV    10/31/22  11:45 EDT

## 2022-10-31 ENCOUNTER — OFFICE VISIT (OUTPATIENT)
Dept: NEUROSURGERY | Facility: CLINIC | Age: 52
End: 2022-10-31

## 2022-10-31 VITALS
HEIGHT: 66 IN | BODY MASS INDEX: 27.45 KG/M2 | WEIGHT: 170.8 LBS | DIASTOLIC BLOOD PRESSURE: 77 MMHG | OXYGEN SATURATION: 99 % | HEART RATE: 91 BPM | SYSTOLIC BLOOD PRESSURE: 141 MMHG

## 2022-10-31 DIAGNOSIS — M46.1 SACROILIITIS: Primary | ICD-10-CM

## 2022-10-31 DIAGNOSIS — M51.36 LUMBAR DEGENERATIVE DISC DISEASE: ICD-10-CM

## 2022-10-31 PROCEDURE — 99214 OFFICE O/P EST MOD 30 MIN: CPT | Performed by: NEUROLOGICAL SURGERY

## 2022-11-08 ENCOUNTER — TELEPHONE (OUTPATIENT)
Dept: NEUROSURGERY | Facility: CLINIC | Age: 52
End: 2022-11-08

## 2022-11-08 NOTE — TELEPHONE ENCOUNTER
RETURNED CALL TO PATIENT AND LET HER KNOW THAT AUTH IS APPROVED THROUGH [facility] AND INFO SENT TO [facility] FOR SCHEDULING SI INJECTION

## 2022-11-08 NOTE — TELEPHONE ENCOUNTER
"  Caller: NATALIE LUIS    Relationship: SELF    Best call back number: [phone]    What was the call regarding: PT CALLED AND STATES AT HER APPOINTMENT WITH DR. GO HE WAS REFERRING HER TO HAVE INJECTIONS.      LAST OVN WITH DR. GO STATES  \" We will send her for left SI joint injection for diagnostic and therapeutic purposes.\"    Office Visit with John Go IV, MD (10/31/2022)    PT STATES HER 1ST INJECTION WAS WITH DR. WILLIAMSON BUT SHE IS UNSURE WHO THIS ONE WILL BE WITH.  SHE THINKS IT MAY BE WITH DR. WILLIAMSON AS WELL.  PT REACHED OUT TO PAIN MANAGEMENT AND THEY INFORMED HER SHE NEEDED TO SPEAK WITH SAMANTHA.     Do you require a callback:   PLEASE CALL PT WITH AN UPDATE OF HER PAIN MANAGEMENT REFERRAL.    THANK YOU  "

## 2022-11-09 ENCOUNTER — HOSPITAL ENCOUNTER (OUTPATIENT)
Dept: PAIN MEDICINE | Facility: HOSPITAL | Age: 52
Discharge: HOME | End: 2022-11-09

## 2022-11-09 VITALS
TEMPERATURE: 98 F | DIASTOLIC BLOOD PRESSURE: 73 MMHG | HEART RATE: 89 BPM | OXYGEN SATURATION: 100 % | RESPIRATION RATE: 16 BRPM | WEIGHT: 170 LBS | BODY MASS INDEX: 27.32 KG/M2 | HEIGHT: 66 IN | SYSTOLIC BLOOD PRESSURE: 120 MMHG

## 2022-11-09 DIAGNOSIS — M46.1 SACROILIITIS: ICD-10-CM

## 2022-11-09 DIAGNOSIS — R52 PAIN: ICD-10-CM

## 2022-11-09 PROCEDURE — 77003 FLUOROGUIDE FOR SPINE INJECT: CPT

## 2022-11-09 PROCEDURE — 0 IOPAMIDOL 41 % SOLUTION: Performed by: STUDENT IN AN ORGANIZED HEALTH CARE EDUCATION/TRAINING PROGRAM

## 2022-11-09 PROCEDURE — 27096 INJECT SACROILIAC JOINT: CPT | Performed by: STUDENT IN AN ORGANIZED HEALTH CARE EDUCATION/TRAINING PROGRAM

## 2022-11-09 PROCEDURE — 25010000002 METHYLPREDNISOLONE PER 40 MG: Performed by: STUDENT IN AN ORGANIZED HEALTH CARE EDUCATION/TRAINING PROGRAM

## 2022-11-09 RX ORDER — METHYLPREDNISOLONE ACETATE 40 MG/ML
40 INJECTION, SUSPENSION INTRA-ARTICULAR; INTRALESIONAL; INTRAMUSCULAR; SOFT TISSUE ONCE
Status: COMPLETED | OUTPATIENT
Start: 2022-11-09 | End: 2022-11-09

## 2022-11-09 RX ADMIN — METHYLPREDNISOLONE ACETATE 40 MG: 40 INJECTION, SUSPENSION INTRA-ARTICULAR; INTRALESIONAL; INTRAMUSCULAR; INTRASYNOVIAL; SOFT TISSUE at 11:27

## 2022-11-09 RX ADMIN — IOPAMIDOL 3 ML: 408 INJECTION, SOLUTION INTRATHECAL at 11:26

## 2022-11-09 NOTE — PROCEDURES
LEFT Sacroiliac Joint Injection  [facility]    PREOPERATIVE DIAGNOSIS:   Sacroiliac joint dysfunction on the LEFT    POSTOPERATIVE DIAGNOSIS:  Sacroiliac joint dysfunction on the LEFT    PROCEDURE:  Sacroiliac Joint Injection, on the LEFT, with fluoroscopic guidance    PRE-PROCEDURE DISCUSSION WITH PATIENT:    Risks and complications were discussed with the patient prior to starting the procedure and informed consent was obtained.  We discussed various topics including but not limited to bleeding, infection, injury, postprocedural site soreness, painful flareup, worsening of clinical picture, paralysis, coma, and death.     SURGEON:  Ethan Barksdale MD    REASON FOR PROCEDURE:    Patient has pain consistent with SI pathology on history and physical exam. Positive sacroiliac provocation maneuvers noted.   Tender to palpation over the affected SI joint. Positive FABERE.     SEDATION:  Patient declined administration of moderate sedation    ANESTHETIC AGENT:  Lidocaine 1%  STEROID AGENT:  Methylprednisolone (DEPO MEDROL) 40mg/ml    DESCRIPTON OF PROCEDURE:  After obtaining informed consent, IV access  was not obtained in the preoperative area.  The patient was transported to the operative suite and placed in the prone position with a pillow under the pelvic area.  The lumbosacral area was prepped with Chloraprep and draped in a sterile fashion.     Under fluoroscopic guidance the inferior most portion of the LEFT sacroiliac joint was identified. The overlying skin and subcutaneous tissue was anesthetized with 1% lidocaine. A 25-gauge spinal needle was introduced from the inferior most portion of the joint into the sacroiliac joint under fluoroscopic guidance in the AP dimension with slight oblique rotation to the contralateral side.  Aspiration was negative.  After confirming the position of the needle with fluoroscopy, 1 mL of Omnipaque was injected and after seeing appropriate spread into the joint a total  of 4 mL of Marcaine, with the steroid, was injected very slowly.  Vital signs remained stable.  The onset of analgesia was noted.    ESTIMATED BLOOD LOSS:  minimal  SPECIMENS:  None    COMPLICATIONS:  No complications were noted., There was no indication of vascular uptake on live injection of contrast dye. and There was no indication of intrathecal uptake on live injection of contrast dye.    TOLERANCE & DISCHARGE CONDITION:    The patient tolerated the procedure well.  The patient was transported to the recovery area without difficulties.  The patient was discharged to home under the care of family in stable and satisfactory condition.    PLAN OF CARE:  1. The patient was given our standard instruction sheet and will resume all medications as per the medication reconciliation sheet.  2. The patient will Return to clinic PRN.  3. The patient is instructed to keep a pain log hourly for 8 hours after the procedure.

## 2022-11-10 ENCOUNTER — TELEPHONE (OUTPATIENT)
Dept: PAIN MEDICINE | Facility: HOSPITAL | Age: 52
End: 2022-11-10

## 2022-11-11 ENCOUNTER — OFFICE VISIT (OUTPATIENT)
Dept: FAMILY MEDICINE CLINIC | Facility: CLINIC | Age: 52
End: 2022-11-11

## 2022-11-11 VITALS
SYSTOLIC BLOOD PRESSURE: 110 MMHG | WEIGHT: 172.2 LBS | DIASTOLIC BLOOD PRESSURE: 80 MMHG | BODY MASS INDEX: 27.68 KG/M2 | TEMPERATURE: 98.7 F | OXYGEN SATURATION: 98 % | HEART RATE: 96 BPM | RESPIRATION RATE: 18 BRPM | HEIGHT: 66 IN

## 2022-11-11 DIAGNOSIS — M54.16 LUMBAR RADICULOPATHY: ICD-10-CM

## 2022-11-11 DIAGNOSIS — R05.1 ACUTE COUGH: Primary | ICD-10-CM

## 2022-11-11 DIAGNOSIS — J02.9 ACUTE SORE THROAT: ICD-10-CM

## 2022-11-11 DIAGNOSIS — M54.42 ACUTE LEFT-SIDED LOW BACK PAIN WITH LEFT-SIDED SCIATICA: ICD-10-CM

## 2022-11-11 DIAGNOSIS — J45.20 MILD INTERMITTENT ASTHMA WITHOUT COMPLICATION: ICD-10-CM

## 2022-11-11 PROCEDURE — 99214 OFFICE O/P EST MOD 30 MIN: CPT | Performed by: STUDENT IN AN ORGANIZED HEALTH CARE EDUCATION/TRAINING PROGRAM

## 2022-11-11 RX ORDER — PREDNISONE 20 MG/1
20 TABLET ORAL DAILY
Qty: 5 TABLET | Refills: 0 | Status: SHIPPED | OUTPATIENT
Start: 2022-11-11 | End: 2022-11-17 | Stop reason: SDUPTHER

## 2022-11-11 RX ORDER — AZITHROMYCIN 250 MG/1
TABLET, FILM COATED ORAL
Qty: 6 TABLET | Refills: 0 | Status: SHIPPED | OUTPATIENT
Start: 2022-11-11 | End: 2023-02-09

## 2022-11-11 RX ORDER — ALBUTEROL SULFATE 90 UG/1
2 AEROSOL, METERED RESPIRATORY (INHALATION) EVERY 4 HOURS PRN
Qty: 18 G | Refills: 3 | Status: SHIPPED | OUTPATIENT
Start: 2022-11-11

## 2022-11-11 RX ORDER — HYDROCODONE BITARTRATE AND HOMATROPINE METHYLBROMIDE ORAL SOLUTION 5; 1.5 MG/5ML; MG/5ML
2.5 LIQUID ORAL EVERY 6 HOURS PRN
Qty: 120 ML | Refills: 0 | Status: SHIPPED | OUTPATIENT
Start: 2022-11-11 | End: 2022-11-17 | Stop reason: SDUPTHER

## 2022-11-11 RX ORDER — HYDROCODONE BITARTRATE AND ACETAMINOPHEN 7.5; 325 MG/1; MG/1
1 TABLET ORAL EVERY 6 HOURS PRN
Qty: 20 TABLET | Refills: 0 | Status: SHIPPED | OUTPATIENT
Start: 2022-11-11 | End: 2023-02-09 | Stop reason: SDUPTHER

## 2022-11-11 NOTE — PROGRESS NOTES
"Chief Complaint  Chief Complaint   Patient presents with   • Cough   • Sore Throat   • Headache     Subjective        Gideon Fields is a 51 y.o. female who presents to [facility].    History of Present Illness  Symptoms started 3 days ago.  Significant sore throat, cough, fevers.    Son has been sick w/ similar symptoms recently.    She has asthma and has been needing to use her albuterol inhaler more.  She uses symbicort daily.  She has history of right upper lung lobectomy d/t cancer.      Objective   /80   Pulse 96   Temp 98.7 °F (37.1 °C)   Resp 18   Ht 167.6 cm (66\")   Wt 78.1 kg (172 lb 3.2 oz)   SpO2 98%   BMI 27.79 kg/m²     Estimated body mass index is 27.79 kg/m² as calculated from the following:    Height as of this encounter: 167.6 cm (66\").    Weight as of this encounter: 78.1 kg (172 lb 3.2 oz).     Physical Exam   GEN: In no acute distress, fatigued appearing  HEENT: Pupils equal and reactive to light, sclera clear. Mucous membranes moist. Oropharynx without erythema or exudate. No cervical or submandibular lymphadenopathy.  TMs WNL.    CV: Regular rate and rhythm, no murmurs  RESP: Lungs clear to auscultation anteriorly and posteriorly in all lung fields bilaterally.  Frequent dry coughing.  Auditory external wheezing following coughing fits.       Result Review :              Assessment and Plan     Diagnoses and all orders for this visit:    1. Acute cough (Primary)  I had seen her son who had tested negative for strep, covid and flu so we elected not to retest today.  She is higher risk given her hx of asthma and RUL lobectomy so we will treat with Z jarod and steroid burst.  Continue symbicort daily and prn albuterol.  Call if any worsening.  Tylenol / ibuprofen prn.  Encourage plenty of fluid intake.    -     azithromycin (Zithromax Z-Jarod) 250 MG tablet; Take 2 tablets by mouth on day 1, then 1 tablet daily on days 2-5  Dispense: 6 tablet; Refill: 0  -  "    predniSONE (DELTASONE) 20 MG tablet; Take 1 tablet by mouth Daily for 5 days.  Dispense: 5 tablet; Refill: 0    2. Acute sore throat    3. Mild intermittent asthma without complication  -     albuterol sulfate  (90 Base) MCG/ACT inhaler; Inhale 2 puffs Every 4 (Four) Hours As Needed for Wheezing.  Dispense: 18 g; Refill: 3    4. Acute left-sided low back pain with left-sided sciatica  -     HYDROcodone-acetaminophen (NORCO) 7.5-325 MG per tablet; Take 1 tablet by mouth Every 6 (Six) Hours As Needed for Mild Pain.  Dispense: 20 tablet; Refill: 0    5. Lumbar radiculopathy  Refilled her prn norco.

## 2022-11-17 DIAGNOSIS — J02.9 ACUTE SORE THROAT: ICD-10-CM

## 2022-11-17 DIAGNOSIS — R05.1 ACUTE COUGH: ICD-10-CM

## 2022-11-17 RX ORDER — HYDROCODONE BITARTRATE AND HOMATROPINE METHYLBROMIDE ORAL SOLUTION 5; 1.5 MG/5ML; MG/5ML
2.5 LIQUID ORAL EVERY 6 HOURS PRN
Qty: 120 ML | Refills: 0 | OUTPATIENT
Start: 2022-11-17

## 2022-11-17 RX ORDER — PREDNISONE 20 MG/1
20 TABLET ORAL DAILY
Qty: 5 TABLET | Refills: 0 | OUTPATIENT
Start: 2022-11-17 | End: 2022-11-22

## 2022-11-17 NOTE — TELEPHONE ENCOUNTER
PATIENT CALLED FOR MEDICATION REFILL OF  HYDROcodone Bit-Homatrop MBr (HYCODAN) 5-1.5 MG/5ML solution    AND     predniSONE (DELTASONE) 20 MG tablet (    SHE IS OUT OF MEDICATIONS AND HAS DEEP CHEST COUGH AND COUGHING A LO. SHE WAS SEEN ON 22.    [facility]/pharmacy #[id number] - [place], [place] - [address] - [phone] [facility] [phone]   [phone]    CALL BACK NUMBER [phone]    SHE HAS BRONCHITIS

## 2022-11-18 RX ORDER — PREDNISONE 10 MG/1
TABLET ORAL
Qty: 15 TABLET | Refills: 0 | Status: SHIPPED | OUTPATIENT
Start: 2022-11-18 | End: 2023-02-09

## 2022-11-18 RX ORDER — HYDROCODONE BITARTRATE AND HOMATROPINE METHYLBROMIDE ORAL SOLUTION 5; 1.5 MG/5ML; MG/5ML
2.5 LIQUID ORAL EVERY 6 HOURS PRN
Qty: 120 ML | Refills: 0 | Status: SHIPPED | OUTPATIENT
Start: 2022-11-18 | End: 2023-02-09

## 2022-12-14 ENCOUNTER — TELEPHONE (OUTPATIENT)
Dept: FAMILY MEDICINE CLINIC | Facility: CLINIC | Age: 52
End: 2022-12-14

## 2022-12-14 NOTE — TELEPHONE ENCOUNTER
Caller: FieldsGideon    Relationship: Self    Best call back number: [phone] (Mobile)    What medication are you requesting: SOMETHING FOR SYMPTOMS/POSSIBLE FLU PER PATIENT    What are your current symptoms: FEVER, BODY ACHES, EAR PAIN    How long have you been experiencing symptoms: SINCE SUNDAY    Have you had these symptoms before:    [x] Yes  [] No    Have you been treated for these symptoms before:   [x] Yes  [] No    If a prescription is needed, what is your preferred pharmacy and phone number:  [facility]/pharmacy #[id number] - [place], [place] - [address] - [phone]  - [phone] FX     Additional notes: PATIENT IS ASKING FOR SOMETHING TO BE SENT TO THE PHARMACY ASAP, PLEASE ADVISE PATIENT IF THIS CAN BE SENT TO THE PHARMACY ASAP

## 2022-12-14 NOTE — TELEPHONE ENCOUNTER
Gave message to patient at 12:20pm and said we are full until Friday with all providers so we recommend the [facility] [place].

## 2023-01-05 NOTE — PROGRESS NOTES
Subjective   History of Present Illness: Gideon Fields is a 52 y.o. female is here today for follow-up for back pain. In the office today patient reports significant improvement in her symptoms following SI joint injection.  She had the injection 2 months ago and has noticed some worsening over the last couple weeks.  She has been taking it easy in terms of activity as well.  However she does feel that she has gotten nearly complete relief from the SI joint injection    Chief Complaint   Patient presents with   • Back Pain     Follow up          Previous Treatment: SI joint injection, Zanaflex, Prednisone,Hydrocodone, Duloxetine    The following portions of the patient's history were reviewed and updated as appropriate: allergies, current medications, past family history, past medical history, past social history, past surgical history and problem list.    Review of Systems   Constitutional: Positive for activity change.   HENT: Negative.    Eyes: Negative.    Respiratory: Negative.    Cardiovascular: Negative.    Gastrointestinal: Negative.    Endocrine: Negative.    Genitourinary: Negative.    Musculoskeletal: Positive for back pain (mild).   Skin: Negative.    Allergic/Immunologic: Negative.    Neurological: Negative.    Hematological: Negative.    Psychiatric/Behavioral: Negative.        Objective     /79   Pulse 92   Ht 167.6 cm (66\")   Wt 77.7 kg (171 lb 3.2 oz)   LMP  (LMP Unknown) Comment: last period 3 months ago  SpO2 100%   BMI 27.63 kg/m²    Body mass index is 27.63 kg/m².      Neurologic Exam    Assessment & Plan   Independent Review of Radiographic Studies:      I personally reviewed and interpreted the images from the following studies.    No new imaging    Medical Decision Making:      Gideon Fields is a 52 y.o. female with a history consistent of SI joint dysfunction on the left.  Patient got significant relief from an injection, but the pain is beginning to return.  I recommend that she  undergo a second injection.  I will see her back a couple weeks after she completes this.  Depending on her response we could proceed with SI joint fusion      Diagnoses and all orders for this visit:    1. SI (sacroiliac) joint dysfunction (Primary)      No follow-ups on file.    This patient was examined wearing appropriate personal protective equipment.                      Dr. John Go IV    01/06/23  09:04 EST

## 2023-01-06 ENCOUNTER — OFFICE VISIT (OUTPATIENT)
Dept: NEUROSURGERY | Facility: CLINIC | Age: 53
End: 2023-01-06
Payer: MEDICAID

## 2023-01-06 VITALS
HEART RATE: 92 BPM | BODY MASS INDEX: 27.51 KG/M2 | DIASTOLIC BLOOD PRESSURE: 79 MMHG | SYSTOLIC BLOOD PRESSURE: 120 MMHG | OXYGEN SATURATION: 100 % | WEIGHT: 171.2 LBS | HEIGHT: 66 IN

## 2023-01-06 DIAGNOSIS — M53.3 SI (SACROILIAC) JOINT DYSFUNCTION: Primary | ICD-10-CM

## 2023-01-06 PROCEDURE — 99212 OFFICE O/P EST SF 10 MIN: CPT | Performed by: NEUROLOGICAL SURGERY

## 2023-01-19 ENCOUNTER — TELEPHONE (OUTPATIENT)
Dept: NEUROSURGERY | Facility: CLINIC | Age: 53
End: 2023-01-19
Payer: MEDICAID

## 2023-01-19 NOTE — TELEPHONE ENCOUNTER
LM FOR PATIENT LETTING HER KNOW THAT [facility] DENIED AUTH FOR SI INJECTION AS IT HAS NOT BEEN 3 MONTHS SINCE LAST ONE AND LU'S DOCUMENTATION FROM LAST OFFICE NOTE IS NOT STATING THIS, LAST SI INJECTION WAS ON 11/9/2022 AND SHE MUST SEE LU IN FEB SO WE CAN HAVE UPDATED DOCUMENTATION AND THEN WE CAN RESUBMIT FOR AUTH

## 2023-01-27 DIAGNOSIS — J45.909 ASTHMA, UNSPECIFIED ASTHMA SEVERITY, UNSPECIFIED WHETHER COMPLICATED, UNSPECIFIED WHETHER PERSISTENT: Primary | ICD-10-CM

## 2023-01-27 RX ORDER — BUDESONIDE AND FORMOTEROL FUMARATE DIHYDRATE 160; 4.5 UG/1; UG/1
2 AEROSOL RESPIRATORY (INHALATION) 2 TIMES DAILY
Qty: 1 EACH | Refills: 5 | Status: SHIPPED | OUTPATIENT
Start: 2023-01-27

## 2023-02-06 RX ORDER — MONTELUKAST SODIUM 10 MG/1
TABLET ORAL
Qty: 90 TABLET | Refills: 1 | Status: SHIPPED | OUTPATIENT
Start: 2023-02-06

## 2023-02-09 ENCOUNTER — OFFICE VISIT (OUTPATIENT)
Dept: FAMILY MEDICINE CLINIC | Facility: CLINIC | Age: 53
End: 2023-02-09
Payer: MEDICAID

## 2023-02-09 VITALS
RESPIRATION RATE: 16 BRPM | HEIGHT: 66 IN | WEIGHT: 170 LBS | BODY MASS INDEX: 27.32 KG/M2 | TEMPERATURE: 97.5 F | SYSTOLIC BLOOD PRESSURE: 131 MMHG | HEART RATE: 92 BPM | OXYGEN SATURATION: 98 % | DIASTOLIC BLOOD PRESSURE: 72 MMHG

## 2023-02-09 DIAGNOSIS — Z82.0 FAMILY HISTORY OF ALZHEIMER'S DISEASE: ICD-10-CM

## 2023-02-09 DIAGNOSIS — M54.16 LUMBAR RADICULOPATHY: ICD-10-CM

## 2023-02-09 DIAGNOSIS — M54.42 ACUTE LEFT-SIDED LOW BACK PAIN WITH LEFT-SIDED SCIATICA: Primary | ICD-10-CM

## 2023-02-09 DIAGNOSIS — R41.3 MEMORY DEFICIT: ICD-10-CM

## 2023-02-09 PROCEDURE — 96372 THER/PROPH/DIAG INJ SC/IM: CPT | Performed by: NURSE PRACTITIONER

## 2023-02-09 PROCEDURE — 99214 OFFICE O/P EST MOD 30 MIN: CPT | Performed by: NURSE PRACTITIONER

## 2023-02-09 RX ORDER — METHYLPREDNISOLONE ACETATE 80 MG/ML
120 INJECTION, SUSPENSION INTRA-ARTICULAR; INTRALESIONAL; INTRAMUSCULAR; SOFT TISSUE ONCE
Status: COMPLETED | OUTPATIENT
Start: 2023-02-09 | End: 2023-02-09

## 2023-02-09 RX ORDER — HYDROCODONE BITARTRATE AND ACETAMINOPHEN 7.5; 325 MG/1; MG/1
1 TABLET ORAL EVERY 6 HOURS PRN
Qty: 20 TABLET | Refills: 0 | Status: SHIPPED | OUTPATIENT
Start: 2023-02-09

## 2023-02-09 RX ORDER — BACLOFEN 10 MG/1
10 TABLET ORAL 3 TIMES DAILY
COMMUNITY

## 2023-02-09 RX ADMIN — METHYLPREDNISOLONE ACETATE 120 MG: 80 INJECTION, SUSPENSION INTRA-ARTICULAR; INTRALESIONAL; INTRAMUSCULAR; SOFT TISSUE at 10:24

## 2023-02-09 NOTE — PROGRESS NOTES
"Chief Complaint  Fall  Subjective        Gideon Fields presents to [facility]  History of Present Illness  Pt comes in today with c/o fall that occurred on Monday. States she slipped in the bathtub on Monday. Hit the back of her head and feels she twisted back.   She is currently taking baclofen during the day, zanaflex at night, and taking norco prn. Using heat. Pain is mostly on right side of thoracic back and radiating up neck.   She is requesting a steroid injection.     She is seeing Dr. Go for injections and chronic back pain. Has had 2 injections.     Pt is also with concerns about her memory. Mother with hx of alzheimers and that concerns her. States family members have noticed how she can't remember things. Worried and would like to see neurology for workup to try and \"catch it early\".        Objective     Vital Signs:   /72   Pulse 92   Temp 97.5 °F (36.4 °C)   Resp 16   Ht 167.6 cm (66\")   Wt 77.1 kg (170 lb)   SpO2 98%   BMI 27.44 kg/m²       BP Readings from Last 3 Encounters:   02/09/23 131/72   01/06/23 120/79   12/15/22 141/85       Wt Readings from Last 3 Encounters:   02/09/23 77.1 kg (170 lb)   01/06/23 77.7 kg (171 lb 3.2 oz)   12/15/22 77.8 kg (171 lb 9.6 oz)     Physical Exam  Constitutional:       Appearance: She is well-developed.   Eyes:      Pupils: Pupils are equal, round, and reactive to light.   Cardiovascular:      Rate and Rhythm: Normal rate and regular rhythm.   Pulmonary:      Effort: Pulmonary effort is normal.      Breath sounds: Normal breath sounds.   Musculoskeletal:      Comments: Right sided thoracic pain    Neurological:      Mental Status: She is alert and oriented to person, place, and time.        Result Review :                 Assessment and Plan    Diagnoses and all orders for this visit:    1. Acute left-sided low back pain with left-sided sciatica (Primary)  -     HYDROcodone-acetaminophen (NORCO) 7.5-325 MG per " tablet; Take 1 tablet by mouth Every 6 (Six) Hours As Needed for Mild Pain.  Dispense: 20 tablet; Refill: 0  -     methylPREDNISolone acetate (DEPO-medrol) injection 120 mg    2. Lumbar radiculopathy  -     HYDROcodone-acetaminophen (NORCO) 7.5-325 MG per tablet; Take 1 tablet by mouth Every 6 (Six) Hours As Needed for Mild Pain.  Dispense: 20 tablet; Refill: 0    3. Family history of Alzheimer's disease  -     Ambulatory Referral to Neurology    4. Memory deficit  -     Ambulatory Referral to Neurology    depo medrol 120mg today  Referral to neuro  Refill norco  During this office visit, we discussed the pertinent aspects of the visit and treatment recommendations. Pt verbalizes understanding. Follow up was discussed. Patient was given the opportunity to ask questions and discuss other concerns.           Follow Up   Return if symptoms worsen or fail to improve.  Patient was given instructions and counseling regarding her condition or for health maintenance advice. Please see specific information pulled into the AVS if appropriate.

## 2023-02-17 NOTE — PROGRESS NOTES
"Subjective   History of Present Illness: Gideon Fields is a 52 y.o. female is here today for follow-up for SI Joint dysfunction. In the office today patient reports increased back pain Left greater than right.  She was not able to undergo the second SI joint injection yet.    Chief Complaint   Patient presents with   • Back Pain     Follow up          Previous Treatment: Injections,Physical Therapy    The following portions of the patient's history were reviewed and updated as appropriate: allergies, current medications, past family history, past medical history, past social history, past surgical history and problem list.    Review of Systems   Constitutional: Positive for activity change.   HENT: Negative.    Eyes: Negative.    Respiratory: Negative.    Cardiovascular: Negative.    Gastrointestinal: Negative.    Endocrine: Negative.    Genitourinary: Positive for urgency (some bladder incontinence).   Musculoskeletal: Positive for back pain and myalgias.   Skin: Negative.    Allergic/Immunologic: Negative.    Neurological: Negative for numbness.   Hematological: Negative.    Psychiatric/Behavioral: Positive for sleep disturbance.       Objective     /82   Pulse 89   Ht 167.6 cm (66\")   Wt 75.8 kg (167 lb 3.2 oz)   SpO2 100%   BMI 26.99 kg/m²    Body mass index is 26.99 kg/m².      Neurologic Exam    Assessment & Plan   Independent Review of Radiographic Studies:      I personally reviewed and interpreted the images from the following studies.    No new imaging    Medical Decision Making:      Gideon Fields is a 52 y.o. female with SI joint dysfunction on the left that significantly improved with an injection.  She needs to undergo a second injection to confirm the diagnosis.  She can follow-up with me after the injection and we could consider SI joint fusion if she does get significant relief      Diagnoses and all orders for this visit:    1. Sacroiliitis (HCC) (Primary)      No follow-ups on " file.    This patient was examined wearing appropriate personal protective equipment.                      Dr. John Go IV    02/20/23  11:10 EST

## 2023-02-20 ENCOUNTER — OFFICE VISIT (OUTPATIENT)
Dept: NEUROSURGERY | Facility: CLINIC | Age: 53
End: 2023-02-20
Payer: MEDICAID

## 2023-02-20 VITALS
SYSTOLIC BLOOD PRESSURE: 161 MMHG | OXYGEN SATURATION: 100 % | HEIGHT: 66 IN | WEIGHT: 167.2 LBS | BODY MASS INDEX: 26.87 KG/M2 | DIASTOLIC BLOOD PRESSURE: 82 MMHG | HEART RATE: 89 BPM

## 2023-02-20 DIAGNOSIS — M46.1 SACROILIITIS: Primary | ICD-10-CM

## 2023-02-20 PROCEDURE — 99213 OFFICE O/P EST LOW 20 MIN: CPT | Performed by: NEUROLOGICAL SURGERY

## 2023-02-27 ENCOUNTER — HOSPITAL ENCOUNTER (OUTPATIENT)
Dept: PAIN MEDICINE | Facility: HOSPITAL | Age: 53
Discharge: HOME | End: 2023-02-27
Payer: MEDICAID

## 2023-02-27 VITALS
RESPIRATION RATE: 16 BRPM | TEMPERATURE: 98.2 F | WEIGHT: 167 LBS | HEART RATE: 110 BPM | SYSTOLIC BLOOD PRESSURE: 129 MMHG | DIASTOLIC BLOOD PRESSURE: 80 MMHG | BODY MASS INDEX: 26.84 KG/M2 | OXYGEN SATURATION: 98 % | HEIGHT: 66 IN

## 2023-02-27 DIAGNOSIS — M46.1 SACROILIITIS: ICD-10-CM

## 2023-02-27 DIAGNOSIS — R52 PAIN: ICD-10-CM

## 2023-02-27 PROCEDURE — 25510000001 IOPAMIDOL 41 % SOLUTION: Performed by: STUDENT IN AN ORGANIZED HEALTH CARE EDUCATION/TRAINING PROGRAM

## 2023-02-27 PROCEDURE — 27096 INJECT SACROILIAC JOINT: CPT | Performed by: STUDENT IN AN ORGANIZED HEALTH CARE EDUCATION/TRAINING PROGRAM

## 2023-02-27 PROCEDURE — 25010000002 METHYLPREDNISOLONE PER 40 MG: Performed by: STUDENT IN AN ORGANIZED HEALTH CARE EDUCATION/TRAINING PROGRAM

## 2023-02-27 PROCEDURE — 77003 FLUOROGUIDE FOR SPINE INJECT: CPT

## 2023-02-27 RX ORDER — METHYLPREDNISOLONE ACETATE 40 MG/ML
40 INJECTION, SUSPENSION INTRA-ARTICULAR; INTRALESIONAL; INTRAMUSCULAR; SOFT TISSUE ONCE
Status: COMPLETED | OUTPATIENT
Start: 2023-02-27 | End: 2023-02-27

## 2023-02-27 RX ADMIN — METHYLPREDNISOLONE ACETATE 40 MG: 40 INJECTION, SUSPENSION INTRA-ARTICULAR; INTRALESIONAL; INTRAMUSCULAR; INTRASYNOVIAL; SOFT TISSUE at 15:30

## 2023-02-27 RX ADMIN — IOPAMIDOL 3 ML: 408 INJECTION, SOLUTION INTRATHECAL at 15:30

## 2023-02-27 NOTE — PROCEDURES
LEFT Sacroiliac Joint Injection  [facility]    PREOPERATIVE DIAGNOSIS:   Sacroiliac joint dysfunction on the LEFT    POSTOPERATIVE DIAGNOSIS:  Sacroiliac joint dysfunction on the LEFT    PROCEDURE:  Sacroiliac Joint Injection, on the LEFT, with fluoroscopic guidance    PRE-PROCEDURE DISCUSSION WITH PATIENT:    Risks and complications were discussed with the patient prior to starting the procedure and informed consent was obtained.  We discussed various topics including but not limited to bleeding, infection, injury, postprocedural site soreness, painful flareup, worsening of clinical picture, paralysis, coma, and death.     SURGEON:  Ethan Barksdale MD    REASON FOR PROCEDURE:    Patient has pain consistent with SI pathology on history and physical exam. Positive sacroiliac provocation maneuvers noted.   Tender to palpation over the affected SI joint. Positive FABERE.     SEDATION:  Patient declined administration of moderate sedation    ANESTHETIC AGENT:  Lidocaine 1%  STEROID AGENT:  Methylprednisolone (DEPO MEDROL) 40mg/ml    DESCRIPTON OF PROCEDURE:  After obtaining informed consent, IV access  was not obtained in the preoperative area.  The patient was transported to the operative suite and placed in the prone position with a pillow under the pelvic area.  The lumbosacral area was prepped with Chloraprep and draped in a sterile fashion.     Under fluoroscopic guidance the inferior most portion of the LEFT sacroiliac joint was identified. The overlying skin and subcutaneous tissue was anesthetized with 1% lidocaine. A 25-gauge spinal needle was introduced from the inferior most portion of the joint into the sacroiliac joint under fluoroscopic guidance in the AP dimension with slight oblique rotation to the contralateral side.  Aspiration was negative.  After confirming the position of the needle with fluoroscopy, 1 mL of Omnipaque was injected and after seeing appropriate spread into the joint a total  of 4 mL of Marcaine, with the steroid, was injected very slowly.  Vital signs remained stable.  The onset of analgesia was noted.    ESTIMATED BLOOD LOSS:  minimal  SPECIMENS:  None    COMPLICATIONS:  No complications were noted., There was no indication of vascular uptake on live injection of contrast dye. and There was no indication of intrathecal uptake on live injection of contrast dye.    TOLERANCE & DISCHARGE CONDITION:    The patient tolerated the procedure well.  The patient was transported to the recovery area without difficulties.  The patient was discharged to home under the care of family in stable and satisfactory condition.    PLAN OF CARE:  1. The patient was given our standard instruction sheet and will resume all medications as per the medication reconciliation sheet.  2. The patient will Return to clinic PRN.  3. The patient is instructed to keep a pain log hourly for 8 hours after the procedure.

## 2023-02-28 ENCOUNTER — TELEPHONE (OUTPATIENT)
Dept: PAIN MEDICINE | Facility: HOSPITAL | Age: 53
End: 2023-02-28
Payer: MEDICAID

## 2023-03-17 ENCOUNTER — TELEPHONE (OUTPATIENT)
Dept: FAMILY MEDICINE CLINIC | Facility: CLINIC | Age: 53
End: 2023-03-17
Payer: MEDICAID

## 2023-03-17 NOTE — TELEPHONE ENCOUNTER
Gave message to patient at 3:59pm.  Said she would call back next week if she was still sick to see if we have any appts available.

## 2023-03-17 NOTE — TELEPHONE ENCOUNTER
Please let her know Junie is out of town and if she is not better next week she will need to be seen by someone else in office.

## 2023-03-17 NOTE — TELEPHONE ENCOUNTER
Left detailed message on patient's voice mail at 3pm.    HUB TO SHARE    Please let her know Junie is out of town for next 10 days and if she is not better next week she will need to be seen by someone else in office.

## 2023-03-17 NOTE — TELEPHONE ENCOUNTER
HUB RELAYED MESSAGE LABELED HUB TO READ, PATIENT VOICED UNDERSTANDING     Caller: Gideon Fields    Relationship to patient: Self    Best call back number: [phone]    Patient is needing: PATIENT CALLED AND SAID THAT SHE WOULD HAVE DIFFICULTY GETTING IN TO SEE SOMEONE, SHE IS WANTING TO SEE IF EITHER DR. FAUSTIN OR PETRONA GRAY COULD SEND SOMETHING IN FOR HER

## 2023-03-17 NOTE — TELEPHONE ENCOUNTER
Caller: DonnieGideon    Relationship: Self    Best call back number: [phone]    What medication are you requesting: COUGH MEDICINE WITH CODEINE     What are your current symptoms: COUGH, RUNNY NOSE, CONGESTION    How long have you been experiencing symptoms: 2-3 DAYS    Have you had these symptoms before:    [x] Yes  [] No    Have you been treated for these symptoms before:   [x] Yes  [] No    If a prescription is needed, what is your preferred pharmacy and phone number: [facility]/PHARMACY #[id number] - [facility] [address] - [phone]  - [phone] FX     Additional notes:  PATIENT CALLED AND SAID SHE WENT TO THE URGENT CARE Wednesday AND WAS PRESCRIBED AN ANTIBIOTIC AND COUGH SYRUP, HOWEVER SHE SAID THE PROMETHAZINE ISN'T HELPING AND SHE IS WANTING TO SEE IF AILIN CHAPMAN WILL PRESCRIBE HER COUGH SYRUP WITH CODEINE

## 2023-03-22 ENCOUNTER — TELEPHONE (OUTPATIENT)
Dept: FAMILY MEDICINE CLINIC | Facility: CLINIC | Age: 53
End: 2023-03-22
Payer: MEDICAID

## 2023-03-22 NOTE — TELEPHONE ENCOUNTER
Hub staff attempted to follow warm transfer process and was unsuccessful     Caller: Gideon Fields    Relationship to patient: Self    Best call back number: [phone]    Patient is needing: SAME DAY APPOINTMENT, COUGH, CONGESTION, FOLLOW UP ON URGENT CARE VISIT LAST WEEK    NOTHING AVAILABLE IN Epic UNTIL 3/23/23

## 2023-03-23 ENCOUNTER — OFFICE VISIT (OUTPATIENT)
Dept: FAMILY MEDICINE CLINIC | Facility: CLINIC | Age: 53
End: 2023-03-23
Payer: MEDICAID

## 2023-03-23 VITALS
RESPIRATION RATE: 18 BRPM | TEMPERATURE: 98.4 F | OXYGEN SATURATION: 97 % | HEART RATE: 113 BPM | DIASTOLIC BLOOD PRESSURE: 84 MMHG | WEIGHT: 170 LBS | SYSTOLIC BLOOD PRESSURE: 146 MMHG | BODY MASS INDEX: 27.44 KG/M2

## 2023-03-23 DIAGNOSIS — R05.1 ACUTE COUGH: ICD-10-CM

## 2023-03-23 DIAGNOSIS — J45.41 MODERATE PERSISTENT ASTHMA WITH ACUTE EXACERBATION: Primary | ICD-10-CM

## 2023-03-23 PROCEDURE — 1160F RVW MEDS BY RX/DR IN RCRD: CPT | Performed by: NURSE PRACTITIONER

## 2023-03-23 PROCEDURE — 99213 OFFICE O/P EST LOW 20 MIN: CPT | Performed by: NURSE PRACTITIONER

## 2023-03-23 PROCEDURE — 1159F MED LIST DOCD IN RCRD: CPT | Performed by: NURSE PRACTITIONER

## 2023-03-23 RX ORDER — PREDNISONE 10 MG/1
TABLET ORAL
Qty: 34 TABLET | Refills: 0 | Status: SHIPPED | OUTPATIENT
Start: 2023-03-23 | End: 2023-04-04

## 2023-03-23 RX ORDER — HYDROCODONE BITARTRATE AND HOMATROPINE METHYLBROMIDE ORAL SOLUTION 5; 1.5 MG/5ML; MG/5ML
5 LIQUID ORAL EVERY 8 HOURS PRN
Qty: 120 ML | Refills: 0 | Status: SHIPPED | OUTPATIENT
Start: 2023-03-23

## 2023-03-23 NOTE — PROGRESS NOTES
Chief Complaint  Cough    Subjective          Gene KAREN Fields presents to [facility]  History of Present Illness    Is here today with c/o persistent cough for about 10 days  Was treated as URI and sinus infection and she tells me that now it feels like bronchitis    She is having shortness of breath and wheezing    She has a h/o asthma, she endorses that she is using her rescue inhaler several times - up to 6 - daily  She has had her RUL removed a couple of years ago    Review of Systems   Constitutional: Negative.  Negative for appetite change and fever.   HENT: Positive for rhinorrhea.    Respiratory: Positive for cough, shortness of breath and wheezing.    Cardiovascular: Negative.  Negative for chest pain.   Gastrointestinal: Negative.    Genitourinary:        Stress incontinence   Musculoskeletal: Negative.    Psychiatric/Behavioral: Positive for sleep disturbance.       Objective   Vital Signs:  /84 (BP Location: Left arm, Patient Position: Sitting)   Pulse 113   Temp 98.4 °F (36.9 °C)   Resp 18   Wt 77.1 kg (170 lb)   SpO2 97%   BMI 27.44 kg/m²     BP Readings from Last 3 Encounters:   03/23/23 146/84   03/15/23 110/69   02/27/23 129/80        Wt Readings from Last 3 Encounters:   03/23/23 77.1 kg (170 lb)   03/15/23 77.1 kg (170 lb)   02/27/23 75.8 kg (167 lb)              Physical Exam  Vitals reviewed.   Constitutional:       Appearance: Normal appearance.   Cardiovascular:      Rate and Rhythm: Regular rhythm. Tachycardia present.      Heart sounds: Normal heart sounds.   Pulmonary:      Effort: Pulmonary effort is normal.      Breath sounds: Wheezing and rhonchi present.   Skin:     General: Skin is warm.   Neurological:      Mental Status: She is alert and oriented to person, place, and time.   Psychiatric:         Mood and Affect: Mood normal.        Result Review :                 Assessment and Plan    Diagnoses and all orders for this visit:    1. Moderate  persistent asthma with acute exacerbation (Primary)  -     predniSONE (DELTASONE) 10 MG tablet; Take 5 tablets by mouth Daily for 1 day, THEN 4 tablets Daily for 3 days, THEN 3 tablets Daily for 3 days, THEN 2 tablets Daily for 3 days, THEN 1 tablet Daily for 2 days.  Dispense: 34 tablet; Refill: 0  -     HYDROcodone Bit-Homatrop MBr (HYCODAN) 5-1.5 MG/5ML solution; Take 5 mL by mouth Every 8 (Eight) Hours As Needed for Cough.  Dispense: 120 mL; Refill: 0    2. Acute cough  -     HYDROcodone Bit-Homatrop MBr (HYCODAN) 5-1.5 MG/5ML solution; Take 5 mL by mouth Every 8 (Eight) Hours As Needed for Cough.  Dispense: 120 mL; Refill: 0           Follow Up   Return if symptoms worsen or fail to improve.  Patient was given instructions and counseling regarding her condition or for health maintenance advice. Please see specific information pulled into the AVS if appropriate.

## 2023-05-14 ENCOUNTER — HOSPITAL ENCOUNTER (INPATIENT)
Facility: HOSPITAL | Age: 53
LOS: 2 days | Discharge: HOME | End: 2023-05-19
Attending: EMERGENCY MEDICINE | Admitting: STUDENT IN AN ORGANIZED HEALTH CARE EDUCATION/TRAINING PROGRAM
Payer: MEDICAID

## 2023-05-14 ENCOUNTER — APPOINTMENT (OUTPATIENT)
Dept: GENERAL RADIOLOGY | Facility: HOSPITAL | Age: 53
End: 2023-05-14
Payer: MEDICAID

## 2023-05-14 ENCOUNTER — APPOINTMENT (OUTPATIENT)
Dept: CT IMAGING | Facility: HOSPITAL | Age: 53
End: 2023-05-14
Payer: MEDICAID

## 2023-05-14 DIAGNOSIS — G89.29 CHRONIC BILATERAL THORACIC BACK PAIN: ICD-10-CM

## 2023-05-14 DIAGNOSIS — M54.6 ACUTE RIGHT-SIDED THORACIC BACK PAIN: ICD-10-CM

## 2023-05-14 DIAGNOSIS — R56.9 FOCAL SEIZURE: Primary | ICD-10-CM

## 2023-05-14 DIAGNOSIS — M54.6 CHRONIC BILATERAL THORACIC BACK PAIN: ICD-10-CM

## 2023-05-14 DIAGNOSIS — R47.9 SPEECH DISTURBANCE, UNSPECIFIED TYPE: ICD-10-CM

## 2023-05-14 LAB
ALBUMIN SERPL-MCNC: 4.1 G/DL (ref 3.5–5.2)
ALBUMIN/GLOB SERPL: 1.1 G/DL
ALP SERPL-CCNC: 354 U/L (ref 39–117)
ALT SERPL W P-5'-P-CCNC: 70 U/L (ref 1–33)
ANION GAP SERPL CALCULATED.3IONS-SCNC: 14 MMOL/L (ref 5–15)
APTT PPP: 26.9 SECONDS (ref 61–76.5)
AST SERPL-CCNC: 53 U/L (ref 1–32)
BILIRUB SERPL-MCNC: 0.8 MG/DL (ref 0–1.2)
BUN SERPL-MCNC: 5 MG/DL (ref 6–20)
BUN/CREAT SERPL: 9.1 (ref 7–25)
CALCIUM SPEC-SCNC: 8.9 MG/DL (ref 8.6–10.5)
CHLORIDE SERPL-SCNC: 100 MMOL/L (ref 98–107)
CO2 SERPL-SCNC: 23 MMOL/L (ref 22–29)
CREAT SERPL-MCNC: 0.55 MG/DL (ref 0.57–1)
DEPRECATED RDW RBC AUTO: 50.3 FL (ref 37–54)
EGFRCR SERPLBLD CKD-EPI 2021: 110.4 ML/MIN/1.73
EOSINOPHIL # BLD MANUAL: 0.39 10*3/MM3 (ref 0–0.4)
EOSINOPHIL NFR BLD MANUAL: 5 % (ref 0.3–6.2)
ERYTHROCYTE [DISTWIDTH] IN BLOOD BY AUTOMATED COUNT: 14.9 % (ref 12.3–15.4)
GLOBULIN UR ELPH-MCNC: 3.7 GM/DL
GLUCOSE BLDC GLUCOMTR-MCNC: 91 MG/DL (ref 70–105)
GLUCOSE SERPL-MCNC: 92 MG/DL (ref 65–99)
HCT VFR BLD AUTO: 37.1 % (ref 34–46.6)
HGB BLD-MCNC: 12.2 G/DL (ref 12–15.9)
INR PPP: <0.93 (ref 0.93–1.1)
LYMPHOCYTES # BLD MANUAL: 1.85 10*3/MM3 (ref 0.7–3.1)
LYMPHOCYTES NFR BLD MANUAL: 4 % (ref 5–12)
MCH RBC QN AUTO: 29.8 PG (ref 26.6–33)
MCHC RBC AUTO-ENTMCNC: 32.8 G/DL (ref 31.5–35.7)
MCV RBC AUTO: 90.9 FL (ref 79–97)
MONOCYTES # BLD: 0.31 10*3/MM3 (ref 0.1–0.9)
NEUTROPHILS # BLD AUTO: 5.16 10*3/MM3 (ref 1.7–7)
NEUTROPHILS NFR BLD MANUAL: 64 % (ref 42.7–76)
NEUTS BAND NFR BLD MANUAL: 3 % (ref 0–5)
PLATELET # BLD AUTO: 281 10*3/MM3 (ref 140–450)
PMV BLD AUTO: 10 FL (ref 6–12)
POTASSIUM SERPL-SCNC: 3.7 MMOL/L (ref 3.5–5.2)
PROT SERPL-MCNC: 7.8 G/DL (ref 6–8.5)
PROTHROMBIN TIME: 9.4 SECONDS (ref 9.6–11.7)
RBC # BLD AUTO: 4.09 10*6/MM3 (ref 3.77–5.28)
RBC MORPH BLD: NORMAL
SCAN SLIDE: NORMAL
SMALL PLATELETS BLD QL SMEAR: ADEQUATE
SODIUM SERPL-SCNC: 137 MMOL/L (ref 136–145)
TROPONIN T SERPL HS-MCNC: <6 NG/L
VARIANT LYMPHS NFR BLD MANUAL: 24 % (ref 19.6–45.3)
WBC MORPH BLD: NORMAL
WBC NRBC COR # BLD: 7.7 10*3/MM3 (ref 3.4–10.8)

## 2023-05-14 PROCEDURE — 93005 ELECTROCARDIOGRAM TRACING: CPT | Performed by: EMERGENCY MEDICINE

## 2023-05-14 PROCEDURE — 0 LEVETIRACETAM IN NACL 0.75% 1000 MG/100ML SOLUTION: Performed by: EMERGENCY MEDICINE

## 2023-05-14 PROCEDURE — 85025 COMPLETE CBC W/AUTO DIFF WBC: CPT | Performed by: EMERGENCY MEDICINE

## 2023-05-14 PROCEDURE — 70498 CT ANGIOGRAPHY NECK: CPT

## 2023-05-14 PROCEDURE — 70496 CT ANGIOGRAPHY HEAD: CPT

## 2023-05-14 PROCEDURE — 85610 PROTHROMBIN TIME: CPT | Performed by: EMERGENCY MEDICINE

## 2023-05-14 PROCEDURE — 85007 BL SMEAR W/DIFF WBC COUNT: CPT | Performed by: EMERGENCY MEDICINE

## 2023-05-14 PROCEDURE — 82948 REAGENT STRIP/BLOOD GLUCOSE: CPT

## 2023-05-14 PROCEDURE — 85730 THROMBOPLASTIN TIME PARTIAL: CPT | Performed by: EMERGENCY MEDICINE

## 2023-05-14 PROCEDURE — 84484 ASSAY OF TROPONIN QUANT: CPT | Performed by: EMERGENCY MEDICINE

## 2023-05-14 PROCEDURE — 99285 EMERGENCY DEPT VISIT HI MDM: CPT

## 2023-05-14 PROCEDURE — 80053 COMPREHEN METABOLIC PANEL: CPT | Performed by: EMERGENCY MEDICINE

## 2023-05-14 PROCEDURE — 71045 X-RAY EXAM CHEST 1 VIEW: CPT

## 2023-05-14 PROCEDURE — 25510000001 IOPAMIDOL PER 1 ML: Performed by: EMERGENCY MEDICINE

## 2023-05-14 PROCEDURE — 70470 CT HEAD/BRAIN W/O & W/DYE: CPT

## 2023-05-14 RX ORDER — SODIUM CHLORIDE 0.9 % (FLUSH) 0.9 %
10 SYRINGE (ML) INJECTION AS NEEDED
Status: DISCONTINUED | OUTPATIENT
Start: 2023-05-14 | End: 2023-05-19 | Stop reason: HOSPADM

## 2023-05-14 RX ORDER — LEVETIRACETAM 10 MG/ML
1000 INJECTION INTRAVASCULAR ONCE
Status: COMPLETED | OUTPATIENT
Start: 2023-05-14 | End: 2023-05-14

## 2023-05-14 RX ADMIN — LEVETIRACETAM 1000 MG: 10 INJECTION INTRAVENOUS at 22:55

## 2023-05-14 RX ADMIN — IOPAMIDOL 100 ML: 755 INJECTION, SOLUTION INTRAVENOUS at 23:23

## 2023-05-14 NOTE — Clinical Note
Level of Care: Progressive Care [20]   Admitting Physician: YOSSI GREGORIO [[id number]]   Attending Physician: YOSSI GREGORIO [[id number]]   Bed Request Comments: adore

## 2023-05-15 ENCOUNTER — APPOINTMENT (OUTPATIENT)
Dept: MRI IMAGING | Facility: HOSPITAL | Age: 53
End: 2023-05-15
Payer: MEDICAID

## 2023-05-15 PROBLEM — R56.9 FOCAL SEIZURE: Status: ACTIVE | Noted: 2023-05-15

## 2023-05-15 LAB
ANION GAP SERPL CALCULATED.3IONS-SCNC: 11 MMOL/L (ref 5–15)
BASOPHILS # BLD AUTO: 0.1 10*3/MM3 (ref 0–0.2)
BASOPHILS NFR BLD AUTO: 1.3 % (ref 0–1.5)
BUN SERPL-MCNC: 6 MG/DL (ref 6–20)
BUN/CREAT SERPL: 11.8 (ref 7–25)
CALCIUM SPEC-SCNC: 8.4 MG/DL (ref 8.6–10.5)
CHLORIDE SERPL-SCNC: 102 MMOL/L (ref 98–107)
CHOLEST SERPL-MCNC: 226 MG/DL (ref 0–200)
CO2 SERPL-SCNC: 23 MMOL/L (ref 22–29)
CREAT SERPL-MCNC: 0.51 MG/DL (ref 0.57–1)
CRP SERPL-MCNC: 0.75 MG/DL (ref 0–0.5)
D-LACTATE SERPL-SCNC: 0.8 MMOL/L (ref 0.5–2)
DEPRECATED RDW RBC AUTO: 48.1 FL (ref 37–54)
EGFRCR SERPLBLD CKD-EPI 2021: 112.5 ML/MIN/1.73
EOSINOPHIL # BLD AUTO: 0.5 10*3/MM3 (ref 0–0.4)
EOSINOPHIL NFR BLD AUTO: 6.8 % (ref 0.3–6.2)
ERYTHROCYTE [DISTWIDTH] IN BLOOD BY AUTOMATED COUNT: 14.9 % (ref 12.3–15.4)
ERYTHROCYTE [SEDIMENTATION RATE] IN BLOOD: 65 MM/HR (ref 0–30)
GEN 5 2HR TROPONIN T REFLEX: <6 NG/L
GLUCOSE SERPL-MCNC: 97 MG/DL (ref 65–99)
HBA1C MFR BLD: 5.5 % (ref 4.8–5.6)
HCT VFR BLD AUTO: 34 % (ref 34–46.6)
HDLC SERPL-MCNC: 56 MG/DL (ref 40–60)
HGB BLD-MCNC: 11.1 G/DL (ref 12–15.9)
LDLC SERPL CALC-MCNC: 118 MG/DL (ref 0–100)
LDLC/HDLC SERPL: 1.97 {RATIO}
LYMPHOCYTES # BLD AUTO: 2.1 10*3/MM3 (ref 0.7–3.1)
LYMPHOCYTES NFR BLD AUTO: 28.2 % (ref 19.6–45.3)
MAGNESIUM SERPL-MCNC: 2 MG/DL (ref 1.6–2.6)
MCH RBC QN AUTO: 30.2 PG (ref 26.6–33)
MCHC RBC AUTO-ENTMCNC: 32.5 G/DL (ref 31.5–35.7)
MCV RBC AUTO: 92.8 FL (ref 79–97)
MONOCYTES # BLD AUTO: 0.8 10*3/MM3 (ref 0.1–0.9)
MONOCYTES NFR BLD AUTO: 11.4 % (ref 5–12)
NEUTROPHILS NFR BLD AUTO: 3.8 10*3/MM3 (ref 1.7–7)
NEUTROPHILS NFR BLD AUTO: 52.3 % (ref 42.7–76)
NRBC BLD AUTO-RTO: 0 /100 WBC (ref 0–0.2)
PHOSPHATE SERPL-MCNC: 2.9 MG/DL (ref 2.5–4.5)
PLATELET # BLD AUTO: 270 10*3/MM3 (ref 140–450)
PMV BLD AUTO: 9 FL (ref 6–12)
POTASSIUM SERPL-SCNC: 3.9 MMOL/L (ref 3.5–5.2)
PROLACTIN SERPL-MCNC: 29.6 NG/ML (ref 4.79–23.3)
QT INTERVAL: 380 MS
RBC # BLD AUTO: 3.66 10*6/MM3 (ref 3.77–5.28)
SODIUM SERPL-SCNC: 136 MMOL/L (ref 136–145)
TRIGL SERPL-MCNC: 299 MG/DL (ref 0–150)
TROPONIN T DELTA: NORMAL
TSH SERPL DL<=0.05 MIU/L-ACNC: 1.88 UIU/ML (ref 0.27–4.2)
VIT B12 BLD-MCNC: 338 PG/ML (ref 211–946)
VLDLC SERPL-MCNC: 52 MG/DL (ref 5–40)
WBC NRBC COR # BLD: 7.3 10*3/MM3 (ref 3.4–10.8)

## 2023-05-15 PROCEDURE — 94664 DEMO&/EVAL PT USE INHALER: CPT

## 2023-05-15 PROCEDURE — 83735 ASSAY OF MAGNESIUM: CPT | Performed by: NURSE PRACTITIONER

## 2023-05-15 PROCEDURE — 83036 HEMOGLOBIN GLYCOSYLATED A1C: CPT | Performed by: NURSE PRACTITIONER

## 2023-05-15 PROCEDURE — G0378 HOSPITAL OBSERVATION PER HR: HCPCS

## 2023-05-15 PROCEDURE — 94799 UNLISTED PULMONARY SVC/PX: CPT

## 2023-05-15 PROCEDURE — 84443 ASSAY THYROID STIM HORMONE: CPT | Performed by: NURSE PRACTITIONER

## 2023-05-15 PROCEDURE — 94761 N-INVAS EAR/PLS OXIMETRY MLT: CPT

## 2023-05-15 PROCEDURE — 83605 ASSAY OF LACTIC ACID: CPT | Performed by: NURSE PRACTITIONER

## 2023-05-15 PROCEDURE — 80048 BASIC METABOLIC PNL TOTAL CA: CPT

## 2023-05-15 PROCEDURE — 25010000002 GADOTERIDOL PER 1 ML: Performed by: HOSPITALIST

## 2023-05-15 PROCEDURE — 84484 ASSAY OF TROPONIN QUANT: CPT | Performed by: EMERGENCY MEDICINE

## 2023-05-15 PROCEDURE — 25010000002 LACOSAMIDE 200 MG/20ML SOLUTION: Performed by: NURSE PRACTITIONER

## 2023-05-15 PROCEDURE — 70551 MRI BRAIN STEM W/O DYE: CPT

## 2023-05-15 PROCEDURE — 86140 C-REACTIVE PROTEIN: CPT | Performed by: NURSE PRACTITIONER

## 2023-05-15 PROCEDURE — 84146 ASSAY OF PROLACTIN: CPT | Performed by: NURSE PRACTITIONER

## 2023-05-15 PROCEDURE — 82607 VITAMIN B-12: CPT | Performed by: NURSE PRACTITIONER

## 2023-05-15 PROCEDURE — 25010000002 LEVETIRACETAM IN NACL 0.82% 500 MG/100ML SOLUTION

## 2023-05-15 PROCEDURE — C9254 INJECTION, LACOSAMIDE: HCPCS | Performed by: NURSE PRACTITIONER

## 2023-05-15 PROCEDURE — 80061 LIPID PANEL: CPT | Performed by: NURSE PRACTITIONER

## 2023-05-15 PROCEDURE — 70552 MRI BRAIN STEM W/DYE: CPT

## 2023-05-15 PROCEDURE — 85652 RBC SED RATE AUTOMATED: CPT | Performed by: NURSE PRACTITIONER

## 2023-05-15 PROCEDURE — A9579 GAD-BASE MR CONTRAST NOS,1ML: HCPCS | Performed by: HOSPITALIST

## 2023-05-15 PROCEDURE — 99222 1ST HOSP IP/OBS MODERATE 55: CPT | Performed by: NURSE PRACTITIONER

## 2023-05-15 PROCEDURE — 25010000002 LORAZEPAM PER 2 MG

## 2023-05-15 PROCEDURE — 85025 COMPLETE CBC W/AUTO DIFF WBC: CPT

## 2023-05-15 PROCEDURE — 84100 ASSAY OF PHOSPHORUS: CPT | Performed by: NURSE PRACTITIONER

## 2023-05-15 RX ORDER — MONTELUKAST SODIUM 10 MG/1
10 TABLET ORAL NIGHTLY
Status: DISCONTINUED | OUTPATIENT
Start: 2023-05-15 | End: 2023-05-19 | Stop reason: HOSPADM

## 2023-05-15 RX ORDER — SODIUM CHLORIDE 0.9 % (FLUSH) 0.9 %
10 SYRINGE (ML) INJECTION AS NEEDED
Status: DISCONTINUED | OUTPATIENT
Start: 2023-05-15 | End: 2023-05-19 | Stop reason: HOSPADM

## 2023-05-15 RX ORDER — SODIUM CHLORIDE 0.9 % (FLUSH) 0.9 %
10 SYRINGE (ML) INJECTION EVERY 12 HOURS SCHEDULED
Status: DISCONTINUED | OUTPATIENT
Start: 2023-05-15 | End: 2023-05-19 | Stop reason: HOSPADM

## 2023-05-15 RX ORDER — ACETAMINOPHEN 650 MG/1
650 SUPPOSITORY RECTAL EVERY 4 HOURS PRN
Status: DISCONTINUED | OUTPATIENT
Start: 2023-05-15 | End: 2023-05-19 | Stop reason: HOSPADM

## 2023-05-15 RX ORDER — POLYETHYLENE GLYCOL 3350 17 G/17G
17 POWDER, FOR SOLUTION ORAL DAILY PRN
Status: DISCONTINUED | OUTPATIENT
Start: 2023-05-15 | End: 2023-05-19 | Stop reason: HOSPADM

## 2023-05-15 RX ORDER — LEVETIRACETAM 5 MG/ML
500 INJECTION INTRAVASCULAR EVERY 12 HOURS SCHEDULED
Status: DISCONTINUED | OUTPATIENT
Start: 2023-05-15 | End: 2023-05-17

## 2023-05-15 RX ORDER — TIZANIDINE 4 MG/1
4 TABLET ORAL 3 TIMES DAILY PRN
Status: DISCONTINUED | OUTPATIENT
Start: 2023-05-15 | End: 2023-05-19 | Stop reason: HOSPADM

## 2023-05-15 RX ORDER — LORAZEPAM 2 MG/ML
2 INJECTION INTRAMUSCULAR ONCE
Status: COMPLETED | OUTPATIENT
Start: 2023-05-15 | End: 2023-05-15

## 2023-05-15 RX ORDER — HYDROCODONE BITARTRATE AND ACETAMINOPHEN 7.5; 325 MG/1; MG/1
1 TABLET ORAL EVERY 6 HOURS PRN
Status: DISCONTINUED | OUTPATIENT
Start: 2023-05-15 | End: 2023-05-19 | Stop reason: HOSPADM

## 2023-05-15 RX ORDER — ALBUTEROL SULFATE 90 UG/1
2 AEROSOL, METERED RESPIRATORY (INHALATION) EVERY 4 HOURS PRN
Status: DISCONTINUED | OUTPATIENT
Start: 2023-05-15 | End: 2023-05-19 | Stop reason: HOSPADM

## 2023-05-15 RX ORDER — BISACODYL 5 MG/1
5 TABLET, DELAYED RELEASE ORAL DAILY PRN
Status: DISCONTINUED | OUTPATIENT
Start: 2023-05-15 | End: 2023-05-19 | Stop reason: HOSPADM

## 2023-05-15 RX ORDER — ONDANSETRON 4 MG/1
4 TABLET, FILM COATED ORAL EVERY 6 HOURS PRN
Status: DISCONTINUED | OUTPATIENT
Start: 2023-05-15 | End: 2023-05-19 | Stop reason: HOSPADM

## 2023-05-15 RX ORDER — BISACODYL 10 MG
10 SUPPOSITORY, RECTAL RECTAL DAILY PRN
Status: DISCONTINUED | OUTPATIENT
Start: 2023-05-15 | End: 2023-05-19 | Stop reason: HOSPADM

## 2023-05-15 RX ORDER — LACOSAMIDE 10 MG/ML
100 INJECTION, SOLUTION INTRAVENOUS EVERY 12 HOURS
Status: DISCONTINUED | OUTPATIENT
Start: 2023-05-16 | End: 2023-05-16

## 2023-05-15 RX ORDER — DULOXETIN HYDROCHLORIDE 30 MG/1
60 CAPSULE, DELAYED RELEASE ORAL 2 TIMES DAILY
Status: DISCONTINUED | OUTPATIENT
Start: 2023-05-15 | End: 2023-05-17

## 2023-05-15 RX ORDER — ACETAMINOPHEN 325 MG/1
650 TABLET ORAL EVERY 4 HOURS PRN
Status: DISCONTINUED | OUTPATIENT
Start: 2023-05-15 | End: 2023-05-19 | Stop reason: HOSPADM

## 2023-05-15 RX ORDER — ONDANSETRON 2 MG/ML
4 INJECTION INTRAMUSCULAR; INTRAVENOUS EVERY 6 HOURS PRN
Status: DISCONTINUED | OUTPATIENT
Start: 2023-05-15 | End: 2023-05-19 | Stop reason: HOSPADM

## 2023-05-15 RX ORDER — ACETAMINOPHEN 160 MG/5ML
650 SOLUTION ORAL EVERY 4 HOURS PRN
Status: DISCONTINUED | OUTPATIENT
Start: 2023-05-15 | End: 2023-05-19 | Stop reason: HOSPADM

## 2023-05-15 RX ORDER — BUDESONIDE AND FORMOTEROL FUMARATE DIHYDRATE 160; 4.5 UG/1; UG/1
2 AEROSOL RESPIRATORY (INHALATION) 2 TIMES DAILY
Status: DISCONTINUED | OUTPATIENT
Start: 2023-05-15 | End: 2023-05-19 | Stop reason: HOSPADM

## 2023-05-15 RX ORDER — LACOSAMIDE 10 MG/ML
200 INJECTION, SOLUTION INTRAVENOUS ONCE
Status: COMPLETED | OUTPATIENT
Start: 2023-05-15 | End: 2023-05-15

## 2023-05-15 RX ORDER — CHOLECALCIFEROL (VITAMIN D3) 125 MCG
5 CAPSULE ORAL NIGHTLY PRN
Status: DISCONTINUED | OUTPATIENT
Start: 2023-05-15 | End: 2023-05-19 | Stop reason: HOSPADM

## 2023-05-15 RX ORDER — AMOXICILLIN 250 MG
2 CAPSULE ORAL 2 TIMES DAILY
Status: DISCONTINUED | OUTPATIENT
Start: 2023-05-15 | End: 2023-05-15

## 2023-05-15 RX ORDER — SODIUM CHLORIDE 9 MG/ML
40 INJECTION, SOLUTION INTRAVENOUS AS NEEDED
Status: DISCONTINUED | OUTPATIENT
Start: 2023-05-15 | End: 2023-05-19 | Stop reason: HOSPADM

## 2023-05-15 RX ORDER — ASCORBIC ACID 500 MG
500 TABLET ORAL DAILY
Status: DISCONTINUED | OUTPATIENT
Start: 2023-05-15 | End: 2023-05-19 | Stop reason: HOSPADM

## 2023-05-15 RX ORDER — LORAZEPAM 2 MG/ML
INJECTION INTRAMUSCULAR
Status: COMPLETED
Start: 2023-05-15 | End: 2023-05-15

## 2023-05-15 RX ORDER — BACLOFEN 10 MG/1
10 TABLET ORAL 3 TIMES DAILY
Status: DISCONTINUED | OUTPATIENT
Start: 2023-05-15 | End: 2023-05-19 | Stop reason: HOSPADM

## 2023-05-15 RX ORDER — NITROGLYCERIN 0.4 MG/1
0.4 TABLET SUBLINGUAL
Status: DISCONTINUED | OUTPATIENT
Start: 2023-05-15 | End: 2023-05-19 | Stop reason: HOSPADM

## 2023-05-15 RX ORDER — BUSPIRONE HYDROCHLORIDE 5 MG/1
5 TABLET ORAL 2 TIMES DAILY PRN
Status: DISCONTINUED | OUTPATIENT
Start: 2023-05-15 | End: 2023-05-17

## 2023-05-15 RX ADMIN — Medication 10 ML: at 09:03

## 2023-05-15 RX ADMIN — LORAZEPAM 2 MG: 2 INJECTION INTRAMUSCULAR at 13:25

## 2023-05-15 RX ADMIN — MONTELUKAST 10 MG: 10 TABLET, FILM COATED ORAL at 20:43

## 2023-05-15 RX ADMIN — Medication 10 ML: at 20:46

## 2023-05-15 RX ADMIN — BUSPIRONE HYDROCHLORIDE 5 MG: 5 TABLET ORAL at 18:40

## 2023-05-15 RX ADMIN — LACOSAMIDE 200 MG: 10 INJECTION INTRAVENOUS at 14:58

## 2023-05-15 RX ADMIN — BACLOFEN 10 MG: 10 TABLET ORAL at 14:58

## 2023-05-15 RX ADMIN — DULOXETINE HYDROCHLORIDE 60 MG: 30 CAPSULE, DELAYED RELEASE ORAL at 20:43

## 2023-05-15 RX ADMIN — DULOXETINE HYDROCHLORIDE 60 MG: 30 CAPSULE, DELAYED RELEASE ORAL at 09:03

## 2023-05-15 RX ADMIN — HYDROCODONE BITARTRATE AND ACETAMINOPHEN 1 TABLET: 7.5; 325 TABLET ORAL at 20:47

## 2023-05-15 RX ADMIN — Medication 10 ML: at 02:14

## 2023-05-15 RX ADMIN — LORAZEPAM 2 MG: 2 INJECTION INTRAMUSCULAR; INTRAVENOUS at 13:25

## 2023-05-15 RX ADMIN — BUDESONIDE AND FORMOTEROL FUMARATE DIHYDRATE 2 PUFF: 160; 4.5 AEROSOL RESPIRATORY (INHALATION) at 19:40

## 2023-05-15 RX ADMIN — LEVETIRACETAM 500 MG: 5 INJECTION INTRAVENOUS at 09:03

## 2023-05-15 RX ADMIN — OXYCODONE HYDROCHLORIDE AND ACETAMINOPHEN 500 MG: 500 TABLET ORAL at 09:03

## 2023-05-15 RX ADMIN — BACLOFEN 10 MG: 10 TABLET ORAL at 09:03

## 2023-05-15 RX ADMIN — LEVETIRACETAM 500 MG: 5 INJECTION INTRAVENOUS at 20:43

## 2023-05-15 RX ADMIN — ALBUTEROL SULFATE 2 PUFF: 108 AEROSOL, METERED RESPIRATORY (INHALATION) at 09:10

## 2023-05-15 RX ADMIN — GADOTERIDOL 16 ML: 279.3 INJECTION, SOLUTION INTRAVENOUS at 13:39

## 2023-05-15 RX ADMIN — BACLOFEN 10 MG: 10 TABLET ORAL at 20:43

## 2023-05-15 RX ADMIN — HYDROCODONE BITARTRATE AND ACETAMINOPHEN 1 TABLET: 7.5; 325 TABLET ORAL at 09:10

## 2023-05-15 RX ADMIN — HYDROCODONE BITARTRATE AND ACETAMINOPHEN 1 TABLET: 7.5; 325 TABLET ORAL at 02:06

## 2023-05-15 NOTE — ED NOTES
Pt. Has history of lung CA with right lobectomy. States she began having studdered speech on Friday with right hand tremors. Episode resolved and pt. Was back to baseline. States yesterday she had another episode with same that lasted for about 2 hours but resolved. Today she began having tremors in right hand around 5pm and symptoms did not go away. Symptoms fluctuate in severity but have not resolved. States episodes seem to be getting more severe and lasting longer. She c/o back pain which is

## 2023-05-15 NOTE — ED NOTES
Returns from CT, family at bedside. Placed on hospital bed for comfort by Lennie. Ambulatory to restroom with no complication. Sinus rhythm on monitor. Vitals stable.

## 2023-05-15 NOTE — ED PROVIDER NOTES
Subjective   History of Present Illness  Chief complaint: Patient is a 52-year-old female who presents to the emergency department with a tremor to her right upper extremity.  She has had difficulty with speech.  This has been intermittent for a month but has been progressively worsening.  The speech problem has come and gone.  She has difficulty formulating her words and some stuttering.  Her right upper extremity tremor has been there persistently for the last couple of days.  Her speech changes came about at again reoccurring at 5 PM tonight.  She presents for evaluation of this as it has progressively becoming worse.  Currently presenting just over 4 and half hours after the start of her speech onset.    Context:    Duration:    Timing: As above    Severity: Severe    Associated Symptoms:        PCP:  LMP:        Review of Systems   HENT: Negative.    Cardiovascular: Negative.    Gastrointestinal: Negative for abdominal pain.       Past Medical History:   Diagnosis Date   • Anxiety    • Asthma 2013   • Lumbar radiculopathy 3/12/2018   • Lung nodule seen on imaging study 10/2/2020   • Malignant neoplasm of upper lobe of right lung 2021   • Thoracic back pain 3/12/2018       Allergies   Allergen Reactions   • Bee Venom Itching       Past Surgical History:   Procedure Laterality Date   • BREAST BIOPSY Right     benign   • BRONCHOSCOPY N/A 10/21/2020    Procedure: BRONCHOSCOPY WITH BRONCHOALVEOLAR LAVAGE, NAVIGATION WITH FINE NEEDLE ASPIRATION;  Surgeon: Nichol Jaquez MD;  Location: [facility] ENDOSCOPY;  Service: Pulmonary;  Laterality: N/A;  POST RUL NODULE   • CARDIAC CATHETERIZATION N/A 2022    Procedure: Left Heart Cath and coronary angiogram;  Surgeon: Chandana Tellez MD;  Location: [facility] CATH INVASIVE LOCATION;  Service: Cardiovascular;  Laterality: N/A;   •  SECTION      x3    • LUNG REMOVAL, PARTIAL Right 2020    right upper lobe    • NASAL SEPTUM SURGERY      20 Years Ago        Family History   Problem Relation Age of Onset   • Hypertension Mother    • Alzheimer's disease Mother    • Thyroid disease Mother    • Lung cancer Father    • Lupus Sister    • Asthma Other    • No Known Problems Brother    • Lupus Sister        Social History     Socioeconomic History   • Marital status:    • Number of children: 3   Tobacco Use   • Smoking status: Never     Passive exposure: Never   • Smokeless tobacco: Never   Vaping Use   • Vaping Use: Never used   Substance and Sexual Activity   • Alcohol use: Yes     Comment: Couple times a week   • Drug use: No   • Sexual activity: Yes           Objective   Physical Exam  Vitals and nursing note reviewed.   Constitutional:       Appearance: Normal appearance.   HENT:      Head: Normocephalic and atraumatic.   Eyes:      Extraocular Movements: Extraocular movements intact.      Pupils: Pupils are equal, round, and reactive to light.   Cardiovascular:      Rate and Rhythm: Normal rate and regular rhythm.      Pulses: Normal pulses.      Heart sounds: Normal heart sounds.   Pulmonary:      Effort: Pulmonary effort is normal.      Breath sounds: Normal breath sounds.   Abdominal:      Tenderness: There is no abdominal tenderness.   Skin:     General: Skin is warm and dry.   Neurological:      Mental Status: She is alert.      Comments: Patient with a focal right upper extremity tremor.  She does have difficulty with speech forming.  However is able to capably answer every question I asked her at this point.  She is generally weak with bilateral weakness.   Psychiatric:         Behavior: Behavior normal.         Procedures           ED Course  ED Course as of 05/14/23 2226   Sun May 14, 2023   2148 I spoke with Dr. Lundberg.  He would like CT head with contrast.  Also he would like CT angiogram head and neck.  Also to start Keppra for focal seizure. [LH]      ED Course User Index  [LH] Laci Tran,                  Results for orders placed  or performed during the hospital encounter of 05/14/23   Comprehensive Metabolic Panel    Specimen: Arm, Left; Blood   Result Value Ref Range    Glucose 92 65 - 99 mg/dL    BUN 5 (L) 6 - 20 mg/dL    Creatinine 0.55 (L) 0.57 - 1.00 mg/dL    Sodium 137 136 - 145 mmol/L    Potassium 3.7 3.5 - 5.2 mmol/L    Chloride 100 98 - 107 mmol/L    CO2 23.0 22.0 - 29.0 mmol/L    Calcium 8.9 8.6 - 10.5 mg/dL    Total Protein 7.8 6.0 - 8.5 g/dL    Albumin 4.1 3.5 - 5.2 g/dL    ALT (SGPT) 70 (H) 1 - 33 U/L    AST (SGOT) 53 (H) 1 - 32 U/L    Alkaline Phosphatase 354 (H) 39 - 117 U/L    Total Bilirubin 0.8 0.0 - 1.2 mg/dL    Globulin 3.7 gm/dL    A/G Ratio 1.1 g/dL    BUN/Creatinine Ratio 9.1 7.0 - 25.0    Anion Gap 14.0 5.0 - 15.0 mmol/L    eGFR 110.4 >60.0 mL/min/1.73   Protime-INR    Specimen: Arm, Left; Blood   Result Value Ref Range    Protime 9.4 (L) 9.6 - 11.7 Seconds    INR <0.93 (L) 0.93 - 1.10   aPTT    Specimen: Arm, Left; Blood   Result Value Ref Range    PTT 26.9 (L) 61.0 - 76.5 seconds   High Sensitivity Troponin T    Specimen: Arm, Left; Blood   Result Value Ref Range    HS Troponin T <6 <10 ng/L   CBC Auto Differential    Specimen: Arm, Left; Blood   Result Value Ref Range    WBC 7.70 3.40 - 10.80 10*3/mm3    RBC 4.09 3.77 - 5.28 10*6/mm3    Hemoglobin 12.2 12.0 - 15.9 g/dL    Hematocrit 37.1 34.0 - 46.6 %    MCV 90.9 79.0 - 97.0 fL    MCH 29.8 26.6 - 33.0 pg    MCHC 32.8 31.5 - 35.7 g/dL    RDW 14.9 12.3 - 15.4 %    RDW-SD 50.3 37.0 - 54.0 fl    MPV 10.0 6.0 - 12.0 fL    Platelets 281 140 - 450 10*3/mm3   Scan Slide    Specimen: Arm, Left; Blood   Result Value Ref Range    Scan Slide     Manual Differential    Specimen: Arm, Left; Blood   Result Value Ref Range    Neutrophil % 64.0 42.7 - 76.0 %    Lymphocyte % 24.0 19.6 - 45.3 %    Monocyte % 4.0 (L) 5.0 - 12.0 %    Eosinophil % 5.0 0.3 - 6.2 %    Bands %  3.0 0.0 - 5.0 %    Neutrophils Absolute 5.16 1.70 - 7.00 10*3/mm3    Lymphocytes Absolute 1.85 0.70 - 3.10  10*3/mm3    Monocytes Absolute 0.31 0.10 - 0.90 10*3/mm3    Eosinophils Absolute 0.39 0.00 - 0.40 10*3/mm3    RBC Morphology Normal Normal    WBC Morphology Normal Normal    Platelet Estimate Adequate Normal   POC Glucose Once    Specimen: Blood   Result Value Ref Range    Glucose 91 70 - 105 mg/dL   ECG 12 Lead Other; speech abnormality   Result Value Ref Range    QT Interval 380 ms                        CT Head With & Without Contrast    Result Date: 5/14/2023  1. Normal head CT. 2. Normal CTA brain. No occlusion, stenosis, aneurysm or AVM. 3. Normal CTA neck. 0% ICA stenosis bilaterally by NASCET criteria.  This examination was interpreted by Kenny Dumont M.D. Electronically signed by:  Kenny Dumont M.D.  5/14/2023 9:46 PM Mountain Time    CT Angiogram Neck    Result Date: 5/14/2023  1. Normal head CT. 2. Normal CTA brain. No occlusion, stenosis, aneurysm or AVM. 3. Normal CTA neck. 0% ICA stenosis bilaterally by NASCET criteria.  This examination was interpreted by Kenny Dumont M.D. Electronically signed by:  Kenny Dumont M.D.  5/14/2023 9:46 PM Mountain Time    CT Angiogram Head    Result Date: 5/14/2023  1. Normal head CT. 2. Normal CTA brain. No occlusion, stenosis, aneurysm or AVM. 3. Normal CTA neck. 0% ICA stenosis bilaterally by NASCET criteria.  This examination was interpreted by Kenny Dumont M.D. Electronically signed by:  Kenny Dumont M.D.  5/14/2023 9:46 PM Mountain Time              Medical Decision Making  Patient seen and evaluated for speech difficulty and tremor    Differential  diagnosis includes but is not limited to seizure, CVA, brain mass, anxiety    Patient was seen here and discussed with the neurologist Dr. Lundberg.  We did place patient on [facility].  Per his recommendations CT head with contrast as well as CT angiograms were ordered and obtained.  Showing no abnormality.  At this point time no signs of mass lesion.  No signs of vessel lesion.  This could be  a focal seizure.  Also could be psychiatric causes.  Patient will be admitted for MRI potential EEG and further neurologic management.  I spoke with Kimberly on-call for the hospitalist who agrees to admit for these reasons.        Focal seizure: acute illness or injury  Speech disturbance, unspecified type: acute illness or injury  Amount and/or Complexity of Data Reviewed  Labs: ordered. Decision-making details documented in ED Course.     Details: Creatinine normal Labs reviewed as above  Radiology: ordered and independent interpretation performed.     Details: Radiology reviewed as above  ECG/medicine tests: ordered and independent interpretation performed.     Details: EKG interpreted by myself shows sinus rhythm rate of 98 and compared to EKG dated 2/12/2022 showing sinus rhythm rate of 84  Discussion of management or test interpretation with external provider(s): As above    Risk  Prescription drug management.  Drug therapy requiring intensive monitoring for toxicity.  Decision regarding hospitalization.          Final diagnoses:   None   Focal seizure  Speech abnormality    ED Disposition  ED Disposition     None          No follow-up provider specified.       Medication List      No changes were made to your prescriptions during this visit.          Laci Tran DO  05/15/23 0021

## 2023-05-15 NOTE — PLAN OF CARE
Goal Outcome Evaluation:         Pt A&Ox4, VS stable, pt RA. MRI x2 completed. Neurology following. Will continue to monitor.

## 2023-05-15 NOTE — CONSULTS
"Primary Care Provider: Junie Le APRN     Consult requested by:  Dr. Tran    Reason for Consultation: Neurological evaluation     History taken from: patient chart family RN    Chief complaint: right arm shaking, confused speech       SUBJECTIVE:    History of present illness: Background per H&P: Gideon Fields is a 52 y.o. year old female who presented to [facility] on 5/14/2023 complaining of a tremor in her right arm and \"trouble finding the right words\".  Her tremor has been occurring sporadically for at least a month, but has progressively worsened to the point that it is now continuous.  The speech trouble started two days ago.       In the ED, a CT of the head and CTA of the head and neck showed no acute abnormality. Labs are unremarkable.  She is afebrile, all vitals are stable.  She was given IV Keppra and Neurology was consulted per ED provider.  Hospitalist was consulted for further care and management.    Past medical history of depression, anxiety, chronic back pain and a lung cancer S/P lobectomy  - Portions of the above HPI were copied from previous encounters and edited as appropriate. PMH as detailed below.     Pt states she has had spells of right arm shaking since around March 2023. The spells come on without warning and previously were not associated with any other symptoms, but the last few spells have been associated with word finding difficulty. The shaking usually subsides after about 30 minutes, but this time it has been persistent. No shaking on the left. She has not had any loss of consciousness. No hx of stroke, seizures, or brain tumor. She did fall in the shower (mechanical, slipped) in March and the spells started after that. She did not lose consciousness after the fall, but did feel dazed. She denies any neck pain. No headache. No focal deficits. Her right arm does feel weak after the shaking stops. She is unable to stop the shaking. Her  has not noticed it " while she is sleeping, but states she tosses and turns frequently while sleeping.     Review of Systems   Constitutional: Negative for chills, diaphoresis and fatigue.   Eyes: Negative for photophobia and visual disturbance.   Neurological: Positive for tremors, speech difficulty and weakness. Negative for dizziness, seizures, syncope, facial asymmetry, light-headedness, numbness and headaches.          PATIENT HISTORY:  Past Medical History:   Diagnosis Date   • Anxiety    • Asthma 2013   • Lumbar radiculopathy 3/12/2018   • Lung nodule seen on imaging study 10/2/2020   • Malignant neoplasm of upper lobe of right lung 2021   • Thoracic back pain 3/12/2018   ,   Past Surgical History:   Procedure Laterality Date   • BREAST BIOPSY Right     benign   • BRONCHOSCOPY N/A 10/21/2020    Procedure: BRONCHOSCOPY WITH BRONCHOALVEOLAR LAVAGE, NAVIGATION WITH FINE NEEDLE ASPIRATION;  Surgeon: Nichol Jaquez MD;  Location: [facility] ENDOSCOPY;  Service: Pulmonary;  Laterality: N/A;  POST RUL NODULE   • CARDIAC CATHETERIZATION N/A 2022    Procedure: Left Heart Cath and coronary angiogram;  Surgeon: Chandana Tellez MD;  Location: [facility] CATH INVASIVE LOCATION;  Service: Cardiovascular;  Laterality: N/A;   •  SECTION      x3    • LUNG REMOVAL, PARTIAL Right 2020    right upper lobe    • NASAL SEPTUM SURGERY      20 Years Ago   ,   Family History   Problem Relation Age of Onset   • Hypertension Mother    • Alzheimer's disease Mother    • Thyroid disease Mother    • Lung cancer Father    • Lupus Sister    • Asthma Other    • No Known Problems Brother    • Lupus Sister    ,   Social History     Tobacco Use   • Smoking status: Never     Passive exposure: Never   • Smokeless tobacco: Never   Vaping Use   • Vaping Use: Never used   Substance Use Topics   • Alcohol use: Yes     Comment: Couple times a week   • Drug use: No   ,   Prior to Admission medications    Medication Sig Start Date End Date Taking?  Authorizing Provider   albuterol sulfate  (90 Base) MCG/ACT inhaler Inhale 2 puffs Every 4 (Four) Hours As Needed for Wheezing. 11/11/22  Yes Trevor Dyre DO   baclofen (LIORESAL) 10 MG tablet Take 1 tablet by mouth 3 (Three) Times a Day.   Yes Magdiel Brown MD   budesonide-formoterol (SYMBICORT) 160-4.5 MCG/ACT inhaler Inhale 2 puffs 2 (Two) Times a Day. 1/27/23  Yes Blanca Samayoa MD   busPIRone (BUSPAR) 5 MG tablet Take 1 tablet by mouth 2 (Two) Times a Day As Needed. 10/2/18  Yes Magdiel Brown MD   Calcium Acetate, Phos Binder, (CALCIUM ACETATE PO) Take 1 tablet by mouth Daily.   Yes Magdiel Brown MD   Cholecalciferol (Vitamin D) 25 MCG (1000 UT) tablet Take 1 tablet by mouth Daily.   Yes Magdiel Brown MD   DULoxetine (CYMBALTA) 60 MG capsule TAKE 1 CAPSULE BY MOUTH TWICE A DAY 12/1/21  Yes Junie Le APRN   HYDROcodone Bit-Homatrop MBr (HYCODAN) 5-1.5 MG/5ML solution Take 5 mL by mouth Every 8 (Eight) Hours As Needed for Cough. 3/23/23  Yes Jaquelin Rivas APRN   HYDROcodone-acetaminophen (NORCO) 7.5-325 MG per tablet Take 1 tablet by mouth Every 6 (Six) Hours As Needed for Mild Pain. 2/9/23  Yes Junie Le APRN   montelukast (SINGULAIR) 10 MG tablet TAKE 1 TABLET BY MOUTH EVERY DAY AT NIGHT 2/6/23  Yes Junie Le APRN   Multiple Vitamins-Minerals (multivitamin with minerals) tablet tablet Take 1 tablet by mouth Daily.   Yes Magdiel Brown MD   Omega-3 Fatty Acids (fish oil) 1000 MG capsule capsule Take 1 capsule by mouth Daily With Breakfast.   Yes Magdiel Brown MD   tiZANidine (ZANAFLEX) 2 MG tablet Take 2 tablets by mouth 3 (Three) Times a Day As Needed for Muscle Spasms. 9/21/22  Yes Sean Crawford PA   TRI-SPRINTEC 0.18/0.215/0.25 MG-35 MCG per tablet Take 1 tablet by mouth Daily. 8/14/19  Yes Provider, Historical, MD   Turmeric 500 MG capsule Take 1 capsule by mouth Daily.   Yes Provider, Historical, MD    vitamin C (ASCORBIC ACID) 500 MG tablet Take 1 tablet by mouth Daily.   Yes Provider, MD Magdiel   amoxicillin-clavulanate (AUGMENTIN) 875-125 MG per tablet Take 1 tablet by mouth 2 (Two) Times a Day. 3/15/23   Amandeep Hudson APRN    Allergies:  Bee venom    Current Facility-Administered Medications   Medication Dose Route Frequency Provider Last Rate Last Admin   • acetaminophen (TYLENOL) tablet 650 mg  650 mg Oral Q4H PRN Kimberly Urbina APRN        Or   • acetaminophen (TYLENOL) 160 MG/5ML solution 650 mg  650 mg Oral Q4H PRN Kimberly Urbina APRN        Or   • acetaminophen (TYLENOL) suppository 650 mg  650 mg Rectal Q4H PRN Kimberly Urbina APRN       • albuterol sulfate HFA (PROVENTIL HFA;VENTOLIN HFA;PROAIR HFA) inhaler 2 puff  2 puff Inhalation Q4H PRN Kimberyl Urbina APRN   2 puff at 05/15/23 0910   • ascorbic acid (VITAMIN C) tablet 500 mg  500 mg Oral Daily Kimberly Urbina APRN   500 mg at 05/15/23 0903   • baclofen (LIORESAL) tablet 10 mg  10 mg Oral TID Kimberly Urbina APRN   10 mg at 05/15/23 0903   • polyethylene glycol (MIRALAX) packet 17 g  17 g Oral Daily PRN Kimberly Urbina APRN        And   • bisacodyl (DULCOLAX) EC tablet 5 mg  5 mg Oral Daily PRN Kimberly Urbina APRN        And   • bisacodyl (DULCOLAX) suppository 10 mg  10 mg Rectal Daily PRN Kimberly Urbina APRN       • budesonide-formoterol (SYMBICORT) 160-4.5 MCG/ACT inhaler 2 puff  2 puff Inhalation BID Kimberly Urbina APRN       • busPIRone (BUSPAR) tablet 5 mg  5 mg Oral BID PRN Kimberly Urbina APRN       • DULoxetine (CYMBALTA) DR capsule 60 mg  60 mg Oral BID Kimberly Urbina APRN   60 mg at 05/15/23 0903   • HYDROcodone-acetaminophen (NORCO) 7.5-325 MG per tablet 1 tablet  1 tablet Oral Q6H PRN Kimberly Urbina APRN   1 tablet at 05/15/23 0910   • lacosamide (VIMPAT) injection 200 mg  200 mg Intravenous Once Chastity Pelayo APRN        Followed by   • [START ON 5/16/2023] lacosamide (VIMPAT)  injection 100 mg  100 mg Intravenous Q12H Chastity Pelayo APRN       • levETIRAcetam in NaCl 0.82% (KEPPRA) IVPB 500 mg  500 mg Intravenous Q12H Kimberly Urbina APRN   500 mg at 05/15/23 0903   • melatonin tablet 5 mg  5 mg Oral Nightly PRN Kimberly Urbina, APRYELITZA       • montelukast (SINGULAIR) tablet 10 mg  10 mg Oral Nightly Kimberly Urbina APRYELITZA       • nitroglycerin (NITROSTAT) SL tablet 0.4 mg  0.4 mg Sublingual Q5 Min PRN Kimberly Urbina APRYELITZA       • ondansetron (ZOFRAN) tablet 4 mg  4 mg Oral Q6H PRN Kimberly Urbina APRYELITZA        Or   • ondansetron (ZOFRAN) injection 4 mg  4 mg Intravenous Q6H PRN Kimberly Urbina, APRYELITZA       • sodium chloride 0.9 % flush 10 mL  10 mL Intravenous PRN Laci Tran,        • sodium chloride 0.9 % flush 10 mL  10 mL Intravenous Q12H Kimberly Urbina APRN   10 mL at 05/15/23 0903   • sodium chloride 0.9 % flush 10 mL  10 mL Intravenous PRN Kimberly Urbina APRN       • sodium chloride 0.9 % infusion 40 mL  40 mL Intravenous PRN Kimberly Urbina APRYELITZA       • tiZANidine (ZANAFLEX) tablet 4 mg  4 mg Oral TID PRN Kimberly Urbina APRN         Current Outpatient Medications   Medication Sig Dispense Refill   • albuterol sulfate  (90 Base) MCG/ACT inhaler Inhale 2 puffs Every 4 (Four) Hours As Needed for Wheezing. 18 g 3   • baclofen (LIORESAL) 10 MG tablet Take 1 tablet by mouth 3 (Three) Times a Day.     • budesonide-formoterol (SYMBICORT) 160-4.5 MCG/ACT inhaler Inhale 2 puffs 2 (Two) Times a Day. 1 each 5   • busPIRone (BUSPAR) 5 MG tablet Take 1 tablet by mouth 2 (Two) Times a Day As Needed.     • Calcium Acetate, Phos Binder, (CALCIUM ACETATE PO) Take 1 tablet by mouth Daily.     • Cholecalciferol (Vitamin D) 25 MCG (1000 UT) tablet Take 1 tablet by mouth Daily.     • DULoxetine (CYMBALTA) 60 MG capsule TAKE 1 CAPSULE BY MOUTH TWICE A  capsule 2   • HYDROcodone Bit-Homatrop MBr (HYCODAN) 5-1.5 MG/5ML solution Take 5 mL by mouth Every 8 (Eight)  Hours As Needed for Cough. 120 mL 0   • HYDROcodone-acetaminophen (NORCO) 7.5-325 MG per tablet Take 1 tablet by mouth Every 6 (Six) Hours As Needed for Mild Pain. 20 tablet 0   • montelukast (SINGULAIR) 10 MG tablet TAKE 1 TABLET BY MOUTH EVERY DAY AT NIGHT 90 tablet 1   • Multiple Vitamins-Minerals (multivitamin with minerals) tablet tablet Take 1 tablet by mouth Daily.     • Omega-3 Fatty Acids (fish oil) 1000 MG capsule capsule Take 1 capsule by mouth Daily With Breakfast.     • tiZANidine (ZANAFLEX) 2 MG tablet Take 2 tablets by mouth 3 (Three) Times a Day As Needed for Muscle Spasms. 30 tablet 1   • TRI-SPRINTEC 0.18/0.215/0.25 MG-35 MCG per tablet Take 1 tablet by mouth Daily.  4   • Turmeric 500 MG capsule Take 1 capsule by mouth Daily.     • vitamin C (ASCORBIC ACID) 500 MG tablet Take 1 tablet by mouth Daily.     • amoxicillin-clavulanate (AUGMENTIN) 875-125 MG per tablet Take 1 tablet by mouth 2 (Two) Times a Day. 20 tablet 0        ________________________________________________________        OBJECTIVE:  Upon today's exam, pt is awake and alert. She is pleasant, cooperative.  at BS.      Neurologic Exam  PHYSICAL EXAM:    CONSTITUTIONAL: The patient is in no apparent distress, bright awake and alert.      HEENT: There is no tenderness over the temporal arteries bilaterally. Normocephalic, atraumatic. TMJ open symmetrically without tenderness.    NECK: ROM normal, supple    RESPIRATORY: Breathing unlabored, Breath sounds are normal    CARDIAC: Regular rate and rhythm.     EXTREMITIES:  No clubbing, cyanosis or edema.    PSYCHIATRIC: Mood/affect normal, judgement normal, appropriate    NEUROLOGICAL:    Cognition:   Fully oriented.  Fund of knowledge excellent.  Concentration and attention normal.   Language normal with normal comprehension, fluent speech, intact repetition and naming. Occasional word finding difficulty and halted speech.   Short and long term memory appears intact    Cranial  nerves;    II - pupils bilaterally equal reacting to light,  No new Visual field deficits;  Fundoscopic exam- Not able to be done, non-dilated exam  III,IV,VI: EOMI with no diplopia  V: Normal facial sensations  VII: No facial asymmetry,  VIII: No New hearing abnormality  IX, X, XI: normal gag and shoulder shrug;  XII: tongue is in the midline.    Sensory:  Intact to light touch in all extremities.     Motor: Strength 5/5 bilaterally upper and lower extremities. Rhythmic persistent flapping tremor in the RUE, sometimes briefly distractible, mostly continuous. No involuntary movements present otherweise. Normal tone and bulk.  Deep tendon reflexes: 2/4 and symmetrical in biceps, brachioradialis, triceps, bilateral 2/4 knees and ankles. Both plantars are flexor.    Cerebellar: Finger to nose and mirror movements normal on left, difficult to assess on right.    Gait and balance: deferred    ________________________________________________________   RESULTS REVIEW:    VITAL SIGNS:   Temp:  [97.8 °F (36.6 °C)-98.2 °F (36.8 °C)] 98.1 °F (36.7 °C)  Heart Rate:  [] 86  Resp:  [15-20] 18  BP: (112-156)/(55-80) 115/71     LABS:      Lab 05/15/23  0618 05/14/23  2211   WBC 7.30 7.70   HEMOGLOBIN 11.1* 12.2   HEMATOCRIT 34.0 37.1   PLATELETS 270 281   NEUTROS ABS 3.80 5.16   LYMPHS ABS 2.10  --    MONOS ABS 0.80  --    EOS ABS 0.50* 0.39   MCV 92.8 90.9   SED RATE 65*  --    CRP 0.75*  --    PROTIME  --  9.4*   APTT  --  26.9*         Lab 05/15/23  0618 05/14/23  2211   SODIUM 136 137   POTASSIUM 3.9 3.7   CHLORIDE 102 100   CO2 23.0 23.0   ANION GAP 11.0 14.0   BUN 6 5*   CREATININE 0.51* 0.55*   EGFR 112.5 110.4   GLUCOSE 97 92   CALCIUM 8.4* 8.9   MAGNESIUM 2.0  --    PHOSPHORUS 2.9  --    HEMOGLOBIN A1C 5.50  --    TSH 1.880  --          Lab 05/14/23 2211   TOTAL PROTEIN 7.8   ALBUMIN 4.1   GLOBULIN 3.7   ALT (SGPT) 70*   AST (SGOT) 53*   BILIRUBIN 0.8   ALK PHOS 354*         Lab 05/15/23  0024 05/14/23 2211    HSTROP T <6 <6   PROTIME  --  9.4*   INR  --  <0.93*         Lab 05/15/23  0618   CHOLESTEROL 226*   LDL CHOL 118*   HDL CHOL 56   TRIGLYCERIDES 299*                 Lab Results   Component Value Date    TSH 1.880 05/15/2023     (H) 05/15/2023    HGBA1C 5.50 05/15/2023       IMAGING STUDIES:  CT Head With & Without Contrast    Result Date: 5/14/2023  1. Normal head CT. 2. Normal CTA brain. No occlusion, stenosis, aneurysm or AVM. 3. Normal CTA neck. 0% ICA stenosis bilaterally by NASCET criteria.  This examination was interpreted by Kenny Dumont M.D. Electronically signed by:  Kenny Dumont M.D.  5/14/2023 9:46 PM Mountain Time    CT Angiogram Neck    Result Date: 5/14/2023  1. Normal head CT. 2. Normal CTA brain. No occlusion, stenosis, aneurysm or AVM. 3. Normal CTA neck. 0% ICA stenosis bilaterally by NASCET criteria.  This examination was interpreted by Kenny Dumont M.D. Electronically signed by:  Kenny Dumont M.D.  5/14/2023 9:46 PM Mountain Time    MRI Brain Without Contrast    Result Date: 5/15/2023  Impression: Unremarkable MRI brain without contrast Electronically Signed: Neal Altamirano  5/15/2023 12:09 PM EDT  Workstation ID: [id number]    MRI Brain With Contrast    Result Date: 5/15/2023  Impression: Normal MRI brain with contrast. Electronically Signed: Lilian Suarez  5/15/2023 1:55 PM EDT  Workstation ID: [id number]    XR Chest 1 View    Result Date: 5/15/2023  Impression: No acute process Electronically Signed: Neal Altamirano  5/15/2023 7:56 AM EDT  Workstation ID: [id number]    CT Angiogram Head    Result Date: 5/14/2023  1. Normal head CT. 2. Normal CTA brain. No occlusion, stenosis, aneurysm or AVM. 3. Normal CTA neck. 0% ICA stenosis bilaterally by NASCET criteria.  This examination was interpreted by Kenny Dumont M.D. Electronically signed by:  Kenny Dumont M.D.  5/14/2023 9:46 PM Mountain Time      I reviewed the patient's new clinical  results.    ________________________________________________________     PROBLEM LIST:    Focal seizure    Anxiety    Depression    Hyperlipidemia    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    History of lobectomy of lung    Seasonal allergies            ASSESSMENT/PLAN:  1. Intermittent, currently persistent flapping tremor of the right arm and intermittent dysphasia. Concern for focal seizure vs non-epileptic spells. No hx of seizures, stroke, or brain tumor. Pt did fall and hit her head in March and the spells started after that. Pt treated with Ativan 2mg in ED with temporary cessation of tremor.  - CT Head:Normal head CT.   - CTA head/neck: Normal CTA brain. No occlusion, stenosis, aneurysm or AVM. Normal CTA neck.   - MRI brain with/without contrast: Unremarkable MRI brain without contrast; Normal MRI brain with contrast.  - EKG:Sinus rhythm  - EEG: pending  - Labs: A1C: 5.50, B12: P, LDL: 118, TSH: 1.88, ma.0, phos: 2.9, sodium: 136, calcium: 8.4, ESR: 65, CRP: 0.75, prolactin: P, LDH: P, Lactic: P  - Seizures/Syncope precautions (see below)  - Maintain healthy lifestyle including stress management, routine sleep schedule, and a well balanced diet.  Alcohol and drugs lowers the seizure threshold and should be avoided. Take all medications as prescribed.  - Keppra 500mg BID  - Add Vimpat 100mg BID, 200mg load    SEIZURE/SYNCOPE INSTRUCTIONS:  -Recommended to observe all seizure precautions, including, but not limited to:   -No driving until seizure free for more than 3 months- as per State driving regulation / law;   -Avoid all high-risk activity, Take showers instead of baths, Avoid swimming without observation, Avoid open heat sources, Avoid working at heights, and Avoid engaging in all potentially hazardous activities.   -Patient expressed clear understanding.    Will follow     I discussed the patient's findings and my recommendations with patient, family, nursing staff and  consulting provider    Chastity Pelayo, NEO  05/15/23  14:29 EDT

## 2023-05-15 NOTE — H&P
"    [facility] Medicine Services  History & Physical    Patient Name: Gideon Fields  : 1970  MRN: [id number]  Primary Care Physician:  Junie Le APRN  Date of admission: 2023  Date and Time of Service: 5/15/23 at 0030    Subjective      Chief Complaint: tremor of the right arm and speech problems    History of Present Illness: Gideon Fields is a 52 y.o. female with a past medical history of depression, anxiety, chronic back pain and a lung cancer S/P lobectomy who presented to [facility] on 2023 complaining of a tremor in her right arm and \"trouble finding the right words\".  Her tremor has been occurring sporadically for a month but has progressively worsened to the point that it is now continuous.  The speech trouble started two days ago.      In the ED, a CT of the head and CTA of the head and neck showed no acute abnormality. Labs are unremarkable.  She is afebrile, all vitals are stable.  She was given IV Keppra and Neurology was consulted per ED provider.  Hospitalist was consulted for further care and management.    12 point ROS reviewed and negative except as mentioned above    Personal History     Past Medical History:   Diagnosis Date   • Anxiety    • Asthma 2013   • Lumbar radiculopathy 3/12/2018   • Lung nodule seen on imaging study 10/2/2020   • Malignant neoplasm of upper lobe of right lung 2021   • Thoracic back pain 3/12/2018       Past Surgical History:   Procedure Laterality Date   • BREAST BIOPSY Right     benign   • BRONCHOSCOPY N/A 10/21/2020    Procedure: BRONCHOSCOPY WITH BRONCHOALVEOLAR LAVAGE, NAVIGATION WITH FINE NEEDLE ASPIRATION;  Surgeon: Nichol Jaquez MD;  Location: [facility] ENDOSCOPY;  Service: Pulmonary;  Laterality: N/A;  POST RUL NODULE   • CARDIAC CATHETERIZATION N/A 2022    Procedure: Left Heart Cath and coronary angiogram;  Surgeon: Chandana Tellez MD;  Location: [facility] CATH INVASIVE LOCATION;  Service: Cardiovascular;  " Laterality: N/A;   •  SECTION      x3    • LUNG REMOVAL, PARTIAL Right 2020    right upper lobe    • NASAL SEPTUM SURGERY      20 Years Ago       Family History: family history includes Alzheimer's disease in her mother; Asthma in an other family member; Hypertension in her mother; Lung cancer in her father; Lupus in her sister and sister; No Known Problems in her brother; Thyroid disease in her mother. Otherwise pertinent FHx was reviewed and not pertinent to current issue.    Social History:  reports that she has never smoked. She has never been exposed to tobacco smoke. She has never used smokeless tobacco. She reports current alcohol use. She reports that she does not use drugs.    Home Medications:  Prior to Admission Medications     Prescriptions Last Dose Informant Patient Reported? Taking?    albuterol sulfate  (90 Base) MCG/ACT inhaler   No No    Inhale 2 puffs Every 4 (Four) Hours As Needed for Wheezing.    amoxicillin-clavulanate (AUGMENTIN) 875-125 MG per tablet   No No    Take 1 tablet by mouth 2 (Two) Times a Day.    baclofen (LIORESAL) 10 MG tablet   Yes No    Take 1 tablet by mouth 3 (Three) Times a Day.    budesonide-formoterol (SYMBICORT) 160-4.5 MCG/ACT inhaler   No No    Inhale 2 puffs 2 (Two) Times a Day.    busPIRone (BUSPAR) 5 MG tablet  Self Yes No    Take 1 tablet by mouth 2 (Two) Times a Day As Needed.    Calcium Acetate, Phos Binder, (CALCIUM ACETATE PO)  Self Yes No    Take 1 tablet by mouth Daily.    Cholecalciferol (Vitamin D) 25 MCG (1000 UT) tablet  Self Yes No    Take 1 tablet by mouth Daily.    DULoxetine (CYMBALTA) 60 MG capsule   No No    TAKE 1 CAPSULE BY MOUTH TWICE A DAY    HYDROcodone Bit-Homatrop MBr (HYCODAN) 5-1.5 MG/5ML solution   No No    Take 5 mL by mouth Every 8 (Eight) Hours As Needed for Cough.    HYDROcodone-acetaminophen (NORCO) 7.5-325 MG per tablet   No No    Take 1 tablet by mouth Every 6 (Six) Hours As Needed for Mild Pain.    montelukast  (SINGULAIR) 10 MG tablet   No No    TAKE 1 TABLET BY MOUTH EVERY DAY AT NIGHT    Multiple Vitamins-Minerals (multivitamin with minerals) tablet tablet  Self Yes No    Take 1 tablet by mouth Daily.    Omega-3 Fatty Acids (fish oil) 1000 MG capsule capsule   Yes No    Take 1 capsule by mouth Daily With Breakfast.    tiZANidine (ZANAFLEX) 2 MG tablet   No No    Take 2 tablets by mouth 3 (Three) Times a Day As Needed for Muscle Spasms.    TRI-SPRINTEC 0.18/0.215/0.25 MG-35 MCG per tablet   Yes No    Take 1 tablet by mouth Daily.    Turmeric 500 MG capsule   Yes No    Take 1 capsule by mouth Daily.    vitamin C (ASCORBIC ACID) 500 MG tablet  Self Yes No    Take 1 tablet by mouth Daily.            Allergies:  Allergies   Allergen Reactions   • Bee Venom Itching       Objective      Vitals:   Temp:  [97.8 °F (36.6 °C)-98.2 °F (36.8 °C)] 98.2 °F (36.8 °C)  Heart Rate:  [] 96  Resp:  [15-17] 16  BP: (137-156)/(72-80) 137/78    Physical Exam  Vitals and nursing note reviewed.   Constitutional:       Appearance: Normal appearance.   HENT:      Head: Normocephalic and atraumatic.      Nose: Nose normal.      Mouth/Throat:      Mouth: Mucous membranes are moist.   Eyes:      Extraocular Movements: Extraocular movements intact.      Pupils: Pupils are equal, round, and reactive to light.   Cardiovascular:      Rate and Rhythm: Normal rate and regular rhythm.      Pulses: Normal pulses.      Heart sounds: Normal heart sounds.   Pulmonary:      Effort: Pulmonary effort is normal.      Breath sounds: Normal breath sounds.   Abdominal:      General: Bowel sounds are normal.      Palpations: Abdomen is soft.   Musculoskeletal:         General: Normal range of motion.      Cervical back: Normal range of motion.   Skin:     General: Skin is warm and dry.   Neurological:      General: No focal deficit present.      Mental Status: She is alert and oriented to person, place, and time. Mental status is at baseline.      Cranial  "Nerves: Cranial nerves 2-12 are intact.      Motor: Tremor (RUE, constant tremor) present.      Comments: On exam, speech is intact and patient does not seem slow to answer at this time.  Her RUE has a constant tremor that was displayed throughout the time I was at bedside.  Otherwise, she is neurologically intact.   Psychiatric:         Mood and Affect: Mood normal.         Behavior: Behavior normal.        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    • Anxiety    • Depression    • Hyperlipidemia      Plan:     Home medications not verified at the time of assessment and plan    Focal Seizure vs Tremor of RUE  Speech difficulty-\"trouble finding the right words\"  -Ongoing for approximatly 1 month but worsening   -CT of the head reviewed  -CTA of the head and neck reviewed  -MRI of the brain ordered  -EEG ordered  -Keppra given in ED, continue  -Seizure precautions  -Neurology consulted per ED provider    Depression with Anxiety  -Chronic, stable  -Continue home Buspar, Duloxetine    Seasonal Allergies  -Continue home albuterol, singulair    Chronic Back Pain  -Stable  -Continue home baclofen, Zanaflex, Norco (Inspect verified)    History of Lung Cancer  -S/P lobectomy  -No current treatments    DVT prophylaxis:  Mechanical DVT prophylaxis orders are present.    CODE STATUS:    Code Status (Patient has no pulse and is not breathing): CPR (Attempt to Resuscitate)  Medical Interventions " (Patient has pulse or is breathing): Full Support    Admission Status:  I believe this patient meets observation status.    I discussed the patient's findings and my recommendations with patient, family and nursing staff.    This patient has been examined wearing appropriate Personal Protective Equipment 05/15/23      Signature: Electronically signed by Kimberly Urbina DNP, APRN, 05/15/23, 01:00 EDT.  [facility]

## 2023-05-15 NOTE — CASE MANAGEMENT/SOCIAL WORK
Discharge Planning Assessment   [place]     Patient Name: Gideon Fields  MRN: [id number]  Today's Date: 5/15/2023    Admit Date: 5/14/2023    Plan: home   Discharge Needs Assessment     Row Name 05/15/23 1827       Living Environment    People in Home spouse    Current Living Arrangements home    Potentially Unsafe Housing Conditions none    Primary Care Provided by self    Provides Primary Care For no one    Able to Return to Prior Arrangements yes       Resource/Environmental Concerns    Resource/Environmental Concerns none    Transportation Concerns no car       Food Insecurity    Within the past 12 months, you worried that your food would run out before you got the money to buy more. Never true    Within the past 12 months, the food you bought just didn't last and you didn't have money to get more. Never true       Transition Planning    Patient/Family Anticipates Transition to home with family    Patient/Family Anticipated Services at Transition none    Transportation Anticipated family or friend will provide       Discharge Needs Assessment    Readmission Within the Last 30 Days no previous admission in last 30 days    Equipment Currently Used at Home nebulizer    Concerns to be Addressed denies needs/concerns at this time    Equipment Needed After Discharge none               Discharge Plan     Row Name 05/15/23 3668       Plan    Plan home    Plan Comments Met with Patient at bedside Lives at home with family. IADL's PCP and Pharmacy verified, able to afford medications. Denies any transportation or financial concerns. D/C BArriers: Neurology following              Continued Care and Services - Admitted Since 5/14/2023    Coordination has not been started for this encounter.          Demographic Summary     Row Name 05/15/23 3838       General Information    Admission Type observation    Arrived From emergency department    Referral Source admission list    Reason for Consult discharge planning    Preferred  Language English               Functional Status     Row Name 05/15/23 1823       Functional Status    Usual Activity Tolerance moderate    Current Activity Tolerance moderate       Functional Status, IADL    Medications independent    Meal Preparation independent    Housekeeping independent    Laundry independent    Shopping independent       Mental Status    General Appearance WDL WDL       Mental Status Summary    Recent Changes in Mental Status/Cognitive Functioning no changes              Met with patient at bedside wearing mask and goggles, Spent less than 15 minutes in room at greater than 6 feet distance.       Tati Cordoba RN

## 2023-05-16 LAB
ANION GAP SERPL CALCULATED.3IONS-SCNC: 10 MMOL/L (ref 5–15)
BASOPHILS # BLD AUTO: 0 10*3/MM3 (ref 0–0.2)
BASOPHILS NFR BLD AUTO: 0.3 % (ref 0–1.5)
BUN SERPL-MCNC: 11 MG/DL (ref 6–20)
BUN/CREAT SERPL: 16.2 (ref 7–25)
CALCIUM SPEC-SCNC: 8.7 MG/DL (ref 8.6–10.5)
CHLORIDE SERPL-SCNC: 103 MMOL/L (ref 98–107)
CO2 SERPL-SCNC: 24 MMOL/L (ref 22–29)
CREAT SERPL-MCNC: 0.68 MG/DL (ref 0.57–1)
DEPRECATED RDW RBC AUTO: 47.7 FL (ref 37–54)
EGFRCR SERPLBLD CKD-EPI 2021: 104.9 ML/MIN/1.73
EOSINOPHIL # BLD AUTO: 0.3 10*3/MM3 (ref 0–0.4)
EOSINOPHIL NFR BLD AUTO: 5.6 % (ref 0.3–6.2)
ERYTHROCYTE [DISTWIDTH] IN BLOOD BY AUTOMATED COUNT: 14.6 % (ref 12.3–15.4)
GLUCOSE SERPL-MCNC: 146 MG/DL (ref 65–99)
HCT VFR BLD AUTO: 33 % (ref 34–46.6)
HGB BLD-MCNC: 10.9 G/DL (ref 12–15.9)
LDH SERPL-CCNC: 181 U/L (ref 135–214)
LYMPHOCYTES # BLD AUTO: 1.9 10*3/MM3 (ref 0.7–3.1)
LYMPHOCYTES NFR BLD AUTO: 30.4 % (ref 19.6–45.3)
MCH RBC QN AUTO: 30.9 PG (ref 26.6–33)
MCHC RBC AUTO-ENTMCNC: 33.1 G/DL (ref 31.5–35.7)
MCV RBC AUTO: 93.4 FL (ref 79–97)
MONOCYTES # BLD AUTO: 0.8 10*3/MM3 (ref 0.1–0.9)
MONOCYTES NFR BLD AUTO: 13.2 % (ref 5–12)
NEUTROPHILS NFR BLD AUTO: 3.1 10*3/MM3 (ref 1.7–7)
NEUTROPHILS NFR BLD AUTO: 50.5 % (ref 42.7–76)
NRBC BLD AUTO-RTO: 0.1 /100 WBC (ref 0–0.2)
PLATELET # BLD AUTO: 265 10*3/MM3 (ref 140–450)
PMV BLD AUTO: 10.3 FL (ref 6–12)
POTASSIUM SERPL-SCNC: 4.2 MMOL/L (ref 3.5–5.2)
RBC # BLD AUTO: 3.53 10*6/MM3 (ref 3.77–5.28)
SODIUM SERPL-SCNC: 137 MMOL/L (ref 136–145)
WBC NRBC COR # BLD: 6.2 10*3/MM3 (ref 3.4–10.8)

## 2023-05-16 PROCEDURE — 99233 SBSQ HOSP IP/OBS HIGH 50: CPT | Performed by: NURSE PRACTITIONER

## 2023-05-16 PROCEDURE — 97162 PT EVAL MOD COMPLEX 30 MIN: CPT

## 2023-05-16 PROCEDURE — 94799 UNLISTED PULMONARY SVC/PX: CPT

## 2023-05-16 PROCEDURE — 85025 COMPLETE CBC W/AUTO DIFF WBC: CPT

## 2023-05-16 PROCEDURE — G0378 HOSPITAL OBSERVATION PER HR: HCPCS

## 2023-05-16 PROCEDURE — 94761 N-INVAS EAR/PLS OXIMETRY MLT: CPT

## 2023-05-16 PROCEDURE — 25010000002 LEVETIRACETAM IN NACL 0.82% 500 MG/100ML SOLUTION

## 2023-05-16 PROCEDURE — 83615 LACTATE (LD) (LDH) ENZYME: CPT | Performed by: NURSE PRACTITIONER

## 2023-05-16 PROCEDURE — 80048 BASIC METABOLIC PNL TOTAL CA: CPT

## 2023-05-16 PROCEDURE — 97166 OT EVAL MOD COMPLEX 45 MIN: CPT | Performed by: OCCUPATIONAL THERAPIST

## 2023-05-16 PROCEDURE — 94664 DEMO&/EVAL PT USE INHALER: CPT

## 2023-05-16 RX ORDER — TEMAZEPAM 15 MG/1
15 CAPSULE ORAL NIGHTLY PRN
Status: DISCONTINUED | OUTPATIENT
Start: 2023-05-16 | End: 2023-05-19 | Stop reason: HOSPADM

## 2023-05-16 RX ORDER — LANOLIN ALCOHOL/MO/W.PET/CERES
1000 CREAM (GRAM) TOPICAL DAILY
Status: DISCONTINUED | OUTPATIENT
Start: 2023-05-16 | End: 2023-05-19 | Stop reason: HOSPADM

## 2023-05-16 RX ADMIN — DULOXETINE HYDROCHLORIDE 60 MG: 30 CAPSULE, DELAYED RELEASE ORAL at 11:34

## 2023-05-16 RX ADMIN — OXYCODONE HYDROCHLORIDE AND ACETAMINOPHEN 500 MG: 500 TABLET ORAL at 11:34

## 2023-05-16 RX ADMIN — BACLOFEN 10 MG: 10 TABLET ORAL at 16:04

## 2023-05-16 RX ADMIN — Medication 10 ML: at 11:35

## 2023-05-16 RX ADMIN — LEVETIRACETAM 500 MG: 5 INJECTION INTRAVENOUS at 11:35

## 2023-05-16 RX ADMIN — DULOXETINE HYDROCHLORIDE 60 MG: 30 CAPSULE, DELAYED RELEASE ORAL at 20:03

## 2023-05-16 RX ADMIN — HYDROCODONE BITARTRATE AND ACETAMINOPHEN 1 TABLET: 7.5; 325 TABLET ORAL at 16:25

## 2023-05-16 RX ADMIN — MONTELUKAST 10 MG: 10 TABLET, FILM COATED ORAL at 20:03

## 2023-05-16 RX ADMIN — CYANOCOBALAMIN TAB 1000 MCG 1000 MCG: 1000 TAB at 16:04

## 2023-05-16 RX ADMIN — BUDESONIDE AND FORMOTEROL FUMARATE DIHYDRATE 2 PUFF: 160; 4.5 AEROSOL RESPIRATORY (INHALATION) at 07:54

## 2023-05-16 RX ADMIN — LEVETIRACETAM 500 MG: 5 INJECTION INTRAVENOUS at 20:03

## 2023-05-16 RX ADMIN — HYDROCODONE BITARTRATE AND ACETAMINOPHEN 1 TABLET: 7.5; 325 TABLET ORAL at 23:12

## 2023-05-16 RX ADMIN — BACLOFEN 10 MG: 10 TABLET ORAL at 20:03

## 2023-05-16 RX ADMIN — BUDESONIDE AND FORMOTEROL FUMARATE DIHYDRATE 2 PUFF: 160; 4.5 AEROSOL RESPIRATORY (INHALATION) at 21:52

## 2023-05-16 RX ADMIN — BACLOFEN 10 MG: 10 TABLET ORAL at 11:34

## 2023-05-16 RX ADMIN — Medication 10 ML: at 20:03
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
## 2023-05-16 NOTE — THERAPY EVALUATION
Patient Name: Gideon Fields  : 1970    MRN: [id number]                              Today's Date: 2023       Admit Date: 2023    Visit Dx:     ICD-10-CM ICD-9-CM   1. Focal seizure  R56.9 780.39   2. 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   • Lumbar radiculopathy   • Thoracic back pain   • History of lobectomy of lung   • Malignant neoplasm of upper lobe, right bronchus or lung (HCC)   • Mixed anxiety depressive disorder   • Gastroesophageal reflux disease   • Malignant neoplasm of upper lobe of right lung   • Chest pain   • Seasonal allergies   • Abnormal nuclear stress test   • S/P cardiac cath   • Focal seizure     Past Medical History:   Diagnosis Date   • Anxiety    • Asthma 2013   • Lumbar radiculopathy 3/12/2018   • Lung nodule seen on imaging study 10/2/2020   • Malignant neoplasm of upper lobe of right lung 2021   • Thoracic back pain 3/12/2018     Past Surgical History:   Procedure Laterality Date   • BREAST BIOPSY Right     benign   • BRONCHOSCOPY N/A 10/21/2020    Procedure: BRONCHOSCOPY WITH BRONCHOALVEOLAR LAVAGE, NAVIGATION WITH FINE NEEDLE ASPIRATION;  Surgeon: Nichol Jaquez MD;  Location: [facility] ENDOSCOPY;  Service: Pulmonary;  Laterality: N/A;  POST RUL NODULE   • CARDIAC CATHETERIZATION N/A 2022    Procedure: Left Heart Cath and coronary angiogram;  Surgeon: Chandana Tellez MD;  Location: [facility] CATH INVASIVE LOCATION;  Service: Cardiovascular;  Laterality: N/A;   •  SECTION      x3    • LUNG REMOVAL, PARTIAL Right 2020    right upper lobe    • NASAL SEPTUM SURGERY      20 Years Ago      General Information     Row Name 23 1544          OT Time and Intention    Document Type evaluation  -DT     Mode of Treatment occupational therapy  -DT     Row Name 23  1544          General Information    Patient Profile Reviewed yes  -DT     Prior Level of Function independent:;all household mobility;ADL's;driving;home management;work  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  -DT     Existing Precautions/Restrictions seizures;fall  Pt being evaluated for possible seizure activity.  -DT     Barriers to Rehab medically complex;cognitive status  -DT     Row Name 05/16/23 1544          Living Environment    People in Home child(jimmie), dependent;spouse  -DT     Row Name 05/16/23 1544          Home Main Entrance    Number of Stairs, Main Entrance seven  Pt lives in bi-level home.  -DT     Row Name 05/16/23 1544          Stairs Within Home, Primary    Stairs, Within Home, Primary Can function on one level PRN.  -DT     Row Name 05/16/23 1544          Cognition    Orientation Status (Cognition) oriented x 4  -DT     Row Name 05/16/23 1544          Safety Issues, Functional Mobility    Impairments Affecting Function (Mobility) balance;cognition;endurance/activity tolerance;postural/trunk control;muscle tone abnormal;motor control;strength  -DT           User Key  (r) = Recorded By, (t) = Taken By, (c) = Cosigned By    Initials Name Provider Type    DT Starla Lea, OT Occupational Therapist                 Mobility/ADL's     Row Name 05/16/23 1552          Bed Mobility    Bed Mobility supine-sit  -DT     Supine-Sit [place] (Bed Mobility) standby assist  -DT     Assistive Device (Bed Mobility) bed rails;head of bed elevated  -DT     Row Name 05/16/23 1552          Transfers    Transfers toilet transfer  -DT     Row Name 05/16/23 1552          Bed-Chair Transfer    Bed-Chair [place] (Transfers) minimum assist (75% patient effort)  -DT     Row Name 05/16/23 1552          Sit-Stand Transfer    Sit-Stand [place] (Transfers) minimum assist (75% patient effort)  -DT     Row Name 05/16/23 1552          Toilet Transfer    [place] Level (Toilet  Transfer) minimum assist (75% patient effort)  -DT     Row Name 05/16/23 1552          Activities of Daily Living    BADL Assessment/Intervention toileting  -DT     Row Name 05/16/23 1552          Toileting Assessment/Training    [place] Level (Toileting) toileting skills;contact guard assist  -DT           User Key  (r) = Recorded By, (t) = Taken By, (c) = Cosigned By    Initials Name Provider Type    DT Starla Lea OT Occupational Therapist               Obj/Interventions     Row Name 05/16/23 1553          Sensory Assessment (Somatosensory)    Sensory Assessment (Somatosensory) sensation intact  -DT     Row Name 05/16/23 1553          Range of Motion Comprehensive    General Range of Motion bilateral upper extremity ROM WFL;bilateral lower extremity ROM WFL  -DT     Row Name 05/16/23 1553          Strength Comprehensive (MMT)    General Manual Muscle Testing (MMT) Assessment upper extremity strength deficits identified  -DT     Comment, General Manual Muscle Testing (MMT) Assessment RUE = 3-/5, LUE=4/5  -DT     Row Name 05/16/23 1553          Balance    Balance Assessment sitting static balance;standing static balance;sitting dynamic balance;standing dynamic balance  -DT     Static Sitting Balance supervision  -DT     Dynamic Sitting Balance contact guard  -DT     Static Standing Balance minimal assist  -DT     Dynamic Standing Balance minimal assist  -DT           User Key  (r) = Recorded By, (t) = Taken By, (c) = Cosigned By    Initials Name Provider Type    DT Starla Lea, OT Occupational Therapist               Goals/Plan     Row Name 05/16/23 1611          Transfer Goal 1 (OT)    Activity/Assistive Device (Transfer Goal 1, OT) transfers, all  -DT     [place] Level/Cues Needed (Transfer Goal 1, OT) modified independence  -DT     Time Frame (Transfer Goal 1, OT) 2 weeks  -DT     Row Name 05/16/23 1611          Dressing Goal 1 (OT)    Activity/Device (Dressing Goal 1, OT)  dressing skills, all  -DT     [place]/Cues Needed (Dressing Goal 1, OT) modified independence  -DT     Time Frame (Dressing Goal 1, OT) 2 weeks  -DT     Row Name 05/16/23 1611          Grooming Goal 1 (OT)    Activity/Device (Grooming Goal 1, OT) grooming skills, all  -DT     [place] (Grooming Goal 1, OT) modified independence  -DT     Time Frame (Grooming Goal 1, OT) 2 weeks  -DT     Row Name 05/16/23 1611          Therapy Assessment/Plan (OT)    Planned Therapy Interventions (OT) activity tolerance training;adaptive equipment training;BADL retraining;functional balance retraining;neuromuscular control/coordination retraining;occupation/activity based interventions;patient/caregiver education/training;strengthening exercise;IADL retraining  -DT           User Key  (r) = Recorded By, (t) = Taken By, (c) = Cosigned By    Initials Name Provider Type    DT Starla Lea, OT Occupational Therapist               Clinical Impression     Row Name 05/16/23 1091          Pain Assessment    Pre/Posttreatment Pain Comment Pt has no c/o pain, but states her RUE is sore just from the constant tremoring  -DT     Row Name 05/16/23 0177          Plan of Care Review    Plan of Care Reviewed With patient;daughter  -DT     Outcome Evaluation Pt is a 52 y.o. F adm to ED on 05/14/23 with constant RUE tremoring, confusion, dysarthria. MRI & CT scan (-). EEG is currently pending. Pt states she had some intermittent min RUE tremoring primarily in the hand, but the tremoring is now in the whole RUE & is constant. It has also progressed into both LEs just since her ED admission. Current dx is possible focal seizures. PMH: lung CA with removal of  upper lobe of right lung. Pt states she also noticed new onset of inc. nerve pain around region of surgical scar. At baseline, pt lives in bi-level home with spouse & 2 children. 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 She is ind with all ADLs, IADLs & driving.  Upon eval, pt lying in bed with noticable RUE & BLE tremoring. She completed ADL transfers with min A & demo impaired standing static/dynamic balance & has dec. standing tolerance. She completes toileting & LB ADLs with min A & is unable to use right hand for feeding/grooming tasks due to tremoring. Will continue to follow for OT tx & recommend  acute inpt rehab upon dicharge. Discussed my recommendation with pt & pt's daughter discussing her risk for falls, evolving s/s &  impaired ability to safely & ind complete ADL transfer, ambulation, BADLs & IADLs.  -DT     Row Name 05/16/23 1556          Therapy Assessment/Plan (OT)    Rehab Potential (OT) good, to achieve stated therapy goals  -DT     Criteria for Skilled Therapeutic Interventions Met (OT) yes;meets criteria;skilled treatment is necessary  -DT     Therapy Frequency (OT) 5 times/wk  -DT     Predicted Duration of Therapy Intervention (OT) until d/c  -DT     Row Name 05/16/23 7221          Therapy Plan Review/Discharge Plan (OT)    Anticipated Discharge Disposition (OT) inpatient rehabilitation facility  -DT     Row Name 05/16/23 1610 05/16/23 7398       Positioning and Restraints    In Bed notified nsg;call light within reach;encouraged to call for assist;with family/caregiver;side rails up x2  -DT notified nsg;call light within reach;encouraged to call for assist;with family/caregiver;side rails up x2  -DT          User Key  (r) = Recorded By, (t) = Taken By, (c) = Cosigned By    Initials Name Provider Type    DT Starla Lea, OT Occupational Therapist               Outcome Measures     Row Name 05/16/23 8125          How much help from another person do you currently need...    Turning from your back to your side while in flat bed without using bedrails? 3  -CM     Moving from lying on back to sitting on the side of a flat bed without bedrails? 3  -CM     Moving to and from a bed to a chair (including a wheelchair)? 2  -CM     Standing up from a  chair using your arms (e.g., wheelchair, bedside chair)? 2  -CM     Climbing 3-5 steps with a railing? 1  -CM     To walk in hospital room? 2  -CM     AM-PAC 6 Clicks Score (PT) 13  -CM     Highest level of mobility 4 --> Transferred to chair/commode  -CM     Row Name 05/16/23 1558          Modified Dickey Scale    Pre-Stroke Modified Rhea Scale 1 - No significant disability despite symptoms.  Able to carry out all usual duties and activities.  -CM     Modified Rhea Scale 4 - Moderately severe disability.  Unable to walk without assistance, and unable to attend to own bodily needs without assistance.  -CM     Row Name 05/16/23 1558          Functional Assessment    Outcome Measure Options Modified Dickey  -CM           User Key  (r) = Recorded By, (t) = Taken By, (c) = Cosigned By    Initials Name Provider Type    Raisa Dudley, PT Physical Therapist                Occupational Therapy Education     Title: PT OT SLP Therapies (In Progress)     Topic: Occupational Therapy (In Progress)     Point: ADL training (Done)     Description:   Instruct learner(s) on proper safety adaptation and remediation techniques during self care or transfers.   Instruct in proper use of assistive devices.              Learning Progress Summary           Patient Acceptance, E,TB, VU by DT at 5/16/2023 1612    Comment: Role of OT,goals & POC, safety prec.   Family Acceptance, E,TB, VU by DT at 5/16/2023 1612    Comment: Role of OT,goals & POC, safety prec.                   Point: Home exercise program (Not Started)     Description:   Instruct learner(s) on appropriate technique for monitoring, assisting and/or progressing therapeutic exercises/activities.              Learner Progress:  Not documented in this visit.          Point: Precautions (Done)     Description:   Instruct learner(s) on prescribed precautions during self-care and functional transfers.              Learning Progress Summary           Patient Acceptance,  E,TB, VU by DT at 5/16/2023 1612    Comment: Role of OT,goals & POC, safety prec.   Family Acceptance, E,TB, VU by DT at 5/16/2023 1612    Comment: Role of OT,goals & POC, safety prec.                   Point: Body mechanics (Done)     Description:   Instruct learner(s) on proper positioning and spine alignment during self-care, functional mobility activities and/or exercises.              Learning Progress Summary           Patient Acceptance, E,TB, VU by DT at 5/16/2023 1612    Comment: Role of OT,goals & POC, safety prec.   Family Acceptance, E,TB, VU by DT at 5/16/2023 1612    Comment: Role of OT,goals & POC, safety prec.                               User Key     Initials Effective Dates Name Provider Type Discipline    DT 11/03/22 -  Starla Lea, OT Occupational Therapist OT              OT Recommendation and Plan  Planned Therapy Interventions (OT): activity tolerance training, adaptive equipment training, BADL retraining, functional balance retraining, neuromuscular control/coordination retraining, occupation/activity based interventions, patient/caregiver education/training, strengthening exercise, IADL retraining  Therapy Frequency (OT): 5 times/wk  Plan of Care Review  Plan of Care Reviewed With: patient, daughter  Outcome Evaluation: Pt is a 52 y.o. F adm to ED on 05/14/23 with constant RUE tremoring, confusion, dysarthria. MRI & CT scan (-). EEG is currently pending. Pt states she had some intermittent min RUE tremoring primarily in the hand, but the tremoring is now in the whole RUE & is constant. It has also progressed into both LEs just since her ED admission. Current dx is possible focal seizures. PMH: lung CA with removal of  upper lobe of right lung. Pt states she also noticed new onset of inc. nerve pain around region of surgical scar. At baseline, pt lives in bi-level home with spouse & 2 children. 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 She is ind with all ADLs,  IADLs & driving. Upon eval, pt lying in bed with noticable RUE & BLE tremoring. She completed ADL transfers with min A & demo impaired standing static/dynamic balance & has dec. standing tolerance. She completes toileting & LB ADLs with min A & is unable to use right hand for feeding/grooming tasks due to tremoring. Will continue to follow for OT tx & recommend  acute inpt rehab upon dicharge. Discussed my recommendation with pt & pt's daughter discussing her risk for falls, evolving s/s &  impaired ability to safely & ind complete ADL transfer, ambulation, BADLs & IADLs.     Time Calculation:    Time Calculation- OT     Row Name 05/16/23 1614 05/16/23 1613          Time Calculation- OT    OT Start Time -- 1300  -DT     OT Stop Time -- 1335  -DT     OT Time Calculation (min) -- 35 min  -DT     OT Received On 05/16/23  -DT --     OT - Next Appointment 05/17/23  -DT --     OT Goal Re-Cert Due Date 05/30/23  -DT --           User Key  (r) = Recorded By, (t) = Taken By, (c) = Cosigned By    Initials Name Provider Type    DT Starla Lea OT Occupational Therapist              Therapy Charges for Today     Code Description Service Date Service Provider Modifiers Qty    31386295766 HC OT EVAL MOD COMPLEXITY 4 5/16/2023 Starla Lea OT GO 1               Starla Lea OT  5/16/2023

## 2023-05-16 NOTE — DISCHARGE PLACEMENT REQUEST
"Natalie Fields (52 y.o. Female)     Date of Birth   1970    Social Security Number       Address   [address] [place] [address]    Home Phone   [phone]    MRN   [id number]       Synagogue   None    Marital Status                               Admission Date   5/14/23    Admission Type   Emergency    Admitting Provider   Boyd Cortez DO    Attending Provider   Bienvenido Morris DO    Department, Room/Bed   [facility] EMERGENCY DEPARTMENT, 36/36       Discharge Date       Discharge Disposition       Discharge Destination                               Attending Provider: Bienvenido Morris DO    Allergies: Bee Venom    Isolation: None   Infection: None   Code Status: CPR    Ht: 167.6 cm (66\")   Wt: 77.6 kg (171 lb)    Admission Cmt: None   Principal Problem: Focal seizure [R56.9]                 Active Insurance as of 5/14/2023     Primary Coverage     Payor Plan Insurance Group Employer/Plan Group    [id number]     Payor Plan Address Payor Plan Phone Number Payor Plan Fax Number Effective Dates    MAIL STOP:   2/16/2021 - None Entered    [address] [address]       [facility] [address]       Subscriber Name Subscriber Birth Date Member ID       NATALIE FIELDS 1970 [id number]                 Emergency Contacts      (Rel.) Home Phone Work Phone Mobile Phone    LILLIE FIELDS (Spouse) -- -- [phone]    WAYLONANGEL (Daughter) -- -- [phone]              "

## 2023-05-16 NOTE — PLAN OF CARE
Goal Outcome Evaluation:  Pt arrived from the ED, A&Ox4, v/s stable, call light in reach, RN will monitor.

## 2023-05-16 NOTE — THERAPY EVALUATION
Patient Name: Gideon Fields  : 1970    MRN: [id number]                              Today's Date: 2023       Admit Date: 2023    Visit Dx:     ICD-10-CM ICD-9-CM   1. Focal seizure  R56.9 780.39   2. 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   • Lumbar radiculopathy   • Thoracic back pain   • History of lobectomy of lung   • Malignant neoplasm of upper lobe, right bronchus or lung (HCC)   • Mixed anxiety depressive disorder   • Gastroesophageal reflux disease   • Malignant neoplasm of upper lobe of right lung   • Chest pain   • Seasonal allergies   • Abnormal nuclear stress test   • S/P cardiac cath   • Focal seizure     Past Medical History:   Diagnosis Date   • Anxiety    • Asthma 2013   • Lumbar radiculopathy 3/12/2018   • Lung nodule seen on imaging study 10/2/2020   • Malignant neoplasm of upper lobe of right lung 2021   • Thoracic back pain 3/12/2018     Past Surgical History:   Procedure Laterality Date   • BREAST BIOPSY Right     benign   • BRONCHOSCOPY N/A 10/21/2020    Procedure: BRONCHOSCOPY WITH BRONCHOALVEOLAR LAVAGE, NAVIGATION WITH FINE NEEDLE ASPIRATION;  Surgeon: Nichol Jaquez MD;  Location: [facility] ENDOSCOPY;  Service: Pulmonary;  Laterality: N/A;  POST RUL NODULE   • CARDIAC CATHETERIZATION N/A 2022    Procedure: Left Heart Cath and coronary angiogram;  Surgeon: Chandana Tellez MD;  Location: [facility] CATH INVASIVE LOCATION;  Service: Cardiovascular;  Laterality: N/A;   •  SECTION      x3    • LUNG REMOVAL, PARTIAL Right 2020    right upper lobe    • NASAL SEPTUM SURGERY      20 Years Ago      General Information     Row Name 23 1543          Physical Therapy Time and Intention    Document Type evaluation  -CM     Mode of Treatment physical therapy  -CM     Row Name  23 1541          General Information    Patient Profile Reviewed yes  -CM     Prior Level of Function independent:;community mobility;gait;driving  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  -CM     Existing Precautions/Restrictions fall  -CM     Barriers to Rehab medically complex;cognitive status  -CM     Row Name 23 1543          Living Environment    People in Home spouse;child(jimmie), dependent;other (see comments)  13 yo son plus 16 yo daughter (dtr home 1/2 time, has different mother so goes btwn homes)  -CM     Row Name 23 1543          Home Main Entrance    Number of Stairs, Main Entrance seven;other (see comments)  1 step up to porch, then 6-8 steps up/down; bilevel home; can function on one level  -CM     Stair Railings, Main Entrance railings safe and in good condition  -CM     Row Name 23 1543          Stairs Within Home, Primary    Stairs, Within Home, Primary can function on one level if needed  -CM     Number of Stairs, Within Home, Primary other (see comments)  -CM     Row Name 23 1543          Cognition    Orientation Status (Cognition) oriented to;person;place;situation;time  made errors in stating  and month of year, but immediately knew she had spoken incorrectly and was able to correct; dysarthria noted  -CM     Row Name 23 1543          Safety Issues, Functional Mobility    Impairments Affecting Function (Mobility) balance;cognition;endurance/activity tolerance;postural/trunk control;muscle tone abnormal;motor control;strength  -CM           User Key  (r) = Recorded By, (t) = Taken By, (c) = Cosigned By    Initials Name Provider Type    CM Raisa Hill, PT Physical Therapist               Mobility     Row Name 23 1545          Bed Mobility    Bed Mobility supine-sit  -CM     Supine-Sit [place] (Bed Mobility) minimum assist (75% patient effort)  -CM     Assistive Device (Bed Mobility) bed rails;head of bed elevated  -CM     Row Name 23 1544           Bed-Chair Transfer    Bed-Chair [place] (Transfers) moderate assist (50% patient effort);1 person assist  -CM     Assistive Device (Bed-Chair Transfers) other (see comments)  gait belt, non skid socks  -CM     Row Name 05/16/23 1547          Sit-Stand Transfer    Sit-Stand [place] (Transfers) minimum assist (75% patient effort);moderate assist (50% patient effort);1 person assist  -CM     Row Name 05/16/23 1547          Gait/Stairs (Locomotion)    [place] Level (Gait) not tested  -CM     Distance in Feet (Gait) very unsteady w/ transfers and having spasms of RLE and RUE; not safe to attempt amb this date  -CM           User Key  (r) = Recorded By, (t) = Taken By, (c) = Cosigned By    Initials Name Provider Type    Raisa Dudley, PT Physical Therapist               Obj/Interventions     Row Name 05/16/23 1549          Range of Motion Comprehensive    General Range of Motion bilateral upper extremity ROM WFL;bilateral lower extremity ROM WFL  -CM     Row Name 05/16/23 1549          Strength Comprehensive (MMT)    General Manual Muscle Testing (MMT) Assessment upper extremity strength deficits identified;lower extremity strength deficits identified  -CM     Comment, General Manual Muscle Testing (MMT) Assessment RUE 3-/5 and RLE 3-/5 w/ continuous tremors in RUE, intermittent tremors in RLE; LUE and LLE 4/5  -CM     Row Name 05/16/23 1549          Balance    Balance Assessment sitting static balance;sitting dynamic balance;standing static balance;standing dynamic balance  -CM     Static Sitting Balance supervision  -CM     Dynamic Sitting Balance contact guard  -CM     Position, Sitting Balance unsupported;sitting edge of bed  -CM     Static Standing Balance minimal assist;moderate assist  -CM     Dynamic Standing Balance moderate assist  -CM     Position/Device Used, Standing Balance supported  -CM     Row Name 05/16/23 1545          Sensory Assessment (Somatosensory)    Sensory  Assessment (Somatosensory) sensation intact  -CM           User Key  (r) = Recorded By, (t) = Taken By, (c) = Cosigned By    Initials Name Provider Type    Raisa Dudley, PT Physical Therapist               Goals/Plan     Row Name 05/16/23 2867          Bed Mobility Goal 1 (PT)    Activity/Assistive Device (Bed Mobility Goal 1, PT) bed mobility activities, all  -CM     [place] Level/Cues Needed (Bed Mobility Goal 1, PT) independent  -CM     Time Frame (Bed Mobility Goal 1, PT) 2 weeks  -CM     Row Name 05/16/23 1557          Transfer Goal 1 (PT)    Activity/Assistive Device (Transfer Goal 1, PT) transfers, all  -CM     [place] Level/Cues Needed (Transfer Goal 1, PT) independent  -CM     Time Frame (Transfer Goal 1, PT) 2 weeks  -CM     Row Name 05/16/23 1557          Gait Training Goal 1 (PT)    Activity/Assistive Device (Gait Training Goal 1, PT) gait (walking locomotion);walker, rolling  -CM     [place] Level (Gait Training Goal 1, PT) modified independence  -CM     Distance (Gait Training Goal 1, PT) 100 ft  -CM     Time Frame (Gait Training Goal 1, PT) 2 weeks  -CM     Row Name 05/16/23 2197          Therapy Assessment/Plan (PT)    Planned Therapy Interventions (PT) balance training;bed mobility training;gait training;patient/family education;neuromuscular re-education;motor coordination training;postural re-education;transfer training;ROM (range of motion);strengthening  -CM           User Key  (r) = Recorded By, (t) = Taken By, (c) = Cosigned By    Initials Name Provider Type    Raisa Dudley, PT Physical Therapist               Clinical Impression     Row Name 05/16/23 5816          Pain    Pretreatment Pain Rating 0/10 - no pain  -CM     Posttreatment Pain Rating 0/10 - no pain  -CM     Pain Intervention(s) Ambulation/increased activity;Repositioned;Emotional support  -CM     Row Name 05/16/23 6955          Plan of Care Review    Plan of Care Reviewed With patient;spouse  -CM      Outcome Evaluation 51 yo female adm 5/14/23 for acute onset of RUE tremoring, confusion, dysarthria. MRI and CT scan of brain (-). EEG pending. Neurology has reported this may be focal seizure activity. Hx of R lung lobectomy. At baseline, pt lives w/ , 15 yo son, and 16 yo dtr (dtr goes btwn her mother's home and pt's home). Son is home schooled. Pt supervises this in evenings. Pt lives in bilevel home w/ one stair to enter before reaching 6 stairs to either ascend or descend to reach functional level. Pt reports bed/bath on upper level. Pt reports she could go to her mother in law's home, where there would only be one step to enter.  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 Pt normally able to amb independently w/o assistive device for community distances; drives; works full time at insurance company. Pt reports tremoring began in R hand about 2-3 months ago, but just progressed to involve entire RUE and RLE at times. Today, pt needs min to mod assist to come to sitting, mod assist to transfer in/out of bed. Did not attempt amb, as pt was having significant tremoring of RLE in standing, and this was not deemed to be safe. Pt NOT safe for home at this time. Recommend acute IP rehab, pending progress. Will follow.  -     Row Name 05/16/23 5211          Therapy Assessment/Plan (PT)    Patient/Family Therapy Goals Statement (PT) pt hoping to improve enough to d/c home  -CM     Rehab Potential (PT) good, to achieve stated therapy goals  -CM     Criteria for Skilled Interventions Met (PT) yes;meets criteria;skilled treatment is necessary  -CM     Therapy Frequency (PT) 5 times/wk  -CM     Predicted Duration of Therapy Intervention (PT) until d/c  -CM     Row Name 05/16/23 8699          Vital Signs    O2 Delivery Pre Treatment room air  -CM     O2 Delivery Intra Treatment room air  -CM     O2 Delivery Post Treatment room air  -CM     Recovery Time VSS   -CM     Row Name 05/16/23 1555          Positioning and Restraints    Pre-Treatment Position in bed  -CM     Post Treatment Position chair  -CM     In Chair notified nsg;sitting;call light within reach;encouraged to call for assist;with family/caregiver  -           User Key  (r) = Recorded By, (t) = Taken By, (c) = Cosigned By    Initials Name Provider Type    Raisa Dudley, PT Physical Therapist               Outcome Measures     Row Name 05/16/23 1558          How much help from another person do you currently need...    Turning from your back to your side while in flat bed without using bedrails? 3  -CM     Moving from lying on back to sitting on the side of a flat bed without bedrails? 3  -CM     Moving to and from a bed to a chair (including a wheelchair)? 2  -CM     Standing up from a chair using your arms (e.g., wheelchair, bedside chair)? 2  -CM     Climbing 3-5 steps with a railing? 1  -CM     To walk in hospital room? 2  -CM     AM-PAC 6 Clicks Score (PT) 13  -CM     Highest level of mobility 4 --> Transferred to chair/commode  -     Row Name 05/16/23 1558          Modified Rhea Scale    Pre-Stroke Modified [place]hea Scale 1 - No significant disability despite symptoms.  Able to carry out all usual duties and activities.  -CM     Modified [place] Scale 4 - Moderately severe disability.  Unable to walk without assistance, and unable to attend to own bodily needs without assistance.  -     Row Name 05/16/23 1558          Functional Assessment    Outcome Measure Options Modified [facility]  -           User Key  (r) = Recorded By, (t) = Taken By, (c) = Cosigned By    Initials Name Provider Type    Raisa Dudley, PT Physical Therapist                             Physical Therapy Education     Title: PT OT SLP Therapies (Done)     Topic: Physical Therapy (Done)     Point: Mobility training (Done)     Learning Progress Summary           Patient Nonacceptance, E,TB, VU by COREY at 5/16/2023 1600    Significant Other Nonacceptance, E,TB, VU by CM at 5/16/2023 1600                   Point: Precautions (Done)     Learning Progress Summary           Patient Nonacceptance, E,TB, VU by CM at 5/16/2023 1600   Significant Other Nonacceptance, E,TB, VU by CM at 5/16/2023 1600                               User Key     Initials Effective Dates Name Provider Type Discipline    COREY 06/16/21 -  Raisa Hill, PT Physical Therapist PT              PT Recommendation and Plan  Planned Therapy Interventions (PT): balance training, bed mobility training, gait training, patient/family education, neuromuscular re-education, motor coordination training, postural re-education, transfer training, ROM (range of motion), strengthening  Plan of Care Reviewed With: patient, spouse  Outcome Evaluation: 53 yo female adm 5/14/23 for acute onset of RUE tremoring, confusion, dysarthria. MRI and CT scan of brain (-). EEG pending. Neurology has reported this may be focal seizure activity. Hx of R lung lobectomy. At baseline, pt lives w/ , 15 yo son, and 18 yo dtr (dtr goes btwn her mother's home and pt's home). Son is home schooled. Pt supervises this in evenings. Pt lives in bilevel home w/ one stair to enter before reaching 6 stairs to either ascend or descend to reach functional level. Pt reports bed/bath on upper level. Pt reports she could go to her mother in law's home, where there would only be one step to enter.  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 Pt normally able to amb independently w/o assistive device for community distances; drives; works full time at insurance company. Pt reports tremoring began in R hand about 2-3 months ago, but just progressed to involve entire RUE and RLE at times. Today, pt needs min to mod assist to come to sitting, mod assist to transfer in/out of bed. Did not attempt amb, as pt was having significant tremoring of RLE in  standing, and this was not deemed to be safe. Pt NOT safe for home at this time. Recommend acute IP rehab, pending progress. Will follow.     Time Calculation:    PT Charges     Row Name 05/16/23 1600             Time Calculation    Start Time 1025  -CM      Stop Time 1104  -CM      Time Calculation (min) 39 min  -CM      PT Received On 05/16/23  -CM      PT - Next Appointment 05/17/23  -CM      PT Goal Re-Cert Due Date 05/30/23  -CM         Time Calculation- PT    Total Timed Code Minutes- PT 0 minute(s)  -CM            User Key  (r) = Recorded By, (t) = Taken By, (c) = Cosigned By    Initials Name Provider Type    CM Raisa Hill, PT Physical Therapist              Therapy Charges for Today     Code Description Service Date Service Provider Modifiers Qty    53544695754 HC PT EVAL MOD COMPLEXITY 4 5/16/2023 Raisa Hill, PT GP 1          PT G-Codes  Outcome Measure Options: Modified [place]  AM-PAC 6 Clicks Score (PT): 13  Modified [place] Scale: 4 - Moderately severe disability.  Unable to walk without assistance, and unable to attend to own bodily needs without assistance.  PT Discharge Summary  Anticipated Discharge Disposition (PT): inpatient rehabilitation facility    Raisa Hill, PT  5/16/2023

## 2023-05-16 NOTE — PROGRESS NOTES
[facility]   Daily Progress Note      Patient Name: Gideon Fields  : 1970  MRN: [id number]  Primary Care Physician:  Junie Le APRN  Date of admission: 2023      Subjective      Chief Complaint: Tremor    Patient seen and examined this morning.  Taking over care today, still having right hand/arm tremor and now has developed bilateral leg tremors whenever she ambulates.  She feels weak overall.  Left arm/hand is fine.  Denies any headache, palpitations, chest pain, shortness of breath.  Denies any previous history of seizures.  Awaiting neurology evaluation today, EEG pending.    Pertinent positives as noted in HPI/subjective.  All other systems were reviewed and are negative.      Objective      Vitals:   Temp:  [97.3 °F (36.3 °C)-98 °F (36.7 °C)] 97.3 °F (36.3 °C)  Heart Rate:  [] 96  Resp:  [13-20] 17  BP: (108-142)/(59-77) 121/77    Physical Exam:    General: Awake, alert, NAD  Eyes: PERRL, EOMI, conjunctivae are clear  Cardiovascular: Regular rate and rhythm, no murmurs  Respiratory: Clear to auscultation bilaterally, no wheezing or rales, unlabored breathing  Abdomen: Soft, nontender, positive bowel sounds, no guarding  Neurologic: A&O, with some slurred speech noted otherwise CN grossly intact, moves all extremities spontaneously, right upper extremity tremors noted which is continuous at the time my exam, mild bilateral extremity tremors also noted  Musculoskeletal: Generalized weakness, no other gross deformities  Skin: Warm, dry, intact         Result Review    Result Review:  I have personally reviewed the results from the time of this admission to 2023 10:35 EDT and agree with these findings:  [x]  Laboratory  [x]  Microbiology  [x]  Radiology  []  EKG/Telemetry   []  Cardiology/Vascular   []  Pathology  [x]  Old records  []  Other:          Assessment & Plan      Brief Patient Summary:  Gideon Fields is a 52 y.o. female who      vitamin C, 500  mg, Oral, Daily  baclofen, 10 mg, Oral, TID  budesonide-formoterol, 2 puff, Inhalation, BID  DULoxetine, 60 mg, Oral, BID  Lacosamide, 100 mg, Intravenous, Q12H  levETIRAcetam, 500 mg, Intravenous, Q12H  montelukast, 10 mg, Oral, Nightly  sodium chloride, 10 mL, Intravenous, Q12H             I have utilized all available, immediate resources to obtain, update, or review the patient's current medications including all prescriptions, over-the-counter products, herbals, cannabis/cannabidiol products, and vitamin.mineral/dietary (nutritional) supplements.    Active Hospital Problems:  Active Hospital Problems    Diagnosis    • **Focal seizure    • Seasonal allergies    • History of lobectomy of lung    • 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    • Anxiety    • Depression    • Hyperlipidemia      Plan:     RUE tremors, slurred speech  -Possible focal seizures  -Prolactin levels elevated  -MRI brain with and without contrast negative for acute findings  -EEG pending, continue seizure precautions  -On Keppra and Vimpat per neurology  -PT eval  -Neurology following    Chronic depression/anxiety  -Continue duloxetine  -Hold BuSpar due to possible seizure as above    Chronic back pain  -Continue home meds as tolerated  -Monitor    History of lung cancer  -Status post lobectomy  -In remission, chest x-ray stable  -Continue outpatient monitoring    DVT prophylaxis  -SCDs    CODE STATUS:    Code Status (Patient has no pulse and is not breathing): CPR (Attempt to Resuscitate)  Medical Interventions (Patient has pulse or is breathing): Full Support      Disposition: Pending improvement    Electronically signed by Bienvenido Morris DO, 05/16/23, 10:35 EDT.  [facility] Hospitalist Team      Part of this note may be an electronic transcription/translation of spoken language to printed text using the Dragon Dictation System.

## 2023-05-16 NOTE — CASE MANAGEMENT/SOCIAL WORK
Continued Stay Note   Wilbert     Patient Name: Gideon Fields  MRN: [id number]  Today's Date: 5/16/2023    Admit Date: 5/14/2023    Plan: Referral to [facility] pending acceptance will nee Precert VS Home with Family and OP PT At [facility] (will need Order, Demo's, H&P and PT notes faxed)   Discharge Plan     Row Name 05/16/23 1543       Plan    Plan Referral to [facility] pending acceptance will nee Precert VS Home with Family and OP PT At [facility] (will need Order, Demo's, H&P and PT notes faxed)    Plan Comments Was Contact by OT after working with patient and requested to come speak with patient and family again per patient she may now be agreeable to Inpatient and wants to talk with [facility] about inpatient and to see if she will quailfy. Referral sent in [facility] and liaison Stephany and Marcelle notified.                                                              Discharge Codes    No documentation.               Expected Discharge Date and Time     Expected Discharge Date Expected Discharge Time    May 17, 2023             Tati Cordoba RN

## 2023-05-16 NOTE — PLAN OF CARE
Goal Outcome Evaluation:  Plan of Care Reviewed With: patient, daughter           Outcome Evaluation: Pt is a 52 y.o. F adm to ED on 05/14/23 with constant RUE tremoring, confusion, dysarthria. MRI & CT scan (-). EEG is currently pending. Pt states she had some intermittent min RUE tremoring primarily in the hand, but the tremoring is now in the whole RUE & is constant. It has also progressed into both LEs just since her ED admission. Current dx is possible focal seizures. PMH: lung CA with removal of  upper lobe of right lung. Pt states she also noticed new onset of inc. nerve pain around region of surgical scar. At baseline, pt lives in bi-level home with spouse & 2 children. 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 She is ind with all ADLs, IADLs & driving. Upon eval, pt lying in bed with noticable RUE & BLE tremoring. She completed ADL transfers with min A & demo impaired standing static/dynamic balance & has dec. standing tolerance. She completes toileting & LB ADLs with min A & is unable to use right hand for feeding/grooming tasks due to tremoring. Will continue to follow for OT tx & recommend  acute inpt rehab upon dicharge. Discussed my recommendation with pt & pt's daughter discussing her risk for falls, evolving s/s &  impaired ability to safely & ind complete ADL transfer, ambulation, BADLs & IADLs.

## 2023-05-16 NOTE — CASE MANAGEMENT/SOCIAL WORK
Continued Stay Note  WILBERTO Atkins     Patient Name: Gideon Fields  MRN: [id number]  Today's Date: 5/16/2023    Admit Date: 5/14/2023    Plan: Home withfamily, Declined IP, wants  OP PT at [facility] (will need Order, Demo's, H&P and PT notes faxed)   Discharge Plan     Row Name 05/16/23 1428       Plan    Plan Home withfamily, Declined IP, wants  OP PT at [facility] (will need Order, Demo's, H&P and PT notes faxed)    Patient/Family in Agreement with Plan yes    Provided Post Acute Provider List? Yes    Post Acute Provider List Outpatient Therapy    Delivered To Patient    Method of Delivery In person    Plan Comments Met with Patient and family at bedside to review PT recommendations. Patient declined Inpatient and wants Outpatient Therapy at [facility]. Called and Spoke with Penelope at Outpatient [facility] as the Following will need to be faxed once available and then they will call patient to Schedule. Secure Chat to Dr kuhn requesting orders. D/C Barriers: neurology consult and testing pending                  Tati Cordoba RN

## 2023-05-16 NOTE — PLAN OF CARE
Goal Outcome Evaluation:  Plan of Care Reviewed With: patient, spouse           Outcome Evaluation: 53 yo female adm 5/14/23 for acute onset of RUE tremoring, confusion, dysarthria. MRI and CT scan of brain (-). EEG pending. Neurology has reported this may be focal seizure activity. Hx of R lung lobectomy. At baseline, pt lives w/ , 15 yo son, and 16 yo dtr (dtr goes btwn her mother's home and pt's home). Son is home schooled. Pt supervises this in evenings. Pt lives in bilevel home w/ one stair to enter before reaching 6 stairs to either ascend or descend to reach functional level. Pt reports bed/bath on upper level. Pt reports she could go to her mother in law's home, where there would only be one step to enter.  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 Pt normally able to amb independently w/o assistive device for community distances; drives; works full time at insurance company. Pt reports tremoring began in R hand about 2-3 months ago, but just progressed to involve entire RUE and RLE at times. Today, pt needs min to mod assist to come to sitting, mod assist to transfer in/out of bed. Did not attempt amb, as pt was having significant tremoring of RLE in standing, and this was not deemed to be safe. Pt NOT safe for home at this time. Recommend acute IP rehab, pending progress. Will follow.

## 2023-05-16 NOTE — DISCHARGE PLACEMENT REQUEST
"Natalie Fields (52 y.o. Female)     Date of Birth   1970    Social Security Number       Address   [address] [place] [address]    Home Phone   [phone]    MRN   [id number]       Sabianism   None    Marital Status                               Admission Date   5/14/23    Admission Type   Emergency    Admitting Provider   Boyd Cortez DO    Attending Provider   Bienvenido Morris DO    Department, Room/Bed   [facility] EMERGENCY DEPARTMENT, 36/36       Discharge Date       Discharge Disposition       Discharge Destination                               Attending Provider: Bienvenido Morris DO    Allergies: Bee Venom    Isolation: None   Infection: None   Code Status: CPR    Ht: 167.6 cm (66\")   Wt: 77.6 kg (171 lb)    Admission Cmt: None   Principal Problem: Focal seizure [R56.9]                 Active Insurance as of 5/14/2023     Primary Coverage     Payor Plan Insurance Group Employer/Plan Group    [id number]     Payor Plan Address Payor Plan Phone Number Payor Plan Fax Number Effective Dates    MAIL STOP:   2/16/2021 - None Entered    [address] [address]       [facility] [address]       Subscriber Name Subscriber Birth Date Member ID       NATALIE FIELDS 1970 [id number]                 Emergency Contacts      (Rel.) Home Phone Work Phone Mobile Phone    LILLIE FIELDS (Spouse) -- -- [phone]    WAYLONANGEL (Daughter) -- -- [phone]              "

## 2023-05-16 NOTE — PLAN OF CARE
Goal Outcome Evaluation:                   No new concerns overnight.  at bedside. 1 Assist to bathroom with slight tremors noted in extremities. Call light in reach.

## 2023-05-17 ENCOUNTER — APPOINTMENT (OUTPATIENT)
Dept: NEUROLOGY | Facility: HOSPITAL | Age: 53
End: 2023-05-17
Payer: MEDICAID

## 2023-05-17 LAB
ANION GAP SERPL CALCULATED.3IONS-SCNC: 10 MMOL/L (ref 5–15)
BASOPHILS # BLD AUTO: 0.1 10*3/MM3 (ref 0–0.2)
BASOPHILS NFR BLD AUTO: 0.8 % (ref 0–1.5)
BUN SERPL-MCNC: 10 MG/DL (ref 6–20)
BUN/CREAT SERPL: 14.1 (ref 7–25)
CALCIUM SPEC-SCNC: 9.3 MG/DL (ref 8.6–10.5)
CHLORIDE SERPL-SCNC: 102 MMOL/L (ref 98–107)
CO2 SERPL-SCNC: 24 MMOL/L (ref 22–29)
CREAT SERPL-MCNC: 0.71 MG/DL (ref 0.57–1)
DEPRECATED RDW RBC AUTO: 48.6 FL (ref 37–54)
EGFRCR SERPLBLD CKD-EPI 2021: 102.5 ML/MIN/1.73
EOSINOPHIL # BLD AUTO: 0.4 10*3/MM3 (ref 0–0.4)
EOSINOPHIL NFR BLD AUTO: 5.1 % (ref 0.3–6.2)
ERYTHROCYTE [DISTWIDTH] IN BLOOD BY AUTOMATED COUNT: 14.7 % (ref 12.3–15.4)
GLUCOSE SERPL-MCNC: 145 MG/DL (ref 65–99)
HCT VFR BLD AUTO: 35.8 % (ref 34–46.6)
HGB BLD-MCNC: 11.5 G/DL (ref 12–15.9)
LYMPHOCYTES # BLD AUTO: 1.9 10*3/MM3 (ref 0.7–3.1)
LYMPHOCYTES NFR BLD AUTO: 26.7 % (ref 19.6–45.3)
MCH RBC QN AUTO: 30.2 PG (ref 26.6–33)
MCHC RBC AUTO-ENTMCNC: 32.1 G/DL (ref 31.5–35.7)
MCV RBC AUTO: 94 FL (ref 79–97)
MONOCYTES # BLD AUTO: 0.8 10*3/MM3 (ref 0.1–0.9)
MONOCYTES NFR BLD AUTO: 11.2 % (ref 5–12)
NEUTROPHILS NFR BLD AUTO: 4.1 10*3/MM3 (ref 1.7–7)
NEUTROPHILS NFR BLD AUTO: 56.2 % (ref 42.7–76)
NRBC BLD AUTO-RTO: 0.1 /100 WBC (ref 0–0.2)
PLATELET # BLD AUTO: 288 10*3/MM3 (ref 140–450)
PMV BLD AUTO: 10.2 FL (ref 6–12)
POTASSIUM SERPL-SCNC: 4.3 MMOL/L (ref 3.5–5.2)
RBC # BLD AUTO: 3.81 10*6/MM3 (ref 3.77–5.28)
SODIUM SERPL-SCNC: 136 MMOL/L (ref 136–145)
WBC NRBC COR # BLD: 7.3 10*3/MM3 (ref 3.4–10.8)

## 2023-05-17 PROCEDURE — 94799 UNLISTED PULMONARY SVC/PX: CPT

## 2023-05-17 PROCEDURE — 94664 DEMO&/EVAL PT USE INHALER: CPT

## 2023-05-17 PROCEDURE — 36415 COLL VENOUS BLD VENIPUNCTURE: CPT

## 2023-05-17 PROCEDURE — 95816 EEG AWAKE AND DROWSY: CPT

## 2023-05-17 PROCEDURE — 97110 THERAPEUTIC EXERCISES: CPT

## 2023-05-17 PROCEDURE — 97116 GAIT TRAINING THERAPY: CPT

## 2023-05-17 PROCEDURE — 94761 N-INVAS EAR/PLS OXIMETRY MLT: CPT

## 2023-05-17 PROCEDURE — 85025 COMPLETE CBC W/AUTO DIFF WBC: CPT

## 2023-05-17 PROCEDURE — 99232 SBSQ HOSP IP/OBS MODERATE 35: CPT | Performed by: NURSE PRACTITIONER

## 2023-05-17 PROCEDURE — 97535 SELF CARE MNGMENT TRAINING: CPT

## 2023-05-17 PROCEDURE — 97530 THERAPEUTIC ACTIVITIES: CPT

## 2023-05-17 PROCEDURE — 95816 EEG AWAKE AND DROWSY: CPT | Performed by: PSYCHIATRY & NEUROLOGY

## 2023-05-17 PROCEDURE — 80048 BASIC METABOLIC PNL TOTAL CA: CPT

## 2023-05-17 PROCEDURE — 25010000002 LEVETIRACETAM IN NACL 0.82% 500 MG/100ML SOLUTION

## 2023-05-17 RX ORDER — LEVETIRACETAM 500 MG/1
500 TABLET ORAL EVERY 12 HOURS SCHEDULED
Status: DISCONTINUED | OUTPATIENT
Start: 2023-05-17 | End: 2023-05-19 | Stop reason: HOSPADM

## 2023-05-17 RX ORDER — TIZANIDINE 2 MG/1
2 TABLET ORAL EVERY 8 HOURS PRN
COMMUNITY

## 2023-05-17 RX ORDER — DIVALPROEX SODIUM 250 MG/1
250 TABLET, DELAYED RELEASE ORAL EVERY 8 HOURS SCHEDULED
Status: DISCONTINUED | OUTPATIENT
Start: 2023-05-17 | End: 2023-05-17

## 2023-05-17 RX ADMIN — BUDESONIDE AND FORMOTEROL FUMARATE DIHYDRATE 2 PUFF: 160; 4.5 AEROSOL RESPIRATORY (INHALATION) at 07:20

## 2023-05-17 RX ADMIN — BACLOFEN 10 MG: 10 TABLET ORAL at 20:04

## 2023-05-17 RX ADMIN — BUDESONIDE AND FORMOTEROL FUMARATE DIHYDRATE 2 PUFF: 160; 4.5 AEROSOL RESPIRATORY (INHALATION) at 19:44

## 2023-05-17 RX ADMIN — BACLOFEN 10 MG: 10 TABLET ORAL at 15:55

## 2023-05-17 RX ADMIN — CYANOCOBALAMIN TAB 1000 MCG 1000 MCG: 1000 TAB at 09:16

## 2023-05-17 RX ADMIN — HYDROCODONE BITARTRATE AND ACETAMINOPHEN 1 TABLET: 7.5; 325 TABLET ORAL at 20:44

## 2023-05-17 RX ADMIN — HYDROCODONE BITARTRATE AND ACETAMINOPHEN 1 TABLET: 7.5; 325 TABLET ORAL at 12:07

## 2023-05-17 RX ADMIN — DULOXETINE HYDROCHLORIDE 60 MG: 30 CAPSULE, DELAYED RELEASE ORAL at 09:15

## 2023-05-17 RX ADMIN — Medication 10 ML: at 20:05

## 2023-05-17 RX ADMIN — LEVETIRACETAM 500 MG: 500 TABLET, FILM COATED ORAL at 20:04

## 2023-05-17 RX ADMIN — LEVETIRACETAM 500 MG: 5 INJECTION INTRAVENOUS at 09:15

## 2023-05-17 RX ADMIN — DIVALPROEX SODIUM 250 MG: 250 TABLET, DELAYED RELEASE ORAL at 12:03

## 2023-05-17 RX ADMIN — MONTELUKAST 10 MG: 10 TABLET, FILM COATED ORAL at 20:04

## 2023-05-17 RX ADMIN — OXYCODONE HYDROCHLORIDE AND ACETAMINOPHEN 500 MG: 500 TABLET ORAL at 09:16

## 2023-05-17 RX ADMIN — Medication 10 ML: at 09:16

## 2023-05-17 RX ADMIN — BACLOFEN 10 MG: 10 TABLET ORAL at 09:16

## 2023-05-17 NOTE — CASE MANAGEMENT/SOCIAL WORK
Continued Stay Note  [facility]     Patient Name: Gideon Fields  MRN: [id number]  Today's Date: 5/17/2023    Admit Date: 5/14/2023    Plan: D/C Plan: [facility] accepted; Pre-cert started at Noon.  Transport facility van vs family car.   Discharge Plan     Row Name 05/17/23 1409       Plan    Plan D/C Plan: [facility] accepted; Pre-cert started at Noon.  Transport facility van vs family car.           Expected Discharge Date and Time     Expected Discharge Date Expected Discharge Time    May 17, 2023             Naa Peres RN

## 2023-05-17 NOTE — PLAN OF CARE
Goal Outcome Evaluation:     Problem: Adult Inpatient Plan of Care  Goal: Plan of Care Review  Outcome: Ongoing, Progressing  Goal: Patient-Specific Goal (Individualized)  Outcome: Ongoing, Progressing  Goal: Absence of Hospital-Acquired Illness or Injury  Outcome: Ongoing, Progressing  Intervention: Identify and Manage Fall Risk  Recent Flowsheet Documentation  Taken 5/17/2023 0600 by Miladys Fang RN  Safety Promotion/Fall Prevention:   safety round/check completed   room organization consistent   fall prevention program maintained   clutter free environment maintained   assistive device/personal items within reach  Taken 5/17/2023 0400 by Miladys Fang RN  Safety Promotion/Fall Prevention:   safety round/check completed   room organization consistent   fall prevention program maintained   clutter free environment maintained   assistive device/personal items within reach   activity supervised  Taken 5/17/2023 0200 by Miladys Fang RN  Safety Promotion/Fall Prevention:   safety round/check completed   room organization consistent   fall prevention program maintained   assistive device/personal items within reach   clutter free environment maintained   activity supervised  Taken 5/17/2023 0000 by Miladys Fang RN  Safety Promotion/Fall Prevention:   safety round/check completed   room organization consistent   fall prevention program maintained   clutter free environment maintained   assistive device/personal items within reach   activity supervised  Taken 5/16/2023 2200 by Miladys Fang RN  Safety Promotion/Fall Prevention:   room organization consistent   safety round/check completed   fall prevention program maintained   assistive device/personal items within reach   clutter free environment maintained   activity supervised  Taken 5/16/2023 1952 by Miladys Fang RN  Safety Promotion/Fall Prevention:   safety round/check completed   room organization consistent   fall prevention program maintained   clutter free  environment maintained   assistive device/personal items within reach  Intervention: Prevent Skin Injury  Recent Flowsheet Documentation  Taken 5/17/2023 0400 by Miladys Fang RN  Body Position: position changed independently  Taken 5/17/2023 0000 by Miladys Fang RN  Body Position: position changed independently  Taken 5/16/2023 1952 by Miladys Fang RN  Body Position: position changed independently  Intervention: Prevent and Manage VTE (Venous Thromboembolism) Risk  Recent Flowsheet Documentation  Taken 5/16/2023 1952 by Miladys Fang RN  Activity Management: ambulated to bathroom  Intervention: Prevent Infection  Recent Flowsheet Documentation  Taken 5/17/2023 0600 by Miladys Fang RN  Infection Prevention:   hand hygiene promoted   personal protective equipment utilized   rest/sleep promoted  Taken 5/17/2023 0400 by Miladys Fang RN  Infection Prevention:   hand hygiene promoted   personal protective equipment utilized   rest/sleep promoted  Taken 5/17/2023 0200 by Miladys Fang RN  Infection Prevention:   hand hygiene promoted   personal protective equipment utilized   rest/sleep promoted  Taken 5/17/2023 0000 by Miladys Fang RN  Infection Prevention:   hand hygiene promoted   personal protective equipment utilized   rest/sleep promoted  Taken 5/16/2023 2200 by Miladys Fang RN  Infection Prevention:   hand hygiene promoted   personal protective equipment utilized   rest/sleep promoted  Taken 5/16/2023 1952 by Miladys Fang RN  Infection Prevention:   hand hygiene promoted   personal protective equipment utilized   rest/sleep promoted  Goal: Optimal Comfort and Wellbeing  Outcome: Ongoing, Progressing  Intervention: Monitor Pain and Promote Comfort  Recent Flowsheet Documentation  Taken 5/16/2023 1952 by Miladys Fang RN  Pain Management Interventions:   heat applied   position adjusted  Intervention: Provide Person-Centered Care  Recent Flowsheet Documentation  Taken 5/17/2023 0400 by Miladys Fang RN  Trust  Relationship/Rapport: care explained  Taken 5/17/2023 0000 by Miladys Fang RN  Trust Relationship/Rapport: care explained  Taken 5/16/2023 1952 by Miladys Fang RN  Trust Relationship/Rapport:   care explained   questions answered   thoughts/feelings acknowledged  Goal: Readiness for Transition of Care  Outcome: Ongoing, Progressing     Problem: Fall Injury Risk  Goal: Absence of Fall and Fall-Related Injury  Outcome: Ongoing, Progressing  Intervention: Identify and Manage Contributors  Recent Flowsheet Documentation  Taken 5/16/2023 1952 by Miladys Fang RN  Medication Review/Management: medications reviewed  Intervention: Promote Injury-Free Environment  Recent Flowsheet Documentation  Taken 5/17/2023 0600 by Miladys Fang RN  Safety Promotion/Fall Prevention:   safety round/check completed   room organization consistent   fall prevention program maintained   clutter free environment maintained   assistive device/personal items within reach  Taken 5/17/2023 0400 by Miladys Fang RN  Safety Promotion/Fall Prevention:   safety round/check completed   room organization consistent   fall prevention program maintained   clutter free environment maintained   assistive device/personal items within reach   activity supervised  Taken 5/17/2023 0200 by Miladys Fang RN  Safety Promotion/Fall Prevention:   safety round/check completed   room organization consistent   fall prevention program maintained   assistive device/personal items within reach   clutter free environment maintained   activity supervised  Taken 5/17/2023 0000 by Miladys Fang RN  Safety Promotion/Fall Prevention:   safety round/check completed   room organization consistent   fall prevention program maintained   clutter free environment maintained   assistive device/personal items within reach   activity supervised  Taken 5/16/2023 2200 by Miladys Fang RN  Safety Promotion/Fall Prevention:   room organization consistent   safety round/check completed    fall prevention program maintained   assistive device/personal items within reach   clutter free environment maintained   activity supervised  Taken 5/16/2023 1952 by Miladys Fang RN  Safety Promotion/Fall Prevention:   safety round/check completed   room organization consistent   fall prevention program maintained   clutter free environment maintained   assistive device/personal items within reach

## 2023-05-17 NOTE — PROCEDURES
This is an inpatient,   Digitally recorded multi-montage EEG with leads placed according to the international 10/20 system  Photic stimulation was attempted   hyperventilation was attempted    With the patient fully aroused, there is significant faster frequencies, more beta activity, the background was 8 to 9 Hz but on top of that is motion and tremor type of artifact.  It is about 5 to 7 Hz  It can be better defined on the left side  But otherwise is present throughout    No clear epileptiform activity    I tried to use different high-frequency filters and still did not see any thing that is out of the ordinary    Patient continues to have tremor-like activity which correlates with this motion artifact so I think it is a flapping motion type of activity rather than true epileptiform discharges    Hyperventilation was attempted but I did not see any significant changes  Photic summation was attempted in intermittent stepwise fashion up to the flash frequency of 21 Hz but I did not see any driving, asymmetry or paroxysmal activity    Nothing suggesting clear-cut epileptiform activity or asymmetry  No clinical events      Impression:    This is an EEG with significant artifact and beta activity  Was mostly awake and drowsy    There are no correlates to her tremors so I do not think these are epileptiform discharges or epilepsy otherwise    Nonspecific  No seizures but EEG like this does not rule out epilepsy

## 2023-05-17 NOTE — NURSING NOTE
Second nurse skin assessment completed at this time.  This writer agrees with the findings of the primary nurse at the time of admission.

## 2023-05-17 NOTE — PLAN OF CARE
Goal Outcome Evaluation:                       Gideon Fields is a 51 y/o F who presented with R UE tremor, difficulty with speech intermittent for a month but progressively worsening.  EEG (-). R UE tremors do not correlate with EEG for focal seizures and are distractible on exam. Per neurology they are non epileptic events. Patient has flapping tremor on presentation this date. Once standing she has LE involvement with consistent tremoring of legs during ambulation which leads to postural instability. This slightly improves initially with RW but then worsens again. Pt had loss of balance requiring mod A while ambulating but required at least  Min A. Min-mod A for transfers as well. Pt with poor eccentric control with transfers. Repeated cues for this and for hand placement. Small improvements with repetition. Continuing to recommend IP rehab at d/c due to decline from baseline independent function. Will continue to follow and progress as tolerated.

## 2023-05-17 NOTE — THERAPY TREATMENT NOTE
Subjective: Pt agreeable to therapeutic plan of care. States she has been ambulating to toilet with staff today but her tremors are still significantly limiting to her.     Objective:     Bed mobility - Min-A  Transfers - Min-A, Mod-A and with rolling walker  Ambulation - 20 feet x 2 Min-A, Mod-A and with rolling walker        Vitals: WNL    Pain: 5 VAS   Location: chronic back pain  Intervention for pain: Repositioned, RN provided medication and RN notified    Education: Provided education on the importance of mobility in the acute care setting, Verbal/Tactile Cues, Transfer Training and Gait Training    Assessment: Gideon Fields is a 53 y/o F who presented with R UE tremor, difficulty with speech intermittent for a month but progressively worsening.  EEG (-). R UE tremors do not correlate with EEG for focal seizures and are distractible on exam. Per neurology they are non epileptic events. Patient has flapping tremor on presentation this date. Once standing she has LE involvement with consistent tremoring of legs during ambulation which leads to postural instability. This slightly improves initially with RW but then worsens again. Pt had loss of balance requiring mod A while ambulating but required at least  Min A. Min-mod A for transfers as well. Pt with poor eccentric control with transfers. Repeated cues for this and for hand placement. Small improvements with repetition. Continuing to recommend IP rehab at d/c due to decline from baseline independent function. Will continue to follow and progress as tolerated.     Plan/Recommendations:   High Intensity Therapy recommended post-acute care. This is recommended as therapy feels the patient would require 5-6 days per week, 2-3 hours per day. At this time, inpatient rehabilitation (acute rehab) would be the first choice and SNF would be second.. Pt requires no DME at discharge.     Pt desires Inpatient Rehabilitation placement at discharge. Pt cooperative; agreeable  to therapeutic recommendations and plan of care.         Basic Mobility 6-click:  Rollin = Total, A lot = 2, A little = 3; 4 = None  Supine>Sit:   1 = Total, A lot = 2, A little = 3; 4 = None   Sit>Stand with arms:  1 = Total, A lot = 2, A little = 3; 4 = None  Bed>Chair:   1 = Total, A lot = 2, A little = 3; 4 = None  Ambulate in room:  1 = Total, A lot = 2, A little = 3; 4 = None  3-5 Steps with railin = Total, A lot = 2, A little = 3; 4 = None  Score: 13    Post-Tx Position: Up in Chair, Alarms activated and Call light and personal items within reach  PPE: gloves

## 2023-05-17 NOTE — PROGRESS NOTES
LOS: 0 days     Chief Complaint: tremors        SUBJECTIVE:    History taken from: patient chart family RN    Interval History: Pt finally able to sleep last night with Restoril. Right arm tremor continues today.         Review of Systems   Constitutional: Negative.    Eyes: Negative for visual disturbance.   Cardiovascular: Negative.    Neurological: Positive for tremors. Negative for dizziness, seizures, syncope, facial asymmetry, speech difficulty, weakness, light-headedness, numbness and headaches.        Pertinent PMH:  has a past medical history of Anxiety, Asthma (12/9/2013), Lumbar radiculopathy (3/12/2018), Lung nodule seen on imaging study (10/2/2020), Malignant neoplasm of upper lobe of right lung (12/8/2021), and Thoracic back pain (3/12/2018).   ________________________________________________     OBJECTIVE:    On exam:  GENERAL: NAD  CARDIO: RRR  NEURO:  Awake, alert, sitting up in the chair  Oriented x3  EOMI, PERRL, no visual field deficits  CN 2-12 intact, No facial asymmetry  Speech clear without dysarthria  Sensations intact and equal bilaterally  Strength 5/5 and equal in all extremities  Right upper extremity tremor, stops with distraction    ________________________________________________   RESULTS REVIEW    VITAL SIGNS:  Temp:  [97.3 °F (36.3 °C)-98.7 °F (37.1 °C)] 97.7 °F (36.5 °C)  Heart Rate:  [] 112  Resp:  [14-25] 18  BP: (113-149)/(49-89) 123/71    LABS:       Lab 05/17/23  0119 05/16/23  0515 05/15/23  1506 05/15/23  0618 05/14/23  2211   WBC 7.30 6.20  --  7.30 7.70   HEMOGLOBIN 11.5* 10.9*  --  11.1* 12.2   HEMATOCRIT 35.8 33.0*  --  34.0 37.1   PLATELETS 288 265  --  270 281   NEUTROS ABS 4.10 3.10  --  3.80 5.16   LYMPHS ABS 1.90 1.90  --  2.10  --    MONOS ABS 0.80 0.80  --  0.80  --    EOS ABS 0.40 0.30  --  0.50* 0.39   MCV 94.0 93.4  --  92.8 90.9   SED RATE  --   --   --  65*  --    CRP  --   --   --  0.75*  --    LACTATE  --   --  0.8  --   --    LDH  --  181  --    --   --    PROTIME  --   --   --   --  9.4*   APTT  --   --   --   --  26.9*         Lab 23  0119 23  0515 05/15/23  0618 23  2211   SODIUM 136 137 136 137   POTASSIUM 4.3 4.2 3.9 3.7   CHLORIDE 102 103 102 100   CO2 24.0 24.0 23.0 23.0   ANION GAP 10.0 10.0 11.0 14.0   BUN 10 11 6 5*   CREATININE 0.71 0.68 0.51* 0.55*   EGFR 102.5 104.9 112.5 110.4   GLUCOSE 145* 146* 97 92   CALCIUM 9.3 8.7 8.4* 8.9   MAGNESIUM  --   --  2.0  --    PHOSPHORUS  --   --  2.9  --    HEMOGLOBIN A1C  --   --  5.50  --    TSH  --   --  1.880  --          Lab 23  221   TOTAL PROTEIN 7.8   ALBUMIN 4.1   GLOBULIN 3.7   ALT (SGPT) 70*   AST (SGOT) 53*   BILIRUBIN 0.8   ALK PHOS 354*         Lab 05/15/23  0024 23  2211   HSTROP T <6 <6   PROTIME  --  9.4*   INR  --  <0.93*         Lab 05/15/23  0618   CHOLESTEROL 226*   LDL CHOL 118*   HDL CHOL 56   TRIGLYCERIDES 299*         Lab 05/15/23  1506   VITAMIN B 12 338             Lab Results   Component Value Date    TSH 1.880 05/15/2023     (H) 05/15/2023    HGBA1C 5.50 05/15/2023    [id number] 338 05/15/2023         IMAGING STUDIES:  MRI Brain With Contrast    Result Date: 5/15/2023  Impression: Normal MRI brain with contrast. Electronically Signed: Lilian Suarez  5/15/2023 1:55 PM EDT  Workstation ID: [id number]      I reviewed the patient's new clinical results.    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 Right arm tremors that do not correlate with EEG for focal seizures. They are distractible on exam. These are non epileptic events.   - It should be noted that a negative/normal EEG does not rule out the possibility of epilepsy   - Labs: A1C: 5.50, B12: 338, LDL: 118, TSH: 1.88, ma.0, phos: 2.9, sodium: 136,  calcium: 8.4, ESR: 65, CRP: 0.75, prolactin: 29.6, LDH: 181, Lactic: 0.8  - Seizures/Syncope precautions  - Continue Keppra at discharge- Recommend to see Epileptologist outpatient at either [facility] or .   -  EEG negative for epileptiform activity     2. Insomnia, acute on chronic- Patient able to sleep with 15 mg Restoril last night with cessation of tremor      3. Anxiety, increased stress, past trauma  - Strongly recommend pt seek counseling services, discussed with pt  - Continue home medications.        Plan:  1. Follow up outpatient with [facility] or  Epileptologist   2. Pt would benefit from outpatient Psychiatry and counseling for past trauma    3. Continue Keppra 500 mg bid and Restoril 15 mg QHS PRN for sleep       There are no further recommendations. Will sign off, please call with any questions or concerns. I discussed the patient's findings and my recommendations with patient, family and nursing staff    Marie Coffman, APRN  05/17/23  11:10 EDT

## 2023-05-17 NOTE — PROGRESS NOTES
"    [facility] Medicine Services   Daily Progress Note    Patient Name: Gideon Fields  : 1970  MRN: [id number]  Primary Care Physician:  Junie Le APRN  Date of admission: 2023  Date and Time of Service: 2023 at 10:30 AM      Subjective      Chief Complaint: Tremors      Brief Patient Summary:  Gideon Fields is a 52 y.o. female admitted with right hand/arm tremor at rest, bilateral lower extremity tremor/weakness with activity.  Neurology following, EEG being done today.  Patient unable to ambulate currently, PT/OT evaluations, case management following for potential SNF at discharge.    Patient Reports no change in tremor    Subjective:  Symptoms:  No shortness of breath or chest pain.    Diet:  No nausea.    Activity level: Impaired due to weakness.    Pain:  She reports no pain.          Objective      Vitals:   Temp:  [97.3 °F (36.3 °C)-98.7 °F (37.1 °C)] 97.5 °F (36.4 °C)  Heart Rate:  [] 81  Resp:  [14-25] 21  BP: (113-149)/(49-89) 119/69  Objective:  General Appearance:  Comfortable.    Vital signs: (most recent): Blood pressure 135/65, pulse 103, temperature 97.7 °F (36.5 °C), temperature source Oral, resp. rate 13, height 167.6 cm (66\"), weight 77.6 kg (171 lb 1.2 oz), SpO2 98 %, not currently breastfeeding.    Lungs:  Normal effort and normal respiratory rate.  Breath sounds clear to auscultation.    Heart: Normal rate.  Regular rhythm.  S1 normal and S2 normal.    Extremities: There is no dependent edema.    Neurological: Patient is alert and oriented to person, place and time.    Skin:  Warm and dry.                    Result Review    Result Review:  I have personally reviewed the results from the time of this admission to 2023 10:07 EDT and agree with these findings:  -[x]  Laboratory  []  Microbiology  []  Radiology  []  EKG/Telemetry   []  Cardiology/Vascular   []  Pathology  []  Old records  []  Other:  Most notable findings include: See summary and " plan          Assessment & Plan        vitamin C, 500 mg, Oral, Daily  baclofen, 10 mg, Oral, TID  budesonide-formoterol, 2 puff, Inhalation, BID  DULoxetine, 60 mg, Oral, BID  levETIRAcetam, 500 mg, Intravenous, Q12H  montelukast, 10 mg, Oral, Nightly  sodium chloride, 10 mL, Intravenous, Q12H  vitamin B-12, 1,000 mcg, Oral, Daily             Active Hospital Problems:  Active Hospital Problems    Diagnosis    • **Focal seizure    • Seasonal allergies    • History of lobectomy of lung    • 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    • Anxiety    • Depression    • Hyperlipidemia      Plan:   RUE tremors, slurred speech  -Possible focal seizures  -Prolactin levels elevated  -MRI brain with and without contrast negative for acute findings  -EEG pending, continue seizure precautions  -On Keppra and Vimpat per neurology  -PT eval  -Neurology following     Chronic depression/anxiety  -Continue duloxetine  -Hold BuSpar due to possible seizure as above     Chronic back pain  -Continue home meds as tolerated  -Monitor     History of lung cancer  -Status post lobectomy  -In remission, chest x-ray stable  -Continue outpatient monitoring    #B12 deficiency  -Replacement anything about prolactinomas can look that up today       DVT prophylaxis:  Mechanical DVT prophylaxis orders are present.    CODE STATUS:    Code Status (Patient has no pulse and is not breathing): CPR (Attempt to Resuscitate)  Medical Interventions (Patient has pulse or is breathing): Full Support      Disposition:  I expect patient to be discharged 3 days.    Plan of care discussed with patient, spouse at bedside, case management, bedside nurse    This patient has been examined wearing appropriate Personal Protective Equipment   Electronically signed by NEO Guerrero, 05/17/23, 10:07 EDT.  [facility] Hospitalist Team

## 2023-05-17 NOTE — PLAN OF CARE
Problem: Adult Inpatient Plan of Care  Goal: Plan of Care Review  Outcome: Ongoing, Progressing  Flowsheets (Taken 5/16/2023 1555 by Starla Lea, OT)  Plan of Care Reviewed With:   patient   daughter  Goal: Patient-Specific Goal (Individualized)  Outcome: Ongoing, Progressing  Goal: Absence of Hospital-Acquired Illness or Injury  Outcome: Ongoing, Progressing  Intervention: Identify and Manage Fall Risk  Recent Flowsheet Documentation  Taken 5/17/2023 1000 by Maren Simmons RN  Safety Promotion/Fall Prevention:   activity supervised   assistive device/personal items within reach   clutter free environment maintained   fall prevention program maintained   lighting adjusted   nonskid shoes/slippers when out of bed   room organization consistent   safety round/check completed  Taken 5/17/2023 0800 by Maren Simmons RN  Safety Promotion/Fall Prevention:   activity supervised   assistive device/personal items within reach   fall prevention program maintained   clutter free environment maintained   lighting adjusted   nonskid shoes/slippers when out of bed   room organization consistent   safety round/check completed  Intervention: Prevent Skin Injury  Recent Flowsheet Documentation  Taken 5/17/2023 0800 by Maren Simmons RN  Body Position: position changed independently  Skin Protection:   adhesive use limited   incontinence pads utilized   transparent dressing maintained   tubing/devices free from skin contact  Intervention: Prevent and Manage VTE (Venous Thromboembolism) Risk  Recent Flowsheet Documentation  Taken 5/17/2023 0800 by Maren Simmons RN  Activity Management: ambulated to bathroom  VTE Prevention/Management:   bilateral   sequential compression devices on  Intervention: Prevent Infection  Recent Flowsheet Documentation  Taken 5/17/2023 1000 by Maren Simmons RN  Infection Prevention:   personal protective equipment utilized   hand hygiene promoted  Taken 5/17/2023 0800 by Iris  Maren RN  Infection Prevention:   personal protective equipment utilized   hand hygiene promoted  Goal: Optimal Comfort and Wellbeing  Outcome: Ongoing, Progressing  Intervention: Provide Person-Centered Care  Recent Flowsheet Documentation  Taken 5/17/2023 0800 by Maren Simmons RN  Trust Relationship/Rapport:   care explained   choices provided   emotional support provided   questions answered   reassurance provided   questions encouraged   thoughts/feelings acknowledged  Goal: Readiness for Transition of Care  Outcome: Ongoing, Progressing     Problem: Fall Injury Risk  Goal: Absence of Fall and Fall-Related Injury  Outcome: Ongoing, Progressing  Intervention: Identify and Manage Contributors  Recent Flowsheet Documentation  Taken 5/17/2023 1000 by Maren Simmons RN  Medication Review/Management:   medications reviewed   high-risk medications identified  Taken 5/17/2023 0800 by Maren Simmons RN  Medication Review/Management:   medications reviewed   high-risk medications identified  Self-Care Promotion: independence encouraged  Intervention: Promote Injury-Free Environment  Recent Flowsheet Documentation  Taken 5/17/2023 1000 by Maren Simmons RN  Safety Promotion/Fall Prevention:   activity supervised   assistive device/personal items within reach   clutter free environment maintained   fall prevention program maintained   lighting adjusted   nonskid shoes/slippers when out of bed   room organization consistent   safety round/check completed  Taken 5/17/2023 0800 by Maren Simmons, RN  Safety Promotion/Fall Prevention:   activity supervised   assistive device/personal items within reach   fall prevention program maintained   clutter free environment maintained   lighting adjusted   nonskid shoes/slippers when out of bed   room organization consistent   safety round/check completed   Goal Outcome Evaluation:

## 2023-05-18 LAB
ANION GAP SERPL CALCULATED.3IONS-SCNC: 11 MMOL/L (ref 5–15)
BASOPHILS # BLD AUTO: 0.1 10*3/MM3 (ref 0–0.2)
BASOPHILS NFR BLD AUTO: 1.1 % (ref 0–1.5)
BUN SERPL-MCNC: 11 MG/DL (ref 6–20)
BUN/CREAT SERPL: 20.8 (ref 7–25)
CALCIUM SPEC-SCNC: 9 MG/DL (ref 8.6–10.5)
CHLORIDE SERPL-SCNC: 102 MMOL/L (ref 98–107)
CO2 SERPL-SCNC: 24 MMOL/L (ref 22–29)
CREAT SERPL-MCNC: 0.53 MG/DL (ref 0.57–1)
DEPRECATED RDW RBC AUTO: 49.9 FL (ref 37–54)
EGFRCR SERPLBLD CKD-EPI 2021: 111.4 ML/MIN/1.73
EOSINOPHIL # BLD AUTO: 0.5 10*3/MM3 (ref 0–0.4)
EOSINOPHIL NFR BLD AUTO: 5.6 % (ref 0.3–6.2)
ERYTHROCYTE [DISTWIDTH] IN BLOOD BY AUTOMATED COUNT: 15 % (ref 12.3–15.4)
GLUCOSE BLDC GLUCOMTR-MCNC: 99 MG/DL (ref 70–105)
GLUCOSE SERPL-MCNC: 101 MG/DL (ref 65–99)
HCT VFR BLD AUTO: 38.1 % (ref 34–46.6)
HGB BLD-MCNC: 12.2 G/DL (ref 12–15.9)
LYMPHOCYTES # BLD AUTO: 2.2 10*3/MM3 (ref 0.7–3.1)
LYMPHOCYTES NFR BLD AUTO: 27.2 % (ref 19.6–45.3)
MCH RBC QN AUTO: 30.4 PG (ref 26.6–33)
MCHC RBC AUTO-ENTMCNC: 32 G/DL (ref 31.5–35.7)
MCV RBC AUTO: 94.8 FL (ref 79–97)
MONOCYTES # BLD AUTO: 0.7 10*3/MM3 (ref 0.1–0.9)
MONOCYTES NFR BLD AUTO: 8.8 % (ref 5–12)
NEUTROPHILS NFR BLD AUTO: 4.7 10*3/MM3 (ref 1.7–7)
NEUTROPHILS NFR BLD AUTO: 57.3 % (ref 42.7–76)
NRBC BLD AUTO-RTO: 0.1 /100 WBC (ref 0–0.2)
PLATELET # BLD AUTO: 328 10*3/MM3 (ref 140–450)
PMV BLD AUTO: 9.9 FL (ref 6–12)
POTASSIUM SERPL-SCNC: 4.4 MMOL/L (ref 3.5–5.2)
RBC # BLD AUTO: 4.01 10*6/MM3 (ref 3.77–5.28)
SODIUM SERPL-SCNC: 137 MMOL/L (ref 136–145)
WBC NRBC COR # BLD: 8.2 10*3/MM3 (ref 3.4–10.8)

## 2023-05-18 PROCEDURE — 99222 1ST HOSP IP/OBS MODERATE 55: CPT

## 2023-05-18 PROCEDURE — 80048 BASIC METABOLIC PNL TOTAL CA: CPT | Performed by: NURSE PRACTITIONER

## 2023-05-18 PROCEDURE — 85025 COMPLETE CBC W/AUTO DIFF WBC: CPT | Performed by: NURSE PRACTITIONER

## 2023-05-18 PROCEDURE — 94799 UNLISTED PULMONARY SVC/PX: CPT

## 2023-05-18 PROCEDURE — 82948 REAGENT STRIP/BLOOD GLUCOSE: CPT

## 2023-05-18 RX ORDER — DULOXETIN HYDROCHLORIDE 30 MG/1
60 CAPSULE, DELAYED RELEASE ORAL EVERY 12 HOURS SCHEDULED
Status: DISCONTINUED | OUTPATIENT
Start: 2023-05-18 | End: 2023-05-19 | Stop reason: HOSPADM

## 2023-05-18 RX ADMIN — CYANOCOBALAMIN TAB 1000 MCG 1000 MCG: 1000 TAB at 08:25

## 2023-05-18 RX ADMIN — OXYCODONE HYDROCHLORIDE AND ACETAMINOPHEN 500 MG: 500 TABLET ORAL at 08:25

## 2023-05-18 RX ADMIN — BACLOFEN 10 MG: 10 TABLET ORAL at 08:25

## 2023-05-18 RX ADMIN — BUDESONIDE AND FORMOTEROL FUMARATE DIHYDRATE 2 PUFF: 160; 4.5 AEROSOL RESPIRATORY (INHALATION) at 20:18

## 2023-05-18 RX ADMIN — LEVETIRACETAM 500 MG: 500 TABLET, FILM COATED ORAL at 08:25

## 2023-05-18 RX ADMIN — NYSTATIN 500000 UNITS: 100000 SUSPENSION ORAL at 14:51

## 2023-05-18 RX ADMIN — NYSTATIN 500000 UNITS: 100000 SUSPENSION ORAL at 20:13

## 2023-05-18 RX ADMIN — HYDROCODONE BITARTRATE AND ACETAMINOPHEN 1 TABLET: 7.5; 325 TABLET ORAL at 10:32

## 2023-05-18 RX ADMIN — DULOXETINE 60 MG: 30 CAPSULE, DELAYED RELEASE ORAL at 17:10

## 2023-05-18 RX ADMIN — HYDROCODONE BITARTRATE AND ACETAMINOPHEN 1 TABLET: 7.5; 325 TABLET ORAL at 17:09

## 2023-05-18 RX ADMIN — HYDROCODONE BITARTRATE AND ACETAMINOPHEN 1 TABLET: 7.5; 325 TABLET ORAL at 03:00

## 2023-05-18 RX ADMIN — MONTELUKAST 10 MG: 10 TABLET, FILM COATED ORAL at 20:13

## 2023-05-18 RX ADMIN — Medication 5 MG: at 03:03

## 2023-05-18 RX ADMIN — BACLOFEN 10 MG: 10 TABLET ORAL at 17:09

## 2023-05-18 RX ADMIN — Medication 10 ML: at 08:25

## 2023-05-18 RX ADMIN — NYSTATIN 500000 UNITS: 100000 SUSPENSION ORAL at 17:09

## 2023-05-18 RX ADMIN — Medication 10 ML: at 20:15

## 2023-05-18 RX ADMIN — LEVETIRACETAM 500 MG: 500 TABLET, FILM COATED ORAL at 20:13

## 2023-05-18 RX ADMIN — BACLOFEN 10 MG: 10 TABLET ORAL at 20:13

## 2023-05-18 NOTE — SIGNIFICANT NOTE
05/18/23 1331   OTHER   Discipline physical therapy assistant   Rehab Time/Intention   Session Not Performed patient/family declined, not feeling well;other (see comments)  (Pt sleeping upon PT entry; pt expressed she didn't get much sleep last night and to hold this AM; will follow up time permitting)   Recommendation   PT - Next Appointment 05/19/23

## 2023-05-18 NOTE — PROGRESS NOTES
"    [facility] Medicine Services   Daily Progress Note    Patient Name: Gideon Fields  : 1970  MRN: [id number]  Primary Care Physician:  Junie Le APRN  Date of admission: 2023  Date and Time of Service: 2023 at 10:00 AM      Subjective      Chief Complaint: Tremors      Brief Patient Summary:  Gideon Fields is a 52 y.o. female admitted with right hand/arm tremor at rest, bilateral lower extremity tremor/weakness with activity.  Neurology following for potential focal seizures, EEG without seizure activity.  Neurology recommends continued Keppra at discharge, and recommendation to see an Epileptologist as an outpatient at either [facility] or [facility].  Patient states was able to ambulate to the bathroom earlier with improvement, PT/OT evaluations pending, case management following for potential SNF at discharge, patient would prefer to return home, potential discharge tomorrow.    There was some confusion regarding Cymbalta and BuSpar as our pharmacy team could not find that they had been filled in the last year.  Psychiatrist was asked to evaluate today as some of her symptomology may be impacted by psychosocial issues, have restarted Cymbalta 60 mg twice daily, continue to hold BuSpar as may lower seizure threshold.    Patient Reports continued tremor, but feels that it is improved    Subjective:  Symptoms:  No shortness of breath or chest pain.    Diet:  No nausea.    Pain:  She reports no pain.          Objective      Vitals:   Temp:  [97.5 °F (36.4 °C)-98.2 °F (36.8 °C)] 97.7 °F (36.5 °C)  Heart Rate:  [] 90  Resp:  [12-20] 15  BP: (110-135)/(62-83) 126/62  Objective:  General Appearance:  Comfortable.    Vital signs: (most recent): Blood pressure 122/54, pulse 88, temperature 97.7 °F (36.5 °C), temperature source Oral, resp. rate 12, height 167.6 cm (66\"), weight 77.6 kg (171 lb 1.2 oz), SpO2 96 %, not currently breastfeeding.    Lungs:  Normal effort and normal " respiratory rate.  Breath sounds clear to auscultation.    Heart: Normal rate.  Regular rhythm.  S1 normal and S2 normal.    Abdomen: Abdomen is soft and non-distended.    Extremities: There is no dependent edema.    Neurological: Patient is alert and oriented to person, place and time.    Skin:  Warm and dry.                    Result Review    Result Review:  I have personally reviewed the results from the time of this admission to 5/18/2023 09:43 EDT and agree with these findings:  [x]  Laboratory  []  Microbiology  []  Radiology  []  EKG/Telemetry   []  Cardiology/Vascular   []  Pathology  []  Old records  []  Other:  Most notable findings include: See summary and plan          Assessment & Plan        vitamin C, 500 mg, Oral, Daily  baclofen, 10 mg, Oral, TID  budesonide-formoterol, 2 puff, Inhalation, BID  levETIRAcetam, 500 mg, Oral, Q12H  montelukast, 10 mg, Oral, Nightly  sodium chloride, 10 mL, Intravenous, Q12H  vitamin B-12, 1,000 mcg, Oral, Daily             Active Hospital Problems:  Active Hospital Problems    Diagnosis    • **Focal seizure    • Seasonal allergies    • History of lobectomy of lung    • 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    • Anxiety    • Depression    • Hyperlipidemia      Plan:   #RUE tremors, slurred speech  -Possible focal seizures---not evidenced by EEG  -MRI brain with and without contrast negative for acute findings  -EEG without seizure activity  -Continue Keppra at discharge per neurology  -PT eval  -Neurology signed off  -Follow-up on outpatient basis with an epileptologist  -Slurred speech resolved, right upper extremity tremor improved  -Discontinue telemetry    #Chronic depression/anxiety  -Continue duloxetine  -Hold BuSpar due to possible seizure as above  -Psychiatry following     #Chronic back pain  -Continue home meds as tolerated     #History of lung cancer status post lobectomy  -In remission, chest x-ray stable  -Continue  Symbicort  -Continue outpatient monitoring     #B12 deficiency  -Replacement    #Thrush  -Nystatin swish and swallow    DVT prophylaxis:  Mechanical DVT prophylaxis orders are present.    CODE STATUS:    Code Status (Patient has no pulse and is not breathing): CPR (Attempt to Resuscitate)  Medical Interventions (Patient has pulse or is breathing): Full Support      Disposition:  I expect patient to be discharged tomorrow.    Plan of care discussed with patient, daughter at bedside, bedside nurse, case management    This patient has been examined wearing appropriate Personal Protective Equipment   Electronically signed by NEO Guerrero, 05/18/23, 09:43 EDT.  [facility]

## 2023-05-18 NOTE — PLAN OF CARE
Problem: Adult Inpatient Plan of Care  Goal: Plan of Care Review  Outcome: Ongoing, Progressing  Flowsheets (Taken 5/16/2023 1555 by Starla Lea, OT)  Plan of Care Reviewed With:   patient   daughter  Goal: Patient-Specific Goal (Individualized)  Outcome: Ongoing, Progressing  Goal: Absence of Hospital-Acquired Illness or Injury  Outcome: Ongoing, Progressing  Intervention: Identify and Manage Fall Risk  Recent Flowsheet Documentation  Taken 5/18/2023 0800 by Maren Simmons RN  Safety Promotion/Fall Prevention:   activity supervised   assistive device/personal items within reach   clutter free environment maintained   fall prevention program maintained   lighting adjusted   nonskid shoes/slippers when out of bed   room organization consistent  Intervention: Prevent Skin Injury  Recent Flowsheet Documentation  Taken 5/18/2023 0800 by Maren Simmons RN  Body Position: position changed independently  Skin Protection:   adhesive use limited   incontinence pads utilized   transparent dressing maintained   tubing/devices free from skin contact  Intervention: Prevent and Manage VTE (Venous Thromboembolism) Risk  Recent Flowsheet Documentation  Taken 5/18/2023 0800 by Maren Simmons RN  VTE Prevention/Management:   bilateral   sequential compression devices on  Intervention: Prevent Infection  Recent Flowsheet Documentation  Taken 5/18/2023 0800 by Maren Simmons RN  Infection Prevention:   hand hygiene promoted   personal protective equipment utilized  Goal: Optimal Comfort and Wellbeing  Outcome: Ongoing, Progressing  Intervention: Provide Person-Centered Care  Recent Flowsheet Documentation  Taken 5/18/2023 0800 by Maren Simmons RN  Trust Relationship/Rapport:   care explained   choices provided   emotional support provided   questions answered   questions encouraged   thoughts/feelings acknowledged  Goal: Readiness for Transition of Care  Outcome: Ongoing, Progressing     Problem: Fall Injury  Risk  Goal: Absence of Fall and Fall-Related Injury  Outcome: Ongoing, Progressing  Intervention: Identify and Manage Contributors  Recent Flowsheet Documentation  Taken 5/18/2023 0800 by Maren Simmons, RN  Medication Review/Management:   medications reviewed   high-risk medications identified  Self-Care Promotion: independence encouraged  Intervention: Promote Injury-Free Environment  Recent Flowsheet Documentation  Taken 5/18/2023 0800 by Maren Simmons, RN  Safety Promotion/Fall Prevention:   activity supervised   assistive device/personal items within reach   clutter free environment maintained   fall prevention program maintained   lighting adjusted   nonskid shoes/slippers when out of bed   room organization consistent   Goal Outcome Evaluation:

## 2023-05-18 NOTE — PLAN OF CARE
Problem: Adult Inpatient Plan of Care  Goal: Plan of Care Review  Outcome: Ongoing, Progressing  Goal: Patient-Specific Goal (Individualized)  Outcome: Ongoing, Progressing  Goal: Absence of Hospital-Acquired Illness or Injury  Outcome: Ongoing, Progressing  Intervention: Identify and Manage Fall Risk  Recent Flowsheet Documentation  Taken 5/18/2023 0415 by Arleen Blair, RN  Safety Promotion/Fall Prevention:   activity supervised   assistive device/personal items within reach   clutter free environment maintained   lighting adjusted   nonskid shoes/slippers when out of bed   room organization consistent   safety round/check completed  Taken 5/18/2023 0204 by Arleen Blair, RN  Safety Promotion/Fall Prevention:   activity supervised   assistive device/personal items within reach   clutter free environment maintained   fall prevention program maintained   lighting adjusted   nonskid shoes/slippers when out of bed   room organization consistent   safety round/check completed  Taken 5/18/2023 0044 by Arleen Blair, RN  Safety Promotion/Fall Prevention:   activity supervised   assistive device/personal items within reach   clutter free environment maintained   fall prevention program maintained   lighting adjusted   nonskid shoes/slippers when out of bed   room organization consistent   safety round/check completed  Taken 5/17/2023 2208 by Arleen Blair, RN  Safety Promotion/Fall Prevention:   assistive device/personal items within reach   activity supervised   clutter free environment maintained   fall prevention program maintained   nonskid shoes/slippers when out of bed   room organization consistent   safety round/check completed   lighting adjusted  Taken 5/17/2023 2006 by Arleen Blair, RN  Safety Promotion/Fall Prevention:   activity supervised   assistive device/personal items within reach   clutter free environment maintained   fall prevention program maintained   lighting adjusted   nonskid  shoes/slippers when out of bed   room organization consistent   safety round/check completed  Intervention: Prevent Skin Injury  Recent Flowsheet Documentation  Taken 5/18/2023 0415 by Arleen Blair RN  Body Position: position changed independently  Taken 5/18/2023 0044 by Arleen Blair RN  Body Position: position changed independently  Taken 5/17/2023 2006 by Arleen Blair RN  Body Position: position changed independently  Skin Protection:   adhesive use limited   incontinence pads utilized   transparent dressing maintained   tubing/devices free from skin contact  Intervention: Prevent and Manage VTE (Venous Thromboembolism) Risk  Recent Flowsheet Documentation  Taken 5/18/2023 0415 by Arleen Blair RN  VTE Prevention/Management:   bilateral   sequential compression devices on  Range of Motion: active ROM (range of motion) encouraged  Taken 5/18/2023 0044 by Arleen Blair RN  VTE Prevention/Management:   bilateral   sequential compression devices on  Range of Motion: active ROM (range of motion) encouraged  Taken 5/17/2023 2006 by Arleen Blair RN  Activity Management:   ambulated to bathroom   ambulated in room  VTE Prevention/Management:   bilateral   sequential compression devices on  Range of Motion: active ROM (range of motion) encouraged  Intervention: Prevent Infection  Recent Flowsheet Documentation  Taken 5/18/2023 0415 by Arleen Blair RN  Infection Prevention:   environmental surveillance performed   equipment surfaces disinfected   hand hygiene promoted   personal protective equipment utilized   rest/sleep promoted   single patient room provided  Taken 5/18/2023 0204 by Arleen Blair RN  Infection Prevention:   environmental surveillance performed   equipment surfaces disinfected   hand hygiene promoted   personal protective equipment utilized   rest/sleep promoted   single patient room provided  Taken 5/18/2023 0044 by Arleen Blair RN  Infection Prevention:    environmental surveillance performed   equipment surfaces disinfected   hand hygiene promoted   personal protective equipment utilized   rest/sleep promoted   single patient room provided  Taken 5/17/2023 2208 by Arleen Blair RN  Infection Prevention:   environmental surveillance performed   equipment surfaces disinfected   hand hygiene promoted   personal protective equipment utilized   rest/sleep promoted   single patient room provided  Taken 5/17/2023 2006 by Arleen Blair RN  Infection Prevention:   environmental surveillance performed   equipment surfaces disinfected   hand hygiene promoted   personal protective equipment utilized   rest/sleep promoted   single patient room provided  Goal: Optimal Comfort and Wellbeing  Outcome: Ongoing, Progressing  Intervention: Monitor Pain and Promote Comfort  Recent Flowsheet Documentation  Taken 5/18/2023 0044 by Arleen Blair RN  Pain Management Interventions:   pain management plan reviewed with patient/caregiver   quiet environment facilitated   relaxation techniques promoted  Taken 5/17/2023 2100 by Arleen Blair RN  Pain Management Interventions:   quiet environment facilitated   relaxation techniques promoted   pain management plan reviewed with patient/caregiver  Taken 5/17/2023 2044 by Arleen Blair RN  Pain Management Interventions:   pain management plan reviewed with patient/caregiver   see MAR   relaxation techniques promoted  Taken 5/17/2023 2006 by Arleen Blair RN  Pain Management Interventions:   pain management plan reviewed with patient/caregiver   relaxation techniques promoted   quiet environment facilitated   pillow support provided  Intervention: Provide Person-Centered Care  Recent Flowsheet Documentation  Taken 5/18/2023 0415 by Arleen Blair RN  Trust Relationship/Rapport:   care explained   choices provided   emotional support provided   empathic listening provided   questions answered   questions encouraged    reassurance provided   thoughts/feelings acknowledged  Taken 5/18/2023 0044 by Arleen Blair RN  Trust Relationship/Rapport:   care explained   choices provided   emotional support provided   empathic listening provided   questions encouraged   questions answered   reassurance provided   thoughts/feelings acknowledged  Taken 5/17/2023 2006 by Arleen Blair RN  Trust Relationship/Rapport:   care explained   choices provided   emotional support provided   empathic listening provided   questions answered   questions encouraged   reassurance provided   thoughts/feelings acknowledged  Goal: Readiness for Transition of Care  Outcome: Ongoing, Progressing     Problem: Fall Injury Risk  Goal: Absence of Fall and Fall-Related Injury  Outcome: Ongoing, Progressing  Intervention: Identify and Manage Contributors  Recent Flowsheet Documentation  Taken 5/18/2023 0415 by Arleen Blair RN  Medication Review/Management:   medications reviewed   high-risk medications identified  Self-Care Promotion: independence encouraged  Taken 5/18/2023 0204 by Arleen Blair RN  Medication Review/Management:   medications reviewed   high-risk medications identified  Taken 5/18/2023 0044 by Arleen Blair RN  Medication Review/Management:   medications reviewed   high-risk medications identified  Self-Care Promotion: independence encouraged  Taken 5/17/2023 2208 by Arleen Blair RN  Medication Review/Management:   medications reviewed   high-risk medications identified  Taken 5/17/2023 2006 by Arleen Blair RN  Medication Review/Management:   medications reviewed   high-risk medications identified  Intervention: Promote Injury-Free Environment  Recent Flowsheet Documentation  Taken 5/18/2023 0415 by Arleen Blair, RN  Safety Promotion/Fall Prevention:   activity supervised   assistive device/personal items within reach   clutter free environment maintained   lighting adjusted   nonskid shoes/slippers when out of bed    room organization consistent   safety round/check completed  Taken 5/18/2023 0204 by Arleen Blair, RN  Safety Promotion/Fall Prevention:   activity supervised   assistive device/personal items within reach   clutter free environment maintained   fall prevention program maintained   lighting adjusted   nonskid shoes/slippers when out of bed   room organization consistent   safety round/check completed  Taken 5/18/2023 0044 by Arleen Blair, RN  Safety Promotion/Fall Prevention:   activity supervised   assistive device/personal items within reach   clutter free environment maintained   fall prevention program maintained   lighting adjusted   nonskid shoes/slippers when out of bed   room organization consistent   safety round/check completed  Taken 5/17/2023 2208 by Arleen Blair, RN  Safety Promotion/Fall Prevention:   assistive device/personal items within reach   activity supervised   clutter free environment maintained   fall prevention program maintained   nonskid shoes/slippers when out of bed   room organization consistent   safety round/check completed   lighting adjusted  Taken 5/17/2023 2006 by Arleen Blair, RN  Safety Promotion/Fall Prevention:   activity supervised   assistive device/personal items within reach   clutter free environment maintained   fall prevention program maintained   lighting adjusted   nonskid shoes/slippers when out of bed   room organization consistent   safety round/check completed   Goal Outcome Evaluation:

## 2023-05-18 NOTE — NURSING NOTE
Patient mentioned to nurse that her abdomen was distended upon admission. Patient stated it looks better.

## 2023-05-18 NOTE — CONSULTS
"  Referring Provider: Marilu Whalen APRN  Reason for Consultation: Tremor, anxiety.      Chief complaint: \" I would just like to know if I am having seizures \".    Subjective .     History of present illness:  The patient is a 52 y.o. female who was admitted secondary to right hand/arm tremor, extremity weakness.  Neurology following for focal seizure.  Patient's daughter is at bedside patient is okay for her to stay.  Past psychiatric history includes depression and anxiety.  Patient reports she was diagnosed with lung cancer which instigated her anxiety and depression symptoms.  Patient's symptoms are stable on duloxetine 60 mg twice daily.  Patient reports that she has been frustrated during her hospital visit because she is unclear about her diagnosis and care.  Patient reports that she is somewhat wary of medical providers because she has had issues missed in the past related to her lung cancer.   Patient denies panic but endorses mild anxiety related to the next steps of her care here.  Patient reports she may be going to a rehab facility due to her tremor after this hospital stay.  Patient denies SI/HI/AVH.  Patient denies symptoms of psychosis, lynn, hypomania.  Patient denies symptoms of depression.  Patient denies drug and alcohol use.  Patient reports that she has not received her duloxetine today, and it appears that order has been discontinued. Will follow up with primary.          Review of Systems   Pertinent items are noted in HPI, all other systems reviewed and negative    The following portions of the patient's history were reviewed and updated as appropriate: allergies, current medications, past family history, past medical history, past social history, past surgical history and problem list.    History    Past psychiatric history anxiety, depression    Past Medical History:   Diagnosis Date   • Anxiety    • Asthma 12/9/2013   • Lumbar radiculopathy 3/12/2018   • Lung nodule seen on imaging " study 10/2/2020   • Malignant neoplasm of upper lobe of right lung 12/8/2021   • Thoracic back pain 3/12/2018          Family History   Problem Relation Age of Onset   • Hypertension Mother    • Alzheimer's disease Mother    • Thyroid disease Mother    • Lung cancer Father    • Lupus Sister    • Asthma Other    • No Known Problems Brother    • Lupus Sister         Social History     Tobacco Use   • Smoking status: Never     Passive exposure: Never   • Smokeless tobacco: Never   Vaping Use   • Vaping Use: Never used   Substance Use Topics   • Alcohol use: Yes     Comment: Couple times a week   • Drug use: No          Medications Prior to Admission   Medication Sig Dispense Refill Last Dose   • albuterol sulfate  (90 Base) MCG/ACT inhaler Inhale 2 puffs Every 4 (Four) Hours As Needed for Wheezing. 18 g 3    • budesonide-formoterol (SYMBICORT) 160-4.5 MCG/ACT inhaler Inhale 2 puffs 2 (Two) Times a Day. 1 each 5    • Cholecalciferol (Vitamin D) 25 MCG (1000 UT) tablet Take 1 tablet by mouth Daily.      • HYDROcodone-acetaminophen (NORCO) 7.5-325 MG per tablet Take 1 tablet by mouth Every 6 (Six) Hours As Needed for Mild Pain. 20 tablet 0    • montelukast (SINGULAIR) 10 MG tablet TAKE 1 TABLET BY MOUTH EVERY DAY AT NIGHT 90 tablet 1    • Multiple Vitamins-Minerals (multivitamin with minerals) tablet tablet Take 1 tablet by mouth Daily.      • Omega-3 Fatty Acids (fish oil) 1000 MG capsule capsule Take 1 capsule by mouth Daily With Breakfast.      • TRI-SPRINTEC 0.18/0.215/0.25 MG-35 MCG per tablet Take 1 tablet by mouth Daily.  4    • Turmeric 500 MG capsule Take 1 capsule by mouth Daily.      • vitamin C (ASCORBIC ACID) 500 MG tablet Take 1 tablet by mouth Daily.      • tiZANidine (ZANAFLEX) 2 MG tablet Take 1 tablet by mouth Every 8 (Eight) Hours As Needed for Muscle Spasms.           Scheduled Meds:  vitamin C, 500 mg, Oral, Daily  baclofen, 10 mg, Oral, TID  budesonide-formoterol, 2 puff, Inhalation,  "BID  levETIRAcetam, 500 mg, Oral, Q12H  montelukast, 10 mg, Oral, Nightly  nystatin, 5 mL, Swish & Swallow, 4x Daily  sodium chloride, 10 mL, Intravenous, Q12H  vitamin B-12, 1,000 mcg, Oral, Daily         Continuous Infusions:       PRN Meds:  •  acetaminophen **OR** acetaminophen **OR** acetaminophen  •  albuterol sulfate HFA  •  [DISCONTINUED] senna-docusate sodium **AND** polyethylene glycol **AND** bisacodyl **AND** bisacodyl  •  HYDROcodone-acetaminophen  •  melatonin  •  nitroglycerin  •  ondansetron **OR** ondansetron  •  [COMPLETED] Insert Peripheral IV **AND** sodium chloride  •  sodium chloride  •  sodium chloride  •  temazepam  •  tiZANidine      Allergies:  Bee venom      Objective     Vital Signs   /54 (BP Location: Right arm, Patient Position: Lying)   Pulse 88   Temp 97.7 °F (36.5 °C) (Oral)   Resp 12   Ht 167.6 cm (66\")   Wt 77.6 kg (171 lb 1.2 oz)   SpO2 96%   BMI 27.61 kg/m²     Physical Exam:    Musculoskeletal:   Muscle strength and tone: Upper extremities moderate equal bilaterally  Abnormal Movements: None observed  Gait: Unable to assess patient in bed     General Appearance:   Alert and upright   Head:    Normocephalic, without obvious abnormality, atraumatic   Eyes:            Lids and lashes normal, conjunctivae and sclerae normal, no   icterus, no pallor, corneas clear, PERRLA   Skin:   No bleeding, bruising or rash          Neurologic:   Cranial nerves 2 - 12 grossly intact, sensation intact, DTR       present and equal bilaterally     Mental Status Exam:   Hygiene:   good  Cooperation:  Cooperative  Eye Contact:  Good  Psychomotor Behavior:  Patient has some mild tremor on her right upper extremity.  Affect:  Full range  Mood: normal  Hopelessness: Denies  Speech:  Normal  Thought Process:  Goal directed and Linear  Thought Content:  Normal  Suicidal:  None  Homicidal:  None  Hallucinations:  None  Delusion:  None  Memory:  Intact  Orientation:  Person, Place, Time and " Situation  Reliability:  good  Insight:  Good  Judgement:  Good  Impulse Control:  Good        Medications and allergies reviewed yes    Result Review:  I have personally reviewed the results from the time of this admission to 5/18/2023 15:57 EDT and agree with these findings:  [x]  Laboratory  []  Microbiology  []  Radiology  [x]  EKG/Telemetry   []  Cardiology/Vascular   []  Pathology  []  Old records  []  Other:  Most notable findings include:   Assessment & Plan       Focal seizure    Anxiety    Depression    Hyperlipidemia    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    History of lobectomy of lung    Seasonal allergies      Assessment: Anxiety  Treatment Plan: Will restart duloxetine 60 mg unless there is a reason it was discontinued by primary team or neurology.   Continue to provide support to patient and will follow up with primary care team.    Treatment Plan discussed with: Patient        I discussed the patients findings and my recommendations with patient, family and nursing staff    I have reviewed and approved the behavioral health treatment plans and problem list. Yes  Thank you for the consult   Referring MD has access to consult report and progress notes in EMR     NEO Potts  05/18/23  15:57 EDT

## 2023-05-19 ENCOUNTER — READMISSION MANAGEMENT (OUTPATIENT)
Dept: CALL CENTER | Facility: HOSPITAL | Age: 53
End: 2023-05-19
Payer: MEDICAID

## 2023-05-19 VITALS
HEART RATE: 75 BPM | RESPIRATION RATE: 19 BRPM | WEIGHT: 178.35 LBS | SYSTOLIC BLOOD PRESSURE: 126 MMHG | HEIGHT: 66 IN | BODY MASS INDEX: 28.66 KG/M2 | TEMPERATURE: 98.3 F | OXYGEN SATURATION: 99 % | DIASTOLIC BLOOD PRESSURE: 73 MMHG

## 2023-05-19 PROCEDURE — 97535 SELF CARE MNGMENT TRAINING: CPT

## 2023-05-19 PROCEDURE — 94799 UNLISTED PULMONARY SVC/PX: CPT

## 2023-05-19 PROCEDURE — 97530 THERAPEUTIC ACTIVITIES: CPT

## 2023-05-19 PROCEDURE — 94640 AIRWAY INHALATION TREATMENT: CPT

## 2023-05-19 PROCEDURE — 97116 GAIT TRAINING THERAPY: CPT

## 2023-05-19 PROCEDURE — 94761 N-INVAS EAR/PLS OXIMETRY MLT: CPT

## 2023-05-19 PROCEDURE — 97112 NEUROMUSCULAR REEDUCATION: CPT

## 2023-05-19 RX ORDER — PROPRANOLOL HYDROCHLORIDE 20 MG/1
10 TABLET ORAL EVERY 12 HOURS SCHEDULED
Status: DISCONTINUED | OUTPATIENT
Start: 2023-05-19 | End: 2023-05-19 | Stop reason: HOSPADM

## 2023-05-19 RX ORDER — DULOXETIN HYDROCHLORIDE 60 MG/1
60 CAPSULE, DELAYED RELEASE ORAL EVERY 12 HOURS SCHEDULED
Qty: 60 CAPSULE | Refills: 0 | Status: SHIPPED | OUTPATIENT
Start: 2023-05-19

## 2023-05-19 RX ORDER — PROPRANOLOL HYDROCHLORIDE 10 MG/1
10 TABLET ORAL EVERY 12 HOURS SCHEDULED
Qty: 60 TABLET | Refills: 0 | Status: SHIPPED | OUTPATIENT
Start: 2023-05-19

## 2023-05-19 RX ORDER — PROPRANOLOL HYDROCHLORIDE 10 MG/1
10 TABLET ORAL EVERY 12 HOURS SCHEDULED
Qty: 60 TABLET | Refills: 0 | Status: SHIPPED | OUTPATIENT
Start: 2023-05-19 | End: 2023-05-19 | Stop reason: SDUPTHER

## 2023-05-19 RX ORDER — LEVETIRACETAM 500 MG/1
500 TABLET ORAL EVERY 12 HOURS SCHEDULED
Qty: 60 TABLET | Refills: 0 | Status: SHIPPED | OUTPATIENT
Start: 2023-05-19

## 2023-05-19 RX ADMIN — PROPRANOLOL HYDROCHLORIDE 10 MG: 20 TABLET ORAL at 16:10

## 2023-05-19 RX ADMIN — DULOXETINE 60 MG: 30 CAPSULE, DELAYED RELEASE ORAL at 11:11

## 2023-05-19 RX ADMIN — LEVETIRACETAM 500 MG: 500 TABLET, FILM COATED ORAL at 11:11

## 2023-05-19 RX ADMIN — ALBUTEROL SULFATE 2 PUFF: 108 AEROSOL, METERED RESPIRATORY (INHALATION) at 11:43

## 2023-05-19 RX ADMIN — Medication 10 ML: at 09:00

## 2023-05-19 RX ADMIN — BUDESONIDE AND FORMOTEROL FUMARATE DIHYDRATE 2 PUFF: 160; 4.5 AEROSOL RESPIRATORY (INHALATION) at 06:44

## 2023-05-19 RX ADMIN — OXYCODONE HYDROCHLORIDE AND ACETAMINOPHEN 500 MG: 500 TABLET ORAL at 11:11

## 2023-05-19 RX ADMIN — HYDROCODONE BITARTRATE AND ACETAMINOPHEN 1 TABLET: 7.5; 325 TABLET ORAL at 00:09

## 2023-05-19 RX ADMIN — NYSTATIN 500000 UNITS: 100000 SUSPENSION ORAL at 11:10

## 2023-05-19 RX ADMIN — CYANOCOBALAMIN TAB 1000 MCG 1000 MCG: 1000 TAB at 11:11

## 2023-05-19 RX ADMIN — BACLOFEN 10 MG: 10 TABLET ORAL at 11:11

## 2023-05-19 RX ADMIN — HYDROCODONE BITARTRATE AND ACETAMINOPHEN 1 TABLET: 7.5; 325 TABLET ORAL at 13:02

## 2023-05-19 NOTE — PLAN OF CARE
Goal Outcome Evaluation:     Bed mobility - Independent supine to sit to supine  Transfers - CGA  Ambulation - 200 feet CGA hand held assist today per pt request as pt did not want to use the roll walker  Stairs:  Pt ascended and descended 10 steps using one hand rail with supervision.          Low Intensity Therapy recommended post-acute care - This is recommended as therapy feels this patient would require 2-3 visits per week. OP would be recommended.  Pt requires rolling walker at discharge.     Pt desires Home with family assist and Outpatient therapy at discharge. Pt cooperative; agreeable to therapeutic recommendations and plan of care.

## 2023-05-19 NOTE — CASE MANAGEMENT/SOCIAL WORK
Case Management Discharge Note      Final Note: Home with rolling walker and OP PT at [facility]    Provided Post Acute Provider List?: Yes  Post Acute Provider List: Outpatient Therapy  Delivered To: Patient  Method of Delivery: In person            Transportation Services  Private: Car    Final Discharge Disposition Code: 01 - home or self-care

## 2023-05-19 NOTE — OUTREACH NOTE
Prep Survey    Flowsheet Row Responses   [facility] facility patient discharged from? [facility]   Is LACE score < 7 ? No   Eligibility [facility]   Date of Admission 05/14/23   Date of Discharge 05/19/23   Discharge Disposition Home or Self Care   Discharge diagnosis Focal seizure   Does the patient have one of the following disease processes/diagnoses(primary or secondary)? Other   Does the patient have Home health ordered? Yes   What is the Home health agency?  OP PT at [facility]   Is there a DME ordered? Yes   What DME was ordered[facility]   Prep survey completed? Yes          Yvette HOLMAN - Registered Nurse

## 2023-05-19 NOTE — PROGRESS NOTES
"  Referring Provider: Koby LARSON  Reason for Consultation: Tremor, anxiety.      Chief complaint \" doing good!\"    Subjective .     History of present illness:   The patient is a 52 y.o. female who was admitted secondary to right hand/arm tremor, extremity weakness.      Today patient sits upright in bed.  Patient is alert and oriented x4.  Patient's duloxetine 60 mg twice daily was restarted.  Patient rarely reports feeling relief that the medication was restarted.  Patient reports receiving her medication this morning.  Patient reports she will be leaving today and that all of her concerns and questions have been addressed by neurology.  Patient reports that her anxiety has decreased due to this.    Today patient reports    Review of Systems   Pertinent items are noted in HPI, all other systems reviewed and negative    The following portions of the patient's history were reviewed and updated as appropriate: allergies, current medications, past family history, past medical history, past social history, past surgical history and problem list.    History    Past psychiatric history anxiety and depression.    Past Medical History:   Diagnosis Date   • Anxiety    • Asthma 12/9/2013   • Lumbar radiculopathy 3/12/2018   • Lung nodule seen on imaging study 10/2/2020   • Malignant neoplasm of upper lobe of right lung 12/8/2021   • Thoracic back pain 3/12/2018          Family History   Problem Relation Age of Onset   • Hypertension Mother    • Alzheimer's disease Mother    • Thyroid disease Mother    • Lung cancer Father    • Lupus Sister    • Asthma Other    • No Known Problems Brother    • Lupus Sister         Social History     Tobacco Use   • Smoking status: Never     Passive exposure: Never   • Smokeless tobacco: Never   Vaping Use   • Vaping Use: Never used   Substance Use Topics   • Alcohol use: Yes     Comment: Couple times a week   • Drug use: No          Medications Prior to Admission   Medication Sig Dispense " Refill Last Dose   • albuterol sulfate  (90 Base) MCG/ACT inhaler Inhale 2 puffs Every 4 (Four) Hours As Needed for Wheezing. 18 g 3    • budesonide-formoterol (SYMBICORT) 160-4.5 MCG/ACT inhaler Inhale 2 puffs 2 (Two) Times a Day. 1 each 5    • Cholecalciferol (Vitamin D) 25 MCG (1000 UT) tablet Take 1 tablet by mouth Daily.      • HYDROcodone-acetaminophen (NORCO) 7.5-325 MG per tablet Take 1 tablet by mouth Every 6 (Six) Hours As Needed for Mild Pain. 20 tablet 0    • montelukast (SINGULAIR) 10 MG tablet TAKE 1 TABLET BY MOUTH EVERY DAY AT NIGHT 90 tablet 1    • Multiple Vitamins-Minerals (multivitamin with minerals) tablet tablet Take 1 tablet by mouth Daily.      • Omega-3 Fatty Acids (fish oil) 1000 MG capsule capsule Take 1 capsule by mouth Daily With Breakfast.      • TRI-SPRINTEC 0.18/0.215/0.25 MG-35 MCG per tablet Take 1 tablet by mouth Daily.  4    • Turmeric 500 MG capsule Take 1 capsule by mouth Daily.      • vitamin C (ASCORBIC ACID) 500 MG tablet Take 1 tablet by mouth Daily.      • tiZANidine (ZANAFLEX) 2 MG tablet Take 1 tablet by mouth Every 8 (Eight) Hours As Needed for Muscle Spasms.           Scheduled Meds:  vitamin C, 500 mg, Oral, Daily  baclofen, 10 mg, Oral, TID  budesonide-formoterol, 2 puff, Inhalation, BID  DULoxetine, 60 mg, Oral, Q12H  levETIRAcetam, 500 mg, Oral, Q12H  montelukast, 10 mg, Oral, Nightly  nystatin, 5 mL, Swish & Swallow, 4x Daily  propranolol, 10 mg, Oral, Q12H  sodium chloride, 10 mL, Intravenous, Q12H  vitamin B-12, 1,000 mcg, Oral, Daily         Continuous Infusions:       PRN Meds:  •  acetaminophen **OR** acetaminophen **OR** acetaminophen  •  albuterol sulfate HFA  •  [DISCONTINUED] senna-docusate sodium **AND** polyethylene glycol **AND** bisacodyl **AND** bisacodyl  •  HYDROcodone-acetaminophen  •  melatonin  •  nitroglycerin  •  ondansetron **OR** ondansetron  •  [COMPLETED] Insert Peripheral IV **AND** sodium chloride  •  sodium chloride  •  sodium  "chloride  •  temazepam  •  tiZANidine      Allergies:  Bee venom      Objective     Vital Signs   /62 (BP Location: Right arm, Patient Position: Lying)   Pulse 98   Temp 98 °F (36.7 °C) (Oral)   Resp 20   Ht 167.6 cm (66\")   Wt 80.9 kg (178 lb 5.6 oz)   SpO2 100%   BMI 28.79 kg/m²     Physical Exam:    Musculoskeletal: No deficits noted  Muscle strength and tone: Moderate equal bilaterally  Abnormal Movements: None noted  Gait: Unable to assess patient sitting in bed     General Appearance:   Upright alert sitting on edge of bed   Head:    Normocephalic, without obvious abnormality, atraumatic   Eyes:            Lids and lashes normal, conjunctivae and sclerae normal, no   icterus, no pallor, corneas clear, PERRLA   Skin:   No bleeding, bruising or rash          Neurologic:   Cranial nerves 2 - 12 grossly intact, sensation intact, DTR       present and equal bilaterally     Mental Status Exam:   Hygiene:   good  Cooperation:  Cooperative  Eye Contact:  Good  Psychomotor Behavior:  Appropriate  Affect:  Full range  Mood: normal  Hopelessness: Denies  Speech:  Normal  Thought Process:  Goal directed  Thought Content:  Normal  Suicidal:  None  Homicidal:  None  Hallucinations:  None  Delusion:  None  Memory:  Intact  Orientation:  Person, Place, Time and Situation  Reliability:  good  Insight:  Good  Judgement:  Good  Impulse Control:  Good        Medications and allergies reviewed     Result Review:  I have personally reviewed the results from the time of this admission to 5/19/2023 14:42 EDT and agree with these findings:  [x]  Laboratory  []  Microbiology  []  Radiology  []  EKG/Telemetry   []  Cardiology/Vascular   []  Pathology  []  Old records  []  Other:  Most notable findings include:     Assessment & Plan       Focal seizure    Anxiety    Depression    Hyperlipidemia    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    History of lobectomy of lung    Seasonal allergies   "       Assessment: Anxiety  Treatment Plan: Continue duloxetine 60 mg   Treatment Plan discussed with: Patient and Family        I discussed the patients findings and my recommendations with patient, family and nursing staff    I have reviewed and approved the behavioral health treatment plans and problem list. Yes  Thank you for the consult   Referring MD has access to consult report and progress notes in EMR     NEO Potts  05/19/23  14:42 EDT

## 2023-05-19 NOTE — CASE MANAGEMENT/SOCIAL WORK
Continued Stay Note  [facility]     Patient Name: Gideon Fields  MRN: [id number]  Today's Date: 5/19/2023    Admit Date: 5/14/2023    Plan: [facility] accepted and precert obtained 5/19 with ready van. [facility] van can transport  vs family car   Discharge Plan     Row Name 05/19/23 1105       Plan    Plan [facility] accepted and precert obtained 5/19 with ready van. [facility] van can transport  vs family car    Plan Comments Stephany with [facility] informed CM that they have received prior authorization for rehab and have a ready bed today. If needed their van can transport. [facility] reported no documented BM on Epic flowsheets. CM informed nursing and MD.            Phone communication or documentation only - no physical contact with patient or family.      Paulette MOYER,RN Case Manager  [facility]  Phone: Desk- [phone] cell- [phone]

## 2023-05-19 NOTE — PLAN OF CARE
"Assessment: Gideon Fields presents with ADL impairments affecting function including endurance / activity tolerance and motor control. Demonstrated functioning below baseline abilities indicate the need for continued skilled intervention while inpatient. Pt comes to standing with less assistance this date, requiring CGA without AD. Pt tolerates dynamic standing task with SBA without AD as well. Pt educated on compensatory strategies and AE when d/c home. Pt expresses desire to return home with family assistance. Pt demos increased independence throughout session and likely to d/c home safe with family assist and OP OT. OT changing recommendations at this time. Pt would benefit from RW at d/c for increased safety. Tolerating session today without incident. Will continue to follow and progress as tolerated.     Plan/Recommendations:   Low Intensity Therapy recommended post-acute care - This is recommended as therapy feels this patient would require 2-3 visits per week. OP or HH would be the best option depending on patient's home bound status. Consider, if the patient has other  \"skilled\" needs such as wounds, IV antibiotics, etc. Combined with \"low intensity\" could also equate to a SNF. If patient is medically complex, consider LTAC... Pt requires rolling walker at discharge.     Pt desires Home with family assist and Outpatient therapy at discharge. Pt cooperative; agreeable to therapeutic recommendations and plan of care.   "

## 2023-05-19 NOTE — CASE MANAGEMENT/SOCIAL WORK
Continued Stay Note  [facility]     Patient Name: Gideon Fields  MRN: [id number]  Today's Date: 5/19/2023    Admit Date: 5/14/2023    Plan: Refused IP [facility]. Home with family and OP PT at [facility]. Rolling walker ordered from [place]   Discharge Plan     Row Name 05/19/23 1414       Plan    Plan Refused IP [facility]. Home with family and OP PT at [facility]. Rolling walker ordered from [place]    Plan Comments Mrs. Fields refused IP Rehab at [facility] but agrees with OP PT at [facility] and orders were submitted through EPIC. CM informed Stephany with [facility]. [facility] contacted for rolling walker and added to basket.              Phone communication or documentation only - no physical contact with patient or family.    Paulette MOYER,RN Case Manager  [facility]  Phone: Desk- [phone] cell- [phone]

## 2023-05-19 NOTE — NURSING NOTE
Patient stated many concerns about her health to this nurse, she explained that her and her family notice that she has intermittent memory loss, not remembering conversations, passwords, daily tasks, etc. She mentioned being worried about parkinson's, this was brought to MD attention by Nurse. shes got nerve pain in her back and stated shes always tired and has no energy.

## 2023-05-19 NOTE — DISCHARGE PLACEMENT REQUEST
"Natalie Fields (52 y.o. Female)     Date of Birth   1970    Social Security Number       Address   [address] [place] [address]    Home Phone   [phone]    MRN   [id number]       Restoration   None    Marital Status                               Admission Date   5/14/23    Admission Type   Emergency    Admitting Provider   Boyd Cortez DO    Attending Provider   Jimmy Perera MD    Department, Room/Bed   [address] PEDIATRICS, 201/1       Discharge Date       Discharge Disposition   Home or Self Care    Discharge Destination                               Attending Provider: Jimmy Perera MD    Allergies: Bee Venom    Isolation: None   Infection: None   Code Status: CPR    Ht: 167.6 cm (66\")   Wt: 80.9 kg (178 lb 5.6 oz)    Admission Cmt: None   Principal Problem: Focal seizure [R56.9]                 Active Insurance as of 5/14/2023     Primary Coverage     Payor Plan Insurance Group Employer/Plan Group    [id number]     Payor Plan Address Payor Plan Phone Number Payor Plan Fax Number Effective Dates    MAIL STOP:   2/16/2021 - None Entered    [address] [address]       [facility] [facility] [address]       Subscriber Name Subscriber Birth Date Member ID       NATALIE FIELDS 1970 [id number]                 Emergency Contacts      (Rel.) Home Phone Work Phone Mobile Phone    LILLIE FIELDS (Spouse) -- -- [phone]    ANGEL CONNORS (Daughter) -- -- [phone]          Ambulatory Referral to Physical Therapy Evaluate and treat; Strengthening [REF87] (Order [id number])  Order  Date: 5/19/2023 Department: [address] PEDIATRICS Ordering/Authorizing: Jimmy Perera MD     Order History  Outpatient  Date/Time Action Taken User Additional Information   05/19/23 1313 Sign Jimmy Perera MD      Order Details    Frequency Duration Priority Order Class   None None Routine Internal " Referral     Start Date/Time    Start Date   05/19/23     Order Information    Order Date Service Start Date Start Time   05/19/23 Medicine 05/19/23      Reference Links    Associated Reports   View Encounter     Order Questions    Question Answer   Specialty needed: Evaluate and treat   Exercises: Strengthening   Follow-up needed: Yes            Source Order Set / Preference List    Preference List   AMB  REFERRALS [[id number]]           Clinical Indications     ICD-10-CM ICD-9-CM   Chronic bilateral thoracic back pain     M54.6  G89.29 724.1  338.29                             Reprint Order Requisition    Ambulatory Referral to Physical Therapy Evaluate and treat; Strengthening (Order #[id number]) on 5/19/23         Encounter    View Encounter                Order Provider Info        Office phone Pager E-mail   Ordering User Jimmy Perera [phone] -- --   Authorizing Provider Jimmy Perera [phone] -- --   Attending Provider Jimmy Perera [phone] -- --     Tracking Reports    Cosign Tracking Order Transmittal Tracking     Authorized by:  Jimmy Perera MD  (NPI: [id number])                Lab Component SmartPhrase Guide    Ambulatory Referral to Physical Therapy Evaluate and treat; Strengthening (Order #[id number]) on 5/19/23

## 2023-05-19 NOTE — PLAN OF CARE
Goal Outcome Evaluation:              Outcome Evaluation: Pt and family had several questions and concerns about what brought her into the hospital, I answered to the best of my ability. Continuing to monitor.

## 2023-05-19 NOTE — DISCHARGE SUMMARY
[facility] Medicine Services  DISCHARGE SUMMARY    Patient Name: Gideon Fields  : 1970  MRN: [id number]    Date of Admission: 2023  Discharge Diagnosis:  1.  Essential tremors   2.  Probable seizure activities  3.  Chronic back pain  4.  Depression/anxiety  5.  History of lung cancer, status post lobectomy  6.  B12 deficiency  7.  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         Hospital Course         Brief Hospital course :52 y.o. obese female with known history of depression/anxiety, chronic back pain, and history of lung cancer,  S/p lobectomy.  According to admission note, patient presented to [facility] ED on 2023, complaining of generalized tremors, but mostly involving the upper extremities, and difficulty with word findings.  Patient said that tremors have been occurring sporadically for the past few months but has progressively worsened.  She denies any headache, no visual changes, no dysphagia, or dysarthria.  No history of illicit drug or alcohol use.  On initial evaluation emergency room with a CT of the head and CTA of the head and neck was unremarkable.  Laboratory is also mostly unrevealing and no major electrolyte abnormalities or derangement.      Patient was admitted and treated expectantly for tremors versus breakthrough seizures.  She was seen in consultation by  neurologist and underwent EEG (5/15) which showed artifact and beta activities and nonspecific finding.  She was maintained on AED with Keppra and propanolol Stossel outlined in discharge summary.   The remainder of her hospital course was uneventful and no reported seizures.  She was seen in consultation by PT/OT, patient received progressive daily exercise as tolerated.  Recommendation is to continue with outpatient PT/OT and patient will be provided with walker on discharge.  She is also to follow-up with  in the next few weeks to be for further evaluation by neurologist or tremor specialist.  Prior to discharge home today, patient is afebrile, with stable hemodynamics and oxygenating well.  She is able to ambulate without any difficulty, but with noted tremors.      DISCHARGE Follow Up Recommendations for labs and diagnostics:   1.  Follow-up with further evaluation at  as scheduled  2.  Continue propanolol    Reasons For Change In Medications and Indications for New Medications:      Day of Discharge     Vital Signs:  Temp:  [98 °F (36.7 °C)-98.6 °F (37 °C)] 98 °F (36.7 °C)  Heart Rate:  [80-98] 98  Resp:  [14-20] 20  BP: (106-128)/(59-66) 112/62  Flow (L/min):  [0] 0      Physical Exam  Constitutional:       General: She is not in acute distress.     Appearance: She is not ill-appearing.   HENT:      Head: Normocephalic.      Right Ear: Tympanic membrane normal.      Nose: Nose normal.      Mouth/Throat:      Mouth: Mucous membranes are moist.      Pharynx: Oropharynx is clear.   Eyes:      Pupils: Pupils are equal, round, and reactive to light.   Cardiovascular:      Rate and Rhythm: Normal rate.      Pulses: Normal pulses.   Pulmonary:      Effort: Pulmonary effort is normal.   Abdominal:      Palpations: Abdomen is soft.   Musculoskeletal:         General: Normal range of motion.      Cervical back: Neck supple.   Skin:     General: Skin is warm.   Neurological:      General: No focal deficit present.       Mental Status: She is alert.            Pertinent  and/or Most Recent Results     LAB RESULTS:      Lab 05/18/23  0306 05/17/23  0119 05/16/23  0515 05/15/23  1506 05/15/23  0618 05/14/23  2211   WBC 8.20 7.30 6.20  --  7.30 7.70   HEMOGLOBIN 12.2 11.5* 10.9*  --  11.1* 12.2   HEMATOCRIT 38.1 35.8 33.0*  --  34.0 37.1   PLATELETS 328 288 265  --  270 281   NEUTROS ABS 4.70 4.10 3.10  --  3.80 5.16   LYMPHS ABS 2.20 1.90 1.90  --  2.10  --    MONOS ABS 0.70 0.80 0.80  --  0.80  --    EOS ABS 0.50* 0.40 0.30  --  0.50* 0.39   MCV 94.8 94.0 93.4  --  92.8 90.9   SED RATE  --   --   --   --  65*  --    CRP  --   --   --   --  0.75*  --    LACTATE  --   --   --  0.8  --   --    LDH  --   --  181  --   --   --    PROTIME  --   --   --   --   --  9.4*   APTT  --   --   --   --   --  26.9*         Lab 05/18/23  0455 05/17/23  0119 05/16/23  0515 05/15/23  0618 05/14/23  2211   SODIUM 137 136 137 136 137   POTASSIUM 4.4 4.3 4.2 3.9 3.7   CHLORIDE 102 102 103 102 100   CO2 24.0 24.0 24.0 23.0 23.0   ANION GAP 11.0 10.0 10.0 11.0 14.0   BUN 11 10 11 6 5*   CREATININE 0.53* 0.71 0.68 0.51* 0.55*   EGFR 111.4 102.5 104.9 112.5 110.4   GLUCOSE 101* 145* 146* 97 92   CALCIUM 9.0 9.3 8.7 8.4* 8.9   MAGNESIUM  --   --   --  2.0  --    PHOSPHORUS  --   --   --  2.9  --    HEMOGLOBIN A1C  --   --   --  5.50  --    TSH  --   --   --  1.880  --          Lab 05/14/23 2211   TOTAL PROTEIN 7.8   ALBUMIN 4.1   GLOBULIN 3.7   ALT (SGPT) 70*   AST (SGOT) 53*   BILIRUBIN 0.8   ALK PHOS 354*         Lab 05/15/23  0024 05/14/23 2211   HSTROP T <6 <6   PROTIME  --  9.4*   INR  --  <0.93*         Lab 05/15/23  0618   CHOLESTEROL 226*   LDL CHOL 118*   HDL CHOL 56   TRIGLYCERIDES 299*         Lab 05/15/23  1506   VITAMIN B 12 338         Brief Urine Lab Results     None        Microbiology Results (last 10 days)     ** No results found for the last 240 hours. **          CT Head With & Without Contrast    Result Date: 5/14/2023  Impression:  1. Normal head CT. 2. Normal CTA brain. No occlusion, stenosis, aneurysm or AVM. 3. Normal CTA neck. 0% ICA stenosis bilaterally by NASCET criteria.  This examination was interpreted by Kenny Dumont M.D. Electronically signed by:  Kenny Dumont M.D.  5/14/2023 9:46 PM Mountain Time    CT Angiogram Neck    Result Date: 5/14/2023  Impression: 1. Normal head CT. 2. Normal CTA brain. No occlusion, stenosis, aneurysm or AVM. 3. Normal CTA neck. 0% ICA stenosis bilaterally by NASCET criteria.  This examination was interpreted by Kenny Dumont M.D. Electronically signed by:  Kenny Dumont M.D.  5/14/2023 9:46 PM Mountain Time    MRI Brain Without Contrast    Result Date: 5/15/2023  Impression: Impression: Unremarkable MRI brain without contrast Electronically Signed: Neal Altamirano  5/15/2023 12:09 PM EDT  Workstation ID: [id number]    MRI Brain With Contrast    Result Date: 5/15/2023  Impression: Impression: Normal MRI brain with contrast. Electronically Signed: Lilian Suarez  5/15/2023 1:55 PM EDT  Workstation ID: [id number]    XR Chest 1 View    Result Date: 5/15/2023  Impression: Impression: No acute process Electronically Signed: Neal Altamirano  5/15/2023 7:56 AM EDT  Workstation ID: [id number]    CT Angiogram Head    Result Date: 5/14/2023  Impression: 1. Normal head CT. 2. Normal CTA brain. No occlusion, stenosis, aneurysm or AVM. 3. Normal CTA neck. 0% ICA stenosis bilaterally by NASCET criteria.  This examination was interpreted by Kenny Dumont M.D. Electronically signed by:  Kenny Dumont M.D.  5/14/2023 9:46 PM Mountain Time              Results for orders placed during the hospital encounter of 02/10/22    Adult Transthoracic Echo Complete W/ Cont if Necessary Per Protocol    Interpretation Summary  · Estimated left ventricular EF = 60% Left ventricular systolic function is normal.    Indications  Chest pain    Technically satisfactory study.  Mitral valve is structurally  normal.  Tricuspid valve is structurally normal.  Aortic valve is structurally normal.  Pulmonic valve could not be well visualized.  No evidence for mitral tricuspid or aortic regurgitation is seen by Doppler study.  Left atrium is normal in size.  Right atrium is normal in size.  Left ventricle is normal in size and contractility with ejection fraction of 60%.  Right ventricle is normal in size.  Atrial septum is intact.  Aorta is normal.  No pericardial effusion or intracardiac thrombus is seen.    Impression  Structurally and functionally normal cardiac valves.  Left ventricular size and contractility is normal with ejection fraction of 60%.      Labs Pending at Discharge:      Procedures Performed    05/17 1404 EEG      Consults:   Consults     Date and Time Order Name Status Description    5/18/2023  9:46 AM Inpatient Psychiatrist Consult Completed     5/14/2023 11:56 PM Hospitalist (on-call MD unless specified)      5/14/2023  9:52 PM Inpatient Neurology Consult Stroke Completed             Discharge Details        Discharge Medications      New Medications      Instructions Start Date   cyanocobalamin 1000 MCG tablet  Commonly known as: VITAMIN B-12   1,000 mcg, Oral, Daily   Start Date: May 20, 2023     DULoxetine 60 MG capsule  Commonly known as: CYMBALTA   60 mg, Oral, Every 12 Hours Scheduled      levETIRAcetam 500 MG tablet  Commonly known as: KEPPRA   500 mg, Oral, Every 12 Hours Scheduled      propranolol 10 MG tablet  Commonly known as: INDERAL   10 mg, Oral, Every 12 Hours Scheduled, Hold for systolic blood pressure if less than 110 mmHg         Continue These Medications      Instructions Start Date   albuterol sulfate  (90 Base) MCG/ACT inhaler  Commonly known as: PROVENTIL HFA;VENTOLIN HFA;PROAIR HFA   2 puffs, Inhalation, Every 4 Hours PRN      budesonide-formoterol 160-4.5 MCG/ACT inhaler  Commonly known as: SYMBICORT   2 puffs, Inhalation, 2 Times Daily      cholecalciferol 25 MCG (1000  UT) tablet  Commonly known as: VITAMIN D3   1,000 Units, Oral, Daily      fish oil 1000 MG capsule capsule   1,000 mg, Oral, Daily With Breakfast      HYDROcodone-acetaminophen 7.5-325 MG per tablet  Commonly known as: NORCO   1 tablet, Oral, Every 6 Hours PRN      montelukast 10 MG tablet  Commonly known as: SINGULAIR   TAKE 1 TABLET BY MOUTH EVERY DAY AT NIGHT      multivitamin with minerals tablet tablet   1 tablet, Oral, Daily      tiZANidine 2 MG tablet  Commonly known as: ZANAFLEX   2 mg, Oral, Every 8 Hours PRN      Tri-Sprintec 0.18/0.215/0.25 MG-35 MCG per tablet  Generic drug: norgestimate-ethinyl estradiol   1 tablet, Oral, Daily      Turmeric 500 MG capsule   500 mg, Oral, Daily      vitamin C 500 MG tablet  Commonly known as: ASCORBIC ACID   500 mg, Oral, Daily             Allergies   Allergen Reactions   • Bee Venom Itching         Discharge Disposition: Home or Self Care    Diet:  Hospital:  Diet Order   Procedures   • Diet: Regular/House Diet; Texture: Regular Texture (IDDSI 7); Fluid Consistency: Thin (IDDSI 0)         Discharge Activity: Ad dior.    CODE STATUS:  Code Status and Medical Interventions:   Ordered at: 05/15/23 0008     Code Status (Patient has no pulse and is not breathing):    CPR (Attempt to Resuscitate)     Medical Interventions (Patient has pulse or is breathing):    Full Support         Future Appointments   Date Time Provider Department Center   6/28/2023  9:30 AM Seipel, Joseph F, MD [facility]       Additional Instructions for the Follow-ups that You Need to Schedule     Ambulatory Referral to Physical Therapy Evaluate and treat; Strengthening   As directed      Specialty needed: Evaluate and treat    Exercises: Strengthening    Follow-up needed: Yes               Time spent on Discharge including face to face service: > 30  minutes

## 2023-05-19 NOTE — PLAN OF CARE
Problem: Adult Inpatient Plan of Care  Goal: Plan of Care Review  Outcome: Met  Goal: Patient-Specific Goal (Individualized)  Outcome: Met  Goal: Absence of Hospital-Acquired Illness or Injury  Outcome: Met  Intervention: Identify and Manage Fall Risk  Recent Flowsheet Documentation  Taken 5/19/2023 1200 by Masha Givens LPN  Safety Promotion/Fall Prevention: safety round/check completed  Taken 5/19/2023 1100 by Masha Givens LPN  Safety Promotion/Fall Prevention: safety round/check completed  Taken 5/19/2023 1000 by Masha Givens LPN  Safety Promotion/Fall Prevention: safety round/check completed  Intervention: Prevent Skin Injury  Recent Flowsheet Documentation  Taken 5/19/2023 1100 by Masha Givens LPN  Body Position: position changed independently  Skin Protection:   adhesive use limited   tubing/devices free from skin contact  Intervention: Prevent and Manage VTE (Venous Thromboembolism) Risk  Recent Flowsheet Documentation  Taken 5/19/2023 1100 by Masha Givens LPN  Activity Management:   ambulated in room   back to bed  VTE Prevention/Management: (patient up waling)   compression stockings off   sequential compression devices off  Range of Motion: ROM (range of motion) performed  Intervention: Prevent Infection  Recent Flowsheet Documentation  Taken 5/19/2023 1200 by Masha Givens LPN  Infection Prevention:   cohorting utilized   single patient room provided  Taken 5/19/2023 1100 by Masha Givens LPN  Infection Prevention:   cohorting utilized   single patient room provided  Taken 5/19/2023 1000 by Masha Givens LPN  Infection Prevention:   cohorting utilized   single patient room provided  Goal: Optimal Comfort and Wellbeing  Outcome: Met  Intervention: Provide Person-Centered Care  Recent Flowsheet Documentation  Taken 5/19/2023 0800 by Masha Givens LPN  Trust Relationship/Rapport:   care explained   choices provided  Goal: Readiness for Transition of Care  Outcome: Met     Problem: Fall Injury  Risk  Goal: Absence of Fall and Fall-Related Injury  Outcome: Met  Intervention: Identify and Manage Contributors  Recent Flowsheet Documentation  Taken 5/19/2023 1200 by Masha Givens LPN  Medication Review/Management: medications reviewed  Taken 5/19/2023 1100 by Masha Givens LPN  Medication Review/Management: medications reviewed  Taken 5/19/2023 1000 by Masha Givens LPN  Medication Review/Management: medications reviewed  Intervention: Promote Injury-Free Environment  Recent Flowsheet Documentation  Taken 5/19/2023 1200 by Masha Givens LPN  Safety Promotion/Fall Prevention: safety round/check completed  Taken 5/19/2023 1100 by Masha Givens LPN  Safety Promotion/Fall Prevention: safety round/check completed  Taken 5/19/2023 1000 by Masha Givens LPN  Safety Promotion/Fall Prevention: safety round/check completed   Goal Outcome Evaluation:

## 2023-05-19 NOTE — THERAPY TREATMENT NOTE
Subjective: Pt agreeable to therapeutic plan of care.    Objective:     Bed mobility - Independent supine to sit to supine  Transfers - CGA  Ambulation - 200 feet CGA hand held assist today per pt request as pt did not want to use the roll walker  Stairs:  Pt ascended and descended 10 steps using one hand rail with supervision.     Vitals: WNL    Pain: 0 VAS       Education: Provided education on the importance of mobility in the acute care setting, Verbal/Tactile Cues, Transfer Training and Gait Training    Assessment: Gideon Fields presents with functional mobility impairments which indicate the need for skilled intervention. Tolerating session today without incident. Pt with significant improvement since treatment session 2 days ago.  Pt is much more independent.   Pt demonstrates good safety awareness. Pt ambulated up and down steps. Will change recommendation to home with OPPT.  Will continue to follow and progress as tolerated.     Plan/Recommendations:   Low Intensity Therapy recommended post-acute care - This is recommended as therapy feels this patient would require 2-3 visits per week. OP would be recommended.  Pt requires rolling walker at discharge.     Pt desires Home with family assist and Outpatient therapy at discharge. Pt cooperative; agreeable to therapeutic recommendations and plan of care.     Basic Mobility 6-click:  Rollin = Total, A lot = 2, A little = 3; 4 = None  Supine>Sit:   1 = Total, A lot = 2, A little = 3; 4 = None   Sit>Stand with arms:  1 = Total, A lot = 2, A little = 3; 4 = None  Bed>Chair:   1 = Total, A lot = 2, A little = 3; 4 = None  Ambulate in room:  1 = Total, A lot = 2, A little = 3; 4 = None  3-5 Steps with railin = Total, A lot = 2, A little = 3; 4 = None  Score: 18    Modified [place]: 4 = Moderately severe disability (Unable to attend to own bodily needs without assistance, and unable to walk unassisted)     Post-Tx Position: Call light and personal items  within reach edge of the bed  PPE: gloves

## 2023-05-22 ENCOUNTER — TRANSITIONAL CARE MANAGEMENT TELEPHONE ENCOUNTER (OUTPATIENT)
Dept: CALL CENTER | Facility: HOSPITAL | Age: 53
End: 2023-05-22
Payer: MEDICAID

## 2023-05-22 NOTE — OUTREACH NOTE
Call Center TCM Note    Flowsheet Row Responses   [facility] patient discharged from? [facility]   Does the patient have one of the following disease processes/diagnoses(primary or secondary)? Other   TCM attempt successful? No   Unsuccessful attempts Attempt 2          Francia Hood RN    5/22/2023, 13:38 EDT

## 2023-05-22 NOTE — OUTREACH NOTE
Call Center TCM Note    Flowsheet Row Responses   [facility] patient discharged from? [facility]   Does the patient have one of the following disease processes/diagnoses(primary or secondary)? Other   TCM attempt successful? No   Unsuccessful attempts Attempt 1   Call Status Left message          Francia Hood RN    5/22/2023, 09:22 EDT

## 2023-05-22 NOTE — PAYOR COMM NOTE
"RE: [id number]    DC NOTIFICATION  DC DATE 23  ==================================  UTILIZATION REVIEW  ELIZABETH DIOR RN   PH:   FAX: [phone]    [facility]  NPI# [id number]  TID # [id number]        Natalie Fields (52 y.o. Female)     Date of Birth   1970    Social Security Number       Address   [address] [place] [address]    Home Phone   [phone]    MRN   [id number]       Church   None    Marital Status                               Admission Date   23    Admission Type   Emergency    Admitting Provider   Boyd Cortez DO    Attending Provider       Department, Room/Bed   [address] PEDIATRICS, 201       Discharge Date   2023    Discharge Disposition   Home or Self Care    Discharge Destination                               Attending Provider: (none)   Allergies: Bee Venom    Isolation: None   Infection: None   Code Status: Prior    Ht: 167.6 cm (66\")   Wt: 80.9 kg (178 lb 5.6 oz)    Admission Cmt: None   Principal Problem: Focal seizure [R56.9]                 Active Insurance as of 2023     Primary Coverage     Payor Plan Insurance Group Employer/Plan Group    [id number]     Payor Plan Address Payor Plan Phone Number Payor Plan Fax Number Effective Dates    MAIL STOP:   2021 - None Entered    [address] [address]       [facility] [address]       Subscriber Name Subscriber Birth Date Member ID       NATALIE FIELDS 1970 [id number]                 Emergency Contacts      (Rel.) Home Phone Work Phone Mobile Phone    LILLIE FIELDS (Spouse) -- -- [phone]    WAYLONANGEL (Daughter) -- -- [phone]               Discharge Summary      Scott-Jimmy Acharya MD at 23 1323                       [facility] Medicine Services  DISCHARGE SUMMARY    Patient Name: Natalie Fields  : 1970  MRN: [id number]    Date of Admission: " 5/14/2023  Discharge Diagnosis:  1.  Essential tremors   2.  Probable seizure activities  3.  Chronic back pain  4.  Depression/anxiety  5.  History of lung cancer, status post lobectomy  6.  B12 deficiency  7.  Oral thrush-resolved      Date of  Discharge: 5/19/2023  Primary Care Physician: Junie Le APRN      Presenting Problem:   Focal seizure [R56.9]  Speech disturbance, unspecified type [R47.9]    Active and Resolved Hospital Problems:  Active Hospital Problems    Diagnosis POA   • **Focal seizure [R56.9] Yes   • Seasonal allergies [J30.2] Yes   • History of lobectomy of lung [Z90.2] Not Applicable   • 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 [M51.27] Yes   • Anxiety [F41.9] Yes   • Depression [F32.A] Yes   • Hyperlipidemia [E78.5] Yes      Resolved Hospital Problems   No resolved problems to display.         Hospital Course         Brief Hospital course :52 y.o. obese female with known history of depression/anxiety, chronic back pain, and history of lung cancer,  S/p lobectomy.  According to admission note, patient presented to [facility] ED on 5/14/2023, complaining of generalized tremors, but mostly involving the upper extremities, and difficulty with word findings.  Patient said that tremors have been occurring sporadically for the past few months but has progressively worsened.  She denies any headache, no visual changes, no dysphagia, or dysarthria.  No history of illicit drug or alcohol use.  On initial evaluation emergency room with a CT of the head and CTA of the head and neck was unremarkable.  Laboratory is also mostly unrevealing and no major electrolyte abnormalities or derangement.      Patient was admitted and treated expectantly for tremors versus breakthrough seizures.  She was seen in consultation by neurologist and underwent EEG (5/15) which showed artifact and beta activities and nonspecific finding.  She was maintained on AED with Keppra and propanolol Stossel  outlined in discharge summary.   The remainder of her hospital course was uneventful and no reported seizures.  She was seen in consultation by PT/OT, patient received progressive daily exercise as tolerated.  Recommendation is to continue with outpatient PT/OT and patient will be provided with walker on discharge.  She is also to follow-up with  in the next few weeks to be for further evaluation by neurologist or tremor specialist.  Prior to discharge home today, patient is afebrile, with stable hemodynamics and oxygenating well.  She is able to ambulate without any difficulty, but with noted tremors.      DISCHARGE Follow Up Recommendations for labs and diagnostics:   1.  Follow-up with further evaluation at  as scheduled  2.  Continue propanolol    Reasons For Change In Medications and Indications for New Medications:      Day of Discharge     Vital Signs:  Temp:  [98 °F (36.7 °C)-98.6 °F (37 °C)] 98 °F (36.7 °C)  Heart Rate:  [80-98] 98  Resp:  [14-20] 20  BP: (106-128)/(59-66) 112/62  Flow (L/min):  [0] 0      Physical Exam  Constitutional:       General: She is not in acute distress.     Appearance: She is not ill-appearing.   HENT:      Head: Normocephalic.      Right Ear: Tympanic membrane normal.      Nose: Nose normal.      Mouth/Throat:      Mouth: Mucous membranes are moist.      Pharynx: Oropharynx is clear.   Eyes:      Pupils: Pupils are equal, round, and reactive to light.   Cardiovascular:      Rate and Rhythm: Normal rate.      Pulses: Normal pulses.   Pulmonary:      Effort: Pulmonary effort is normal.   Abdominal:      Palpations: Abdomen is soft.   Musculoskeletal:         General: Normal range of motion.      Cervical back: Neck supple.   Skin:     General: Skin is warm.   Neurological:      General: No focal deficit present.      Mental Status: She is alert.            Pertinent  and/or Most Recent Results     LAB RESULTS:      Lab 05/18/23  0306 05/17/23  0119 05/16/23  0515  05/15/23  1506 05/15/23  0618 05/14/23  2211   WBC 8.20 7.30 6.20  --  7.30 7.70   HEMOGLOBIN 12.2 11.5* 10.9*  --  11.1* 12.2   HEMATOCRIT 38.1 35.8 33.0*  --  34.0 37.1   PLATELETS 328 288 265  --  270 281   NEUTROS ABS 4.70 4.10 3.10  --  3.80 5.16   LYMPHS ABS 2.20 1.90 1.90  --  2.10  --    MONOS ABS 0.70 0.80 0.80  --  0.80  --    EOS ABS 0.50* 0.40 0.30  --  0.50* 0.39   MCV 94.8 94.0 93.4  --  92.8 90.9   SED RATE  --   --   --   --  65*  --    CRP  --   --   --   --  0.75*  --    LACTATE  --   --   --  0.8  --   --    LDH  --   --  181  --   --   --    PROTIME  --   --   --   --   --  9.4*   APTT  --   --   --   --   --  26.9*         Lab 05/18/23  0455 05/17/23  0119 05/16/23  0515 05/15/23  0618 05/14/23 2211   SODIUM 137 136 137 136 137   POTASSIUM 4.4 4.3 4.2 3.9 3.7   CHLORIDE 102 102 103 102 100   CO2 24.0 24.0 24.0 23.0 23.0   ANION GAP 11.0 10.0 10.0 11.0 14.0   BUN 11 10 11 6 5*   CREATININE 0.53* 0.71 0.68 0.51* 0.55*   EGFR 111.4 102.5 104.9 112.5 110.4   GLUCOSE 101* 145* 146* 97 92   CALCIUM 9.0 9.3 8.7 8.4* 8.9   MAGNESIUM  --   --   --  2.0  --    PHOSPHORUS  --   --   --  2.9  --    HEMOGLOBIN A1C  --   --   --  5.50  --    TSH  --   --   --  1.880  --          Lab 05/14/23  2211   TOTAL PROTEIN 7.8   ALBUMIN 4.1   GLOBULIN 3.7   ALT (SGPT) 70*   AST (SGOT) 53*   BILIRUBIN 0.8   ALK PHOS 354*         Lab 05/15/23  0024 05/14/23  2211   HSTROP T <6 <6   PROTIME  --  9.4*   INR  --  <0.93*         Lab 05/15/23  0618   CHOLESTEROL 226*   LDL CHOL 118*   HDL CHOL 56   TRIGLYCERIDES 299*         Lab 05/15/23  1506   VITAMIN B 12 338         Brief Urine Lab Results     None        Microbiology Results (last 10 days)     ** No results found for the last 240 hours. **          CT Head With & Without Contrast    Result Date: 5/14/2023  Impression: 1. Normal head CT. 2. Normal CTA brain. No occlusion, stenosis, aneurysm or AVM. 3. Normal CTA neck. 0% ICA stenosis bilaterally by NASCET criteria.  This  examination was interpreted by Kenny Dumont M.D. Electronically signed by:  Kenny Dumont M.D.  5/14/2023 9:46 PM Mountain Time    CT Angiogram Neck    Result Date: 5/14/2023  Impression: 1. Normal head CT. 2. Normal CTA brain. No occlusion, stenosis, aneurysm or AVM. 3. Normal CTA neck. 0% ICA stenosis bilaterally by NASCET criteria.  This examination was interpreted by Kenny Dumont M.D. Electronically signed by:  Kenny Dumont M.D.  5/14/2023 9:46 PM Mountain Time    MRI Brain Without Contrast    Result Date: 5/15/2023  Impression: Impression: Unremarkable MRI brain without contrast Electronically Signed: Neal Altamirano  5/15/2023 12:09 PM EDT  Workstation ID: [id number]    MRI Brain With Contrast    Result Date: 5/15/2023  Impression: Impression: Normal MRI brain with contrast. Electronically Signed: Lilian Suarez  5/15/2023 1:55 PM EDT  Workstation ID: [id number]    XR Chest 1 View    Result Date: 5/15/2023  Impression: Impression: No acute process Electronically Signed: Neal Altamirano  5/15/2023 7:56 AM EDT  Workstation ID: [id number]    CT Angiogram Head    Result Date: 5/14/2023  Impression: 1. Normal head CT. 2. Normal CTA brain. No occlusion, stenosis, aneurysm or AVM. 3. Normal CTA neck. 0% ICA stenosis bilaterally by NASCET criteria.  This examination was interpreted by Kenny Dumont M.D. Electronically signed by:  Kenny Dumont M.D.  5/14/2023 9:46 PM Mountain Time              Results for orders placed during the hospital encounter of 02/10/22    Adult Transthoracic Echo Complete W/ Cont if Necessary Per Protocol    Interpretation Summary  · Estimated left ventricular EF = 60% Left ventricular systolic function is normal.    Indications  Chest pain    Technically satisfactory study.  Mitral valve is structurally normal.  Tricuspid valve is structurally normal.  Aortic valve is structurally normal.  Pulmonic valve could not be well visualized.  No evidence for mitral tricuspid or  aortic regurgitation is seen by Doppler study.  Left atrium is normal in size.  Right atrium is normal in size.  Left ventricle is normal in size and contractility with ejection fraction of 60%.  Right ventricle is normal in size.  Atrial septum is intact.  Aorta is normal.  No pericardial effusion or intracardiac thrombus is seen.    Impression  Structurally and functionally normal cardiac valves.  Left ventricular size and contractility is normal with ejection fraction of 60%.      Labs Pending at Discharge:      Procedures Performed    05/17 1404 EEG      Consults:   Consults     Date and Time Order Name Status Description    5/18/2023  9:46 AM Inpatient Psychiatrist Consult Completed     5/14/2023 11:56 PM Hospitalist (on-call MD unless specified)      5/14/2023  9:52 PM Inpatient Neurology Consult Stroke Completed             Discharge Details        Discharge Medications      New Medications      Instructions Start Date   cyanocobalamin 1000 MCG tablet  Commonly known as: VITAMIN B-12   1,000 mcg, Oral, Daily   Start Date: May 20, 2023     DULoxetine 60 MG capsule  Commonly known as: CYMBALTA   60 mg, Oral, Every 12 Hours Scheduled      levETIRAcetam 500 MG tablet  Commonly known as: KEPPRA   500 mg, Oral, Every 12 Hours Scheduled      propranolol 10 MG tablet  Commonly known as: INDERAL   10 mg, Oral, Every 12 Hours Scheduled, Hold for systolic blood pressure if less than 110 mmHg         Continue These Medications      Instructions Start Date   albuterol sulfate  (90 Base) MCG/ACT inhaler  Commonly known as: PROVENTIL HFA;VENTOLIN HFA;PROAIR HFA   2 puffs, Inhalation, Every 4 Hours PRN      budesonide-formoterol 160-4.5 MCG/ACT inhaler  Commonly known as: SYMBICORT   2 puffs, Inhalation, 2 Times Daily      cholecalciferol 25 MCG (1000 UT) tablet  Commonly known as: VITAMIN D3   1,000 Units, Oral, Daily      fish oil 1000 MG capsule capsule   1,000 mg, Oral, Daily With Breakfast       HYDROcodone-acetaminophen 7.5-325 MG per tablet  Commonly known as: NORCO   1 tablet, Oral, Every 6 Hours PRN      montelukast 10 MG tablet  Commonly known as: SINGULAIR   TAKE 1 TABLET BY MOUTH EVERY DAY AT NIGHT      multivitamin with minerals tablet tablet   1 tablet, Oral, Daily      tiZANidine 2 MG tablet  Commonly known as: ZANAFLEX   2 mg, Oral, Every 8 Hours PRN      Tri-Sprintec 0.18/0.215/0.25 MG-35 MCG per tablet  Generic drug: norgestimate-ethinyl estradiol   1 tablet, Oral, Daily      Turmeric 500 MG capsule   500 mg, Oral, Daily      vitamin C 500 MG tablet  Commonly known as: ASCORBIC ACID   500 mg, Oral, Daily             Allergies   Allergen Reactions   • Bee Venom Itching         Discharge Disposition: Home or Self Care    Diet:  Hospital:  Diet Order   Procedures   • Diet: Regular/House Diet; Texture: Regular Texture (IDDSI 7); Fluid Consistency: Thin (IDDSI 0)         Discharge Activity: Ad dior.    CODE STATUS:  Code Status and Medical Interventions:   Ordered at: 05/15/23 0008     Code Status (Patient has no pulse and is not breathing):    CPR (Attempt to Resuscitate)     Medical Interventions (Patient has pulse or is breathing):    Full Support         Future Appointments   Date Time Provider Department Center   6/28/2023  9:30 AM Seipel, Joseph F, MD [facility]       Additional Instructions for the Follow-ups that You Need to Schedule     Ambulatory Referral to Physical Therapy Evaluate and treat; Strengthening   As directed      Specialty needed: Evaluate and treat    Exercises: Strengthening    Follow-up needed: Yes               Time spent on Discharge including face to face service: > 30  minutes        Electronically signed by Jimmy Perera MD at 05/19/23 4729

## 2023-05-23 ENCOUNTER — TRANSITIONAL CARE MANAGEMENT TELEPHONE ENCOUNTER (OUTPATIENT)
Dept: CALL CENTER | Facility: HOSPITAL | Age: 53
End: 2023-05-23
Payer: MEDICAID

## 2023-05-23 NOTE — OUTREACH NOTE
Call Center TCM Note    Flowsheet Row Responses   [facility] patient discharged from? [facility]   Does the patient have one of the following disease processes/diagnoses(primary or secondary)? Other   TCM attempt successful? Yes  [No verbal release on file]   Call start time 0900   Call end time 0902   Discharge diagnosis Focal seizure   Meds reviewed with patient/caregiver? Yes   Is the patient having any side effects they believe may be caused by any medication additions or changes? No   Does the patient have all medications ordered at discharge? Yes   Is the patient taking all medications as directed (includes completed medication regime)? Yes   Comments Hospital d/c follow up 5/24/23@0930   Does the patient have an appointment with their PCP within 7 days of discharge? Yes   Home health comments Patient has not begun OP PT   What DME was ordered? rolling walker[facility]   Has all DME been delivered? Yes   Psychosocial issues? No   Did the patient receive a copy of their discharge instructions? Yes   Nursing interventions Reviewed instructions with patient   What is the patient's perception of their health status since discharge? Improving   Is the patient/caregiver able to teach back signs and symptoms related to disease process for when to call PCP? Yes   Is the patient/caregiver able to teach back signs and symptoms related to disease process for when to call 911? Yes   Is the patient/caregiver able to teach back the hierarchy of who to call/visit for symptoms/problems? PCP, Specialist, Home health nurse, Urgent Care, ED, 911 Yes   If the patient is a current smoker, are they able to teach back resources for cessation? Not a smoker   TCM call completed? Yes   Call end time 0902   Would this patient benefit from a Referral to Amb Social Work? No   Is the patient interested in additional calls from an ambulatory ?  NOTE:  applies to high risk patients requiring additional  follow-up. No          Bronwyn Chaudhary RN    5/23/2023, 09:03 EDT

## 2023-05-24 ENCOUNTER — OFFICE VISIT (OUTPATIENT)
Dept: FAMILY MEDICINE CLINIC | Facility: CLINIC | Age: 53
End: 2023-05-24
Payer: MEDICAID

## 2023-05-24 VITALS
OXYGEN SATURATION: 97 % | DIASTOLIC BLOOD PRESSURE: 69 MMHG | HEART RATE: 100 BPM | BODY MASS INDEX: 26.84 KG/M2 | WEIGHT: 167 LBS | RESPIRATION RATE: 15 BRPM | SYSTOLIC BLOOD PRESSURE: 115 MMHG | HEIGHT: 66 IN | TEMPERATURE: 97.5 F

## 2023-05-24 DIAGNOSIS — G25.0 ESSENTIAL TREMOR: ICD-10-CM

## 2023-05-24 DIAGNOSIS — R29.898 WEAKNESS OF BOTH LOWER EXTREMITIES: ICD-10-CM

## 2023-05-24 DIAGNOSIS — Z09 HOSPITAL DISCHARGE FOLLOW-UP: Primary | ICD-10-CM

## 2023-05-24 RX ORDER — BACLOFEN 10 MG/1
10 TABLET ORAL 3 TIMES DAILY
COMMUNITY

## 2023-05-24 RX ORDER — DIAZEPAM 5 MG/1
5 TABLET ORAL 2 TIMES DAILY PRN
Qty: 20 TABLET | Refills: 0 | Status: SHIPPED | OUTPATIENT
Start: 2023-05-24

## 2023-05-24 NOTE — PROGRESS NOTES
"Transitional Care Follow Up Visit  Subjective     Gideon Fields is a 52 y.o. female who presents for a transitional care management visit.    Within 48 business hours after discharge our office contacted her via telephone to coordinate her care and needs.      I reviewed and discussed the details of that call along with the discharge summary, hospital problems, inpatient lab results, inpatient diagnostic studies, and consultation reports with Gideon.     Current outpatient and discharge medications have been reconciled for the patient.  Reviewed by: NEO Recinos          5/19/2023     5:12 PM   Date of TCM Phone Call   [facility]   Date of Admission 5/14/2023   Date of Discharge 5/19/2023   Discharge Disposition Home or Self Care     Risk for Readmission (LACE) Score: 11 (5/19/2023  6:00 AM)      History of Present Illness   Course During Hospital Stay:  Pt comes in today for follow up from hospital follow up. Went to ER on 5/14 with generalized tremors, and difficulty speaking/finding words. She had CT and CTA which were both unremarkable. Had EEG that was unremarkable. Was started on keppra and propranolol.   States tremors started about 1 year ago, but has been minor and states it was like a \"nervous\" tremor. Has worsened over the past 3 years. When they start then she gets emotional. States she feels scared.   Does have hx of depression and anxiety.  Had an episode at work on 4/21 that was captured on video.   Went to ER on 5/14, but 2 days prior to that at work had symptoms of difficulty speaking. Nurse across the street was concerned and recommended she go to ER for r/o cva. When she was in the hospital had to use a walker because her legs were too shaky.  Sitting down there are no issues.   Tremors are mostly in arms.   Neurology consulted and diagnosed her with essential tremors. She expressed concerns about memory loss and other issues.   Rehab was discussed prior to discharge, but " then was able to start walking on her own.   Neurologist recommended a specialist at  in [facility].    She does have an upcoming appt with neuro on 6/28. This appt was made a while ago 2/2 concerns of memory loss and mother's history of dementia.      The following portions of the patient's history were reviewed and updated as appropriate: allergies, current medications, past family history, past medical history, past social history, past surgical history and problem list.    Review of Systems    Objective   Physical Exam  Constitutional:       Appearance: She is well-developed.   Eyes:      Pupils: Pupils are equal, round, and reactive to light.   Cardiovascular:      Rate and Rhythm: Normal rate and regular rhythm.   Pulmonary:      Effort: Pulmonary effort is normal.      Breath sounds: Normal breath sounds.   Neurological:      Mental Status: She is alert and oriented to person, place, and time.      Motor: Weakness and tremor (right sided ) present.       CT Head With & Without Contrast    Result Date: 5/14/2023  1. Normal head CT. 2. Normal CTA brain. No occlusion, stenosis, aneurysm or AVM. 3. Normal CTA neck. 0% ICA stenosis bilaterally by NASCET criteria.  This examination was interpreted by Kenny Dumont M.D. Electronically signed by:  Kenny Dumont M.D.  5/14/2023 9:46 PM Mountain Time    CT Angiogram Neck    Result Date: 5/14/2023  1. Normal head CT. 2. Normal CTA brain. No occlusion, stenosis, aneurysm or AVM. 3. Normal CTA neck. 0% ICA stenosis bilaterally by NASCET criteria.  This examination was interpreted by Kenny Dumont M.D. Electronically signed by:  Kenny Dumont M.D.  5/14/2023 9:46 PM Mountain Time    MRI Brain Without Contrast    Result Date: 5/15/2023  Impression: Unremarkable MRI brain without contrast Electronically Signed: Nael Altamirano  5/15/2023 12:09 PM EDT  Workstation ID: [id number]    MRI Brain With Contrast    Result Date: 5/15/2023  Impression: Normal MRI brain with  contrast. Electronically Signed: Lilian Suarez  5/15/2023 1:55 PM EDT  Workstation ID: [id number]    XR Chest 1 View    Result Date: 5/15/2023  Impression: No acute process Electronically Signed: Neal Altamirano  5/15/2023 7:56 AM EDT  Workstation ID: [id number]    CT Angiogram Head    Result Date: 5/14/2023  1. Normal head CT. 2. Normal CTA brain. No occlusion, stenosis, aneurysm or AVM. 3. Normal CTA neck. 0% ICA stenosis bilaterally by NASCET criteria.  This examination was interpreted by Kenny Dumont M.D. Electronically signed by:  Kenny Dumont M.D.  5/14/2023 9:46 PM Mountain Time    Assessment & Plan   Diagnoses and all orders for this visit:    1. Hospital discharge follow-up (Primary)  -     Ambulatory Referral to Physical Therapy Evaluate and treat    2. Essential tremor  -     diazePAM (Valium) 5 MG tablet; Take 1 tablet by mouth 2 (Two) Times a Day As Needed for Anxiety or Muscle Spasms.  Dispense: 20 tablet; Refill: 0  -     Ambulatory Referral to Physical Therapy Evaluate and treat    3. Weakness of both lower extremities  -     Ambulatory Referral to Physical Therapy Evaluate and treat    referral to PT  Trial valium  Follow up with neuro as scheduled  During this office visit, we discussed the pertinent aspects of the visit and treatment recommendations. Pt verbalizes understanding. Follow up was discussed. Patient was given the opportunity to ask questions and discuss other concerns.

## 2023-07-27 ENCOUNTER — LAB (OUTPATIENT)
Dept: LAB | Facility: HOSPITAL | Age: 53
End: 2023-07-27
Payer: MEDICAID

## 2023-07-27 DIAGNOSIS — R25.1 TREMOR OF RIGHT HAND: ICD-10-CM

## 2023-07-27 LAB — CERULOPLASMIN SERPL-MCNC: 55 MG/DL (ref 19–39)

## 2023-07-27 PROCEDURE — 86780 TREPONEMA PALLIDUM: CPT

## 2023-07-27 PROCEDURE — 82525 ASSAY OF COPPER: CPT

## 2023-07-27 PROCEDURE — 82390 ASSAY OF CERULOPLASMIN: CPT

## 2023-07-27 PROCEDURE — 84207 ASSAY OF VITAMIN B-6: CPT

## 2023-07-27 PROCEDURE — 36415 COLL VENOUS BLD VENIPUNCTURE: CPT

## 2023-07-27 PROCEDURE — 84446 ASSAY OF VITAMIN E: CPT

## 2023-07-28 ENCOUNTER — OFFICE VISIT (OUTPATIENT)
Dept: FAMILY MEDICINE CLINIC | Facility: CLINIC | Age: 53
End: 2023-07-28
Payer: MEDICAID

## 2023-07-28 VITALS
BODY MASS INDEX: 26 KG/M2 | HEART RATE: 99 BPM | HEIGHT: 66 IN | WEIGHT: 161.8 LBS | SYSTOLIC BLOOD PRESSURE: 133 MMHG | OXYGEN SATURATION: 100 % | TEMPERATURE: 98 F | DIASTOLIC BLOOD PRESSURE: 68 MMHG | RESPIRATION RATE: 16 BRPM

## 2023-07-28 DIAGNOSIS — M54.42 ACUTE LEFT-SIDED LOW BACK PAIN WITH LEFT-SIDED SCIATICA: ICD-10-CM

## 2023-07-28 DIAGNOSIS — M54.16 LUMBAR RADICULOPATHY: Primary | ICD-10-CM

## 2023-07-28 DIAGNOSIS — G25.0 ESSENTIAL TREMOR: ICD-10-CM

## 2023-07-28 LAB — T PALLIDUM AB SER QL IF: NON REACTIVE

## 2023-07-28 RX ORDER — TRIAMCINOLONE ACETONIDE 40 MG/ML
60 INJECTION, SUSPENSION INTRA-ARTICULAR; INTRAMUSCULAR ONCE
Status: COMPLETED | OUTPATIENT
Start: 2023-07-28 | End: 2023-07-28

## 2023-07-28 RX ORDER — DIAZEPAM 5 MG/1
5 TABLET ORAL 2 TIMES DAILY PRN
Qty: 20 TABLET | Refills: 0 | Status: SHIPPED | OUTPATIENT
Start: 2023-07-28

## 2023-07-28 RX ORDER — HYDROCODONE BITARTRATE AND ACETAMINOPHEN 7.5; 325 MG/1; MG/1
1 TABLET ORAL EVERY 6 HOURS PRN
Qty: 20 TABLET | Refills: 0 | Status: SHIPPED | OUTPATIENT
Start: 2023-07-28

## 2023-07-28 RX ORDER — BACLOFEN 10 MG/1
10 TABLET ORAL 3 TIMES DAILY
Qty: 90 TABLET | Refills: 0 | Status: SHIPPED | OUTPATIENT
Start: 2023-07-28

## 2023-07-28 RX ADMIN — TRIAMCINOLONE ACETONIDE 60 MG: 40 INJECTION, SUSPENSION INTRA-ARTICULAR; INTRAMUSCULAR at 16:00

## 2023-07-29 LAB — PYRIDOXAL PHOS SERPL-MCNC: 9.5 UG/L (ref 3.4–65.2)

## 2023-07-31 ENCOUNTER — HOSPITAL ENCOUNTER (OUTPATIENT)
Dept: NEUROLOGY | Facility: HOSPITAL | Age: 53
Discharge: HOME | End: 2023-07-31
Admitting: PSYCHIATRY & NEUROLOGY
Payer: MEDICAID

## 2023-07-31 DIAGNOSIS — R25.1 TREMOR OF RIGHT HAND: ICD-10-CM

## 2023-07-31 PROCEDURE — 95819 EEG AWAKE AND ASLEEP: CPT | Performed by: PSYCHIATRY & NEUROLOGY

## 2023-07-31 PROCEDURE — 95819 EEG AWAKE AND ASLEEP: CPT

## 2023-08-01 LAB
A-TOCOPHEROL VIT E SERPL-MCNC: 14.2 MG/L (ref 7–25.1)
COPPER SERPL-MCNC: 264 UG/DL (ref 80–158)
GAMMA TOCOPHEROL SERPL-MCNC: 1.9 MG/L (ref 0.5–5.5)

## 2023-08-02 DIAGNOSIS — J45.909 ASTHMA, UNSPECIFIED ASTHMA SEVERITY, UNSPECIFIED WHETHER COMPLICATED, UNSPECIFIED WHETHER PERSISTENT: ICD-10-CM

## 2023-08-02 RX ORDER — MONTELUKAST SODIUM 10 MG/1
TABLET ORAL
Qty: 90 TABLET | Refills: 1 | Status: SHIPPED | OUTPATIENT
Start: 2023-08-02

## 2023-08-02 RX ORDER — BUDESONIDE AND FORMOTEROL FUMARATE DIHYDRATE 160; 4.5 UG/1; UG/1
2 AEROSOL RESPIRATORY (INHALATION) 2 TIMES DAILY
Qty: 1 EACH | Refills: 1 | Status: SHIPPED | OUTPATIENT
Start: 2023-08-02

## 2023-08-10 DIAGNOSIS — R79.0 ABNORMAL BLOOD LEVEL OF COPPER: Primary | ICD-10-CM

## 2023-08-20 DIAGNOSIS — G25.0 ESSENTIAL TREMOR: ICD-10-CM

## 2023-08-20 DIAGNOSIS — M54.42 ACUTE LEFT-SIDED LOW BACK PAIN WITH LEFT-SIDED SCIATICA: ICD-10-CM

## 2023-08-20 DIAGNOSIS — M54.16 LUMBAR RADICULOPATHY: ICD-10-CM

## 2023-08-21 RX ORDER — BACLOFEN 10 MG/1
TABLET ORAL
Qty: 90 TABLET | Refills: 0 | Status: SHIPPED | OUTPATIENT
Start: 2023-08-21

## 2024-04-18 ENCOUNTER — TELEPHONE (OUTPATIENT)
Dept: FAMILY MEDICINE CLINIC | Facility: CLINIC | Age: 54
End: 2024-04-18

## 2024-04-18 ENCOUNTER — OFFICE VISIT (OUTPATIENT)
Dept: FAMILY MEDICINE CLINIC | Facility: CLINIC | Age: 54
End: 2024-04-18
Payer: COMMERCIAL

## 2024-04-18 ENCOUNTER — LAB (OUTPATIENT)
Dept: FAMILY MEDICINE CLINIC | Facility: CLINIC | Age: 54
End: 2024-04-18
Payer: COMMERCIAL

## 2024-04-18 VITALS
WEIGHT: 156 LBS | HEIGHT: 66 IN | SYSTOLIC BLOOD PRESSURE: 138 MMHG | HEART RATE: 71 BPM | OXYGEN SATURATION: 98 % | BODY MASS INDEX: 25.07 KG/M2 | RESPIRATION RATE: 18 BRPM | DIASTOLIC BLOOD PRESSURE: 76 MMHG

## 2024-04-18 DIAGNOSIS — M54.42 ACUTE LEFT-SIDED LOW BACK PAIN WITH LEFT-SIDED SCIATICA: ICD-10-CM

## 2024-04-18 DIAGNOSIS — M54.16 LUMBAR RADICULOPATHY: ICD-10-CM

## 2024-04-18 DIAGNOSIS — Z12.31 ENCOUNTER FOR SCREENING MAMMOGRAM FOR MALIGNANT NEOPLASM OF BREAST: ICD-10-CM

## 2024-04-18 DIAGNOSIS — R05.3 CHRONIC COUGH: ICD-10-CM

## 2024-04-18 DIAGNOSIS — Z00.00 PREVENTATIVE HEALTH CARE: ICD-10-CM

## 2024-04-18 DIAGNOSIS — Z00.00 PREVENTATIVE HEALTH CARE: Primary | ICD-10-CM

## 2024-04-18 DIAGNOSIS — Z12.11 ENCOUNTER FOR SCREENING FOR MALIGNANT NEOPLASM OF COLON: ICD-10-CM

## 2024-04-18 DIAGNOSIS — G25.0 ESSENTIAL TREMOR: ICD-10-CM

## 2024-04-18 LAB
ALBUMIN SERPL-MCNC: 3.8 G/DL (ref 3.5–5.2)
ALBUMIN/GLOB SERPL: 1 G/DL
ALP SERPL-CCNC: 530 U/L (ref 39–117)
ALT SERPL W P-5'-P-CCNC: 88 U/L (ref 1–33)
ANION GAP SERPL CALCULATED.3IONS-SCNC: 11.4 MMOL/L (ref 5–15)
AST SERPL-CCNC: 69 U/L (ref 1–32)
BASOPHILS # BLD AUTO: 0.04 10*3/MM3 (ref 0–0.2)
BASOPHILS NFR BLD AUTO: 0.6 % (ref 0–1.5)
BILIRUB SERPL-MCNC: 0.6 MG/DL (ref 0–1.2)
BILIRUB UR QL STRIP: NEGATIVE
BUN SERPL-MCNC: 10 MG/DL (ref 6–20)
BUN/CREAT SERPL: 17.2 (ref 7–25)
CALCIUM SPEC-SCNC: 8.9 MG/DL (ref 8.6–10.5)
CHLORIDE SERPL-SCNC: 102 MMOL/L (ref 98–107)
CHOLEST SERPL-MCNC: 344 MG/DL (ref 0–200)
CLARITY UR: ABNORMAL
CO2 SERPL-SCNC: 22.6 MMOL/L (ref 22–29)
COLOR UR: YELLOW
CREAT SERPL-MCNC: 0.58 MG/DL (ref 0.57–1)
DEPRECATED RDW RBC AUTO: 41.5 FL (ref 37–54)
EGFRCR SERPLBLD CKD-EPI 2021: 108.4 ML/MIN/1.73
EOSINOPHIL # BLD AUTO: 0.13 10*3/MM3 (ref 0–0.4)
EOSINOPHIL NFR BLD AUTO: 1.9 % (ref 0.3–6.2)
ERYTHROCYTE [DISTWIDTH] IN BLOOD BY AUTOMATED COUNT: 12.8 % (ref 12.3–15.4)
GLOBULIN UR ELPH-MCNC: 3.7 GM/DL
GLUCOSE SERPL-MCNC: 106 MG/DL (ref 65–99)
GLUCOSE UR STRIP-MCNC: NEGATIVE MG/DL
HBA1C MFR BLD: 5.2 % (ref 4.8–5.6)
HCT VFR BLD AUTO: 37.8 % (ref 34–46.6)
HDLC SERPL-MCNC: 73 MG/DL (ref 40–60)
HGB BLD-MCNC: 12.5 G/DL (ref 12–15.9)
HGB UR QL STRIP.AUTO: NEGATIVE
HOLD SPECIMEN: NORMAL
IMM GRANULOCYTES # BLD AUTO: 0.02 10*3/MM3 (ref 0–0.05)
IMM GRANULOCYTES NFR BLD AUTO: 0.3 % (ref 0–0.5)
KETONES UR QL STRIP: NEGATIVE
LDLC SERPL CALC-MCNC: 213 MG/DL (ref 0–100)
LDLC/HDLC SERPL: 2.93 {RATIO}
LEUKOCYTE ESTERASE UR QL STRIP.AUTO: NEGATIVE
LYMPHOCYTES # BLD AUTO: 1.9 10*3/MM3 (ref 0.7–3.1)
LYMPHOCYTES NFR BLD AUTO: 27.3 % (ref 19.6–45.3)
MCH RBC QN AUTO: 29.7 PG (ref 26.6–33)
MCHC RBC AUTO-ENTMCNC: 33.1 G/DL (ref 31.5–35.7)
MCV RBC AUTO: 89.8 FL (ref 79–97)
MONOCYTES # BLD AUTO: 0.67 10*3/MM3 (ref 0.1–0.9)
MONOCYTES NFR BLD AUTO: 9.6 % (ref 5–12)
NEUTROPHILS NFR BLD AUTO: 4.19 10*3/MM3 (ref 1.7–7)
NEUTROPHILS NFR BLD AUTO: 60.3 % (ref 42.7–76)
NITRITE UR QL STRIP: NEGATIVE
NRBC BLD AUTO-RTO: 0 /100 WBC (ref 0–0.2)
PH UR STRIP.AUTO: 6 [PH] (ref 5–8)
PLATELET # BLD AUTO: 295 10*3/MM3 (ref 140–450)
PMV BLD AUTO: 12.2 FL (ref 6–12)
POTASSIUM SERPL-SCNC: 4.3 MMOL/L (ref 3.5–5.2)
PROT SERPL-MCNC: 7.5 G/DL (ref 6–8.5)
PROT UR QL STRIP: NEGATIVE
RBC # BLD AUTO: 4.21 10*6/MM3 (ref 3.77–5.28)
SODIUM SERPL-SCNC: 136 MMOL/L (ref 136–145)
SP GR UR STRIP: >=1.03 (ref 1–1.03)
TRIGL SERPL-MCNC: 287 MG/DL (ref 0–150)
TSH SERPL DL<=0.05 MIU/L-ACNC: 0.85 UIU/ML (ref 0.27–4.2)
UROBILINOGEN UR QL STRIP: ABNORMAL
VLDLC SERPL-MCNC: 58 MG/DL (ref 5–40)
WBC NRBC COR # BLD AUTO: 6.95 10*3/MM3 (ref 3.4–10.8)

## 2024-04-18 PROCEDURE — 84443 ASSAY THYROID STIM HORMONE: CPT | Performed by: NURSE PRACTITIONER

## 2024-04-18 PROCEDURE — 80053 COMPREHEN METABOLIC PANEL: CPT | Performed by: NURSE PRACTITIONER

## 2024-04-18 PROCEDURE — 80061 LIPID PANEL: CPT | Performed by: NURSE PRACTITIONER

## 2024-04-18 PROCEDURE — 36415 COLL VENOUS BLD VENIPUNCTURE: CPT

## 2024-04-18 PROCEDURE — 85025 COMPLETE CBC W/AUTO DIFF WBC: CPT | Performed by: NURSE PRACTITIONER

## 2024-04-18 PROCEDURE — 83036 HEMOGLOBIN GLYCOSYLATED A1C: CPT | Performed by: NURSE PRACTITIONER

## 2024-04-18 PROCEDURE — 81003 URINALYSIS AUTO W/O SCOPE: CPT | Performed by: NURSE PRACTITIONER

## 2024-04-18 RX ORDER — PROMETHAZINE HYDROCHLORIDE AND CODEINE PHOSPHATE 6.25; 1 MG/5ML; MG/5ML
5 SOLUTION ORAL EVERY 4 HOURS PRN
Qty: 120 ML | Refills: 1 | Status: SHIPPED | OUTPATIENT
Start: 2024-04-18 | End: 2024-04-22

## 2024-04-18 RX ORDER — HYDROCODONE BITARTRATE AND HOMATROPINE METHYLBROMIDE ORAL SOLUTION 5; 1.5 MG/5ML; MG/5ML
5 LIQUID ORAL EVERY 6 HOURS PRN
Qty: 120 ML | Refills: 0 | Status: SHIPPED | OUTPATIENT
Start: 2024-04-18 | End: 2024-04-22 | Stop reason: SDUPTHER

## 2024-04-18 RX ORDER — TIZANIDINE 2 MG/1
4 TABLET ORAL EVERY 8 HOURS PRN
Qty: 30 TABLET | Refills: 1 | Status: SHIPPED | OUTPATIENT
Start: 2024-04-18

## 2024-04-18 RX ORDER — METHYLPREDNISOLONE 4 MG/1
TABLET ORAL
Qty: 21 TABLET | Refills: 0 | Status: SHIPPED | OUTPATIENT
Start: 2024-04-18

## 2024-04-18 RX ORDER — HYDROCODONE BITARTRATE AND ACETAMINOPHEN 7.5; 325 MG/1; MG/1
1 TABLET ORAL EVERY 6 HOURS PRN
Qty: 20 TABLET | Refills: 0 | Status: SHIPPED | OUTPATIENT
Start: 2024-04-18

## 2024-04-18 RX ORDER — DIAZEPAM 5 MG/1
5 TABLET ORAL 2 TIMES DAILY PRN
Qty: 20 TABLET | Refills: 0 | Status: SHIPPED | OUTPATIENT
Start: 2024-04-18

## 2024-04-18 NOTE — PROGRESS NOTES
"Chief Complaint  Annual Exam  Subjective        Gideon Fields presents to [facility]  History of Present Illness  Pt comes in today for routine physical.  She is also with c/o ongoing cough. Hx of lung cancer and has had right upper lobectomy.  No fever or chills.  She is taking zyrtec and singulair daily   She is also requesting refills on meds she takes for chronic back pain.        Objective     Vital Signs:   /76   Pulse 71   Resp 18   Ht 167.6 cm (65.98\")   Wt 70.8 kg (156 lb)   SpO2 98%   BMI 25.19 kg/m²       BP Readings from Last 3 Encounters:   04/18/24 138/76   07/28/23 133/68   06/28/23 149/77       Wt Readings from Last 3 Encounters:   04/18/24 70.8 kg (156 lb)   07/28/23 73.4 kg (161 lb 12.8 oz)   06/28/23 73 kg (161 lb)     Physical Exam  Constitutional:       Appearance: She is well-developed.   HENT:      Head: Normocephalic.   Eyes:      Conjunctiva/sclera: Conjunctivae normal.      Pupils: Pupils are equal, round, and reactive to light.   Neck:      Thyroid: No thyromegaly.   Cardiovascular:      Rate and Rhythm: Normal rate and regular rhythm.      Heart sounds: No murmur heard.  Pulmonary:      Effort: Pulmonary effort is normal. No respiratory distress.      Breath sounds: Normal breath sounds. No wheezing or rhonchi.      Comments: Dry cough   Abdominal:      General: Bowel sounds are normal.      Palpations: Abdomen is soft. There is no mass.      Tenderness: There is no abdominal tenderness.   Musculoskeletal:      Cervical back: Neck supple.   Skin:     General: Skin is warm and dry.      Findings: No lesion.   Neurological:      Mental Status: She is alert and oriented to person, place, and time.   Psychiatric:         Behavior: Behavior normal.        Result Review :                 Assessment and Plan    Diagnoses and all orders for this visit:    1. Preventative health care (Primary)  -     CBC & Differential; Future  -     Comprehensive " Metabolic Panel; Future  -     Hemoglobin A1c; Future  -     Lipid Panel; Future  -     TSH; Future  -     Urinalysis With Culture If Indicated - Urine, Clean Catch; Future    2. Encounter for screening mammogram for malignant neoplasm of breast  -     Mammo Screening Digital Tomosynthesis Bilateral With CAD; Future    3. Encounter for screening for malignant neoplasm of colon  -     Ambulatory Referral For Screening Colonoscopy    4. Chronic cough  -     XR Chest PA & Lateral; Future  -     promethazine-codeine (PHENERGAN with CODEINE) 6.25-10 MG/5ML solution; Take 5 mL by mouth Every 4 (Four) Hours As Needed for Cough.  Dispense: 120 mL; Refill: 1  -     methylPREDNISolone (MEDROL) 4 MG dose pack; Take as directed on package instructions.  Dispense: 21 tablet; Refill: 0    5. Essential tremor  -     diazePAM (Valium) 5 MG tablet; Take 1 tablet by mouth 2 (Two) Times a Day As Needed for Anxiety or Muscle Spasms.  Dispense: 20 tablet; Refill: 0    6. Lumbar radiculopathy  -     HYDROcodone-acetaminophen (NORCO) 7.5-325 MG per tablet; Take 1 tablet by mouth Every 6 (Six) Hours As Needed for Mild Pain.  Dispense: 20 tablet; Refill: 0  -     tiZANidine (ZANAFLEX) 2 MG tablet; Take 2 tablets by mouth Every 8 (Eight) Hours As Needed for Muscle Spasms.  Dispense: 30 tablet; Refill: 1  -     methylPREDNISolone (MEDROL) 4 MG dose pack; Take as directed on package instructions.  Dispense: 21 tablet; Refill: 0    7. Acute left-sided low back pain with left-sided sciatica  -     HYDROcodone-acetaminophen (NORCO) 7.5-325 MG per tablet; Take 1 tablet by mouth Every 6 (Six) Hours As Needed for Mild Pain.  Dispense: 20 tablet; Refill: 0    Check labs  Refill meds  Mammogram  Colonoscopy  Needs to follow up with surgeon regarding CT chest  Will get CXR today  Start medrol dose pack  During this visit for their annual exam, we reviewed their personal history, social history and family history. We went over their medications and all  the recommended health maintenance items for their age group. They were given the opportunity to ask questions and discuss other concerns.   Discussed importance of regular exercise and recommended starting or continuing a regular exercise program for good health. The patient was also encouraged to lose weight for better health.         Follow Up   Return in about 6 months (around 10/18/2024).  Patient was given instructions and counseling regarding her condition or for health maintenance advice. Please see specific information pulled into the AVS if appropriate.

## 2024-04-18 NOTE — TELEPHONE ENCOUNTER
Caller: Gideon Fields    Relationship: Self    Best call back number: [phone]    What medication are you requesting: HYDROMET     If a prescription is needed, what is your preferred pharmacy and phone number: [id number] - [place] [address] [facility] - [phone] [facility] [phone]      Additional notes: PT WAS SEEN TODAY AND WAS PRESCRIBED promethazine-codeine (PHENERGAN with CODEINE) 6.25-10 MG/5ML solution, BUT THE PHARMACY DOES NOT CARRY IT. PT STATES THAT JOSETTE CARRIES THE MEDICATION THAT SHE USED IN THE PAST THAT REALLY HELPED HER (HYDROMET)

## 2024-04-19 DIAGNOSIS — R05.3 CHRONIC COUGH: ICD-10-CM

## 2024-04-22 ENCOUNTER — TELEPHONE (OUTPATIENT)
Dept: FAMILY MEDICINE CLINIC | Facility: CLINIC | Age: 54
End: 2024-04-22
Payer: COMMERCIAL

## 2024-04-22 DIAGNOSIS — E78.00 PURE HYPERCHOLESTEROLEMIA: Primary | ICD-10-CM

## 2024-04-22 DIAGNOSIS — R79.89 ELEVATED LFTS: ICD-10-CM

## 2024-04-22 RX ORDER — HYDROCODONE BITARTRATE AND HOMATROPINE METHYLBROMIDE ORAL SOLUTION 5; 1.5 MG/5ML; MG/5ML
5 LIQUID ORAL EVERY 6 HOURS PRN
Qty: 120 ML | Refills: 0 | Status: SHIPPED | OUTPATIENT
Start: 2024-04-22

## 2024-04-22 RX ORDER — DOXYCYCLINE HYCLATE 100 MG/1
100 CAPSULE ORAL 2 TIMES DAILY
Qty: 14 CAPSULE | Refills: 0 | Status: SHIPPED | OUTPATIENT
Start: 2024-04-22

## 2024-04-22 NOTE — TELEPHONE ENCOUNTER
When I called patient to schedule [place] [facility] [place], she asked about her chest xray results because she saw some things on the report that scared her.  I saw on that report that you wanted to know how she was feeling.  Patient saw awful.  She is coughing non-stop.  Has already used all of the cough medicine that you gave her last week.  She would like more sent in to [facility].  Said she is wheezing and is using her nebulizer.  She is concerned about the other things listed on the chest xray report.  Please call to advise.

## 2024-04-22 NOTE — TELEPHONE ENCOUNTER
Gave message to patient when I called her and scheduled a [facility] lab appt on 05/06/2024 at 8:40am.

## 2024-04-22 NOTE — TELEPHONE ENCOUNTER
Relay: called patient, unable to reach. Results were seen in mychart. Please relay message below.    Lipids are extremely high. Her liver enzymes are also elevated.   Junie would like her to come back in 1 morning in about 2 weeks to repeat both fasting. If still elevated, we will need to address this.   I will place orders to have checked in 2 weeks. Fasting.

## 2024-04-22 NOTE — PROGRESS NOTES
Lipids are extremely high. Her liver enzymes are also elevated.   I want her to come back in 1 morning in about 2 weeks to repeat both fasting. If still elevated, we will need to address this.   I will place orders to have checked in 2 weeks. Fasting.

## 2024-05-06 ENCOUNTER — LAB (OUTPATIENT)
Dept: FAMILY MEDICINE CLINIC | Facility: CLINIC | Age: 54
End: 2024-05-06
Payer: COMMERCIAL

## 2024-05-06 DIAGNOSIS — E78.00 PURE HYPERCHOLESTEROLEMIA: ICD-10-CM

## 2024-05-06 DIAGNOSIS — R79.89 ELEVATED LFTS: ICD-10-CM

## 2024-05-06 PROCEDURE — 80061 LIPID PANEL: CPT | Performed by: NURSE PRACTITIONER

## 2024-05-06 PROCEDURE — 80053 COMPREHEN METABOLIC PANEL: CPT | Performed by: NURSE PRACTITIONER

## 2024-05-06 PROCEDURE — 36415 COLL VENOUS BLD VENIPUNCTURE: CPT

## 2024-05-07 DIAGNOSIS — R79.89 ELEVATED LFTS: ICD-10-CM

## 2024-05-07 DIAGNOSIS — R05.3 CHRONIC COUGH: Primary | ICD-10-CM

## 2024-05-07 LAB
ALBUMIN SERPL-MCNC: 3.8 G/DL (ref 3.5–5.2)
ALBUMIN/GLOB SERPL: 1.1 G/DL
ALP SERPL-CCNC: 487 U/L (ref 39–117)
ALT SERPL W P-5'-P-CCNC: 82 U/L (ref 1–33)
ANION GAP SERPL CALCULATED.3IONS-SCNC: 12.8 MMOL/L (ref 5–15)
AST SERPL-CCNC: 48 U/L (ref 1–32)
BILIRUB SERPL-MCNC: 0.6 MG/DL (ref 0–1.2)
BUN SERPL-MCNC: 10 MG/DL (ref 6–20)
BUN/CREAT SERPL: 16.9 (ref 7–25)
CALCIUM SPEC-SCNC: 9.3 MG/DL (ref 8.6–10.5)
CHLORIDE SERPL-SCNC: 102 MMOL/L (ref 98–107)
CHOLEST SERPL-MCNC: 332 MG/DL (ref 0–200)
CO2 SERPL-SCNC: 23.2 MMOL/L (ref 22–29)
CREAT SERPL-MCNC: 0.59 MG/DL (ref 0.57–1)
EGFRCR SERPLBLD CKD-EPI 2021: 107.9 ML/MIN/1.73
GLOBULIN UR ELPH-MCNC: 3.4 GM/DL
GLUCOSE SERPL-MCNC: 96 MG/DL (ref 65–99)
HDLC SERPL-MCNC: 74 MG/DL (ref 40–60)
LDLC SERPL CALC-MCNC: 191 MG/DL (ref 0–100)
LDLC/HDLC SERPL: 2.57 {RATIO}
POTASSIUM SERPL-SCNC: 4.2 MMOL/L (ref 3.5–5.2)
PROT SERPL-MCNC: 7.2 G/DL (ref 6–8.5)
SODIUM SERPL-SCNC: 138 MMOL/L (ref 136–145)
TRIGL SERPL-MCNC: 338 MG/DL (ref 0–150)
VLDLC SERPL-MCNC: 67 MG/DL (ref 5–40)

## 2024-05-23 ENCOUNTER — TELEPHONE (OUTPATIENT)
Dept: FAMILY MEDICINE CLINIC | Facility: CLINIC | Age: 54
End: 2024-05-23
Payer: COMMERCIAL

## 2024-05-30 DIAGNOSIS — Z12.31 ENCOUNTER FOR SCREENING MAMMOGRAM FOR MALIGNANT NEOPLASM OF BREAST: ICD-10-CM

## 2024-06-10 ENCOUNTER — OFFICE (AMBULATORY)
Dept: URBAN - METROPOLITAN AREA CLINIC 64 | Facility: CLINIC | Age: 54
End: 2024-06-10
Payer: COMMERCIAL

## 2024-06-10 VITALS
SYSTOLIC BLOOD PRESSURE: 137 MMHG | HEIGHT: 66 IN | HEART RATE: 86 BPM | WEIGHT: 156 LBS | DIASTOLIC BLOOD PRESSURE: 83 MMHG

## 2024-06-10 DIAGNOSIS — R94.5 ABNORMAL RESULTS OF LIVER FUNCTION STUDIES: ICD-10-CM

## 2024-06-10 PROCEDURE — 99203 OFFICE O/P NEW LOW 30 MIN: CPT | Performed by: INTERNAL MEDICINE

## 2024-06-19 RX ORDER — DULOXETIN HYDROCHLORIDE 60 MG/1
60 CAPSULE, DELAYED RELEASE ORAL EVERY 12 HOURS SCHEDULED
Qty: 60 CAPSULE | Refills: 0 | Status: SHIPPED | OUTPATIENT
Start: 2024-06-19

## 2024-06-19 NOTE — TELEPHONE ENCOUNTER
Caller: Gideon Fields    Relationship: Self    Best call back number: [phone]    Requested Prescriptions:   Requested Prescriptions     Pending Prescriptions Disp Refills    DULoxetine (CYMBALTA) 60 MG capsule 60 capsule 0     Sig: Take 1 capsule by mouth Every 12 (Twelve) Hours.      Pharmacy where request should be sent: [facility][id number] [place], [place] - [address] - [phone] PH - [phone] FX     Last office visit with prescribing clinician: 4/18/2024   Last telemedicine visit with prescribing clinician: Visit date not found   Next office visit with prescribing clinician: Visit date not found     Additional details provided by patient: PT IS OUT OF MEDICATION.     Does the patient have less than a 3 day supply:  [x] Yes  [] No    Would you like a call back once the refill request has been completed: [] Yes [x] No    Andres Gil Rep   06/19/24 14:54 EDT

## 2024-06-27 ENCOUNTER — TELEPHONE (OUTPATIENT)
Dept: FAMILY MEDICINE CLINIC | Facility: CLINIC | Age: 54
End: 2024-06-27
Payer: COMMERCIAL

## 2024-06-27 NOTE — TELEPHONE ENCOUNTER
"Caller: Gideon Fields    Relationship to patient: Self    Best call back number: [phone]    Patient is needing: PT STATES THAT SHE CALLED [facility] TO ASK A CLARIFYING QUESTION ABOUT HER CHEST X-RAY RESULTS AND THEY TOLD PT THAT PCP WOULD HAVE TO SUBMIT A REQUEST TO THEM FOR \"CLARIFICATION IN REGARD TO A TINY METALLIC DENSITY OVER RIGHT LUNG BASE\".       "

## 2024-06-28 PROBLEM — R13.10 DYSPHAGIA: Status: ACTIVE | Noted: 2018-11-09

## 2024-07-10 ENCOUNTER — ON CAMPUS - OUTPATIENT (AMBULATORY)
Dept: URBAN - METROPOLITAN AREA HOSPITAL 77 | Facility: HOSPITAL | Age: 54
End: 2024-07-10
Payer: COMMERCIAL

## 2024-07-10 ENCOUNTER — HOSPITAL ENCOUNTER (OUTPATIENT)
Facility: HOSPITAL | Age: 54
Setting detail: OBSERVATION
Discharge: HOME | End: 2024-07-12
Attending: STUDENT IN AN ORGANIZED HEALTH CARE EDUCATION/TRAINING PROGRAM | Admitting: STUDENT IN AN ORGANIZED HEALTH CARE EDUCATION/TRAINING PROGRAM
Payer: COMMERCIAL

## 2024-07-10 DIAGNOSIS — K44.9 DIAPHRAGMATIC HERNIA WITHOUT OBSTRUCTION OR GANGRENE: ICD-10-CM

## 2024-07-10 DIAGNOSIS — R04.2 HEMOPTYSIS: ICD-10-CM

## 2024-07-10 DIAGNOSIS — K74.01 HEPATIC FIBROSIS, EARLY FIBROSIS: ICD-10-CM

## 2024-07-10 DIAGNOSIS — K80.18 CALCULUS OF GALLBLADDER WITH CHOLECYSTITIS OF OTHER ACUITY WITHOUT OBSTRUCTION: Primary | ICD-10-CM

## 2024-07-10 DIAGNOSIS — R94.5 ABNORMAL RESULTS OF LIVER FUNCTION STUDIES: ICD-10-CM

## 2024-07-10 DIAGNOSIS — K92.0 HEMATEMESIS, UNSPECIFIED WHETHER NAUSEA PRESENT: ICD-10-CM

## 2024-07-10 DIAGNOSIS — R10.84 GENERALIZED ABDOMINAL PAIN: ICD-10-CM

## 2024-07-10 DIAGNOSIS — K20.90 ESOPHAGITIS, UNSPECIFIED WITHOUT BLEEDING: ICD-10-CM

## 2024-07-10 DIAGNOSIS — K74.3 PRIMARY BILIARY CIRRHOSIS: ICD-10-CM

## 2024-07-10 PROCEDURE — 85025 COMPLETE CBC W/AUTO DIFF WBC: CPT | Performed by: NURSE PRACTITIONER

## 2024-07-10 PROCEDURE — 83690 ASSAY OF LIPASE: CPT | Performed by: NURSE PRACTITIONER

## 2024-07-10 PROCEDURE — 80053 COMPREHEN METABOLIC PANEL: CPT | Performed by: NURSE PRACTITIONER

## 2024-07-10 PROCEDURE — 99285 EMERGENCY DEPT VISIT HI MDM: CPT

## 2024-07-10 PROCEDURE — 43242 EGD US FINE NEEDLE BX/ASPIR: CPT | Performed by: INTERNAL MEDICINE

## 2024-07-10 RX ORDER — ONDANSETRON 2 MG/ML
4 INJECTION INTRAMUSCULAR; INTRAVENOUS ONCE
Status: COMPLETED | OUTPATIENT
Start: 2024-07-11 | End: 2024-07-11

## 2024-07-10 RX ORDER — SODIUM CHLORIDE 0.9 % (FLUSH) 0.9 %
10 SYRINGE (ML) INJECTION AS NEEDED
Status: DISCONTINUED | OUTPATIENT
Start: 2024-07-10 | End: 2024-07-12 | Stop reason: HOSPADM

## 2024-07-10 NOTE — Clinical Note
Level of Care: Telemetry [5]   Diagnosis: Hematemesis [578.0.ICD-9-CM]   Admitting Physician: LLANES ALVAREZ, CARLOS [[id number]]   Attending Physician: LLANES ALVAREZ, CARLOS [[id number]]

## 2024-07-11 ENCOUNTER — ON CAMPUS - OUTPATIENT (AMBULATORY)
Dept: URBAN - METROPOLITAN AREA HOSPITAL 85 | Facility: HOSPITAL | Age: 54
End: 2024-07-11
Payer: COMMERCIAL

## 2024-07-11 ENCOUNTER — APPOINTMENT (OUTPATIENT)
Dept: CT IMAGING | Facility: HOSPITAL | Age: 54
End: 2024-07-11
Payer: COMMERCIAL

## 2024-07-11 DIAGNOSIS — D62 ACUTE POSTHEMORRHAGIC ANEMIA: ICD-10-CM

## 2024-07-11 DIAGNOSIS — R10.10 UPPER ABDOMINAL PAIN, UNSPECIFIED: ICD-10-CM

## 2024-07-11 DIAGNOSIS — K92.0 HEMATEMESIS: ICD-10-CM

## 2024-07-11 DIAGNOSIS — R94.5 ABNORMAL RESULTS OF LIVER FUNCTION STUDIES: ICD-10-CM

## 2024-07-11 DIAGNOSIS — K80.20 CALCULUS OF GALLBLADDER WITHOUT CHOLECYSTITIS WITHOUT OBSTRU: ICD-10-CM

## 2024-07-11 DIAGNOSIS — K20.0 EOSINOPHILIC ESOPHAGITIS: ICD-10-CM

## 2024-07-11 LAB
ABO GROUP BLD: NORMAL
ALBUMIN SERPL-MCNC: 3.7 G/DL (ref 3.5–5.2)
ALBUMIN/GLOB SERPL: 1.1 G/DL
ALP SERPL-CCNC: 525 U/L (ref 39–117)
ALT SERPL W P-5'-P-CCNC: 102 U/L (ref 1–33)
ANION GAP SERPL CALCULATED.3IONS-SCNC: 11.4 MMOL/L (ref 5–15)
ANION GAP SERPL CALCULATED.3IONS-SCNC: 7.4 MMOL/L (ref 5–15)
AST SERPL-CCNC: 79 U/L (ref 1–32)
BASOPHILS # BLD AUTO: 0.03 10*3/MM3 (ref 0–0.2)
BASOPHILS # BLD AUTO: 0.04 10*3/MM3 (ref 0–0.2)
BASOPHILS NFR BLD AUTO: 0.4 % (ref 0–1.5)
BASOPHILS NFR BLD AUTO: 0.4 % (ref 0–1.5)
BILIRUB SERPL-MCNC: 0.9 MG/DL (ref 0–1.2)
BLD GP AB SCN SERPL QL: NEGATIVE
BUN SERPL-MCNC: 22 MG/DL (ref 6–20)
BUN SERPL-MCNC: 27 MG/DL (ref 6–20)
BUN/CREAT SERPL: 40 (ref 7–25)
BUN/CREAT SERPL: 40.3 (ref 7–25)
CALCIUM SPEC-SCNC: 8.3 MG/DL (ref 8.6–10.5)
CALCIUM SPEC-SCNC: 8.9 MG/DL (ref 8.6–10.5)
CHLORIDE SERPL-SCNC: 103 MMOL/L (ref 98–107)
CHLORIDE SERPL-SCNC: 108 MMOL/L (ref 98–107)
CO2 SERPL-SCNC: 21.6 MMOL/L (ref 22–29)
CO2 SERPL-SCNC: 24.6 MMOL/L (ref 22–29)
CREAT SERPL-MCNC: 0.55 MG/DL (ref 0.57–1)
CREAT SERPL-MCNC: 0.67 MG/DL (ref 0.57–1)
DEPRECATED RDW RBC AUTO: 51 FL (ref 37–54)
DEPRECATED RDW RBC AUTO: 51.7 FL (ref 37–54)
EGFRCR SERPLBLD CKD-EPI 2021: 104.7 ML/MIN/1.73
EGFRCR SERPLBLD CKD-EPI 2021: 109.8 ML/MIN/1.73
EOSINOPHIL # BLD AUTO: 0.04 10*3/MM3 (ref 0–0.4)
EOSINOPHIL # BLD AUTO: 0.16 10*3/MM3 (ref 0–0.4)
EOSINOPHIL NFR BLD AUTO: 0.6 % (ref 0.3–6.2)
EOSINOPHIL NFR BLD AUTO: 1.6 % (ref 0.3–6.2)
ERYTHROCYTE [DISTWIDTH] IN BLOOD BY AUTOMATED COUNT: 14.6 % (ref 12.3–15.4)
ERYTHROCYTE [DISTWIDTH] IN BLOOD BY AUTOMATED COUNT: 14.6 % (ref 12.3–15.4)
GLOBULIN UR ELPH-MCNC: 3.4 GM/DL
GLUCOSE SERPL-MCNC: 113 MG/DL (ref 65–99)
GLUCOSE SERPL-MCNC: 153 MG/DL (ref 65–99)
HCG SERPL QL: NEGATIVE
HCT VFR BLD AUTO: 24.7 % (ref 34–46.6)
HCT VFR BLD AUTO: 25.3 % (ref 34–46.6)
HCT VFR BLD AUTO: 27.1 % (ref 34–46.6)
HCT VFR BLD AUTO: 27.5 % (ref 34–46.6)
HCT VFR BLD AUTO: 27.5 % (ref 34–46.6)
HCT VFR BLD AUTO: 34.4 % (ref 34–46.6)
HGB BLD-MCNC: 10.7 G/DL (ref 12–15.9)
HGB BLD-MCNC: 7.8 G/DL (ref 12–15.9)
HGB BLD-MCNC: 7.8 G/DL (ref 12–15.9)
HGB BLD-MCNC: 8.4 G/DL (ref 12–15.9)
HGB BLD-MCNC: 8.6 G/DL (ref 12–15.9)
HGB BLD-MCNC: 8.6 G/DL (ref 12–15.9)
IMM GRANULOCYTES # BLD AUTO: 0.03 10*3/MM3 (ref 0–0.05)
IMM GRANULOCYTES # BLD AUTO: 0.04 10*3/MM3 (ref 0–0.05)
IMM GRANULOCYTES NFR BLD AUTO: 0.4 % (ref 0–0.5)
IMM GRANULOCYTES NFR BLD AUTO: 0.4 % (ref 0–0.5)
LIPASE SERPL-CCNC: 20 U/L (ref 13–60)
LYMPHOCYTES # BLD AUTO: 1.62 10*3/MM3 (ref 0.7–3.1)
LYMPHOCYTES # BLD AUTO: 2.3 10*3/MM3 (ref 0.7–3.1)
LYMPHOCYTES NFR BLD AUTO: 23.5 % (ref 19.6–45.3)
LYMPHOCYTES NFR BLD AUTO: 24.1 % (ref 19.6–45.3)
MCH RBC QN AUTO: 29.9 PG (ref 26.6–33)
MCH RBC QN AUTO: 29.9 PG (ref 26.6–33)
MCHC RBC AUTO-ENTMCNC: 31.1 G/DL (ref 31.5–35.7)
MCHC RBC AUTO-ENTMCNC: 31.3 G/DL (ref 31.5–35.7)
MCV RBC AUTO: 95.5 FL (ref 79–97)
MCV RBC AUTO: 96.1 FL (ref 79–97)
MONOCYTES # BLD AUTO: 0.52 10*3/MM3 (ref 0.1–0.9)
MONOCYTES # BLD AUTO: 0.77 10*3/MM3 (ref 0.1–0.9)
MONOCYTES NFR BLD AUTO: 7.7 % (ref 5–12)
MONOCYTES NFR BLD AUTO: 7.9 % (ref 5–12)
NEUTROPHILS NFR BLD AUTO: 4.49 10*3/MM3 (ref 1.7–7)
NEUTROPHILS NFR BLD AUTO: 6.46 10*3/MM3 (ref 1.7–7)
NEUTROPHILS NFR BLD AUTO: 66.2 % (ref 42.7–76)
NEUTROPHILS NFR BLD AUTO: 66.8 % (ref 42.7–76)
NRBC BLD AUTO-RTO: 0 /100 WBC (ref 0–0.2)
NRBC BLD AUTO-RTO: 0 /100 WBC (ref 0–0.2)
PLATELET # BLD AUTO: 247 10*3/MM3 (ref 140–450)
PLATELET # BLD AUTO: 335 10*3/MM3 (ref 140–450)
PMV BLD AUTO: 11.2 FL (ref 6–12)
PMV BLD AUTO: 11.6 FL (ref 6–12)
POTASSIUM SERPL-SCNC: 4.6 MMOL/L (ref 3.5–5.2)
POTASSIUM SERPL-SCNC: 4.6 MMOL/L (ref 3.5–5.2)
PROT SERPL-MCNC: 7.1 G/DL (ref 6–8.5)
RBC # BLD AUTO: 2.88 10*6/MM3 (ref 3.77–5.28)
RBC # BLD AUTO: 3.58 10*6/MM3 (ref 3.77–5.28)
RH BLD: POSITIVE
SODIUM SERPL-SCNC: 136 MMOL/L (ref 136–145)
SODIUM SERPL-SCNC: 140 MMOL/L (ref 136–145)
T&S EXPIRATION DATE: NORMAL
WBC NRBC COR # BLD AUTO: 6.73 10*3/MM3 (ref 3.4–10.8)
WBC NRBC COR # BLD AUTO: 9.77 10*3/MM3 (ref 3.4–10.8)

## 2024-07-11 PROCEDURE — 96365 THER/PROPH/DIAG IV INF INIT: CPT

## 2024-07-11 PROCEDURE — 25010000002 ONDANSETRON PER 1 MG: Performed by: NURSE PRACTITIONER

## 2024-07-11 PROCEDURE — 25010000002 CEFTRIAXONE PER 250 MG

## 2024-07-11 PROCEDURE — 25010000002 HYDROMORPHONE 1 MG/ML SOLUTION

## 2024-07-11 PROCEDURE — 80048 BASIC METABOLIC PNL TOTAL CA: CPT

## 2024-07-11 PROCEDURE — G0378 HOSPITAL OBSERVATION PER HR: HCPCS

## 2024-07-11 PROCEDURE — 25510000001 IOPAMIDOL PER 1 ML: Performed by: NURSE PRACTITIONER

## 2024-07-11 PROCEDURE — 86901 BLOOD TYPING SEROLOGIC RH(D): CPT

## 2024-07-11 PROCEDURE — 99223 1ST HOSP IP/OBS HIGH 75: CPT | Performed by: NURSE PRACTITIONER

## 2024-07-11 PROCEDURE — 71260 CT THORAX DX C+: CPT

## 2024-07-11 PROCEDURE — 86900 BLOOD TYPING SEROLOGIC ABO: CPT

## 2024-07-11 PROCEDURE — 85018 HEMOGLOBIN: CPT

## 2024-07-11 PROCEDURE — 96375 TX/PRO/DX INJ NEW DRUG ADDON: CPT

## 2024-07-11 PROCEDURE — 96376 TX/PRO/DX INJ SAME DRUG ADON: CPT

## 2024-07-11 PROCEDURE — 25010000002 MORPHINE PER 10 MG: Performed by: NURSE PRACTITIONER

## 2024-07-11 PROCEDURE — 85014 HEMATOCRIT: CPT | Performed by: INTERNAL MEDICINE

## 2024-07-11 PROCEDURE — 85014 HEMATOCRIT: CPT | Performed by: NURSE PRACTITIONER

## 2024-07-11 PROCEDURE — 74177 CT ABD & PELVIS W/CONTRAST: CPT

## 2024-07-11 PROCEDURE — 84703 CHORIONIC GONADOTROPIN ASSAY: CPT | Performed by: NURSE PRACTITIONER

## 2024-07-11 PROCEDURE — 25810000003 SODIUM CHLORIDE 0.9 % SOLUTION

## 2024-07-11 PROCEDURE — 86850 RBC ANTIBODY SCREEN: CPT

## 2024-07-11 PROCEDURE — 85014 HEMATOCRIT: CPT

## 2024-07-11 PROCEDURE — 25810000003 SODIUM CHLORIDE 0.9 % SOLUTION: Performed by: NURSE PRACTITIONER

## 2024-07-11 PROCEDURE — 85018 HEMOGLOBIN: CPT | Performed by: NURSE PRACTITIONER

## 2024-07-11 PROCEDURE — 85018 HEMOGLOBIN: CPT | Performed by: INTERNAL MEDICINE

## 2024-07-11 PROCEDURE — 36415 COLL VENOUS BLD VENIPUNCTURE: CPT

## 2024-07-11 PROCEDURE — 96361 HYDRATE IV INFUSION ADD-ON: CPT

## 2024-07-11 PROCEDURE — 25010000002 ONDANSETRON PER 1 MG

## 2024-07-11 PROCEDURE — 85025 COMPLETE CBC W/AUTO DIFF WBC: CPT

## 2024-07-11 RX ORDER — AMOXICILLIN 250 MG
2 CAPSULE ORAL 2 TIMES DAILY PRN
Status: DISCONTINUED | OUTPATIENT
Start: 2024-07-11 | End: 2024-07-12 | Stop reason: HOSPADM

## 2024-07-11 RX ORDER — ACETAMINOPHEN 160 MG/5ML
650 SOLUTION ORAL EVERY 4 HOURS PRN
Status: DISCONTINUED | OUTPATIENT
Start: 2024-07-11 | End: 2024-07-12 | Stop reason: HOSPADM

## 2024-07-11 RX ORDER — SODIUM CHLORIDE 9 MG/ML
125 INJECTION, SOLUTION INTRAVENOUS CONTINUOUS
Status: DISCONTINUED | OUTPATIENT
Start: 2024-07-11 | End: 2024-07-12 | Stop reason: HOSPADM

## 2024-07-11 RX ORDER — SODIUM CHLORIDE 9 MG/ML
40 INJECTION, SOLUTION INTRAVENOUS AS NEEDED
Status: DISCONTINUED | OUTPATIENT
Start: 2024-07-11 | End: 2024-07-12 | Stop reason: HOSPADM

## 2024-07-11 RX ORDER — SUCRALFATE 1 G/1
1 TABLET ORAL
Status: DISCONTINUED | OUTPATIENT
Start: 2024-07-11 | End: 2024-07-11

## 2024-07-11 RX ORDER — ALBUTEROL SULFATE 90 UG/1
2 AEROSOL, METERED RESPIRATORY (INHALATION) EVERY 4 HOURS PRN
Status: DISCONTINUED | OUTPATIENT
Start: 2024-07-11 | End: 2024-07-12 | Stop reason: HOSPADM

## 2024-07-11 RX ORDER — SUCRALFATE 1 G/1
1 TABLET ORAL
Qty: 120 TABLET | Refills: 0 | Status: SHIPPED | OUTPATIENT
Start: 2024-07-11

## 2024-07-11 RX ORDER — SODIUM CHLORIDE 0.9 % (FLUSH) 0.9 %
10 SYRINGE (ML) INJECTION AS NEEDED
Status: DISCONTINUED | OUTPATIENT
Start: 2024-07-11 | End: 2024-07-12 | Stop reason: HOSPADM

## 2024-07-11 RX ORDER — BISACODYL 5 MG/1
5 TABLET, DELAYED RELEASE ORAL DAILY PRN
Status: DISCONTINUED | OUTPATIENT
Start: 2024-07-11 | End: 2024-07-12 | Stop reason: HOSPADM

## 2024-07-11 RX ORDER — URSODIOL 300 MG/1
300 CAPSULE ORAL 2 TIMES DAILY
Status: DISCONTINUED | OUTPATIENT
Start: 2024-07-11 | End: 2024-07-12 | Stop reason: HOSPADM

## 2024-07-11 RX ORDER — ACETAMINOPHEN 650 MG/1
650 SUPPOSITORY RECTAL EVERY 4 HOURS PRN
Status: DISCONTINUED | OUTPATIENT
Start: 2024-07-11 | End: 2024-07-12 | Stop reason: HOSPADM

## 2024-07-11 RX ORDER — HYDROCODONE BITARTRATE AND ACETAMINOPHEN 7.5; 325 MG/1; MG/1
1 TABLET ORAL EVERY 6 HOURS PRN
Status: DISCONTINUED | OUTPATIENT
Start: 2024-07-11 | End: 2024-07-12 | Stop reason: HOSPADM

## 2024-07-11 RX ORDER — ONDANSETRON 2 MG/ML
4 INJECTION INTRAMUSCULAR; INTRAVENOUS ONCE
Status: COMPLETED | OUTPATIENT
Start: 2024-07-11 | End: 2024-07-11

## 2024-07-11 RX ORDER — ACETAMINOPHEN 325 MG/1
650 TABLET ORAL EVERY 4 HOURS PRN
Status: DISCONTINUED | OUTPATIENT
Start: 2024-07-11 | End: 2024-07-12 | Stop reason: HOSPADM

## 2024-07-11 RX ORDER — URSODIOL 300 MG/1
300 CAPSULE ORAL 2 TIMES DAILY
COMMUNITY

## 2024-07-11 RX ORDER — POLYETHYLENE GLYCOL 3350 17 G/17G
17 POWDER, FOR SOLUTION ORAL DAILY PRN
Status: DISCONTINUED | OUTPATIENT
Start: 2024-07-11 | End: 2024-07-12 | Stop reason: HOSPADM

## 2024-07-11 RX ORDER — MONTELUKAST SODIUM 10 MG/1
10 TABLET ORAL DAILY
Status: DISCONTINUED | OUTPATIENT
Start: 2024-07-11 | End: 2024-07-12 | Stop reason: HOSPADM

## 2024-07-11 RX ORDER — ONDANSETRON 2 MG/ML
4 INJECTION INTRAMUSCULAR; INTRAVENOUS EVERY 6 HOURS PRN
Status: DISCONTINUED | OUTPATIENT
Start: 2024-07-11 | End: 2024-07-12 | Stop reason: HOSPADM

## 2024-07-11 RX ORDER — SUCRALFATE ORAL 1 G/10ML
1 SUSPENSION ORAL
Status: DISCONTINUED | OUTPATIENT
Start: 2024-07-11 | End: 2024-07-11

## 2024-07-11 RX ORDER — PANTOPRAZOLE SODIUM 40 MG/1
40 TABLET, DELAYED RELEASE ORAL
Status: DISCONTINUED | OUTPATIENT
Start: 2024-07-11 | End: 2024-07-12 | Stop reason: HOSPADM

## 2024-07-11 RX ORDER — MONTELUKAST SODIUM 10 MG/1
10 TABLET ORAL NIGHTLY PRN
COMMUNITY

## 2024-07-11 RX ORDER — UREA 10 %
5 LOTION (ML) TOPICAL NIGHTLY PRN
Status: DISCONTINUED | OUTPATIENT
Start: 2024-07-11 | End: 2024-07-12 | Stop reason: HOSPADM

## 2024-07-11 RX ORDER — SUCRALFATE 1 G/1
1 TABLET ORAL
Status: DISCONTINUED | OUTPATIENT
Start: 2024-07-11 | End: 2024-07-12 | Stop reason: HOSPADM

## 2024-07-11 RX ORDER — SODIUM CHLORIDE 0.9 % (FLUSH) 0.9 %
10 SYRINGE (ML) INJECTION EVERY 12 HOURS SCHEDULED
Status: DISCONTINUED | OUTPATIENT
Start: 2024-07-11 | End: 2024-07-12 | Stop reason: HOSPADM

## 2024-07-11 RX ORDER — DULOXETIN HYDROCHLORIDE 30 MG/1
60 CAPSULE, DELAYED RELEASE ORAL EVERY 12 HOURS SCHEDULED
Status: DISCONTINUED | OUTPATIENT
Start: 2024-07-11 | End: 2024-07-12 | Stop reason: HOSPADM

## 2024-07-11 RX ORDER — PANTOPRAZOLE SODIUM 40 MG/1
40 TABLET, DELAYED RELEASE ORAL
Qty: 60 TABLET | Refills: 0 | Status: SHIPPED | OUTPATIENT
Start: 2024-07-11

## 2024-07-11 RX ORDER — DIAZEPAM 5 MG/1
5 TABLET ORAL 2 TIMES DAILY PRN
Status: DISCONTINUED | OUTPATIENT
Start: 2024-07-11 | End: 2024-07-12 | Stop reason: HOSPADM

## 2024-07-11 RX ORDER — BISACODYL 10 MG
10 SUPPOSITORY, RECTAL RECTAL DAILY PRN
Status: DISCONTINUED | OUTPATIENT
Start: 2024-07-11 | End: 2024-07-12 | Stop reason: HOSPADM

## 2024-07-11 RX ORDER — TIZANIDINE 4 MG/1
4 TABLET ORAL EVERY 8 HOURS PRN
Status: DISCONTINUED | OUTPATIENT
Start: 2024-07-11 | End: 2024-07-12 | Stop reason: HOSPADM

## 2024-07-11 RX ADMIN — PANTOPRAZOLE SODIUM 40 MG: 40 TABLET, DELAYED RELEASE ORAL at 19:07

## 2024-07-11 RX ADMIN — Medication 10 ML: at 12:25

## 2024-07-11 RX ADMIN — SODIUM CHLORIDE 125 ML/HR: 9 INJECTION, SOLUTION INTRAVENOUS at 02:51

## 2024-07-11 RX ADMIN — ACETAMINOPHEN 650 MG: 650 SOLUTION ORAL at 10:59

## 2024-07-11 RX ADMIN — HYDROCODONE BITARTRATE AND ACETAMINOPHEN 1 TABLET: 7.5; 325 TABLET ORAL at 19:07

## 2024-07-11 RX ADMIN — Medication 10 ML: at 21:04

## 2024-07-11 RX ADMIN — ONDANSETRON 4 MG: 2 INJECTION INTRAMUSCULAR; INTRAVENOUS at 00:04

## 2024-07-11 RX ADMIN — CEFTRIAXONE 1000 MG: 1 INJECTION, POWDER, FOR SOLUTION INTRAMUSCULAR; INTRAVENOUS at 02:50

## 2024-07-11 RX ADMIN — DULOXETINE HYDROCHLORIDE 60 MG: 30 CAPSULE, DELAYED RELEASE ORAL at 21:02

## 2024-07-11 RX ADMIN — ONDANSETRON 4 MG: 2 INJECTION INTRAMUSCULAR; INTRAVENOUS at 02:02

## 2024-07-11 RX ADMIN — IOPAMIDOL 100 ML: 755 INJECTION, SOLUTION INTRAVENOUS at 01:45

## 2024-07-11 RX ADMIN — MORPHINE SULFATE 4 MG: 4 INJECTION, SOLUTION INTRAMUSCULAR; INTRAVENOUS at 00:29

## 2024-07-11 RX ADMIN — DIAZEPAM 5 MG: 5 TABLET ORAL at 19:07

## 2024-07-11 RX ADMIN — HYDROMORPHONE HYDROCHLORIDE 0.5 MG: 1 INJECTION, SOLUTION INTRAMUSCULAR; INTRAVENOUS; SUBCUTANEOUS at 02:20

## 2024-07-11 RX ADMIN — SODIUM CHLORIDE 1000 ML: 9 INJECTION, SOLUTION INTRAVENOUS at 00:04

## 2024-07-11 RX ADMIN — SUCRALFATE 1 G: 1 TABLET ORAL at 19:07

## 2024-07-11 NOTE — CONSULTS
GI CONSULT  NOTE:    Referring Provider:  Zulema Fountain NP    Chief complaint: Hematemesis    Subjective .     History of present illness: Gideon Fields is a 53 y.o. female with history of lung cancer s/p lobectomy, chronic back pain, anxiety/depression, eosinophilic esophagitis, and elevated liver enzymes who presents with complaints of hematemesis.  The patient is well-known to our practice and is normally followed by Dr. Savage.  She underwent EGD/EUS yesterday with results below.  She was prescribed ursodiol, but has not yet started this medication.    The patient reports that she was recovering well following her procedure yesterday until last evening at which time she had an episode of a large amount of bright red hematemesis.  She denies any further vomiting this morning.  No heartburn or dysphagia.  She does report some mild upper abdominal pain.  She has not had any bowel movements since her procedure yesterday.  Does report persistent abdominal bloating which is not new.  Does report some dizziness/shortness of breath following her episode of hematemesis, but this is improving.    6/2024 total bilirubin 0.4, alk phos 572, , , smooth muscle antibody 46, mitochondrial antibody 167.2, , VENKATA negative, total cholesterol 329, triglycerides 223,     Endo History:  7/10/2024 EGD/EUS (Dr. Savage) -suspected eosinophilic esophagitis (biopsy with path pending), hiatal hernia with nonobstructing ring, successful liver biopsy (path pending)  2018 EGD ([facility]) EOE dilation 52F     Past Medical History:  Past Medical History:   Diagnosis Date    Anxiety     Asthma 12/9/2013    Lumbar radiculopathy 3/12/2018    Lung nodule seen on imaging study 10/2/2020    Malignant neoplasm of upper lobe of right lung 12/8/2021    Thoracic back pain 3/12/2018       Past Surgical History:  Past Surgical History:   Procedure Laterality Date    BREAST BIOPSY Right     benign    BRONCHOSCOPY N/A 10/21/2020     Procedure: BRONCHOSCOPY WITH BRONCHOALVEOLAR LAVAGE, NAVIGATION WITH FINE NEEDLE ASPIRATION;  Surgeon: Nichol Jaquez MD;  Location: [facility] ENDOSCOPY;  Service: Pulmonary;  Laterality: N/A;  POST RUL NODULE    CARDIAC CATHETERIZATION N/A 2022    Procedure: Left Heart Cath and coronary angiogram;  Surgeon: Chandana Tellez MD;  Location: [facility] CATH INVASIVE LOCATION;  Service: Cardiovascular;  Laterality: N/A;     SECTION      x3     LUNG REMOVAL, PARTIAL Right 2020    right upper lobe     NASAL SEPTUM SURGERY      20 Years Ago       Social History:  Social History     Tobacco Use    Smoking status: Never     Passive exposure: Never    Smokeless tobacco: Never   Vaping Use    Vaping status: Never Used   Substance Use Topics    Alcohol use: Yes     Comment: Couple times a week    Drug use: No       Family History:  Family History   Problem Relation Age of Onset    Hypertension Mother     Alzheimer's disease Mother     Thyroid disease Mother     Lung cancer Father     Lupus Sister     Other Sister         POTS    Lupus Sister     No Known Problems Brother     Asthma Other        Medications:  (Not in a hospital admission)      Scheduled Meds:sodium chloride, 10 mL, Intravenous, Q12H      Continuous Infusions:sodium chloride, 125 mL/hr, Last Rate: 125 mL/hr (24 0524)      PRN Meds:.  acetaminophen **OR** acetaminophen **OR** acetaminophen    senna-docusate sodium **AND** polyethylene glycol **AND** bisacodyl **AND** bisacodyl    melatonin    ondansetron    sodium chloride    sodium chloride    sodium chloride    ALLERGIES:  Bee venom    ROS:  The following systems were reviewed and negative;   Constitution:  No fevers, chills, no unintentional weight loss  Skin: no rash, no jaundice  Eyes:  No blurry vision, no eye pain  HENT:  No change in hearing or smell  Resp:  No dyspnea or cough  CV:  No chest pain or palpitations  :  No dysuria, hematuria  Musculoskeletal:  No leg cramps or  "arthralgias  Neuro:  No tremor, no numbness  Psych:  No depression or confusion    Objective     Vital Signs:   Vitals:    07/11/24 0213 07/11/24 0224 07/11/24 0254 07/11/24 1001   BP: 99/50 117/68 119/50    Pulse: 85 86 94    Resp: 16 18 15 17   Temp:    97.8 °F (36.6 °C)   TempSrc:    Oral   SpO2: 99% 96% 98%    Weight:    68.8 kg (151 lb 10.8 oz)   Height:    167.6 cm (65.98\")       Physical Exam:       General Appearance:    Awake and alert, in no acute distress   Head:    Normocephalic, without obvious abnormality, atraumatic   Throat:   No oral lesions, no thrush, oral mucosa moist   Lungs:     Respirations regular, even and unlabored   Chest Wall:    No abnormalities observed   Abdomen:     Soft, mild upper abdominal tenderness, no rebound or guarding, non-distended   Rectal:     Deferred   Extremities:   Moves all extremities, no edema, no cyanosis   Pulses:   Pulses palpable and equal bilaterally   Skin:   No rash, no jaundice, normal palpation   Lymph nodes:   No cervical, supraclavicular or submandibular palpable adenopathy   Neurologic:   Cranial nerves 2 - 12 grossly intact, no asterixis       Results Review:   I reviewed the patient's labs and imaging.  CBC    Results from last 7 days   Lab Units 07/11/24  0555 07/11/24  0238 07/10/24  2357 07/09/24  0906   WBC 10*3/mm3 6.73  --  9.77 8.32   HEMOGLOBIN g/dL 8.6*  8.6* 8.4* 10.7* 13.0   PLATELETS 10*3/mm3 247  --  335 353     CMP   Results from last 7 days   Lab Units 07/11/24  0555 07/10/24  2357   SODIUM mmol/L 140 136   POTASSIUM mmol/L 4.6 4.6   CHLORIDE mmol/L 108* 103   CO2 mmol/L 24.6 21.6*   BUN mg/dL 22* 27*   CREATININE mg/dL 0.55* 0.67   GLUCOSE mg/dL 113* 153*   ALBUMIN g/dL  --  3.7   BILIRUBIN mg/dL  --  0.9   ALK PHOS U/L  --  525*   AST (SGOT) U/L  --  79*   ALT (SGPT) U/L  --  102*   LIPASE U/L  --  20     Cr Clearance Estimated Creatinine Clearance: 128.5 mL/min (A) (by C-G formula based on SCr of 0.55 mg/dL (L)).  Coag   Results from " "last 7 days   Lab Units 07/09/24  0906   INR INR 0.9     HbA1C   Lab Results   Component Value Date    HGBA1C 5.20 04/18/2024    HGBA1C 5.50 05/15/2023     Blood Glucose No results found for: \"POCGLU\"  Infection     UA      Imaging Results (Last 72 Hours)       Procedure Component Value Units Date/Time    CT Abdomen Pelvis With Contrast [[id number]] Collected: 07/11/24 0148     Updated: 07/11/24 0153    Narrative:      CT ABDOMEN PELVIS W CONTRAST    Date of Exam: 7/11/2024 1:25 AM EDT    Indication: Abdominal pain after EGD today with hemoptysis.    Comparison: None available.    Technique: Axial CT images were obtained of the abdomen and pelvis following the uneventful intravenous administration of iodinated contrast. Sagittal and coronal reconstructions were performed.  Automated exposure control and iterative reconstruction   methods were used.        Findings: Findings in the chest discussed in separate report. No acute findings in the superficial soft tissues. No acute osseous abnormalities or destructive bone lesions. There are mild lower lumbar degenerative changes.    The liver is normal. The gallbladder is prominent. There are numerous layering gallstones. There is gallbladder wall thickening along its contact with the liver. Appearance is equivocal for acute cholecystitis. The bile ducts, pancreas, stomach, spleen   and adrenal glands appear within normal limits. The kidneys and ureters appear normal. Urinary bladder and ovaries appear normal. The uterus is heterogeneous likely the result of small fibroids. The appendix is normal. Colon is unremarkable. No small   bowel distention. No ascites, pneumoperitoneum or lymphadenopathy.      Impression:      Impression:  1.Cholelithiasis with gallbladder wall thickening along its contact with the liver. Appearance may represent acute calculus cholecystitis.  2.No other acute abdominal or pelvic abnormality.        Electronically Signed: Vimal Ribeiro MD    " 7/11/2024 1:51 AM EDT    Workstation ID: [id number]    CT Chest With Contrast Diagnostic [[id number]] Collected: 07/11/24 0147     Updated: 07/11/24 0150    Narrative:      CT CHEST W CONTRAST DIAGNOSTIC    Date of Exam: 7/11/2024 1:25 AM EDT    Indication: EGD today with hemoptysis.    Comparison: 4/19/2024.    Technique: Axial CT images were obtained of the chest after the uneventful intravenous administration of iodinated contrast.  Sagittal and coronal reconstructions were performed.  Automated exposure control and iterative reconstruction methods were used.      Findings:  Thyroid, trachea and esophagus appear within normal limits. Right size is normal. The aorta, aortic branch vessels and pulmonary artery appear within normal limits. No pericardial effusion or mediastinal lymphadenopathy. No evidence of central pulmonary   embolism. No coronary artery calcification    No pneumothorax, pleural effusion or focal airspace consolidation. No suspicious lung nodules. Airways are patent. No significant pleural disease.    There are no acute findings in the superficial soft tissues. Findings in the abdomen discussed in a separate report. No acute osseous abnormalities or destructive bone lesions. No significant thoracic degenerative changes.      Impression:      Impression:  No acute cardiopulmonary abnormality.        Electronically Signed: Vimal Ribeiro MD    7/11/2024 1:48 AM EDT    Workstation ID: [id number]            ASSESSMENT:  -Hematemesis  -Acute blood loss anemia  -Upper abdominal pain  -Elevated LFTs  -Cholelithiasis  -Eosinophilic esophagitis  -History of lung cancer s/p lobectomy  -Chronic back pain    PLAN:  Patient is a 53-year-old female with history of lung cancer s/p lobectomy and elevated LFTs who presented on 7/10 with complaints of hematemesis.  S/p EGD/EUS with liver biopsy on 7/10.    CT abdomen/pelvis W on admission does not show any acute abnormality.  Cholelithiasis noted.  Patient has not had  any further hematemesis this morning.  Suspect hematemesis due to esophageal biopsy during EGD yesterday.  Hemoglobin is stable at 8.6 from 8.6.  Continue to monitor H/H and transfuse as needed.  Hold off on repeat EGD unless there is further hematemesis or significant drop in hemoglobin.  We will start PPI twice daily and Carafate.  Clear liquid diet.  Elevated LFTs noted.  Workup including liver biopsy is underway on an outpatient basis.   Plan repeat H/H later this afternoon.  Antiemetics/analgesics as needed.  If hemoglobin remains stable and patient is able to tolerate diet, okay for discharge home from GI standpoint.  Keep follow-up appointment as scheduled with April Gregorio NP on 9/4 at 8:15 AM.      I discussed the patients findings and my recommendations with the patient.  I will discuss case with Dr. Parisi and change plan accordingly.    We appreciate the referral.    Electronically signed by NEO Ann, 07/11/24, 10:53 AM EDT.

## 2024-07-11 NOTE — CASE MANAGEMENT/SOCIAL WORK
Discharge Planning Assessment   Wilbert     Patient Name: Gideon Fields  MRN: [id number]  Today's Date: 7/11/2024    Admit Date: 7/10/2024  Plan: Return home w/ .   Discharge Needs Assessment       Row Name 07/11/24 0845       Living Environment    People in Home spouse    Name(s) of People in Home  Fracisco    Current Living Arrangements home    Potentially Unsafe Housing Conditions none    In the past 12 months has the electric, gas, oil, or water company threatened to shut off services in your home? No    Primary Care Provided by self    Provides Primary Care For no one    Family Caregiver if Needed spouse    Family Caregiver Names Fracisco    Quality of Family Relationships helpful;involved;supportive    Able to Return to Prior Arrangements yes       Resource/Environmental Concerns    Resource/Environmental Concerns none    Transportation Concerns none       Transportation Needs    In the past 12 months, has lack of transportation kept you from medical appointments or from getting medications? no    In the past 12 months, has lack of transportation kept you from meetings, work, or from getting things needed for daily living? No       Food Insecurity    Within the past 12 months, you worried that your food would run out before you got the money to buy more. Never true    Within the past 12 months, the food you bought just didn't last and you didn't have money to get more. Never true       Transition Planning    Patient/Family Anticipates Transition to home with family    Patient/Family Anticipated Services at Transition none    Transportation Anticipated car, drives self;family or friend will provide       Discharge Needs Assessment    Readmission Within the Last 30 Days no previous admission in last 30 days    Equipment Currently Used at Home nebulizer    Concerns to be Addressed denies needs/concerns at this time;no discharge needs identified    Anticipated Changes Related to Illness none    Equipment  Needed After Discharge none    Provided Post Acute Provider List? N/A    Provided Post Acute Provider Quality & Resource List? N/A                   Discharge Plan       Row Name 07/11/24 0846       Plan    Plan Return home w/ .    Patient/Family in Agreement with Plan yes    Provided Post Acute Provider List? N/A    Provided Post Acute Provider Quality & Resource List? N/A    Plan Comments CM met with patient and  at bedside. Patient states she lives at home w/ , is independent with ADLs, drives self, and current DME is a nebulizer. Patient confirms PCP Mimi and utilizes [facility] in [place], [place] for preferred pharmacy. Patient not agreeable to enrollment in [email or web address] program. Patient denies any current HHC or PT. Patient denies difficulty affording medications, food, or utilities. Patient states at time of discharge her  will provide transportation.                  Continued Care and Services - Admitted Since 7/10/2024    No active coordination exists for this encounter.          Demographic Summary       Row Name 07/11/24 0845       General Information    Admission Type observation    Arrived From emergency department    Referral Source admission list    Reason for Consult discharge planning    Preferred Language English       Contact Information    Permission Granted to Share Info With                    Functional Status       Row Name 07/11/24 0845       Functional Status    Usual Activity Tolerance good    Current Activity Tolerance good       Functional Status, IADL    Medications independent    Meal Preparation independent    Housekeeping independent    Laundry independent    Shopping independent       Mental Status    General Appearance WDL WDL       Mental Status Summary    Recent Changes in Mental Status/Cognitive Functioning no changes               Maria Luisa Oconnor RN      Office Phone [phone]

## 2024-07-11 NOTE — PLAN OF CARE
Problem: Adult Inpatient Plan of Care  Goal: Plan of Care Review  7/11/2024 1404 by Nancy Viramontes RN  Outcome: Ongoing, Progressing  7/11/2024 1404 by Nancy Viramontes RN  Reactivated  7/11/2024 1157 by Nancy Viramontes RN  Outcome: Met  7/11/2024 1000 by Nancy Viramontes RN  Outcome: Ongoing, Progressing  Goal: Patient-Specific Goal (Individualized)  7/11/2024 1404 by Nancy Viramontes RN  Outcome: Ongoing, Progressing  7/11/2024 1404 by Nancy Viramontes RN  Reactivated  7/11/2024 1157 by Nancy Viramontes RN  Outcome: Met  7/11/2024 1000 by Nancy Viramontes RN  Outcome: Ongoing, Progressing  Goal: Absence of Hospital-Acquired Illness or Injury  7/11/2024 1404 by Nancy Viramontes RN  Outcome: Ongoing, Progressing  7/11/2024 1404 by Nancy Viramontes RN  Reactivated  7/11/2024 1157 by Nancy Viramontes RN  Outcome: Met  7/11/2024 1000 by Nancy Viramontes RN  Outcome: Ongoing, Progressing  Intervention: Identify and Manage Fall Risk  Recent Flowsheet Documentation  Taken 7/11/2024 1000 by Nancy Viramontes RN  Safety Promotion/Fall Prevention:   nonskid shoes/slippers when out of bed   safety round/check completed  Goal: Optimal Comfort and Wellbeing  7/11/2024 1404 by Nancy Viramontes RN  Outcome: Ongoing, Progressing  7/11/2024 1404 by Nancy Viramontes RN  Reactivated  7/11/2024 1157 by Nancy Viramontes RN  Outcome: Met  7/11/2024 1000 by Nancy Viramontes RN  Outcome: Ongoing, Progressing  Goal: Readiness for Transition of Care  7/11/2024 1404 by Nancy Viramontes RN  Outcome: Ongoing, Progressing  7/11/2024 1404 by Nancy Viramontes RN  Reactivated  7/11/2024 1157 by Nancy Viramontes RN  Outcome: Met  7/11/2024 1000 by Nancy Viramontes RN  Outcome: Ongoing, Progressing     Problem: Pain Acute  Goal: Acceptable Pain Control and Functional Ability  7/11/2024 1404 by Nancy Viramontes RN  Outcome: Ongoing, Progressing  7/11/2024 1404 by Nancy Viramontes RN  Reactivated  7/11/2024 1157 by Nancy Viramontes RN  Outcome: Met  7/11/2024 1000 by Nancy Viramontes RN  Outcome: Ongoing, Progressing   Goal Outcome Evaluation:

## 2024-07-11 NOTE — DISCHARGE SUMMARY
[facility] Medicine Services  Discharge Summary    Date of Service: 24  Patient Name: Gideon Fields  : 1970  MRN: [id number]    Date of Admission: 7/10/2024  Date of Discharge:  24  Primary Care Physician: Junie Le APRN      Presenting Problem:   Hematemesis [K92.0]    Active and Resolved Hospital Problems:  Active Hospital Problems    Diagnosis POA    **Hematemesis [K92.0] Yes      Resolved Hospital Problems   No resolved problems to display.         Hospital Course     HPI:Gideon Fields is a 53 y.o. female with a previous medical history of depression with anxiety, lung cancer s/p lobectomy who presented to [facility] on 7/10/2024 with complaints of vomiting blood.  She is undergoing a workup for an autoimmune liver disorder with and underwent an EGD and liver biopsy with Dr. Savage on 7/10/24.  Afterwards, she was discharged home and developed diffuse abdominal tenderness and had multiple episodes of vomiting that contained red blood and blood clots.  On exam, she continues to have the diffuse tenderness but has not vomited since being in the ED.         In the ED, Hemoglobin and hematocrit is noted to be trending down.  24: 13.0/40.6 7/10/24 at 2357: 10.7/34.4 24 at 0238: 8.4/27.1.  A CT of the abdomen and pelvis showed cholelithiasis with gallbladder wall thickening along its contact with the liver, possibly acute cholecystitis.  A CT of the chest showed no acute abnormality.  She is afebrile, all vitals are stable.  Dr. Savage was consulted per ED provider and will see patient in consultation.  Hospitalist was consulted for further management.      Hospital Course:    Hematemesis due to biopsy of the esophagus resolved  Anxiety depression  Acute blood loss anemia  Cholelithiasis  History of eosinophilic esophagitis  Transaminitis  Seasonal allergies  Chronic back pain  History of lung cancer s/p lobectomy    -at this time patient wants to be  discharged home and GI is okay with this discharge.  Will start p.o. diet and see how she does.  No more bleeding.  Day of Discharge     Vital Signs:  Temp:  [97.5 °F (36.4 °C)-97.8 °F (36.6 °C)] 97.8 °F (36.6 °C)  Heart Rate:  [] 94  Resp:  [15-18] 17  BP: ()/(40-68) 119/50    Physical Exam:  General Appearance:    Alert, cooperative, in no acute distress   Head:    Normocephalic, without obvious abnormality, atraumatic   Eyes:            conjunctivae and sclerae normal, no   icterus, no pallor, corneas  clear, PERRLA   Neck:   No adenopathy, supple, trachea midline, no thyromegaly, no   carotid bruit, no JVD   Lungs:     Clear to auscultation,respirations regular, even and                  unlabored    Heart:    Regular rhythm and normal rate, normal S1 and S2, no            murmur, no gallop, no rub, no click   Abdomen:     Normal bowel sounds, no masses, no organomegaly, soft        non-tender, non-distended, no guarding, no rebound                No tenderness   Extremities:   Moves all extremities well, no edema, no cyanosis, no             redness   Lymph nodes:   No palpable adenopathy   Neurologic:   Cranial nerves 2 - 12 grossly intact, sensation intact, DTR       present and equal bilaterally          Pertinent  and/or Most Recent Results     LAB RESULTS:      Lab 07/11/24  0555 07/11/24  0238 07/10/24  2357 07/09/24  0906   WBC 6.73  --  9.77 8.32   HEMOGLOBIN 8.6*  8.6* 8.4* 10.7* 13.0   HEMATOCRIT 27.5*  27.5* 27.1* 34.4 40.6   PLATELETS 247  --  335 353   NEUTROS ABS 4.49  --  6.46 4.85   IMMATURE GRANS (ABS) 0.03  --  0.04 0.03   LYMPHS ABS 1.62  --  2.30 2.27   MONOS ABS 0.52  --  0.77 0.77   EOS ABS 0.04  --  0.16 0.36   MCV 95.5  --  96.1 94.4   PROTIME  --   --   --  10.1*         Lab 07/11/24  0555 07/10/24  4219   SODIUM 140 136   POTASSIUM 4.6 4.6   CHLORIDE 108* 103   CO2 24.6 21.6*   ANION GAP 7.4 11.4   BUN 22* 27*   CREATININE 0.55* 0.67   EGFR 109.8 104.7   GLUCOSE 113* 153*    CALCIUM 8.3* 8.9         Lab 07/10/24  2357   TOTAL PROTEIN 7.1   ALBUMIN 3.7   GLOBULIN 3.4   ALT (SGPT) 102*   AST (SGOT) 79*   BILIRUBIN 0.9   ALK PHOS 525*   LIPASE 20         Lab 07/09/24  0906   PROTIME 10.1*   INR 0.9             Lab 07/11/24  0636   ABO TYPING A   RH TYPING Positive   ANTIBODY SCREEN Negative         Brief Urine Lab Results  (Last result in the past 365 days)        Color   Clarity   Blood   Leuk Est   Nitrite   Protein   CREAT   Urine HCG        04/18/24 1124 Yellow   Hazy   Negative   Negative   Negative   Negative                 Microbiology Results (last 10 days)       ** No results found for the last 240 hours. **            CT Abdomen Pelvis With Contrast    Result Date: 7/11/2024  Impression: Impression: 1.Cholelithiasis with gallbladder wall thickening along its contact with the liver. Appearance may represent acute calculus cholecystitis. 2.No other acute abdominal or pelvic abnormality. Electronically Signed: Vimal Ribeiro MD  7/11/2024 1:51 AM EDT  Workstation ID: [id number]    CT Chest With Contrast Diagnostic    Result Date: 7/11/2024  Impression: Impression: No acute cardiopulmonary abnormality. Electronically Signed: Vimal Ribeiro MD  7/11/2024 1:48 AM EDT  Workstation ID: [id number]             Results for orders placed during the hospital encounter of 02/10/22    Adult Transthoracic Echo Complete W/ Cont if Necessary Per Protocol    Interpretation Summary  · Estimated left ventricular EF = 60% Left ventricular systolic function is normal.    Indications  Chest pain    Technically satisfactory study.  Mitral valve is structurally normal.  Tricuspid valve is structurally normal.  Aortic valve is structurally normal.  Pulmonic valve could not be well visualized.  No evidence for mitral tricuspid or aortic regurgitation is seen by Doppler study.  Left atrium is normal in size.  Right atrium is normal in size.  Left ventricle is normal in size and contractility with ejection  fraction of 60%.  Right ventricle is normal in size.  Atrial septum is intact.  Aorta is normal.  No pericardial effusion or intracardiac thrombus is seen.    Impression  Structurally and functionally normal cardiac valves.  Left ventricular size and contractility is normal with ejection fraction of 60%.      Labs Pending at Discharge:      Procedures Performed           Consults:   Consults       Date and Time Order Name Status Description    7/11/2024  2:10 AM Gastroenterology (on-call MD unless specified) Completed               Discharge Details        Discharge Medications        New Medications        Instructions Start Date   pantoprazole 40 MG EC tablet  Commonly known as: PROTONIX   40 mg, Oral, 2 Times Daily Before Meals      sucralfate 1 g tablet  Commonly known as: CARAFATE   1 g, Oral, 4 Times Daily Before Meals & Nightly             Continue These Medications        Instructions Start Date   albuterol sulfate  (90 Base) MCG/ACT inhaler  Commonly known as: PROVENTIL HFA;VENTOLIN HFA;PROAIR HFA   2 puffs, Inhalation, Every 4 Hours PRN      baclofen 10 MG tablet  Commonly known as: LIORESAL   TAKE 1 TABLET BY MOUTH THREE TIMES A DAY      diazePAM 5 MG tablet  Commonly known as: Valium   5 mg, Oral, 2 Times Daily PRN      DULoxetine 60 MG capsule  Commonly known as: CYMBALTA   60 mg, Oral, Every 12 Hours Scheduled      HYDROcodone Bit-Homatrop MBr 5-1.5 MG/5ML solution  Commonly known as: HYCODAN   5 mL, Oral, Every 6 Hours PRN      HYDROcodone-acetaminophen 7.5-325 MG per tablet  Commonly known as: NORCO   1 tablet, Oral, Every 6 Hours PRN      montelukast 10 MG tablet  Commonly known as: SINGULAIR   10 mg, Oral, Nightly PRN      tiZANidine 2 MG tablet  Commonly known as: ZANAFLEX   4 mg, Oral, Every 8 Hours PRN      Tri-Sprintec 0.18/0.215/0.25 MG-35 MCG per tablet  Generic drug: norgestimate-ethinyl estradiol   1 tablet, Oral, Daily      ursodiol 300 MG capsule  Commonly known as: ACTIGALL   300  mg, Oral, 2 Times Daily               Allergies   Allergen Reactions    Bee Venom Itching         Discharge Disposition:   Home or Self Care    Diet:  Hospital:  Diet Order   Procedures    Diet: Liquid; Clear Liquid; Fluid Consistency: Thin (IDDSI 0)         Discharge Activity:         CODE STATUS:  Code Status and Medical Interventions:   Ordered at: 07/11/24 0242     Code Status (Patient has no pulse and is not breathing):    CPR (Attempt to Resuscitate)     Medical Interventions (Patient has pulse or is breathing):    Full Support         No future appointments.    Additional Instructions for the Follow-ups that You Need to Schedule       Discharge Follow-up with PCP   As directed       Currently Documented PCP:    Junie Le APRN    PCP Phone Number:    [phone]     Follow Up Details: 2 weeks        Discharge Follow-up with Specified Provider: GI in next 2 weeks   As directed      To: GI in next 2 weeks                Time spent on Discharge including face to face service:  >30 minutes    Signature: Electronically signed by Efrain Salomn MD, 07/11/24, 11:57 EDT.  [facility] Hospitalist Team

## 2024-07-11 NOTE — ED NOTES
PT presents to ED post liver biopsy earlier today c/o upper abd pain with 3 episodes of vomiting dark red blood with clots. Pt also states she has a headache.

## 2024-07-11 NOTE — H&P
[facility] Medicine Services  History & Physical    Patient Name: Gideon Fields  : 1970  MRN: [id number]  Primary Care Physician:  Junie Le APRN  Date of admission: 7/10/2024  Date and Time of Service: 7/10/2024 at 0250      Assessment & Plan      Chief Complaint: vomiting blood    Plan:    Hematemesis  -S/P EGD and liver biopsy as a workup for an autoimmune liver disorder with Dr. Savage on 7/10/24  -Hemoglobin/Hematocrit trending down, 24: 13.0/40.6 7/10/24 at 2357: 10.7/34.4 24 at 0238: 8.4/27.1  -CT of the abdomen and pelvis showed cholelithiasis with gallbladder wall thickening along its contact with the liver, possibly acute cholecystitis.    -CT of the chest showed no acute abnormality  -Type and Screen  -Transfuse if hemoglobin drops below 7 or if patient develops symptomatic anemia  -GI consulted, Dr. Savage requested to keep patient NPO and he will see her this morning    Depression with Anxiety  -Chronic, stable  -Continue home Valium (Inspect verified), Duloxetine     Seasonal Allergies  -Continue home albuterol, singulair     Chronic Back Pain  -Stable  -Continue home baclofen, Zanaflex, Norco (Inspect verified)     History of Lung Cancer  -S/P lobectomy  -No current treatments    History of Present Illness     History of Present Illness: Gideon Fields is a 53 y.o. female with a previous medical history of depression with anxiety, lung cancer s/p lobectomy who presented to [facility] on 7/10/2024 with complaints of vomiting blood.  She is undergoing a workup for an autoimmune liver disorder with and underwent an EGD and liver biopsy with Dr. Savage on 7/10/24.  Afterwards, she was discharged home and developed diffuse abdominal tenderness and had multiple episodes of vomiting that contained red blood and blood clots.  On exam, she continues to have the diffuse tenderness but has not vomited since being in the ED.        In the ED, Hemoglobin and hematocrit is  noted to be trending down.  7/9/24: 13.0/40.6 7/10/24 at 2357: 10.7/34.4 7/11/24 at 0238: 8.4/27.1.  A CT of the abdomen and pelvis showed cholelithiasis with gallbladder wall thickening along its contact with the liver, possibly acute cholecystitis.  A CT of the chest showed no acute abnormality.  She is afebrile, all vitals are stable.  Dr. Savage was consulted per ED provider and will see patient in consultation.  Hospitalist was consulted for further management.    12 point ROS reviewed and negative except as mentioned above    Objective      Vitals:   Temp:  [97.5 °F (36.4 °C)] 97.5 °F (36.4 °C)  Heart Rate:  [] 86  Resp:  [16-18] 18  BP: ()/(40-68) 117/68  Body mass index is 24.48 kg/m².    Physical Exam  Vitals and nursing note reviewed.   Constitutional:       Appearance: Normal appearance.   HENT:      Mouth/Throat:      Mouth: Mucous membranes are moist.   Cardiovascular:      Rate and Rhythm: Normal rate and regular rhythm.   Pulmonary:      Effort: Pulmonary effort is normal.      Breath sounds: Normal breath sounds.   Abdominal:      General: Bowel sounds are normal.      Palpations: Abdomen is soft.      Tenderness: There is generalized abdominal tenderness.   Musculoskeletal:         General: Normal range of motion.   Skin:     General: Skin is warm and dry.      Coloration: Skin is pale.   Neurological:      General: No focal deficit present.      Mental Status: She is alert and oriented to person, place, and time. Mental status is at baseline.   Psychiatric:         Mood and Affect: Mood normal.         Behavior: Behavior normal.          Personal History     This is a 53 y.o. female with:    Past Medical History:   Diagnosis Date    Anxiety     Asthma 12/9/2013    Lumbar radiculopathy 3/12/2018    Lung nodule seen on imaging study 10/2/2020    Malignant neoplasm of upper lobe of right lung 12/8/2021    Thoracic back pain 3/12/2018       Past Surgical History:   Procedure Laterality Date     BREAST BIOPSY Right     benign    BRONCHOSCOPY N/A 10/21/2020    Procedure: BRONCHOSCOPY WITH BRONCHOALVEOLAR LAVAGE, NAVIGATION WITH FINE NEEDLE ASPIRATION;  Surgeon: Nichol Jaquez MD;  Location: [facility] ENDOSCOPY;  Service: Pulmonary;  Laterality: N/A;  POST RUL NODULE    CARDIAC CATHETERIZATION N/A 2022    Procedure: Left Heart Cath and coronary angiogram;  Surgeon: Chandana Tellez MD;  Location: [facility] CATH INVASIVE LOCATION;  Service: Cardiovascular;  Laterality: N/A;     SECTION      x3     LUNG REMOVAL, PARTIAL Right 2020    right upper lobe     NASAL SEPTUM SURGERY      20 Years Ago       Active and Resolved Problems  Active Hospital Problems    Diagnosis  POA    **Hematemesis [K92.0]  Yes      Resolved Hospital Problems   No resolved problems to display.       Family History: family history includes Alzheimer's disease in her mother; Asthma in an other family member; Hypertension in her mother; Lung cancer in her father; Lupus in her sister and sister; No Known Problems in her brother; Other in her sister; Thyroid disease in her mother. Otherwise pertinent FHx was reviewed and not pertinent to current issue.    Social History:  reports that she has never smoked. She has never been exposed to tobacco smoke. She has never used smokeless tobacco. She reports current alcohol use. She reports that she does not use drugs.    Home Medications:  Prior to Admission Medications       Prescriptions Last Dose Informant Patient Reported? Taking?    albuterol sulfate  (90 Base) MCG/ACT inhaler   No No    Inhale 2 puffs Every 4 (Four) Hours As Needed for Wheezing.    baclofen (LIORESAL) 10 MG tablet   No No    TAKE 1 TABLET BY MOUTH THREE TIMES A DAY    cetirizine (zyrTEC) 10 MG tablet   No No    Take 1 tablet by mouth Daily for 30 days.    Cholecalciferol (Vitamin D) 25 MCG (1000 UT) tablet  Self Yes No    Take 1 tablet by mouth Daily.    diazePAM (Valium) 5 MG tablet   No No    Take 1  tablet by mouth 2 (Two) Times a Day As Needed for Anxiety or Muscle Spasms.    doxycycline (VIBRAMYCIN) 100 MG capsule   No No    Take 1 capsule by mouth 2 (Two) Times a Day.    DULoxetine (CYMBALTA) 60 MG capsule   No No    Take 1 capsule by mouth Every 12 (Twelve) Hours.    HYDROcodone Bit-Homatrop MBr (HYCODAN) 5-1.5 MG/5ML solution   No No    Take 5 mL by mouth Every 6 (Six) Hours As Needed for Cough.    HYDROcodone-acetaminophen (NORCO) 7.5-325 MG per tablet   No No    Take 1 tablet by mouth Every 6 (Six) Hours As Needed for Mild Pain.    methylPREDNISolone (MEDROL) 4 MG dose pack   No No    Take as directed on package instructions.    montelukast (SINGULAIR) 10 MG tablet   No No    TAKE 1 TABLET BY MOUTH EVERY DAY AT NIGHT    Multiple Vitamins-Minerals (multivitamin with minerals) tablet tablet  Self Yes No    Take 1 tablet by mouth Daily.    Omega-3 Fatty Acids (fish oil) 1000 MG capsule capsule   Yes No    Take 1 capsule by mouth Daily With Breakfast.    tiZANidine (ZANAFLEX) 2 MG tablet   No No    Take 2 tablets by mouth Every 8 (Eight) Hours As Needed for Muscle Spasms.    TRI-SPRINTEC 0.18/0.215/0.25 MG-35 MCG per tablet   Yes No    Take 1 tablet by mouth Daily.    Turmeric 500 MG capsule   Yes No    Take 1 capsule by mouth Daily.    vitamin B-12 (VITAMIN B-12) 1000 MCG tablet   No No    Take 1 tablet by mouth Daily.    vitamin C (ASCORBIC ACID) 500 MG tablet  Self Yes No    Take 1 tablet by mouth Daily.              Allergies:  Allergies   Allergen Reactions    Bee Venom Itching           VTE Prophylaxis:  Mechanical VTE prophylaxis orders are present.        CODE STATUS:    Code Status (Patient has no pulse and is not breathing): CPR (Attempt to Resuscitate)  Medical Interventions (Patient has pulse or is breathing): Full Support        Admission Status:  I believe this patient meets observation status.    I discussed the patient's findings and my recommendations with patient.    Signature:     This  document has been electronically signed by Kimberly Urbina DNP, APRN, AGACNP-BC on July 11, 2024 02:51 EDT   [facility] Hospitalist Team

## 2024-07-11 NOTE — ED PROVIDER NOTES
Subjective   History of Present Illness  Patient is a 53-year-old female who had an EGD today with liver biopsy and is having abdominal pain and had 1 episode of bloody emesis    Patient has history of right upper lung cancer was removed 3 years ago      Review of Systems   Gastrointestinal:  Positive for abdominal pain and nausea.        Hemoptysis       Past Medical History:   Diagnosis Date    Anxiety     Asthma 2013    Lumbar radiculopathy 3/12/2018    Lung nodule seen on imaging study 10/2/2020    Malignant neoplasm of upper lobe of right lung 2021    Thoracic back pain 3/12/2018       Allergies   Allergen Reactions    Bee Venom Itching       Past Surgical History:   Procedure Laterality Date    BREAST BIOPSY Right     benign    BRONCHOSCOPY N/A 10/21/2020    Procedure: BRONCHOSCOPY WITH BRONCHOALVEOLAR LAVAGE, NAVIGATION WITH FINE NEEDLE ASPIRATION;  Surgeon: Nichol Jaquez MD;  Location: [facility] ENDOSCOPY;  Service: Pulmonary;  Laterality: N/A;  POST RUL NODULE    CARDIAC CATHETERIZATION N/A 2022    Procedure: Left Heart Cath and coronary angiogram;  Surgeon: Chandana Tellez MD;  Location: [facility] CATH INVASIVE LOCATION;  Service: Cardiovascular;  Laterality: N/A;     SECTION      x3     LUNG REMOVAL, PARTIAL Right 2020    right upper lobe     NASAL SEPTUM SURGERY      20 Years Ago       Family History   Problem Relation Age of Onset    Hypertension Mother     Alzheimer's disease Mother     Thyroid disease Mother     Lung cancer Father     Lupus Sister     Other Sister         POTS    Lupus Sister     No Known Problems Brother     Asthma Other        Social History     Socioeconomic History    Marital status:     Number of children: 3   Tobacco Use    Smoking status: Never     Passive exposure: Never    Smokeless tobacco: Never   Vaping Use    Vaping status: Never Used   Substance and Sexual Activity    Alcohol use: Yes     Comment: Couple times a week    Drug use: No     "Sexual activity: Yes           Objective   Physical Exam  Vitals reviewed.   Constitutional:       Appearance: Normal appearance. She is well-developed.   HENT:      Head: Normocephalic and atraumatic.   Eyes:      Conjunctiva/sclera: Conjunctivae normal.      Pupils: Pupils are equal, round, and reactive to light.   Cardiovascular:      Rate and Rhythm: Normal rate and regular rhythm.      Heart sounds: Normal heart sounds.   Pulmonary:      Effort: Pulmonary effort is normal. No respiratory distress.      Breath sounds: Normal breath sounds. No wheezing.   Abdominal:      General: Abdomen is flat. Bowel sounds are normal.      Palpations: Abdomen is soft.      Tenderness: There is generalized abdominal tenderness.   Musculoskeletal:         General: No deformity. Normal range of motion.      Cervical back: Normal range of motion and neck supple.   Skin:     General: Skin is warm and dry.      Capillary Refill: Capillary refill takes less than 2 seconds.      Coloration: Skin is pale.   Neurological:      Mental Status: She is alert and oriented to person, place, and time.      GCS: GCS eye subscore is 4. GCS verbal subscore is 5. GCS motor subscore is 6.      Motor: No weakness.   Psychiatric:         Attention and Perception: Attention normal.         Mood and Affect: Mood normal. Mood is depressed. Affect is flat.         Speech: Speech normal.         Behavior: Behavior normal.         Cognition and Memory: Cognition and memory normal.         Judgment: Judgment normal.         Procedures           ED Course                                   BP 93/62   Pulse 120   Temp 97.5 °F (36.4 °C)   Resp 16   Ht 167.6 cm (66\")   Wt 68.8 kg (151 lb 10.8 oz)   LMP 06/10/2024 (Approximate)   SpO2 98%   BMI 24.48 kg/m²   Labs Reviewed   CBC WITH AUTO DIFFERENTIAL - Abnormal; Notable for the following components:       Result Value    RBC 3.58 (*)     Hemoglobin 10.7 (*)     MCHC 31.1 (*)     All other components " within normal limits   COMPREHENSIVE METABOLIC PANEL   LIPASE   CBC AND DIFFERENTIAL    Narrative:     The following orders were created for panel order CBC & Differential.  Procedure                               Abnormality         Status                     ---------                               -----------         ------                     CBC Auto Differential[[id number]]        Abnormal            Final result                 Please view results for these tests on the individual orders.     Medications   sodium chloride 0.9 % flush 10 mL (has no administration in time range)   sodium chloride 0.9 % bolus 1,000 mL (1,000 mL Intravenous New Bag 7/11/24 0004)   morphine injection 4 mg (has no administration in time range)   ondansetron (ZOFRAN) injection 4 mg (4 mg Intravenous Given 7/11/24 0004)     No radiology results for the last day            Medical Decision Making  Amount and/or Complexity of Data Reviewed  Labs: ordered.    Risk  Prescription drug management.        Final diagnoses:   Generalized abdominal pain   Hemoptysis       ED Disposition  ED Disposition       None            No follow-up provider specified.       Medication List      No changes were made to your prescriptions during this visit.          limits    Narrative:     GFR Normal >60  Chronic Kidney Disease <60  Kidney Failure <15     CBC WITH AUTO DIFFERENTIAL - Abnormal; Notable for the following components:    RBC 3.58 (*)     Hemoglobin 10.7 (*)     MCHC 31.1 (*)     All other components within normal limits   HEMOGLOBIN AND HEMATOCRIT, BLOOD - Abnormal; Notable for the following components:    Hemoglobin 8.4 (*)     Hematocrit 27.1 (*)     All other components within normal limits   BASIC METABOLIC PANEL - Abnormal; Notable for the following components:    Glucose 113 (*)     BUN 22 (*)     Creatinine 0.55 (*)     Chloride 108 (*)     Calcium 8.3 (*)     BUN/Creatinine Ratio 40.0 (*)     All other components within normal limits    Narrative:     GFR Normal >60  Chronic Kidney Disease <60  Kidney Failure <15     CBC WITH AUTO DIFFERENTIAL - Abnormal; Notable for the following components:    RBC 2.88 (*)     Hemoglobin 8.6 (*)     Hematocrit 27.5 (*)     MCHC 31.3 (*)     All other components within normal limits   HEMOGLOBIN AND HEMATOCRIT, BLOOD - Abnormal; Notable for the following components:    Hemoglobin 8.6 (*)     Hematocrit 27.5 (*)     All other components within normal limits   HEMOGLOBIN AND HEMATOCRIT, BLOOD - Abnormal; Notable for the following components:    Hemoglobin 7.8 (*)     Hematocrit 24.7 (*)     All other components within normal limits   HEMOGLOBIN AND HEMATOCRIT, BLOOD - Abnormal; Notable for the following components:    Hemoglobin 7.8 (*)     Hematocrit 25.3 (*)     All other components within normal limits   COMPREHENSIVE METABOLIC PANEL - Abnormal; Notable for the following components:    Glucose 100 (*)     Creatinine 0.52 (*)     Chloride 110 (*)     CO2 21.0 (*)     Calcium 8.0 (*)     Total Protein 5.8 (*)     Albumin 3.1 (*)     ALT (SGPT) 89 (*)     AST (SGOT) 93 (*)     Alkaline Phosphatase 402 (*)     All other components within normal limits    Narrative:     GFR Normal >60  Chronic Kidney Disease <60  Kidney  Failure <15     CBC WITH AUTO DIFFERENTIAL - Abnormal; Notable for the following components:    RBC 2.47 (*)     Hemoglobin 7.2 (*)     Hematocrit 23.9 (*)     MCHC 30.1 (*)     MPV 12.3 (*)     Neutrophil % 42.2 (*)     All other components within normal limits   HEMOGLOBIN AND HEMATOCRIT, BLOOD - Abnormal; Notable for the following components:    Hemoglobin 8.7 (*)     Hematocrit 27.7 (*)     All other components within normal limits   LIPASE - Normal   HCG, SERUM, QUALITATIVE - Normal   TYPE AND SCREEN   PREPARE RBC   CBC AND DIFFERENTIAL    Narrative:     The following orders were created for panel order CBC & Differential.  Procedure                               Abnormality         Status                     ---------                               -----------         ------                     CBC Auto Differential[[id number]]        Abnormal            Final result                 Please view results for these tests on the individual orders.   CBC AND DIFFERENTIAL    Narrative:     The following orders were created for panel order CBC & Differential.  Procedure                               Abnormality         Status                     ---------                               -----------         ------                     CBC Auto Differential[[id number]]        Abnormal            Final result                 Please view results for these tests on the individual orders.   CBC AND DIFFERENTIAL    Narrative:     The following orders were created for panel order CBC & Differential.  Procedure                               Abnormality         Status                     ---------                               -----------         ------                     CBC Auto Differential[[id number]]        Abnormal            Final result                 Please view results for these tests on the individual orders.     Medications   sodium chloride 0.9 % bolus 1,000 mL (0 mL Intravenous Stopped 7/11/24 0123)   ondansetron  (ZOFRAN) injection 4 mg (4 mg Intravenous Given 7/11/24 0004)   morphine injection 4 mg (4 mg Intravenous Given 7/11/24 0029)   iopamidol (ISOVUE-370) 76 % injection 100 mL (100 mL Intravenous Given 7/11/24 0145)   ondansetron (ZOFRAN) injection 4 mg (4 mg Intravenous Given 7/11/24 0202)   HYDROmorphone (DILAUDID) injection 0.5 mg (0.5 mg Intravenous Given 7/11/24 0220)   cefTRIAXone (ROCEPHIN) 1,000 mg in sodium chloride 0.9 % 100 mL MBP (0 mg Intravenous Stopped 7/11/24 0330)     No radiology results for the last day            Medical Decision Making  Problems Addressed:  Calculus of gallbladder with cholecystitis of other acuity without obstruction: complicated acute illness or injury  Generalized abdominal pain: complicated acute illness or injury  Hemoptysis: complicated acute illness or injury    Amount and/or Complexity of Data Reviewed  Labs: ordered.  Radiology: ordered.    Risk  Prescription drug management.        Final diagnoses:   Generalized abdominal pain   Hemoptysis   Calculus of gallbladder with cholecystitis of other acuity without obstruction       ED Disposition  ED Disposition       ED Disposition   Discharge    Condition   --    Comment   Physician of Record for Attribution - Please select from Treatment Team: TAY STEVENSON [[id number]]   Review needed by CMO to determine Physician of Record: Junie Roland, APRYELITZA  [address] [facility]  [address]  [place] [address]  [phone]      2 weeks         Medication List        New Prescriptions      pantoprazole 40 MG EC tablet  Commonly known as: PROTONIX  Take 1 tablet by mouth 2 (Two) Times a Day Before Meals.     sucralfate 1 g tablet  Commonly known as: CARAFATE  Take 1 tablet by mouth 4 (Four) Times a Day Before Meals & at Bedtime.               Where to Get Your Medications        These medications were sent to [facility]/pharmacy #[id number] - [place], [place] - [address] - [phone] [facility] [phone]   [address],  [place] [address]      Phone: [phone]   pantoprazole 40 MG EC tablet  sucralfate 1 g tablet            Eunice Padilla, APRN  07/16/24 0015

## 2024-07-12 ENCOUNTER — ON CAMPUS - OUTPATIENT (AMBULATORY)
Dept: URBAN - METROPOLITAN AREA HOSPITAL 85 | Facility: HOSPITAL | Age: 54
End: 2024-07-12
Payer: COMMERCIAL

## 2024-07-12 ENCOUNTER — ANESTHESIA EVENT (OUTPATIENT)
Dept: GASTROENTEROLOGY | Facility: HOSPITAL | Age: 54
End: 2024-07-12
Payer: COMMERCIAL

## 2024-07-12 ENCOUNTER — ANESTHESIA (OUTPATIENT)
Dept: GASTROENTEROLOGY | Facility: HOSPITAL | Age: 54
End: 2024-07-12
Payer: COMMERCIAL

## 2024-07-12 ENCOUNTER — READMISSION MANAGEMENT (OUTPATIENT)
Dept: CALL CENTER | Facility: HOSPITAL | Age: 54
End: 2024-07-12
Payer: COMMERCIAL

## 2024-07-12 VITALS
HEIGHT: 65 IN | DIASTOLIC BLOOD PRESSURE: 52 MMHG | HEART RATE: 86 BPM | RESPIRATION RATE: 13 BRPM | BODY MASS INDEX: 25.53 KG/M2 | WEIGHT: 153.22 LBS | SYSTOLIC BLOOD PRESSURE: 108 MMHG | TEMPERATURE: 98.1 F | OXYGEN SATURATION: 100 %

## 2024-07-12 DIAGNOSIS — K22.89 OTHER SPECIFIED DISEASE OF ESOPHAGUS: ICD-10-CM

## 2024-07-12 DIAGNOSIS — K92.0 HEMATEMESIS: ICD-10-CM

## 2024-07-12 LAB
ALBUMIN SERPL-MCNC: 3.1 G/DL (ref 3.5–5.2)
ALBUMIN/GLOB SERPL: 1.1 G/DL
ALP SERPL-CCNC: 402 U/L (ref 39–117)
ALT SERPL W P-5'-P-CCNC: 89 U/L (ref 1–33)
ANION GAP SERPL CALCULATED.3IONS-SCNC: 8 MMOL/L (ref 5–15)
AST SERPL-CCNC: 93 U/L (ref 1–32)
BASOPHILS # BLD AUTO: 0.02 10*3/MM3 (ref 0–0.2)
BASOPHILS NFR BLD AUTO: 0.4 % (ref 0–1.5)
BILIRUB SERPL-MCNC: 0.4 MG/DL (ref 0–1.2)
BUN SERPL-MCNC: 11 MG/DL (ref 6–20)
BUN/CREAT SERPL: 21.2 (ref 7–25)
CALCIUM SPEC-SCNC: 8 MG/DL (ref 8.6–10.5)
CHLORIDE SERPL-SCNC: 110 MMOL/L (ref 98–107)
CO2 SERPL-SCNC: 21 MMOL/L (ref 22–29)
CREAT SERPL-MCNC: 0.52 MG/DL (ref 0.57–1)
DEPRECATED RDW RBC AUTO: 52.3 FL (ref 37–54)
EGFRCR SERPLBLD CKD-EPI 2021: 111.3 ML/MIN/1.73
EOSINOPHIL # BLD AUTO: 0.17 10*3/MM3 (ref 0–0.4)
EOSINOPHIL NFR BLD AUTO: 3.3 % (ref 0.3–6.2)
ERYTHROCYTE [DISTWIDTH] IN BLOOD BY AUTOMATED COUNT: 14.7 % (ref 12.3–15.4)
GLOBULIN UR ELPH-MCNC: 2.7 GM/DL
GLUCOSE SERPL-MCNC: 100 MG/DL (ref 65–99)
HCT VFR BLD AUTO: 23.9 % (ref 34–46.6)
HCT VFR BLD AUTO: 27.7 % (ref 34–46.6)
HGB BLD-MCNC: 7.2 G/DL (ref 12–15.9)
HGB BLD-MCNC: 8.7 G/DL (ref 12–15.9)
IMM GRANULOCYTES # BLD AUTO: 0.02 10*3/MM3 (ref 0–0.05)
IMM GRANULOCYTES NFR BLD AUTO: 0.4 % (ref 0–0.5)
LYMPHOCYTES # BLD AUTO: 2.24 10*3/MM3 (ref 0.7–3.1)
LYMPHOCYTES NFR BLD AUTO: 43.4 % (ref 19.6–45.3)
MCH RBC QN AUTO: 29.1 PG (ref 26.6–33)
MCHC RBC AUTO-ENTMCNC: 30.1 G/DL (ref 31.5–35.7)
MCV RBC AUTO: 96.8 FL (ref 79–97)
MONOCYTES # BLD AUTO: 0.53 10*3/MM3 (ref 0.1–0.9)
MONOCYTES NFR BLD AUTO: 10.3 % (ref 5–12)
NEUTROPHILS NFR BLD AUTO: 2.18 10*3/MM3 (ref 1.7–7)
NEUTROPHILS NFR BLD AUTO: 42.2 % (ref 42.7–76)
NRBC BLD AUTO-RTO: 0 /100 WBC (ref 0–0.2)
PLATELET # BLD AUTO: 218 10*3/MM3 (ref 140–450)
PMV BLD AUTO: 12.3 FL (ref 6–12)
POTASSIUM SERPL-SCNC: 4 MMOL/L (ref 3.5–5.2)
PROT SERPL-MCNC: 5.8 G/DL (ref 6–8.5)
RBC # BLD AUTO: 2.47 10*6/MM3 (ref 3.77–5.28)
SODIUM SERPL-SCNC: 139 MMOL/L (ref 136–145)
WBC NRBC COR # BLD AUTO: 5.16 10*3/MM3 (ref 3.4–10.8)

## 2024-07-12 PROCEDURE — 43235 EGD DIAGNOSTIC BRUSH WASH: CPT | Performed by: INTERNAL MEDICINE

## 2024-07-12 PROCEDURE — P9016 RBC LEUKOCYTES REDUCED: HCPCS

## 2024-07-12 PROCEDURE — 85025 COMPLETE CBC W/AUTO DIFF WBC: CPT

## 2024-07-12 PROCEDURE — G0378 HOSPITAL OBSERVATION PER HR: HCPCS

## 2024-07-12 PROCEDURE — 36430 TRANSFUSION BLD/BLD COMPNT: CPT

## 2024-07-12 PROCEDURE — 25010000002 PROPOFOL 500 MG/50ML EMULSION: Performed by: NURSE ANESTHETIST, CERTIFIED REGISTERED

## 2024-07-12 PROCEDURE — 96361 HYDRATE IV INFUSION ADD-ON: CPT

## 2024-07-12 PROCEDURE — 80053 COMPREHEN METABOLIC PANEL: CPT | Performed by: NURSE PRACTITIONER

## 2024-07-12 PROCEDURE — 86900 BLOOD TYPING SEROLOGIC ABO: CPT

## 2024-07-12 PROCEDURE — 85018 HEMOGLOBIN: CPT | Performed by: INTERNAL MEDICINE

## 2024-07-12 PROCEDURE — 86923 COMPATIBILITY TEST ELECTRIC: CPT

## 2024-07-12 PROCEDURE — 25810000003 SODIUM CHLORIDE 0.9 % SOLUTION

## 2024-07-12 PROCEDURE — 85014 HEMATOCRIT: CPT | Performed by: INTERNAL MEDICINE

## 2024-07-12 RX ORDER — ALUMINA, MAGNESIA, AND SIMETHICONE 2400; 2400; 240 MG/30ML; MG/30ML; MG/30ML
15 SUSPENSION ORAL EVERY 6 HOURS PRN
Status: DISCONTINUED | OUTPATIENT
Start: 2024-07-12 | End: 2024-07-12 | Stop reason: HOSPADM

## 2024-07-12 RX ORDER — PROPOFOL 10 MG/ML
INJECTION, EMULSION INTRAVENOUS AS NEEDED
Status: DISCONTINUED | OUTPATIENT
Start: 2024-07-12 | End: 2024-07-12 | Stop reason: SURG

## 2024-07-12 RX ORDER — LIDOCAINE HYDROCHLORIDE 10 MG/ML
INJECTION, SOLUTION EPIDURAL; INFILTRATION; INTRACAUDAL; PERINEURAL AS NEEDED
Status: DISCONTINUED | OUTPATIENT
Start: 2024-07-12 | End: 2024-07-12 | Stop reason: SURG

## 2024-07-12 RX ADMIN — HYDROCODONE BITARTRATE AND ACETAMINOPHEN 1 TABLET: 7.5; 325 TABLET ORAL at 14:11

## 2024-07-12 RX ADMIN — PANTOPRAZOLE SODIUM 40 MG: 40 TABLET, DELAYED RELEASE ORAL at 09:38

## 2024-07-12 RX ADMIN — HYDROCODONE BITARTRATE AND ACETAMINOPHEN 1 TABLET: 7.5; 325 TABLET ORAL at 04:43

## 2024-07-12 RX ADMIN — URSODIOL 300 MG: 300 CAPSULE ORAL at 09:38

## 2024-07-12 RX ADMIN — MONTELUKAST 10 MG: 10 TABLET, FILM COATED ORAL at 09:38

## 2024-07-12 RX ADMIN — DULOXETINE HYDROCHLORIDE 60 MG: 30 CAPSULE, DELAYED RELEASE ORAL at 09:38

## 2024-07-12 RX ADMIN — SUCRALFATE 1 G: 1 TABLET ORAL at 09:38

## 2024-07-12 RX ADMIN — SODIUM CHLORIDE 125 ML/HR: 9 INJECTION, SOLUTION INTRAVENOUS at 00:17

## 2024-07-12 RX ADMIN — Medication 10 ML: at 09:42

## 2024-07-12 RX ADMIN — LIDOCAINE HYDROCHLORIDE 50 MG: 10 INJECTION, SOLUTION EPIDURAL; INFILTRATION; INTRACAUDAL; PERINEURAL at 13:05

## 2024-07-12 RX ADMIN — PROPOFOL 150 MG: 10 INJECTION, EMULSION INTRAVENOUS at 13:05

## 2024-07-12 RX ADMIN — SODIUM CHLORIDE 125 ML/HR: 9 INJECTION, SOLUTION INTRAVENOUS at 07:42

## 2024-07-12 RX ADMIN — SODIUM CHLORIDE 100 ML/HR: 9 INJECTION, SOLUTION INTRAVENOUS at 13:02

## 2024-07-12 NOTE — OP NOTE
ESOPHAGOGASTRODUODENOSCOPY Procedure Report    Patient Name:  Gideon Fields  YOB: 1970    Date of Surgery:  7/12/2024     Pre-Op Diagnosis:  Hematemesis, unspecified whether nausea present [K92.0]       Post-Op Diagnosis Codes:     * Hematemesis, unspecified whether nausea present [K92.0]    Postop diagnosis:  1.  Hiatal hernia  2.  Nonobstructing esophageal ring  3.  Otherwise normal-appearing EGD    Procedure/CPT® Codes:      Procedure(s):  ESOPHAGOGASTRODUODENOSCOPY    Staff:  Surgeon(s):  ODILON Parisi MD      Anesthesia: Monitored Anesthesia Care    Description of Procedure:  A description of the procedure as well as risks, benefits and alternative methods were explained to the patient who voiced understanding and signed the corresponding consent form. A physical exam was performed and vital signs were monitored throughout the procedure.    An upper GI endoscope was placed into the mouth and proceeded through the esophagus, stomach and second portion of the duodenum without difficulty. The scope was then retroflexed and the fundus was visualized. The procedure was not difficult and there were no immediate complications.  There was no blood loss.    Impression:  1.  Normal mucosa of the esophagus.  There was a small scar in the mid esophagus consistent with recent biopsies.  There was no ulceration.  There was no bleeding or old blood in the esophagus.  2.  Normal-appearing mucosa of the whole stomach.  There was no blood in the stomach.  3.  Normal mucosa of the duodenal bulb and second and third portion of the duodenum.  There is no blood in the duodenum.    Recommendations:  -Unremarkable EGD with no signs of recent bleeding  -CT of the chest/ abdomen with no sign of hematoma  -Suspect any bleeding is resolved at this point  -Okay with discharge home with plans for labs on Monday 7/15, and subsequent follow-up in the office as scheduled  -Will  patient on strict return  precautions  -Can give GI cocktail now to help with any dysphagia or odynophagia    Okay for regular diet and discharge home today      MENDEL Parisi MD     Date: 7/12/2024    Time: 13:13 EDT

## 2024-07-12 NOTE — ANESTHESIA PREPROCEDURE EVALUATION
Anesthesia Evaluation     Patient summary reviewed and Nursing notes reviewed   NPO Solid Status: > 8 hours             Airway   Mallampati: II  TM distance: >3 FB  Neck ROM: full  No difficulty expected  Dental - normal exam     Pulmonary - normal exam   (+) lung cancer, asthma,  (-) not a smoker  Cardiovascular - normal exam    ECG reviewed    (+) hyperlipidemia  (-) dysrhythmias, ESPINOSA      Neuro/Psych- negative ROS  GI/Hepatic/Renal/Endo    (+) GERD    Musculoskeletal (-) negative ROS    Abdominal  - normal exam    Bowel sounds: normal.   Substance History - negative use     OB/GYN negative ob/gyn ROS         Other                    Anesthesia Plan    ASA 3     general       Anesthetic plan, risks, benefits, and alternatives have been provided, discussed and informed consent has been obtained with: patient.    CODE STATUS:    Code Status (Patient has no pulse and is not breathing): CPR (Attempt to Resuscitate)  Medical Interventions (Patient has pulse or is breathing): Full Support

## 2024-07-12 NOTE — CASE MANAGEMENT/SOCIAL WORK
Continued Stay Note  [facility]     Patient Name: Gideon Fields  MRN: [id number]  Today's Date: 7/12/2024    Admit Date: 7/10/2024    Plan: Return home w/ .   Discharge Plan       Row Name 07/12/24 1010       Plan    Plan Comments DC barriers: GI following, EGD today.                Alan Wall RN     Cell number [phone]  Office number [phone]

## 2024-07-12 NOTE — OUTREACH NOTE
Prep Survey      Flowsheet Row Responses   [facility] patient discharged from? [facility]   Is LACE score < 7 ? Yes   Eligibility [facility]   Date of Admission 07/11/24   Date of Discharge 07/12/24   Discharge Disposition Home or Self Care   Discharge diagnosis Hematemesis   Does the patient have one of the following disease processes/diagnoses(primary or secondary)? Other   Does the patient have Home health ordered? No   Is there a DME ordered? No   Prep survey completed? Yes            Shalini NAPOLES - Registered Nurse

## 2024-07-12 NOTE — DISCHARGE SUMMARY
[facility] Medicine Services  Discharge Summary    Date of Service: 2024  Patient Name: Gideon Fields  : 1970  MRN: [id number]    Date of Admission: 7/10/2024  Discharge Diagnosis:   Hematemesis  Acute blood loss anemia  Anxiety with depression  Chronic back pain  History of lung cancer    Date of Discharge: 2024  Primary Care Physician: Junie Le APRN      Presenting Problem:   Hematemesis [K92.0]  Generalized abdominal pain [R10.84]  Hemoptysis [R04.2]  Calculus of gallbladder with cholecystitis of other acuity without obstruction [K80.18]    Active and Resolved Hospital Problems:  Active Hospital Problems    Diagnosis POA    **Hematemesis [K92.0] Yes      Resolved Hospital Problems   No resolved problems to display.         Hospital Course     HPI:  Patient is a 53-year-old female who presented to the hospital with hematemesis.  Please see H&P for details.    Hospital Course:  The patient had undergone EGD and liver biopsy for autoimmune liver disease a couple of days prior to admission and presented to the hospital with hematemesis postoperatively.  There was concern for possible bleeding at the esophageal biopsy sites.  Her hematemesis did stop while she was admitted, however her hemoglobin continued to trend down.  She was transfused 1 unit PBC prior to discharge.  She underwent repeat EGD on the day of discharge which did not show any evidence of active bleeding.  She did have some scar tissue at the biopsy site of the esophagus which could have been the source of bleeding initially.  She will be discharged on PPI and Carafate per GI recommendations.  She will follow-up next week for repeat blood work.  She is stable for discharge.        DISCHARGE Follow Up Recommendations for labs and diagnostics: Follow-up with GI next week.      Reasons For Change In Medications and Indications for New Medications:      Day of Discharge     Vital Signs:  Temp:  [97.7 °F (36.5  °C)-98.6 °F (37 °C)] 98 °F (36.7 °C)  Heart Rate:  [] 86  Resp:  [11-19] 16  BP: ()/(41-79) 109/55  Flow (L/min):  [10] 10    Physical Exam:  Physical Exam   General Appearance:  Alert, cooperative, no distress, appears stated age  Head:  Normocephalic, without obvious abnormality, atraumatic  Eyes:  PERRL, conjunctiva/corneas clear, EOM's intact, fundi benign, both eyes  Ears:  Normal TM's and external ear canals, both ears  Nose: Nares normal, septum midline, mucosa normal, no drainage or sinus tenderness  Throat: Lips, mucosa, and tongue normal; teeth and gums normal  Neck: Supple, symmetrical, trachea midline, no adenopathy, thyroid: not enlarged, symmetric, no tenderness/mass/nodules, no carotid bruit or JVD  Lungs:   Clear to auscultation bilaterally, respirations unlabored  Heart:  Regular rate and rhythm, S1, S2 normal, no murmur, rub or gallop  Abdomen:  Soft, non-tender, bowel sounds active all four quadrants,  no masses, no organomegaly  Extremities: Extremities normal, atraumatic, no cyanosis or edema  Pulses: 2+ and symmetric  Skin: Skin color, texture, turgor normal, no rashes or lesions  Neurologic: Normal        Pertinent  and/or Most Recent Results     LAB RESULTS:      Lab 07/12/24  0449 07/11/24  1933 07/11/24  1224 07/11/24  0555 07/11/24  0238 07/10/24  2357 07/10/24  2357 07/09/24  0906   WBC 5.16  --   --  6.73  --   --  9.77 8.32   HEMOGLOBIN 7.2* 7.8* 7.8* 8.6*  8.6* 8.4*   < > 10.7* 13.0   HEMATOCRIT 23.9* 25.3* 24.7* 27.5*  27.5* 27.1*   < > 34.4 40.6   PLATELETS 218  --   --  247  --   --  335 353   NEUTROS ABS 2.18  --   --  4.49  --   --  6.46 4.85   IMMATURE GRANS (ABS) 0.02  --   --  0.03  --   --  0.04 0.03   LYMPHS ABS 2.24  --   --  1.62  --   --  2.30 2.27   MONOS ABS 0.53  --   --  0.52  --   --  0.77 0.77   EOS ABS 0.17  --   --  0.04  --   --  0.16 0.36   MCV 96.8  --   --  95.5  --   --  96.1 94.4   PROTIME  --   --   --   --   --   --   --  10.1*    < > = values  in this interval not displayed.         Lab 07/12/24  0449 07/11/24  0555 07/10/24  2357   SODIUM 139 140 136   POTASSIUM 4.0 4.6 4.6   CHLORIDE 110* 108* 103   CO2 21.0* 24.6 21.6*   ANION GAP 8.0 7.4 11.4   BUN 11 22* 27*   CREATININE 0.52* 0.55* 0.67   EGFR 111.3 109.8 104.7   GLUCOSE 100* 113* 153*   CALCIUM 8.0* 8.3* 8.9         Lab 07/12/24  0449 07/10/24  2357   TOTAL PROTEIN 5.8* 7.1   ALBUMIN 3.1* 3.7   GLOBULIN 2.7 3.4   ALT (SGPT) 89* 102*   AST (SGOT) 93* 79*   BILIRUBIN 0.4 0.9   ALK PHOS 402* 525*   LIPASE  --  20         Lab 07/09/24  0906   PROTIME 10.1*   INR 0.9             Lab 07/11/24  0636   ABO TYPING A   RH TYPING Positive   ANTIBODY SCREEN Negative         Brief Urine Lab Results  (Last result in the past 365 days)        Color   Clarity   Blood   Leuk Est   Nitrite   Protein   CREAT   Urine HCG        04/18/24 1124 Yellow   Hazy   Negative   Negative   Negative   Negative                 Microbiology Results (last 10 days)       ** No results found for the last 240 hours. **            CT Abdomen Pelvis With Contrast    Result Date: 7/11/2024  Impression: Impression: 1.Cholelithiasis with gallbladder wall thickening along its contact with the liver. Appearance may represent acute calculus cholecystitis. 2.No other acute abdominal or pelvic abnormality. Electronically Signed: Vimal Ribeiro MD  7/11/2024 1:51 AM EDT  Workstation ID: [id number]    CT Chest With Contrast Diagnostic    Result Date: 7/11/2024  Impression: Impression: No acute cardiopulmonary abnormality. Electronically Signed: Vimal Ribeiro MD  7/11/2024 1:48 AM EDT  Workstation ID: [id number]             Results for orders placed during the hospital encounter of 02/10/22    Adult Transthoracic Echo Complete W/ Cont if Necessary Per Protocol    Interpretation Summary  · Estimated left ventricular EF = 60% Left ventricular systolic function is normal.    Indications  Chest pain    Technically satisfactory study.  Mitral valve is  structurally normal.  Tricuspid valve is structurally normal.  Aortic valve is structurally normal.  Pulmonic valve could not be well visualized.  No evidence for mitral tricuspid or aortic regurgitation is seen by Doppler study.  Left atrium is normal in size.  Right atrium is normal in size.  Left ventricle is normal in size and contractility with ejection fraction of 60%.  Right ventricle is normal in size.  Atrial septum is intact.  Aorta is normal.  No pericardial effusion or intracardiac thrombus is seen.    Impression  Structurally and functionally normal cardiac valves.  Left ventricular size and contractility is normal with ejection fraction of 60%.      Labs Pending at Discharge:      Procedures Performed  Procedure(s):  ESOPHAGOGASTRODUODENOSCOPY         Consults:   Consults       Date and Time Order Name Status Description    7/11/2024  2:10 AM Gastroenterology (on-call MD unless specified) Completed               Discharge Details        Discharge Medications        New Medications        Instructions Start Date   pantoprazole 40 MG EC tablet  Commonly known as: PROTONIX   40 mg, Oral, 2 Times Daily Before Meals      sucralfate 1 g tablet  Commonly known as: CARAFATE   1 g, Oral, 4 Times Daily Before Meals & Nightly             Continue These Medications        Instructions Start Date   albuterol sulfate  (90 Base) MCG/ACT inhaler  Commonly known as: PROVENTIL HFA;VENTOLIN HFA;PROAIR HFA   2 puffs, Inhalation, Every 4 Hours PRN      baclofen 10 MG tablet  Commonly known as: LIORESAL   TAKE 1 TABLET BY MOUTH THREE TIMES A DAY      diazePAM 5 MG tablet  Commonly known as: Valium   5 mg, Oral, 2 Times Daily PRN      DULoxetine 60 MG capsule  Commonly known as: CYMBALTA   60 mg, Oral, Every 12 Hours Scheduled      HYDROcodone Bit-Homatrop MBr 5-1.5 MG/5ML solution  Commonly known as: HYCODAN   5 mL, Oral, Every 6 Hours PRN      HYDROcodone-acetaminophen 7.5-325 MG per tablet  Commonly known as:  NORCO   1 tablet, Oral, Every 6 Hours PRN      montelukast 10 MG tablet  Commonly known as: SINGULAIR   10 mg, Oral, Nightly PRN      tiZANidine 2 MG tablet  Commonly known as: ZANAFLEX   4 mg, Oral, Every 8 Hours PRN      Tri-Sprintec 0.18/0.215/0.25 MG-35 MCG per tablet  Generic drug: norgestimate-ethinyl estradiol   1 tablet, Oral, Daily      ursodiol 300 MG capsule  Commonly known as: ACTIGALL   300 mg, Oral, 2 Times Daily               Allergies   Allergen Reactions    Bee Venom Itching         Discharge Disposition:     Home or Self Care    Diet:  Hospital:  Diet Order   Procedures    Diet: Regular/House; Texture: Soft to Chew (NDD 3); Soft to Chew: Chopped Meat; Fluid Consistency: Thin (IDDSI 0)         Discharge Activity:         CODE STATUS:  Code Status and Medical Interventions:   Ordered at: 07/11/24 0242     Code Status (Patient has no pulse and is not breathing):    CPR (Attempt to Resuscitate)     Medical Interventions (Patient has pulse or is breathing):    Full Support         No future appointments.    Additional Instructions for the Follow-ups that You Need to Schedule       Discharge Follow-up with PCP   As directed       Currently Documented PCP:    Junie Le APRN    PCP Phone Number:    [phone]     Follow Up Details: 2 weeks        Discharge Follow-up with Specified Provider: GI in next 3 days for repeat blood work; 3 Days   As directed      To: GI in next 3 days for repeat blood work   Follow Up: 3 Days                Time spent on Discharge including face to face service:  >30 minutes    Signature: Electronically signed by Jorge Horta MD, 07/12/24, 14:09 EDT.  [facility] Hospitalist Team

## 2024-07-12 NOTE — PLAN OF CARE
Goal Outcome Evaluation:           Progress: improving  Outcome Evaluation: Pt received 1 unit PRBCs. Hgb increased to 8.7. EGD done today, no signs of bleeding. Anticipate dc this afternoon.

## 2024-07-12 NOTE — ANESTHESIA POSTPROCEDURE EVALUATION
Patient: Gideon Fields    Procedure Summary       Date: 07/12/24 Room / Location: [facility] ENDOSCOPY 1 / [facility] ENDOSCOPY    Anesthesia Start: 1300 Anesthesia Stop: 1313    Procedure: ESOPHAGOGASTRODUODENOSCOPY Diagnosis:       Hematemesis, unspecified whether nausea present      (Hematemesis, unspecified whether nausea present [K92.0])    Surgeons: ODILON Parisi MD Provider: Russell Saucedo MD    Anesthesia Type: general ASA Status: 3            Anesthesia Type: general    Vitals  Vitals Value Taken Time   /55 07/12/24 1346   Temp     Pulse 83 07/12/24 1349   Resp 16 07/12/24 1335   SpO2 100 % 07/12/24 1349   Vitals shown include unfiled device data.        Post Anesthesia Care and Evaluation    Patient location during evaluation: PACU  Patient participation: complete - patient participated  Level of consciousness: awake  Pain score: 0  Pain management: adequate  Anesthetic complications: No anesthetic complications  PONV Status: none  Cardiovascular status: acceptable  Respiratory status: acceptable  Hydration status: acceptable

## 2024-07-13 LAB
BH BB BLOOD EXPIRATION DATE: NORMAL
BH BB BLOOD TYPE BARCODE: 6200
BH BB DISPENSE STATUS: NORMAL
BH BB PRODUCT CODE: NORMAL
BH BB UNIT NUMBER: NORMAL
CROSSMATCH INTERPRETATION: NORMAL
UNIT  ABO: NORMAL
UNIT  RH: NORMAL

## 2024-07-15 ENCOUNTER — TRANSITIONAL CARE MANAGEMENT TELEPHONE ENCOUNTER (OUTPATIENT)
Dept: CALL CENTER | Facility: HOSPITAL | Age: 54
End: 2024-07-15
Payer: COMMERCIAL

## 2024-07-15 RX ORDER — DULOXETIN HYDROCHLORIDE 60 MG/1
60 CAPSULE, DELAYED RELEASE ORAL EVERY 12 HOURS
Qty: 60 CAPSULE | Refills: 0 | Status: SHIPPED | OUTPATIENT
Start: 2024-07-15

## 2024-07-15 NOTE — CASE MANAGEMENT/SOCIAL WORK
Case Management Discharge Note             Selected Continued Care - Discharged on 7/12/2024 Admission date: 7/10/2024 - Discharge disposition: Home or Self Care            Transportation Services  Private: Car    Final Discharge Disposition Code: 01 - home or self-care

## 2024-07-15 NOTE — OUTREACH NOTE
Call Center TCM Note      Flowsheet Row Responses   [facility] patient discharged from? [facility]   Does the patient have one of the following disease processes/diagnoses(primary or secondary)? Other   TCM attempt successful? Yes   Call start time 1456   Call end time 1457   Discharge diagnosis Hematemesis   Person spoke with today (if not patient) and relationship pt   Meds reviewed with patient/caregiver? Yes   Is the patient having any side effects they believe may be caused by any medication additions or changes? No   Does the patient have all medications ordered at discharge? Yes   Is the patient taking all medications as directed (includes completed medication regime)? Yes   Comments Pt had GI fu appt   Does the patient have an appointment with their PCP within 7-14 days of discharge? Yes  [7/17/2024  1:00 PM]   Psychosocial issues? No   Did the patient receive a copy of their discharge instructions? Yes   Nursing interventions Reviewed instructions with patient   What is the patient's perception of their health status since discharge? Improving   Is the patient/caregiver able to teach back signs and symptoms related to disease process for when to call PCP? Yes   Is the patient/caregiver able to teach back signs and symptoms related to disease process for when to call 911? Yes   Is the patient/caregiver able to teach back the hierarchy of who to call/visit for symptoms/problems? PCP, Specialist, Home health nurse, Urgent Care, ED, 911 Yes   If the patient is a current smoker, are they able to teach back resources for cessation? Not a smoker   TCM call completed? Yes   Wrap up additional comments Pt denies any n/v, pain. Reviewed AVS/meds with pt. Pt verified PCP fu appt, and had GI fu appt.   Call end time 1457   Would this patient benefit from a Referral to Amb Social Work? No   Is the patient interested in additional calls from an ambulatory ? No            Sadie Sage RN    7/15/2024,  14:59 EDT

## 2024-07-17 ENCOUNTER — OFFICE VISIT (OUTPATIENT)
Dept: FAMILY MEDICINE CLINIC | Facility: CLINIC | Age: 54
End: 2024-07-17
Payer: COMMERCIAL

## 2024-07-17 VITALS
DIASTOLIC BLOOD PRESSURE: 78 MMHG | HEIGHT: 65 IN | SYSTOLIC BLOOD PRESSURE: 134 MMHG | HEART RATE: 86 BPM | BODY MASS INDEX: 25.66 KG/M2 | RESPIRATION RATE: 18 BRPM | WEIGHT: 154 LBS | OXYGEN SATURATION: 100 %

## 2024-07-17 DIAGNOSIS — R04.2 HEMOPTYSIS: ICD-10-CM

## 2024-07-17 DIAGNOSIS — R76.8 POSITIVE ANA (ANTINUCLEAR ANTIBODY): ICD-10-CM

## 2024-07-17 DIAGNOSIS — Z09 HOSPITAL DISCHARGE FOLLOW-UP: Primary | ICD-10-CM

## 2024-07-17 DIAGNOSIS — M54.16 LUMBAR RADICULOPATHY: ICD-10-CM

## 2024-07-17 DIAGNOSIS — M54.42 ACUTE LEFT-SIDED LOW BACK PAIN WITH LEFT-SIDED SCIATICA: ICD-10-CM

## 2024-07-17 DIAGNOSIS — K75.4 AUTOIMMUNE HEPATITIS: ICD-10-CM

## 2024-07-17 PROBLEM — K31.7 POLYP OF STOMACH AND DUODENUM: Status: ACTIVE | Noted: 2018-11-09

## 2024-07-17 PROCEDURE — 99496 TRANSJ CARE MGMT HIGH F2F 7D: CPT | Performed by: NURSE PRACTITIONER

## 2024-07-17 RX ORDER — HYDROCODONE BITARTRATE AND ACETAMINOPHEN 7.5; 325 MG/1; MG/1
1 TABLET ORAL EVERY 6 HOURS PRN
Qty: 20 TABLET | Refills: 0 | Status: SHIPPED | OUTPATIENT
Start: 2024-07-17

## 2024-07-17 NOTE — PROGRESS NOTES
Transitional Care Follow Up Visit  Subjective     Gideon Fields is a 53 y.o. female who presents for a transitional care management visit.    Within 48 business hours after discharge our office contacted her via telephone to coordinate her care and needs.      I reviewed and discussed the details of that call along with the discharge summary, hospital problems, inpatient lab results, inpatient diagnostic studies, and consultation reports with Gideon.     Current outpatient and discharge medications have been reconciled for the patient.  Reviewed by: NEO Recinos          7/12/2024     6:15 PM   Date of TCM Phone Call   [facility]   Date of Admission 7/11/2024   Date of Discharge 7/12/2024   Discharge Disposition Home or Self Care     Risk for Readmission (LACE) Score: 6 (7/12/2024  6:00 AM)      History of Present Illness   Course During Hospital Stay:  pt comes in today for follow up from recent hospital. Had a liver biopsy done on 7/10 and the next day on 7/11 was having abd pain and 1 episode of hemoptysis.   She does have hx of lung cancer RUL 3 years ago.  She had liver biopsy with Dr. Savage after she had been evaluated with elevated LFTs.  She has been diagnosed with autoimmune hepatitis and Primary biliary cholangitis.   Her HGB was low and was transfused with 1 unit PRBCs. HGB improved.   She was instructed to start iron supplement otc and Dr. Savage is wanting to recheck labs in 2 weeks.   She has had no more episodes of hemoptysis.  She was started on protonix and carafate, but only currently taking protonix.   She did have CT abd and it showed cholelithiasis and questionable acute cholecystitis.  There was discussion about lap adilia, but not done.   She did have an EGD upon discharge and was normal.   She was also started on ursodiol.   She is with c/o ongoing chronic pain and fatigue.   Concerned about other autoimmune issues.   She has had positive VENKATA's in the past, but has  "never seen rheum.      The following portions of the patient's history were reviewed and updated as appropriate: allergies, current medications, past family history, past medical history, past social history, past surgical history, and problem list.    Review of Systems    Objective   /78   Pulse 86   Resp 18   Ht 165.1 cm (65\")   Wt 69.9 kg (154 lb)   SpO2 100%   BMI 25.63 kg/m²   Physical Exam  Constitutional:       Appearance: She is well-developed.   Eyes:      Pupils: Pupils are equal, round, and reactive to light.   Cardiovascular:      Rate and Rhythm: Normal rate and regular rhythm.   Pulmonary:      Effort: Pulmonary effort is normal.      Breath sounds: Normal breath sounds.   Neurological:      Mental Status: She is alert and oriented to person, place, and time.         Assessment & Plan   Diagnoses and all orders for this visit:    1. Hospital discharge follow-up (Primary)    2. Positive VENKATA (antinuclear antibody)  -     Ambulatory Referral to Rheumatology    3. Autoimmune hepatitis  -     Ambulatory Referral to Rheumatology    4. Lumbar radiculopathy  -     HYDROcodone-acetaminophen (NORCO) 7.5-325 MG per tablet; Take 1 tablet by mouth Every 6 (Six) Hours As Needed for Mild Pain.  Dispense: 20 tablet; Refill: 0    5. Acute left-sided low back pain with left-sided sciatica  -     HYDROcodone-acetaminophen (NORCO) 7.5-325 MG per tablet; Take 1 tablet by mouth Every 6 (Six) Hours As Needed for Mild Pain.  Dispense: 20 tablet; Refill: 0    6. Hemoptysis  Comments:  resolved    Follow up with GI  Referral to rheum  Refill norco  During this office visit, we discussed the pertinent aspects of the visit and treatment recommendations. Pt verbalizes understanding. Follow up was discussed. Patient was given the opportunity to ask questions and discuss other concerns.                    "

## 2024-08-19 ENCOUNTER — OFFICE (AMBULATORY)
Age: 54
End: 2024-08-19
Payer: COMMERCIAL

## 2024-08-19 ENCOUNTER — OFFICE (AMBULATORY)
Dept: URBAN - METROPOLITAN AREA CLINIC 64 | Facility: CLINIC | Age: 54
End: 2024-08-19
Payer: COMMERCIAL

## 2024-08-19 VITALS
SYSTOLIC BLOOD PRESSURE: 131 MMHG | SYSTOLIC BLOOD PRESSURE: 131 MMHG | WEIGHT: 154 LBS | SYSTOLIC BLOOD PRESSURE: 131 MMHG | HEIGHT: 66 IN | HEIGHT: 66 IN | HEIGHT: 66 IN | WEIGHT: 154 LBS | WEIGHT: 154 LBS | SYSTOLIC BLOOD PRESSURE: 131 MMHG | SYSTOLIC BLOOD PRESSURE: 131 MMHG | HEART RATE: 80 BPM | DIASTOLIC BLOOD PRESSURE: 86 MMHG | SYSTOLIC BLOOD PRESSURE: 131 MMHG | HEART RATE: 80 BPM | HEART RATE: 80 BPM | DIASTOLIC BLOOD PRESSURE: 86 MMHG | HEART RATE: 80 BPM | DIASTOLIC BLOOD PRESSURE: 86 MMHG | HEIGHT: 66 IN | DIASTOLIC BLOOD PRESSURE: 86 MMHG | HEART RATE: 80 BPM | WEIGHT: 154 LBS | HEIGHT: 66 IN | WEIGHT: 154 LBS | HEIGHT: 66 IN | HEART RATE: 80 BPM | WEIGHT: 154 LBS | HEIGHT: 66 IN | SYSTOLIC BLOOD PRESSURE: 131 MMHG | DIASTOLIC BLOOD PRESSURE: 86 MMHG | WEIGHT: 154 LBS | DIASTOLIC BLOOD PRESSURE: 86 MMHG | DIASTOLIC BLOOD PRESSURE: 86 MMHG | HEART RATE: 80 BPM

## 2024-08-19 DIAGNOSIS — K75.4 AUTOIMMUNE HEPATITIS: ICD-10-CM

## 2024-08-19 DIAGNOSIS — Z12.11 ENCOUNTER FOR SCREENING FOR MALIGNANT NEOPLASM OF COLON: ICD-10-CM

## 2024-08-19 DIAGNOSIS — D50.0 IRON DEFICIENCY ANEMIA SECONDARY TO BLOOD LOSS (CHRONIC): ICD-10-CM

## 2024-08-19 DIAGNOSIS — K74.3 PRIMARY BILIARY CIRRHOSIS: ICD-10-CM

## 2024-08-19 PROCEDURE — 99214 OFFICE O/P EST MOD 30 MIN: CPT | Performed by: INTERNAL MEDICINE

## 2024-08-19 RX ORDER — URSODIOL 500 MG/1
TABLET ORAL
Qty: 90 | Refills: 11 | Status: COMPLETED
Start: 2024-08-19 | End: 2024-10-25

## 2024-09-05 RX ORDER — DULOXETIN HYDROCHLORIDE 60 MG/1
60 CAPSULE, DELAYED RELEASE ORAL EVERY 12 HOURS
Qty: 60 CAPSULE | Refills: 0 | Status: SHIPPED | OUTPATIENT
Start: 2024-09-05

## 2024-10-14 ENCOUNTER — OFFICE VISIT (OUTPATIENT)
Dept: FAMILY MEDICINE CLINIC | Facility: CLINIC | Age: 54
End: 2024-10-14
Payer: COMMERCIAL

## 2024-10-14 VITALS
HEIGHT: 65 IN | OXYGEN SATURATION: 98 % | DIASTOLIC BLOOD PRESSURE: 82 MMHG | BODY MASS INDEX: 26.23 KG/M2 | WEIGHT: 157.4 LBS | RESPIRATION RATE: 18 BRPM | SYSTOLIC BLOOD PRESSURE: 132 MMHG | HEART RATE: 100 BPM

## 2024-10-14 DIAGNOSIS — M54.42 ACUTE LEFT-SIDED LOW BACK PAIN WITH LEFT-SIDED SCIATICA: ICD-10-CM

## 2024-10-14 DIAGNOSIS — M54.16 LUMBAR RADICULOPATHY: ICD-10-CM

## 2024-10-14 DIAGNOSIS — M54.42 ACUTE BILATERAL LOW BACK PAIN WITH LEFT-SIDED SCIATICA: Primary | ICD-10-CM

## 2024-10-14 PROCEDURE — 99213 OFFICE O/P EST LOW 20 MIN: CPT | Performed by: NURSE PRACTITIONER

## 2024-10-14 PROCEDURE — 96372 THER/PROPH/DIAG INJ SC/IM: CPT | Performed by: NURSE PRACTITIONER

## 2024-10-14 RX ORDER — METHYLPREDNISOLONE ACETATE 80 MG/ML
120 INJECTION, SUSPENSION INTRA-ARTICULAR; INTRALESIONAL; INTRAMUSCULAR; SOFT TISSUE ONCE
Status: COMPLETED | OUTPATIENT
Start: 2024-10-14 | End: 2024-10-14

## 2024-10-14 RX ORDER — HYDROCODONE BITARTRATE AND ACETAMINOPHEN 7.5; 325 MG/1; MG/1
1 TABLET ORAL EVERY 6 HOURS PRN
Qty: 20 TABLET | Refills: 0 | Status: SHIPPED | OUTPATIENT
Start: 2024-10-14

## 2024-10-14 RX ADMIN — METHYLPREDNISOLONE ACETATE 120 MG: 80 INJECTION, SUSPENSION INTRA-ARTICULAR; INTRALESIONAL; INTRAMUSCULAR; SOFT TISSUE at 16:09

## 2024-10-14 NOTE — PROGRESS NOTES
"Chief Complaint  Back Pain (Has had pain for a week )  Subjective        Gideon Fields presents to [facility]  History of Present Illness  Comes in today with c/o low back pain and sciatica down left side. Pain in left leg. No numbness and tingling.  Started about 1.5 week ago  No known injuries.  Taking tylenol and also has rxs for baclofen, zanaflex, and norco prn.   She has taken steroids in the past and helped.  She is actually on oral steroids now prescribed by GI for her liver.   She recently saw rheum for poss RA.   Back Pain    Review of Systems   Musculoskeletal:  Positive for back pain.     Objective     Vital Signs:   /82   Pulse 100   Resp 18   Ht 165.1 cm (65\")   Wt 71.4 kg (157 lb 6.4 oz)   SpO2 98%   BMI 26.19 kg/m²       BP Readings from Last 3 Encounters:   10/14/24 132/82   07/17/24 134/78   07/12/24 108/52       Wt Readings from Last 3 Encounters:   10/14/24 71.4 kg (157 lb 6.4 oz)   07/17/24 69.9 kg (154 lb)   07/12/24 69.5 kg (153 lb 3.5 oz)     Physical Exam  Constitutional:       Appearance: She is well-developed.   Eyes:      Pupils: Pupils are equal, round, and reactive to light.   Cardiovascular:      Rate and Rhythm: Normal rate and regular rhythm.   Pulmonary:      Effort: Pulmonary effort is normal.      Breath sounds: Normal breath sounds.   Musculoskeletal:      Lumbar back: Tenderness and bony tenderness present. Decreased range of motion. Negative right straight leg raise test and negative left straight leg raise test.   Neurological:      Mental Status: She is alert and oriented to person, place, and time.      Result Review :                 Assessment and Plan    Diagnoses and all orders for this visit:    1. Acute bilateral low back pain with left-sided sciatica (Primary)  -     methylPREDNISolone acetate (DEPO-medrol) injection 120 mg    2. Lumbar radiculopathy  -     HYDROcodone-acetaminophen (NORCO) 7.5-325 MG per tablet; Take 1 " tablet by mouth Every 6 (Six) Hours As Needed for Mild Pain.  Dispense: 20 tablet; Refill: 0    3. Acute left-sided low back pain with left-sided sciatica  -     HYDROcodone-acetaminophen (NORCO) 7.5-325 MG per tablet; Take 1 tablet by mouth Every 6 (Six) Hours As Needed for Mild Pain.  Dispense: 20 tablet; Refill: 0    Depo medrol inj today  Refill norco  Ice/moist heat  During this office visit, we discussed the pertinent aspects of the visit and treatment recommendations. Pt verbalizes understanding. Follow up was discussed. Patient was given the opportunity to ask questions and discuss other concerns.         Follow Up   No follow-ups on file.  Patient was given instructions and counseling regarding her condition or for health maintenance advice. Please see specific information pulled into the AVS if appropriate.

## 2024-10-28 ENCOUNTER — ON CAMPUS - OUTPATIENT (AMBULATORY)
Dept: URBAN - METROPOLITAN AREA HOSPITAL 2 | Facility: HOSPITAL | Age: 54
End: 2024-10-28
Payer: COMMERCIAL

## 2024-10-28 ENCOUNTER — OFFICE (AMBULATORY)
Dept: URBAN - METROPOLITAN AREA PATHOLOGY 19 | Facility: PATHOLOGY | Age: 54
End: 2024-10-28
Payer: COMMERCIAL

## 2024-10-28 ENCOUNTER — OFFICE (AMBULATORY)
Age: 54
End: 2024-10-28
Payer: COMMERCIAL

## 2024-10-28 ENCOUNTER — ON CAMPUS - OUTPATIENT (AMBULATORY)
Age: 54
End: 2024-10-28
Payer: COMMERCIAL

## 2024-10-28 VITALS
WEIGHT: 156 LBS | SYSTOLIC BLOOD PRESSURE: 141 MMHG | SYSTOLIC BLOOD PRESSURE: 141 MMHG | HEART RATE: 85 BPM | TEMPERATURE: 98.2 F | RESPIRATION RATE: 14 BRPM | DIASTOLIC BLOOD PRESSURE: 66 MMHG | DIASTOLIC BLOOD PRESSURE: 57 MMHG | RESPIRATION RATE: 20 BRPM | SYSTOLIC BLOOD PRESSURE: 110 MMHG | HEART RATE: 95 BPM | DIASTOLIC BLOOD PRESSURE: 42 MMHG | RESPIRATION RATE: 20 BRPM | HEIGHT: 66 IN | SYSTOLIC BLOOD PRESSURE: 120 MMHG | DIASTOLIC BLOOD PRESSURE: 58 MMHG | DIASTOLIC BLOOD PRESSURE: 60 MMHG | SYSTOLIC BLOOD PRESSURE: 121 MMHG | RESPIRATION RATE: 14 BRPM | DIASTOLIC BLOOD PRESSURE: 70 MMHG | OXYGEN SATURATION: 99 % | DIASTOLIC BLOOD PRESSURE: 62 MMHG | RESPIRATION RATE: 16 BRPM | DIASTOLIC BLOOD PRESSURE: 60 MMHG | SYSTOLIC BLOOD PRESSURE: 147 MMHG | SYSTOLIC BLOOD PRESSURE: 125 MMHG | HEART RATE: 91 BPM | DIASTOLIC BLOOD PRESSURE: 55 MMHG | SYSTOLIC BLOOD PRESSURE: 119 MMHG | HEART RATE: 87 BPM | HEART RATE: 88 BPM | DIASTOLIC BLOOD PRESSURE: 55 MMHG | DIASTOLIC BLOOD PRESSURE: 68 MMHG | TEMPERATURE: 98.2 F | SYSTOLIC BLOOD PRESSURE: 136 MMHG | HEART RATE: 95 BPM | HEART RATE: 82 BPM | DIASTOLIC BLOOD PRESSURE: 68 MMHG | SYSTOLIC BLOOD PRESSURE: 128 MMHG | OXYGEN SATURATION: 98 % | SYSTOLIC BLOOD PRESSURE: 128 MMHG | RESPIRATION RATE: 15 BRPM | DIASTOLIC BLOOD PRESSURE: 70 MMHG | HEART RATE: 84 BPM | DIASTOLIC BLOOD PRESSURE: 66 MMHG | OXYGEN SATURATION: 98 % | WEIGHT: 156 LBS | HEART RATE: 86 BPM | HEART RATE: 82 BPM | SYSTOLIC BLOOD PRESSURE: 119 MMHG | HEART RATE: 112 BPM | HEART RATE: 80 BPM | SYSTOLIC BLOOD PRESSURE: 147 MMHG | OXYGEN SATURATION: 100 % | HEART RATE: 86 BPM | OXYGEN SATURATION: 100 % | DIASTOLIC BLOOD PRESSURE: 62 MMHG | RESPIRATION RATE: 15 BRPM | RESPIRATION RATE: 16 BRPM | SYSTOLIC BLOOD PRESSURE: 120 MMHG | DIASTOLIC BLOOD PRESSURE: 42 MMHG | HEIGHT: 66 IN | OXYGEN SATURATION: 99 % | SYSTOLIC BLOOD PRESSURE: 136 MMHG | DIASTOLIC BLOOD PRESSURE: 57 MMHG | HEART RATE: 88 BPM | HEART RATE: 80 BPM | HEART RATE: 87 BPM | HEART RATE: 84 BPM | SYSTOLIC BLOOD PRESSURE: 125 MMHG | HEART RATE: 85 BPM | SYSTOLIC BLOOD PRESSURE: 121 MMHG | HEART RATE: 91 BPM | DIASTOLIC BLOOD PRESSURE: 58 MMHG | SYSTOLIC BLOOD PRESSURE: 110 MMHG | HEART RATE: 112 BPM

## 2024-10-28 DIAGNOSIS — D12.0 BENIGN NEOPLASM OF CECUM: ICD-10-CM

## 2024-10-28 DIAGNOSIS — D12.5 BENIGN NEOPLASM OF SIGMOID COLON: ICD-10-CM

## 2024-10-28 DIAGNOSIS — Z12.11 ENCOUNTER FOR SCREENING FOR MALIGNANT NEOPLASM OF COLON: ICD-10-CM

## 2024-10-28 DIAGNOSIS — D12.2 BENIGN NEOPLASM OF ASCENDING COLON: ICD-10-CM

## 2024-10-28 DIAGNOSIS — K64.1 SECOND DEGREE HEMORRHOIDS: ICD-10-CM

## 2024-10-28 DIAGNOSIS — D12.4 BENIGN NEOPLASM OF DESCENDING COLON: ICD-10-CM

## 2024-10-28 DIAGNOSIS — K57.30 DIVERTICULOSIS OF LARGE INTESTINE WITHOUT PERFORATION OR ABS: ICD-10-CM

## 2024-10-28 PROBLEM — K63.5 POLYP OF COLON: Status: ACTIVE | Noted: 2024-10-28

## 2024-10-28 LAB
GI HISTOLOGY: A. CECUM: (no result)
GI HISTOLOGY: A. CECUM: (no result)
GI HISTOLOGY: B. ASCENDING COLON: (no result)
GI HISTOLOGY: B. ASCENDING COLON: (no result)
GI HISTOLOGY: C. DESCENDING COLON: (no result)
GI HISTOLOGY: C. DESCENDING COLON: (no result)
GI HISTOLOGY: D. SIGMOID COLON: (no result)
GI HISTOLOGY: D. SIGMOID COLON: (no result)
GI HISTOLOGY: PDF REPORT: (no result)
GI HISTOLOGY: PDF REPORT: (no result)

## 2024-10-28 PROCEDURE — 88305 TISSUE EXAM BY PATHOLOGIST: CPT | Mod: 26 | Performed by: PATHOLOGY

## 2024-10-28 PROCEDURE — 45385 COLONOSCOPY W/LESION REMOVAL: CPT | Mod: 33 | Performed by: INTERNAL MEDICINE

## 2024-11-02 DIAGNOSIS — J45.20 MILD INTERMITTENT ASTHMA WITHOUT COMPLICATION: ICD-10-CM

## 2024-11-04 RX ORDER — DULOXETIN HYDROCHLORIDE 60 MG/1
60 CAPSULE, DELAYED RELEASE ORAL EVERY 12 HOURS
Qty: 60 CAPSULE | Refills: 0 | Status: SHIPPED | OUTPATIENT
Start: 2024-11-04

## 2024-11-04 RX ORDER — ALBUTEROL SULFATE 90 UG/1
2 INHALANT RESPIRATORY (INHALATION) EVERY 4 HOURS PRN
Qty: 18 G | Refills: 3 | Status: SHIPPED | OUTPATIENT
Start: 2024-11-04

## 2024-11-25 ENCOUNTER — OFFICE (AMBULATORY)
Age: 54
End: 2024-11-25
Payer: COMMERCIAL

## 2024-11-25 ENCOUNTER — OFFICE (AMBULATORY)
Dept: URBAN - METROPOLITAN AREA CLINIC 64 | Facility: CLINIC | Age: 54
End: 2024-11-25
Payer: COMMERCIAL

## 2024-11-25 VITALS
DIASTOLIC BLOOD PRESSURE: 96 MMHG | SYSTOLIC BLOOD PRESSURE: 151 MMHG | WEIGHT: 153 LBS | DIASTOLIC BLOOD PRESSURE: 96 MMHG | HEART RATE: 83 BPM | HEIGHT: 66 IN | HEIGHT: 66 IN | HEART RATE: 83 BPM | SYSTOLIC BLOOD PRESSURE: 151 MMHG | HEIGHT: 66 IN | DIASTOLIC BLOOD PRESSURE: 96 MMHG | WEIGHT: 153 LBS | WEIGHT: 153 LBS | HEIGHT: 66 IN | HEART RATE: 83 BPM | WEIGHT: 153 LBS | HEART RATE: 83 BPM | WEIGHT: 153 LBS | SYSTOLIC BLOOD PRESSURE: 151 MMHG | HEIGHT: 66 IN | SYSTOLIC BLOOD PRESSURE: 151 MMHG | SYSTOLIC BLOOD PRESSURE: 151 MMHG | WEIGHT: 153 LBS | WEIGHT: 153 LBS | HEIGHT: 66 IN | HEART RATE: 83 BPM | SYSTOLIC BLOOD PRESSURE: 151 MMHG | DIASTOLIC BLOOD PRESSURE: 96 MMHG | HEIGHT: 66 IN | DIASTOLIC BLOOD PRESSURE: 96 MMHG | HEART RATE: 83 BPM | DIASTOLIC BLOOD PRESSURE: 96 MMHG | HEART RATE: 83 BPM | DIASTOLIC BLOOD PRESSURE: 96 MMHG | SYSTOLIC BLOOD PRESSURE: 151 MMHG

## 2024-11-25 DIAGNOSIS — Z86.0101 PERSONAL HISTORY OF ADENOMATOUS AND SERRATED COLON POLYPS: ICD-10-CM

## 2024-11-25 DIAGNOSIS — K74.3 PRIMARY BILIARY CIRRHOSIS: ICD-10-CM

## 2024-11-25 DIAGNOSIS — K75.4 AUTOIMMUNE HEPATITIS: ICD-10-CM

## 2024-11-25 DIAGNOSIS — R94.5 ABNORMAL RESULTS OF LIVER FUNCTION STUDIES: ICD-10-CM

## 2024-11-25 PROCEDURE — 99214 OFFICE O/P EST MOD 30 MIN: CPT | Performed by: INTERNAL MEDICINE

## 2024-11-25 RX ORDER — URSODIOL 500 MG/1
TABLET ORAL
Qty: 90 | Refills: 11 | Status: ACTIVE
Start: 2024-11-25

## 2024-11-26 ENCOUNTER — TRANSCRIBE ORDERS (OUTPATIENT)
Dept: ADMINISTRATIVE | Facility: HOSPITAL | Age: 54
End: 2024-11-26
Payer: COMMERCIAL

## 2024-11-26 DIAGNOSIS — R94.5 ABNORMAL FINDING ON LIVER FUNCTION: Primary | ICD-10-CM

## 2024-11-26 DIAGNOSIS — K75.4 AUTOIMMUNE HEPATITIS: ICD-10-CM

## 2024-11-26 DIAGNOSIS — Z86.0101 PERSONAL HISTORY OF ADENOMATOUS AND SERRATED COLON POLYPS: ICD-10-CM

## 2024-11-26 DIAGNOSIS — K74.3 PRIMARY BILIARY CHOLANGITIS: ICD-10-CM

## 2024-12-11 RX ORDER — DULOXETIN HYDROCHLORIDE 60 MG/1
60 CAPSULE, DELAYED RELEASE ORAL EVERY 12 HOURS
Qty: 60 CAPSULE | Refills: 0 | Status: SHIPPED | OUTPATIENT
Start: 2024-12-11

## 2024-12-17 ENCOUNTER — OFFICE VISIT (OUTPATIENT)
Dept: FAMILY MEDICINE CLINIC | Facility: CLINIC | Age: 54
End: 2024-12-17
Payer: COMMERCIAL

## 2024-12-17 VITALS
HEIGHT: 65 IN | WEIGHT: 160.6 LBS | OXYGEN SATURATION: 100 % | HEART RATE: 100 BPM | RESPIRATION RATE: 18 BRPM | DIASTOLIC BLOOD PRESSURE: 70 MMHG | BODY MASS INDEX: 26.76 KG/M2 | SYSTOLIC BLOOD PRESSURE: 132 MMHG

## 2024-12-17 DIAGNOSIS — M54.42 ACUTE LEFT-SIDED LOW BACK PAIN WITH LEFT-SIDED SCIATICA: ICD-10-CM

## 2024-12-17 DIAGNOSIS — G25.0 ESSENTIAL TREMOR: ICD-10-CM

## 2024-12-17 DIAGNOSIS — M54.16 LUMBAR RADICULOPATHY: Primary | ICD-10-CM

## 2024-12-17 DIAGNOSIS — M54.16 LUMBAR RADICULOPATHY: ICD-10-CM

## 2024-12-17 DIAGNOSIS — M54.42 ACUTE BILATERAL LOW BACK PAIN WITH LEFT-SIDED SCIATICA: ICD-10-CM

## 2024-12-17 PROCEDURE — 99213 OFFICE O/P EST LOW 20 MIN: CPT | Performed by: NURSE PRACTITIONER

## 2024-12-17 RX ORDER — DIAZEPAM 5 MG/1
5 TABLET ORAL 2 TIMES DAILY PRN
Qty: 20 TABLET | Refills: 0 | Status: SHIPPED | OUTPATIENT
Start: 2024-12-17

## 2024-12-17 RX ORDER — PREDNISONE 20 MG/1
20 TABLET ORAL DAILY
COMMUNITY

## 2024-12-17 RX ORDER — HYDROCODONE BITARTRATE AND ACETAMINOPHEN 7.5; 325 MG/1; MG/1
1 TABLET ORAL EVERY 6 HOURS PRN
Qty: 20 TABLET | Refills: 0 | Status: SHIPPED | OUTPATIENT
Start: 2024-12-17

## 2024-12-17 NOTE — PROGRESS NOTES
"Chief Complaint  Hip Pain (Has had pain for about 3 weeks) and Back Pain  Subjective        Gideon Fields presents to [facility]  History of Present Illness  Pt comes in today with c/o lower back pain. Has had issues with sciatica, but now having pain in hip area on left side.   Started about 3 weeks ago.  No known injuries.  Pain waxes and wanes.  Describes pain as burning.  No numbness or tingling.   Radiates down lateral left leg.  Walking aggravates it.  Nothing seems to make it better.  Has tried heat.   Nothing really gives her relief.  Today isn't as bad.  Pain will wax and wane.   Has tried muscle relaxers and norco, and has taken tyelnol.  Hasn't tried any NSAIDs.   She is on prednisone 20mg daily from GI.   Hip Pain     Back Pain      Review of Systems   Musculoskeletal:  Positive for back pain.     Objective     Vital Signs:   /70   Pulse 100   Resp 18   Ht 165.1 cm (65\")   Wt 72.8 kg (160 lb 9.6 oz)   SpO2 100%   BMI 26.73 kg/m²       BP Readings from Last 3 Encounters:   12/17/24 132/70   10/14/24 132/82   07/17/24 134/78       Wt Readings from Last 3 Encounters:   12/17/24 72.8 kg (160 lb 9.6 oz)   10/14/24 71.4 kg (157 lb 6.4 oz)   07/17/24 69.9 kg (154 lb)     Physical Exam  Constitutional:       Appearance: She is well-developed.   Eyes:      Pupils: Pupils are equal, round, and reactive to light.   Cardiovascular:      Rate and Rhythm: Normal rate and regular rhythm.   Pulmonary:      Effort: Pulmonary effort is normal.      Breath sounds: Normal breath sounds.   Neurological:      Mental Status: She is alert and oriented to person, place, and time.        Result Review :                 Assessment and Plan    Diagnoses and all orders for this visit:    1. Lumbar radiculopathy (Primary)  -     Ambulatory Referral to Neurosurgery    2. Acute bilateral low back pain with left-sided sciatica  -     Ambulatory Referral to Neurosurgery    Cont " prednisone  Cont muscle relaxer prn  Cont norco prn  Discussed PT, but declines  Follow up with Dr. Abbi BALL  During this office visit, we discussed the pertinent aspects of the visit and treatment recommendations. Pt verbalizes understanding. Follow up was discussed. Patient was given the opportunity to ask questions and discuss other concerns.         Follow Up   Return if symptoms worsen or fail to improve.  Patient was given instructions and counseling regarding her condition or for health maintenance advice. Please see specific information pulled into the AVS if appropriate.

## 2024-12-18 ENCOUNTER — TRANSCRIBE ORDERS (OUTPATIENT)
Dept: ADMINISTRATIVE | Facility: HOSPITAL | Age: 54
End: 2024-12-18
Payer: COMMERCIAL

## 2024-12-18 DIAGNOSIS — Z79.52 LONG TERM CURRENT USE OF SYSTEMIC STEROIDS: Primary | ICD-10-CM

## 2024-12-19 ENCOUNTER — HOSPITAL ENCOUNTER (OUTPATIENT)
Dept: BONE DENSITY | Facility: HOSPITAL | Age: 54
Discharge: HOME | End: 2024-12-19
Admitting: INTERNAL MEDICINE
Payer: COMMERCIAL

## 2024-12-19 DIAGNOSIS — Z79.52 LONG TERM CURRENT USE OF SYSTEMIC STEROIDS: ICD-10-CM

## 2024-12-19 PROCEDURE — 77080 DXA BONE DENSITY AXIAL: CPT

## 2024-12-23 ENCOUNTER — OFFICE VISIT (OUTPATIENT)
Dept: NEUROSURGERY | Facility: CLINIC | Age: 54
End: 2024-12-23
Payer: COMMERCIAL

## 2024-12-23 VITALS
DIASTOLIC BLOOD PRESSURE: 103 MMHG | SYSTOLIC BLOOD PRESSURE: 183 MMHG | HEIGHT: 65 IN | HEART RATE: 98 BPM | BODY MASS INDEX: 27.34 KG/M2 | OXYGEN SATURATION: 99 % | RESPIRATION RATE: 18 BRPM | WEIGHT: 164.1 LBS

## 2024-12-23 DIAGNOSIS — M46.1 SACROILIITIS: Primary | ICD-10-CM

## 2024-12-23 NOTE — PROGRESS NOTES
"Subjective   History of Present Illness: Gideon Fields is a 54 y.o. female is being seen for consultation today at the request of NEO Recinos for lumbar radiculopathy.  She was last seen about 10 months ago at that point I sent her for a second SI joint injection.  Patient says that she got significant improvement with the injection for a couple weeks.  Unfortunately she lost her insurance and just recently got the insurance back.  She also had some liver issues that apparently are currently under control.  She continues to describe pain over her left buttock and she is able to point to an area over her SI joint.  The pain can radiate into her lateral thigh and posterior thigh.  No pain below the knee.    Previous treatment: Norco, PT    Previous neurosurgery:      Previous injections: Epidural injections, SI injection x 2    The following portions of the patient's history were reviewed and updated as appropriate: allergies, current medications, past family history, past medical history, past social history, past surgical history, and problem list.    Review of Systems   Constitutional:  Positive for activity change.   Musculoskeletal:  Positive for back pain.   Neurological:  Positive for numbness. Negative for weakness.       Objective      BP (!) 183/103 (BP Location: Right arm, Patient Position: Sitting, Cuff Size: Adult)   Pulse 98   Resp 18   Ht 165.1 cm (65\")   Wt 74.4 kg (164 lb 1.6 oz)   SpO2 99%   BMI 27.31 kg/m²    Body mass index is 27.31 kg/m².  Vitals:    12/23/24 1123   PainSc:   7   PainLoc: Back         Neurological Exam    4 of 4 positive provocative SI joint maneuvers on the left    Assessment & Plan   Independent Review of Radiographic Studies:      I personally reviewed and interpreted the images from the following studies.    No new image    Medical Decision Making:      Gideon Fields is a 54 y.o. female with symptoms consistent with left SI joint dysfunction.  She underwent 2 " previous left SI joint injections which provided her with significant relief.  The most recent one was nearly a year ago.  We will send her for a left SI joint injection and also get a updated MRI of her lumbar spine as her most recent one was from 2022.  She can follow-up a couple weeks after the injection and when she has the MRI      Diagnoses and all orders for this visit:    1. Sacroiliitis (Primary)      No follow-ups on file.    This patient was examined wearing appropriate personal protective equipment.                      Dr. John Go IV    12/23/24  11:35 EST

## 2024-12-27 ENCOUNTER — PATIENT ROUNDING (BHMG ONLY) (OUTPATIENT)
Dept: NEUROSURGERY | Facility: CLINIC | Age: 54
End: 2024-12-27
Payer: COMMERCIAL

## 2024-12-27 ENCOUNTER — TELEPHONE (OUTPATIENT)
Dept: PAIN MEDICINE | Facility: CLINIC | Age: 54
End: 2024-12-27
Payer: COMMERCIAL

## 2024-12-27 NOTE — TELEPHONE ENCOUNTER
Caller: Gideon Fields    Relationship to patient: Self    Best call back number:     Chief complaint: SCIATICA AND BACK PAIN    Type of visit: SI INJECTION    Requested date: ASAP

## 2025-01-02 ENCOUNTER — TELEPHONE (OUTPATIENT)
Dept: PAIN MEDICINE | Facility: CLINIC | Age: 55
End: 2025-01-02
Payer: COMMERCIAL

## 2025-01-02 NOTE — TELEPHONE ENCOUNTER
Caller: Gideon Fields    Relationship to patient: Self    Best call back number: [phone]    Type of visit: SI JOINT INJECTION     Requested date: ASAP     Additional notes:ATTEMPTED TO WARM TRANSFER       no

## 2025-01-03 ENCOUNTER — HOSPITAL ENCOUNTER (OUTPATIENT)
Dept: PAIN MEDICINE | Facility: HOSPITAL | Age: 55
Discharge: HOME | End: 2025-01-03
Payer: COMMERCIAL

## 2025-01-03 VITALS
WEIGHT: 164 LBS | DIASTOLIC BLOOD PRESSURE: 72 MMHG | BODY MASS INDEX: 27.32 KG/M2 | HEIGHT: 65 IN | TEMPERATURE: 97.1 F | HEART RATE: 87 BPM | SYSTOLIC BLOOD PRESSURE: 144 MMHG | RESPIRATION RATE: 16 BRPM | OXYGEN SATURATION: 100 %

## 2025-01-03 DIAGNOSIS — M46.1 SACROILIITIS: Primary | ICD-10-CM

## 2025-01-03 DIAGNOSIS — R52 PAIN: ICD-10-CM

## 2025-01-03 PROCEDURE — 77003 FLUOROGUIDE FOR SPINE INJECT: CPT

## 2025-01-03 PROCEDURE — 25510000001 IOPAMIDOL 41 % SOLUTION: Performed by: ANESTHESIOLOGY

## 2025-01-03 PROCEDURE — 25010000002 METHYLPREDNISOLONE PER 40 MG: Performed by: ANESTHESIOLOGY

## 2025-01-03 PROCEDURE — 25010000002 BUPIVACAINE (PF) 0.25 % SOLUTION: Performed by: ANESTHESIOLOGY

## 2025-01-03 RX ORDER — IOPAMIDOL 408 MG/ML
3 INJECTION, SOLUTION INTRATHECAL
Status: COMPLETED | OUTPATIENT
Start: 2025-01-03 | End: 2025-01-03

## 2025-01-03 RX ORDER — BUPIVACAINE HYDROCHLORIDE 2.5 MG/ML
10 INJECTION, SOLUTION EPIDURAL; INFILTRATION; INTRACAUDAL ONCE
Status: COMPLETED | OUTPATIENT
Start: 2025-01-03 | End: 2025-01-03

## 2025-01-03 RX ORDER — METHYLPREDNISOLONE ACETATE 40 MG/ML
40 INJECTION, SUSPENSION INTRA-ARTICULAR; INTRALESIONAL; INTRAMUSCULAR; SOFT TISSUE ONCE
Status: COMPLETED | OUTPATIENT
Start: 2025-01-03 | End: 2025-01-03

## 2025-01-03 RX ADMIN — METHYLPREDNISOLONE ACETATE 40 MG: 40 INJECTION, SUSPENSION INTRA-ARTICULAR; INTRALESIONAL; INTRAMUSCULAR; INTRASYNOVIAL; SOFT TISSUE at 10:09

## 2025-01-03 RX ADMIN — BUPIVACAINE HYDROCHLORIDE 10 ML: 2.5 INJECTION, SOLUTION EPIDURAL; INFILTRATION; INTRACAUDAL; PERINEURAL at 10:09

## 2025-01-03 RX ADMIN — IOPAMIDOL 3 ML: 408 INJECTION, SOLUTION INTRATHECAL at 10:09

## 2025-01-03 NOTE — PROCEDURES
"Subjective   CC back pain, left buttock pain  Gene KAREN Fields is a 54 y.o. female with sacroiliitis here for left SI joint injection.  Requested by neurosurgery. No anticoagulation    Pain Assessment   Location of Pain: Lower Back, R Hip, L Hip  Description of Pain: Dull/Aching, Throbbing, Stabbing  Previous Pain Rating :6  Current Pain Ratin  Aggravating Factors: Activity  Alleviating Factors: Rest, Medication  Pain onset over 12 weeks ago  Pain interferes with ADL's  [place] back pain disability scale scanned in chart     The following portions of the patient's history were reviewed and updated as appropriate: allergies, current medications, past family history, past medical history, past social history, past surgical history and problem list.      Review of Systems  As in HPI  Objective   Physical Exam  Vitals reviewed.   Constitutional:       General: She is not in acute distress.  Pulmonary:      Effort: Pulmonary effort is normal.       /72 (BP Location: Left arm, Patient Position: Sitting)   Pulse 87   Temp 97.1 °F (36.2 °C) (Skin)   Resp 16   Ht 165.1 cm (65\")   Wt 74.4 kg (164 lb)   SpO2 100%   BMI 27.29 kg/m²     Assessment & Plan    underwent diagnostic and therapeutic left SI joint injection    RTC 4-6 weeks or as needed for repeat    DATE OF PROCEDURE:  1/3/2025    PREOPERATIVE DIAGNOSIS: sacroiliitis    POSTOPERATIVE DIAGNOSIS: same    PROCEDURE PERFORMED: Left SACROILIAC JOINT INJECTION    The patient understands the risks and benefits of the procedure and wishes to proceed. The patient was seen in the preoperative area. Patient's consent was obtained and updated. Vitals were taken. Patient was then brought to the procedure suite and placed in prone position for sacroiliac joint injection. The appropriate anatomic area was widely prepped with Chloraprep and draped in a sterile fashion. Noninvasive monitoring per routine anesthesia protocol was placed. Under fluoroscopic guidance using an " AP view, a 22 gauge curved tip spinal needle was passed through skin anesthetized with 1% Lidocaine without epinephrine. The needle tip was guided to the lower pole of the joint using fluoroscopy. 1 mL of  preservative free contrast was injected into the joint to confirm location. A clear outline was obtained and 5 mL of steroid solution containing 4 mL 0.25% bupivacaine, and 1mL 40mg Depomedrol was injected. The patient tolerated with no eitan-procedural complications.  A sterile dressing was placed over the puncture sites.

## 2025-01-06 ENCOUNTER — TELEPHONE (OUTPATIENT)
Dept: PAIN MEDICINE | Facility: HOSPITAL | Age: 55
End: 2025-01-06
Payer: COMMERCIAL

## 2025-01-06 NOTE — TELEPHONE ENCOUNTER
Post procedure phone call completed.  Pt states they are doing good and denies questions or concerns. Pt states pain level #3 today.

## 2025-01-21 NOTE — PROGRESS NOTES
"Chief Complaint  Chief Complaint   Patient presents with    medication refills     Subjective        Gideon Fields is a 52 y.o. female who presents to [facility].    History of Present Illness  Chronic low back pain with radicular symptoms.  Injections with pain management on hold right now d/t tremors.    Usually gets a kenalog injection at our office when her pain starts to flare.    She feels it would be really helpful for her now since she is not doing the injections.  She also needs a refill on her baclofen which she takes prn through the day, norco which she takes prn, and valium prn which helps when her tremors are really bad.    Objective   /68   Pulse 99   Temp 98 °F (36.7 °C) (Infrared)   Resp 16   Ht 167.6 cm (65.98\")   Wt 73.4 kg (161 lb 12.8 oz)   SpO2 100%   BMI 26.13 kg/m²     Estimated body mass index is 26.13 kg/m² as calculated from the following:    Height as of this encounter: 167.6 cm (65.98\").    Weight as of this encounter: 73.4 kg (161 lb 12.8 oz).     Physical Exam   GEN: In no acute distress, non toxic appearing  MSK: Generalized ttp lower back down into sacrum and upper gluteals.  Normal gait.  No palpable abnormalities.    NEURO: AAO to person, place, and time. CN 2-12 intact grossly.  PSYCH: Affect normal, insight fair     PHQ-2 Depression Screening  Little interest or pleasure in doing things? 0-->not at all   Feeling down, depressed, or hopeless? 1-->several days   PHQ-2 Total Score 1      Result Review :              Assessment and Plan     Diagnoses and all orders for this visit:    1. Lumbar radiculopathy (Primary)  Kenalog injection today.  Recommend gentle exercise.  Norco and baclofen prn, small supply refilled today.  -     baclofen (LIORESAL) 10 MG tablet; Take 1 tablet by mouth 3 (Three) Times a Day.  Dispense: 90 tablet; Refill: 0  -     HYDROcodone-acetaminophen (NORCO) 7.5-325 MG per tablet; Take 1 tablet by mouth Every 6 (Six) " Hours As Needed for Mild Pain.  Dispense: 20 tablet; Refill: 0  -     triamcinolone acetonide (KENALOG-40) injection 60 mg    2. Essential tremor  Seeing neurology, has EEG scheduled for Monday.  Previously on valium prn, small supply refilled today.  -     baclofen (LIORESAL) 10 MG tablet; Take 1 tablet by mouth 3 (Three) Times a Day.  Dispense: 90 tablet; Refill: 0  -     diazePAM (Valium) 5 MG tablet; Take 1 tablet by mouth 2 (Two) Times a Day As Needed for Anxiety or Muscle Spasms.  Dispense: 20 tablet; Refill: 0    3. Acute left-sided low back pain with left-sided sciatica  -     baclofen (LIORESAL) 10 MG tablet; Take 1 tablet by mouth 3 (Three) Times a Day.  Dispense: 90 tablet; Refill: 0  -     HYDROcodone-acetaminophen (NORCO) 7.5-325 MG per tablet; Take 1 tablet by mouth Every 6 (Six) Hours As Needed for Mild Pain.  Dispense: 20 tablet; Refill: 0  -     triamcinolone acetonide (KENALOG-40) injection 60 mg     1

## 2025-02-03 ENCOUNTER — OFFICE VISIT (OUTPATIENT)
Dept: FAMILY MEDICINE CLINIC | Facility: CLINIC | Age: 55
End: 2025-02-03
Payer: COMMERCIAL

## 2025-02-03 VITALS
TEMPERATURE: 97.6 F | RESPIRATION RATE: 18 BRPM | DIASTOLIC BLOOD PRESSURE: 78 MMHG | SYSTOLIC BLOOD PRESSURE: 136 MMHG | BODY MASS INDEX: 26.89 KG/M2 | OXYGEN SATURATION: 96 % | HEART RATE: 104 BPM | HEIGHT: 65 IN | WEIGHT: 161.4 LBS

## 2025-02-03 DIAGNOSIS — J40 BRONCHITIS: Primary | ICD-10-CM

## 2025-02-03 LAB
EXPIRATION DATE: NORMAL
FLUAV AG UPPER RESP QL IA.RAPID: NOT DETECTED
FLUBV AG UPPER RESP QL IA.RAPID: NOT DETECTED
INTERNAL CONTROL: NORMAL
Lab: NORMAL
SARS-COV-2 AG UPPER RESP QL IA.RAPID: NOT DETECTED

## 2025-02-03 PROCEDURE — 99213 OFFICE O/P EST LOW 20 MIN: CPT | Performed by: NURSE PRACTITIONER

## 2025-02-03 PROCEDURE — 87428 SARSCOV & INF VIR A&B AG IA: CPT | Performed by: NURSE PRACTITIONER

## 2025-02-03 RX ORDER — HYDROCODONE BITARTRATE AND HOMATROPINE METHYLBROMIDE ORAL SOLUTION 5; 1.5 MG/5ML; MG/5ML
5 LIQUID ORAL EVERY 6 HOURS PRN
Qty: 120 ML | Refills: 0 | Status: SHIPPED | OUTPATIENT
Start: 2025-02-03

## 2025-02-03 NOTE — PROGRESS NOTES
"Chief Complaint  Shortness of Breath, Nasal Congestion, and Cough (Has had cough for a week )  Subjective        Gideon Fields presents to [facility]  History of Present Illness  Pt comes in today with c/o sore throat, cough, congestion   Cough is productive with green mucous.  No fever or chills.  Started about 2 weeks ago.  Cough has worsened over the past couple of days.   Taking otc tylenol flu.   Had lobectomy in the past for lung cancer  She is on prednisone 20mg daily currently.   Shortness of Breath    Cough  Associated symptoms include shortness of breath.     Review of Systems   Respiratory:  Positive for cough and shortness of breath.      Objective     Vital Signs:   /78   Pulse 104   Temp 97.6 °F (36.4 °C)   Resp 18   Ht 165.1 cm (65\")   Wt 73.2 kg (161 lb 6.4 oz)   SpO2 96%   BMI 26.86 kg/m²       BP Readings from Last 3 Encounters:   02/03/25 136/78   01/03/25 144/72   12/23/24 (!) 183/103       Wt Readings from Last 3 Encounters:   02/03/25 73.2 kg (161 lb 6.4 oz)   01/03/25 74.4 kg (164 lb)   12/23/24 74.4 kg (164 lb 1.6 oz)     Physical Exam  Constitutional:       Appearance: She is well-developed.   Eyes:      Pupils: Pupils are equal, round, and reactive to light.   Cardiovascular:      Rate and Rhythm: Normal rate and regular rhythm.   Pulmonary:      Effort: Pulmonary effort is normal.      Breath sounds: Normal breath sounds.   Neurological:      Mental Status: She is alert and oriented to person, place, and time.        Result Review :                 Assessment and Plan    Diagnoses and all orders for this visit:    1. Bronchitis (Primary)  -     Discontinue: amoxicillin-clavulanate (AUGMENTIN) 875-125 MG per tablet; Take 1 tablet by mouth Every 12 (Twelve) Hours for 7 days.  Dispense: 14 tablet; Refill: 0  -     amoxicillin-clavulanate (AUGMENTIN) 875-125 MG per tablet; Take 1 tablet by mouth Every 12 (Twelve) Hours for 7 days.  Dispense: 14 " tablet; Refill: 0  -     HYDROcodone Bit-Homatrop MBr (HYCODAN) 5-1.5 MG/5ML solution; Take 5 mL by mouth Every 6 (Six) Hours As Needed for Cough.  Dispense: 120 mL; Refill: 0  -     POCT SARS-CoV-2 Antigen INEZ + Flu    Covid/flu neg  Start meds  Mucinex otc  Push fluids  During this office visit, we discussed the pertinent aspects of the visit and treatment recommendations. Pt verbalizes understanding. Follow up was discussed. Patient was given the opportunity to ask questions and discuss other concerns.         Follow Up   Return if symptoms worsen or fail to improve.  Patient was given instructions and counseling regarding her condition or for health maintenance advice. Please see specific information pulled into the AVS if appropriate.

## 2025-02-12 ENCOUNTER — HOSPITAL ENCOUNTER (OUTPATIENT)
Dept: MRI IMAGING | Facility: HOSPITAL | Age: 55
Discharge: HOME | End: 2025-02-12
Admitting: NEUROLOGICAL SURGERY
Payer: COMMERCIAL

## 2025-02-12 ENCOUNTER — TELEPHONE (OUTPATIENT)
Dept: FAMILY MEDICINE CLINIC | Facility: CLINIC | Age: 55
End: 2025-02-12
Payer: COMMERCIAL

## 2025-02-12 DIAGNOSIS — M46.1 SACROILIITIS: ICD-10-CM

## 2025-02-12 PROCEDURE — 72148 MRI LUMBAR SPINE W/O DYE: CPT

## 2025-02-12 RX ORDER — DULOXETIN HYDROCHLORIDE 60 MG/1
60 CAPSULE, DELAYED RELEASE ORAL EVERY 12 HOURS
Qty: 60 CAPSULE | Refills: 0 | Status: SHIPPED | OUTPATIENT
Start: 2025-02-12

## 2025-02-12 NOTE — TELEPHONE ENCOUNTER
Caller: Gideon Fields    Relationship: Self    Best call back number: [phone]         Who are you requesting to speak with (clinical staff, provider,  specific staff member): PCP OR CLINICAL        What was the call regarding: LIVER DOCTOR SENT OVER RESULTS OF BONE DENSITY ABOUT 8 WEEKS AGO.  PATIENT NEEDS MEDICATION FOR OSTEOPENIA THAT HE SAID NEEDED TO GO THROUGH PCP.  HAS SHE RECEIVED THAT AND CAN SHE PRESCRIBE MEDICATION?  PLEASE CALL TO ADVISE.

## 2025-02-28 NOTE — PROGRESS NOTES
"Subjective   History of Present Illness: Gideon Fields is a 54 y.o. female is here today for follow-up for lumbar radiculopathy with a new Lumbar MRI.  Patient continues to have pain primarily over her buttock on the left.  This will spread up to her paraspinal region and into the side of her hip sometimes down into her leg.  No significant changes since she was last seen.  She underwent an SI joint injection which provided less relief than the previous SI joint injections.  Notably she also complains of some pain with movement of the left hip.    Previous treatment: Hydrocodone,Tizanidine,Baclofen,prednisone    Previous neurosurgery:  None    Previous injections: Left SI joint injection 1/3/25    The following portions of the patient's history were reviewed and updated as appropriate: allergies, current medications, past family history, past medical history, past social history, past surgical history, and problem list.    Review of Systems   Constitutional:  Positive for activity change.   Respiratory:  Negative for chest tightness and shortness of breath.    Cardiovascular:  Negative for chest pain.   Musculoskeletal:  Positive for back pain and myalgias.        + Buttock &left leg pain   Neurological:  Positive for numbness.   Psychiatric/Behavioral:  Positive for sleep disturbance.        Objective      Pulse 100   Resp 18   Ht 165.1 cm (65\")   Wt 73 kg (161 lb)   SpO2 99%   BMI 26.79 kg/m²    Body mass index is 26.79 kg/m².  Vitals:    03/03/25 1308   PainSc: 6    PainLoc: Back         Neurological Exam        Assessment & Plan   Independent Review of Radiographic Studies:      I personally reviewed and interpreted the images from the following studies.    MRI lumbar spine: Degenerative changes most severe at L4-5 where there is moderate disc degeneration.  No high-grade central or foraminal stenosis throughout the lumbar spine.  No spondylolisthesis    Medical Decision Making:      Gideon Fields is a 54 " y.o. female with symptoms consistent with left SI joint dysfunction.  MRI of the lumbar spine provides no obvious explanation for the patient's symptoms.  Patient got significant relief with at least 3 previous SI joint injections but the most recent injection caused her pain at the injection site.  Patient also has some symptoms consistent with intrinsic hip dysfunction.  To rule this out I will send her for left hip x-ray and refer her to orthopedics to rule out hip is the cause of her symptoms.  She can follow-up with me after she sees orthopedics.  Assuming the hip is not the issue, I think SI joint fusion would be her best option.      Diagnoses and all orders for this visit:    1. Sacroiliitis (Primary)    2. Left hip pain      No follow-ups on file.    This patient was examined wearing appropriate personal protective equipment.                      Dr. John Go IV    03/03/25  13:43 EST

## 2025-03-03 ENCOUNTER — OFFICE VISIT (OUTPATIENT)
Dept: NEUROSURGERY | Facility: CLINIC | Age: 55
End: 2025-03-03
Payer: COMMERCIAL

## 2025-03-03 VITALS
HEART RATE: 100 BPM | HEIGHT: 65 IN | WEIGHT: 161 LBS | OXYGEN SATURATION: 99 % | RESPIRATION RATE: 18 BRPM | BODY MASS INDEX: 26.82 KG/M2

## 2025-03-03 DIAGNOSIS — M46.1 SACROILIITIS: Primary | ICD-10-CM

## 2025-03-03 DIAGNOSIS — M25.552 LEFT HIP PAIN: ICD-10-CM

## 2025-03-20 ENCOUNTER — HOSPITAL ENCOUNTER (OUTPATIENT)
Dept: GENERAL RADIOLOGY | Facility: HOSPITAL | Age: 55
Discharge: HOME | End: 2025-03-20
Admitting: NEUROLOGICAL SURGERY
Payer: COMMERCIAL

## 2025-03-20 DIAGNOSIS — M25.552 LEFT HIP PAIN: ICD-10-CM

## 2025-03-20 PROCEDURE — 73502 X-RAY EXAM HIP UNI 2-3 VIEWS: CPT

## 2025-03-25 ENCOUNTER — OFFICE VISIT (OUTPATIENT)
Dept: ORTHOPEDIC SURGERY | Facility: CLINIC | Age: 55
End: 2025-03-25
Payer: COMMERCIAL

## 2025-03-25 VITALS — HEART RATE: 80 BPM | WEIGHT: 161 LBS | BODY MASS INDEX: 26.82 KG/M2 | HEIGHT: 65 IN | OXYGEN SATURATION: 97 %

## 2025-03-25 DIAGNOSIS — M67.952 TENDINOPATHY OF LEFT GLUTEUS MEDIUS: Primary | ICD-10-CM

## 2025-03-25 PROCEDURE — 20610 DRAIN/INJ JOINT/BURSA W/O US: CPT | Performed by: FAMILY MEDICINE

## 2025-03-25 PROCEDURE — 99203 OFFICE O/P NEW LOW 30 MIN: CPT | Performed by: FAMILY MEDICINE

## 2025-03-25 RX ORDER — TRIAMCINOLONE ACETONIDE 40 MG/ML
80 INJECTION, SUSPENSION INTRA-ARTICULAR; INTRAMUSCULAR
Status: COMPLETED | OUTPATIENT
Start: 2025-03-25 | End: 2025-03-25

## 2025-03-25 RX ORDER — AZATHIOPRINE 50 MG/1
TABLET ORAL
COMMUNITY
Start: 2025-03-16

## 2025-03-25 RX ADMIN — TRIAMCINOLONE ACETONIDE 80 MG: 40 INJECTION, SUSPENSION INTRA-ARTICULAR; INTRAMUSCULAR at 10:43

## 2025-03-25 NOTE — PROGRESS NOTES
Primary Care Sports Medicine Office Visit Note    Patient ID: Gideon Fields is a 54 y.o. female.    Chief Complaint:  Chief Complaint   Patient presents with   • Left Hip - Pain, Initial Evaluation     On going for 8 months   Pain 2/10       History of Present Illness  The patient presents for evaluation of hip pain.    She has been experiencing hip pain for approximately 6 months, which prompted her physician to recommend a hip examination. The pain is exacerbated by pressure on the leg and ascending or descending stairs. She experiences mild groin pain, but the primary discomfort is in the lower back, occasionally radiating down her leg, which she attributes to sciatica. Her physician has suggested the possibility of pinning the SI joint but wants to rule out the hip as a potential source of the problem. She has a history of back issues, including a pelvic fracture sustained in an accident at the age of 18, and a diagnosis of sciatica. Despite undergoing physical therapy, the pain persists. She has not received any therapy specifically for her hip. Her current medication regimen includes baclofen and tizanidine, which she takes sparingly due to their sedative effects. She also takes Cymbalta (duloxetine) and Valium (diazepam).    Supplemental Information  She has liver disease and has been on prednisone 20 mg daily for about 6 months, which has now been reduced to 15 mg due to a change in medication.    MEDICATIONS  Current: Prednisone, baclofen, tizanidine, Cymbalta (duloxetine), Valium (diazepam).       Past Medical History:   Diagnosis Date   • Anxiety    • Asthma 12/09/2013   • Autoimmune hepatitis    • Lumbar radiculopathy 03/12/2018   • Lung nodule seen on imaging study 10/02/2020   • Malignant neoplasm of upper lobe of right lung 12/08/2021   • Primary biliary cholangitis    • Thoracic back pain 03/12/2018       Past Surgical History:   Procedure Laterality Date   • BREAST BIOPSY Right     benign   •  "BRONCHOSCOPY N/A 10/21/2020    Procedure: BRONCHOSCOPY WITH BRONCHOALVEOLAR LAVAGE, NAVIGATION WITH FINE NEEDLE ASPIRATION;  Surgeon: Nichol Jaquez MD;  Location: [facility] ENDOSCOPY;  Service: Pulmonary;  Laterality: N/A;  POST RUL NODULE   • CARDIAC CATHETERIZATION N/A 2022    Procedure: Left Heart Cath and coronary angiogram;  Surgeon: Chandana Tellez MD;  Location: [facility] CATH INVASIVE LOCATION;  Service: Cardiovascular;  Laterality: N/A;   •  SECTION      x3    • ENDOSCOPY N/A 2024    Procedure: ESOPHAGOGASTRODUODENOSCOPY;  Surgeon: ODILON Parisi MD;  Location: [facility] ENDOSCOPY;  Service: Gastroenterology;  Laterality: N/A;  post:hiatal hernia   • LUNG REMOVAL, PARTIAL Right 2020    right upper lobe    • NASAL SEPTUM SURGERY      20 Years Ago       Family History   Problem Relation Age of Onset   • Hypertension Mother    • Alzheimer's disease Mother    • Thyroid disease Mother    • Lung cancer Father    • Lupus Sister    • Other Sister         POTS   • Lupus Sister    • No Known Problems Brother    • Asthma Other      Social History     Occupational History     Employer: [employer]   Tobacco Use   • Smoking status: Never     Passive exposure: Never   • Smokeless tobacco: Never   Vaping Use   • Vaping status: Never Used   Substance and Sexual Activity   • Alcohol use: Yes     Comment: Couple times a week   • Drug use: No   • Sexual activity: Yes        Review of Systems:  Review of Systems   Constitutional:  Negative for activity change, fatigue and fever.   Musculoskeletal:  Positive for arthralgias.   Skin:  Negative for color change and rash.   Neurological:  Negative for numbness.     Objective:  Physical Exam  Pulse 80   Ht 165.1 cm (65\")   Wt 73 kg (161 lb)   SpO2 97%   BMI 26.79 kg/m²   Vitals and nursing note reviewed.   Constitutional:       General: she  is not in acute distress.     Appearance: she is well-developed. she is not diaphoretic.   HENT:      " Head: Normocephalic and atraumatic.   Eyes:      Conjunctiva/sclera: Conjunctivae normal.   Pulmonary:      Effort: Pulmonary effort is normal. No respiratory distress.   Skin:     General: Skin is warm.      Capillary Refill: Capillary refill takes less than 2 seconds.   Neurological:      Mental Status: she is alert.     Ortho Exam:  Physical Exam  The left hip shows full range of motion in forward flexion, internal and external rotation, and abduction. Cross body adduction is mildly decreased to about 10 degrees with posterior pain and tightness. The sacroiliac joint is mildly tender, as is the paraspinal lumbar musculature on the left and midline bony prominences posteriorly, with no pinpoint tenderness. Strength in both lower extremities is intact. Gross motor function shows a mild 4/5 decrease in left ankle plantar flexion. Sensation is intact bilaterally. Deep tendon reflexes are brisk. The left hip evaluation reveals negative scour, negative Stinchfield, and negative log roll. Resisted hip lateral abduction causes moderate tenderness to palpation laterally, resulting in moderate pain with significant point tenderness to the greater trochanter and/or greater trochanteric bursa.    Results  Imaging  Three-view x-ray of the AP pelvis and frog leg left hip from previous evaluation on 03/20/2025 reveals no acute bony abnormality with mild degenerative change to the pubic symphysis and sacroiliac joint. Further review shows mild insertional enthesopathic activity to the greater trochanter laterally.    Assessment & Plan  1. Greater trochanteric bursitis.  The patient reports significant pain in the hip area, particularly when going up and down steps, which has persisted for about 6 months. Physical examination reveals moderate tenderness to palpation laterally and significant point tenderness to the greater trochanter. X-ray from 03/20/2025 shows mild insertional enthesopathic activity to the greater trochanter.  "She has been advised to continue with physical therapy focusing on the hip.    2. Gluteus medius tendinitis.  The patient experiences pain in the lateral hip area, which is consistent with gluteus medius tendinitis. Physical examination supports this diagnosis. She has been advised to continue with physical therapy focusing on the hip.    3. Peripheral sciatica.  The patient reports pain radiating down her leg, which is likely due to peripheral sciatica. She has a history of back problems and sciatica, which have been exacerbated by her current condition. She has been advised to continue with physical therapy focusing on the hip.    Large Joint Arthrocentesis: L greater trochanteric bursa  Date/Time: 3/25/2025 10:43 AM  Consent given by: patient  Site marked: site marked  Timeout: Immediately prior to procedure a time out was called to verify the correct patient, procedure, equipment, support staff and site/side marked as required   Supporting Documentation  Indications: pain   Procedure Details  Location: hip - L greater trochanteric bursa  Preparation: Patient was prepped and draped in the usual sterile fashion  Needle size: 25 G  Approach: lateral (direct)  Medications administered: 80 mg triamcinolone acetonide 40 MG/ML (6cc 1% lidocaine without epinepherine)  Patient tolerance: patient tolerated the procedure well with no immediate complications (Blood loss negligable, pt admits to near immediate pain relief with gentle ROM/ambulation post injection.)      Ethan TUBBS \"Kraig\" Augustus FORBES DO, CAQSM  03/25/25  09:29 EDT    Disclaimer: Please note that areas of this note were completed with computer voice recognition software.  Quite often unanticipated grammatical, syntax, homophones, and other interpretive errors are inadvertently transcribed by the computer software. Please excuse any errors that have escaped final proofreading.    Patient or patient representative verbalized consent for the use of Ambient " Listening during the visit with  Ethan Coffman II, DO for chart documentation. 09:29 EDT 03/25/25

## 2025-03-26 ENCOUNTER — PATIENT ROUNDING (BHMG ONLY) (OUTPATIENT)
Dept: ORTHOPEDIC SURGERY | Facility: CLINIC | Age: 55
End: 2025-03-26
Payer: COMMERCIAL

## 2025-04-14 ENCOUNTER — OFFICE (AMBULATORY)
Dept: URBAN - METROPOLITAN AREA CLINIC 64 | Facility: CLINIC | Age: 55
End: 2025-04-14
Payer: COMMERCIAL

## 2025-04-14 VITALS
SYSTOLIC BLOOD PRESSURE: 148 MMHG | DIASTOLIC BLOOD PRESSURE: 100 MMHG | DIASTOLIC BLOOD PRESSURE: 90 MMHG | SYSTOLIC BLOOD PRESSURE: 145 MMHG | WEIGHT: 165 LBS | HEIGHT: 66 IN | HEART RATE: 84 BPM

## 2025-04-14 DIAGNOSIS — K75.4 AUTOIMMUNE HEPATITIS: ICD-10-CM

## 2025-04-14 DIAGNOSIS — M85.80 OTHER SPECIFIED DISORDERS OF BONE DENSITY AND STRUCTURE, UNS: ICD-10-CM

## 2025-04-14 DIAGNOSIS — K74.3 PRIMARY BILIARY CIRRHOSIS: ICD-10-CM

## 2025-04-14 PROCEDURE — 99214 OFFICE O/P EST MOD 30 MIN: CPT | Performed by: INTERNAL MEDICINE

## 2025-04-14 RX ORDER — AZATHIOPRINE 50 MG/1
TABLET ORAL
Qty: 90 | Refills: 1 | Status: ACTIVE
Start: 2025-04-14

## 2025-04-14 RX ORDER — ONDANSETRON HYDROCHLORIDE 4 MG/1
TABLET, FILM COATED ORAL
Qty: 25 | Refills: 3 | Status: ACTIVE
Start: 2025-04-14

## 2025-04-21 ENCOUNTER — TELEPHONE (OUTPATIENT)
Dept: FAMILY MEDICINE CLINIC | Facility: CLINIC | Age: 55
End: 2025-04-21
Payer: COMMERCIAL

## 2025-04-21 DIAGNOSIS — R30.0 DYSURIA: Primary | ICD-10-CM

## 2025-04-21 NOTE — TELEPHONE ENCOUNTER
Caller: Gideon Fields    Relationship: Self    Best call back number:     [phone] (Mobile)       What medication are you requesting: UTI MEDS     What are your current symptoms: BURNING, FREQUENCY, PRESSURE      How long have you been experiencing symptoms: SINCE THURSDAY / TAKING OTC MEDS THAT IS NOT HELPING       Have you had these symptoms before:    [] Yes  [] No    Have you been treated for these symptoms before:   [] Yes  [] No    If a prescription is needed, what is your preferred pharmacy and phone number: [facility] #[id number] - [place] - [address] - [phone]  - [phone] FX     Additional notes:

## 2025-04-22 ENCOUNTER — LAB (OUTPATIENT)
Dept: FAMILY MEDICINE CLINIC | Facility: CLINIC | Age: 55
End: 2025-04-22
Payer: COMMERCIAL

## 2025-04-22 DIAGNOSIS — R30.0 DYSURIA: ICD-10-CM

## 2025-04-22 LAB
BILIRUB UR QL STRIP: NEGATIVE
CLARITY UR: ABNORMAL
COLOR UR: ABNORMAL
GLUCOSE UR STRIP-MCNC: NEGATIVE MG/DL
HGB UR QL STRIP.AUTO: NEGATIVE
HOLD SPECIMEN: NORMAL
KETONES UR QL STRIP: ABNORMAL
LEUKOCYTE ESTERASE UR QL STRIP.AUTO: ABNORMAL
NITRITE UR QL STRIP: NEGATIVE
PH UR STRIP.AUTO: 6.5 [PH] (ref 5–8)
PROT UR QL STRIP: ABNORMAL
SP GR UR STRIP: 1.02 (ref 1–1.03)
UROBILINOGEN UR QL STRIP: ABNORMAL

## 2025-04-22 PROCEDURE — 81001 URINALYSIS AUTO W/SCOPE: CPT | Performed by: NURSE PRACTITIONER

## 2025-04-23 LAB
BACTERIA UR QL AUTO: ABNORMAL /HPF
COD CRY URNS QL: PRESENT /HPF
HYALINE CASTS UR QL AUTO: ABNORMAL /LPF
RBC # UR STRIP: ABNORMAL /HPF
REF LAB TEST METHOD: ABNORMAL
SQUAMOUS #/AREA URNS HPF: ABNORMAL /HPF
WBC # UR STRIP: ABNORMAL /HPF

## 2025-04-23 RX ORDER — SULFAMETHOXAZOLE AND TRIMETHOPRIM 800; 160 MG/1; MG/1
1 TABLET ORAL 2 TIMES DAILY
Qty: 10 TABLET | Refills: 0 | Status: SHIPPED | OUTPATIENT
Start: 2025-04-23

## 2025-05-28 ENCOUNTER — OFFICE VISIT (OUTPATIENT)
Dept: FAMILY MEDICINE CLINIC | Facility: CLINIC | Age: 55
End: 2025-05-28
Payer: COMMERCIAL

## 2025-05-28 VITALS
DIASTOLIC BLOOD PRESSURE: 75 MMHG | SYSTOLIC BLOOD PRESSURE: 115 MMHG | WEIGHT: 161.8 LBS | HEIGHT: 65 IN | RESPIRATION RATE: 18 BRPM | OXYGEN SATURATION: 96 % | BODY MASS INDEX: 26.96 KG/M2 | HEART RATE: 100 BPM

## 2025-05-28 DIAGNOSIS — G25.0 ESSENTIAL TREMOR: ICD-10-CM

## 2025-05-28 DIAGNOSIS — M54.16 LUMBAR RADICULOPATHY: ICD-10-CM

## 2025-05-28 DIAGNOSIS — M54.42 ACUTE LEFT-SIDED LOW BACK PAIN WITH LEFT-SIDED SCIATICA: ICD-10-CM

## 2025-05-28 DIAGNOSIS — R11.0 NAUSEA: Primary | ICD-10-CM

## 2025-05-28 PROCEDURE — 99214 OFFICE O/P EST MOD 30 MIN: CPT | Performed by: STUDENT IN AN ORGANIZED HEALTH CARE EDUCATION/TRAINING PROGRAM

## 2025-05-28 RX ORDER — DIAZEPAM 5 MG/1
5 TABLET ORAL DAILY PRN
Qty: 20 TABLET | Refills: 0 | Status: SHIPPED | OUTPATIENT
Start: 2025-05-28

## 2025-05-28 RX ORDER — PROMETHAZINE HYDROCHLORIDE 12.5 MG/1
12.5 TABLET ORAL EVERY 8 HOURS PRN
Qty: 30 TABLET | Refills: 1 | Status: SHIPPED | OUTPATIENT
Start: 2025-05-28

## 2025-05-28 RX ORDER — ELAFIBRANOR 80 MG/1
TABLET, FILM COATED ORAL
COMMUNITY

## 2025-05-28 RX ORDER — HYDROCODONE BITARTRATE AND ACETAMINOPHEN 7.5; 325 MG/1; MG/1
1 TABLET ORAL DAILY PRN
Qty: 20 TABLET | Refills: 0 | Status: SHIPPED | OUTPATIENT
Start: 2025-05-28

## 2025-05-28 RX ORDER — DICYCLOMINE HCL 20 MG
20 TABLET ORAL EVERY 8 HOURS PRN
Qty: 30 TABLET | Refills: 1 | Status: SHIPPED | OUTPATIENT
Start: 2025-05-28

## 2025-05-28 NOTE — PROGRESS NOTES
"Chief Complaint  Chief Complaint   Patient presents with    GI Problem    Fever    Sore Throat    Nasal Congestion     Subjective        Gideon Fields is a 54 y.o. female who presents to [facility].    History of Present Illness    History of Present Illness  The patient is a 54-year-old female presenting for an acute visit.    She has persistent nausea since starting Imuran for autoimmune liver disease. Despite adjusting medication timing and food intake, symptoms have worsened. Severe illness occurred on Sunday and Monday after a break from the medication on Saturday. Her physician advised continuing the medication for three more months, but side effects impact her work schedule. She reports severe stomach cramping and vomiting, both non-productive and violent, without diarrhea. She has not taken Bentyl before. She takes azathioprine at 5:00 PM and spaces out other medications.    She maintains hydration with water and ginger ale, resumed coffee (one cup in the morning), avoids dairy, and finds warm chicken broth soothing. She has a probiotic supplement but takes it irregularly and consumes yogurt. She feels unwell after eating and questions if skipping a medication dose worsened symptoms. She does not believe she has a fever but notes recent illness in her son and . She reports no ear pain but has allergies. Zofran was ineffective; considering returning to Phenergan. Follow-up appointment with her Dr. Savage is scheduled for Monday.    Objective   /75   Pulse 100   Resp 18   Ht 165.1 cm (65\")   Wt 73.4 kg (161 lb 12.8 oz)   SpO2 96%   BMI 26.92 kg/m²     Estimated body mass index is 26.92 kg/m² as calculated from the following:    Height as of this encounter: 165.1 cm (65\").    Weight as of this encounter: 73.4 kg (161 lb 12.8 oz).     Physical Exam   GEN: In no acute distress, non toxic appearing  HEENT: Pupils equal and reactive to light, sclera clear. Mucous " membranes moist. Oropharynx without erythema or exudate.   CV: Regular rate and rhythm, no murmurs   RESP: Lungs clear to auscultation anteriorly and posteriorly in all lung fields bilaterally.     Result Review :              Assessment and Plan     Diagnoses and all orders for this visit:    1. Nausea (Primary)  -     promethazine (PHENERGAN) 12.5 MG tablet; Take 1 tablet by mouth Every 8 (Eight) Hours As Needed for Nausea or Vomiting.  Dispense: 30 tablet; Refill: 1  -     dicyclomine (BENTYL) 20 MG tablet; Take 1 tablet by mouth Every 8 (Eight) Hours As Needed for Abdominal Cramping.  Dispense: 30 tablet; Refill: 1    2. Essential tremor  -     diazePAM (Valium) 5 MG tablet; Take 1 tablet by mouth Daily As Needed for Anxiety or Muscle Spasms.  Dispense: 20 tablet; Refill: 0    3. Lumbar radiculopathy  -     HYDROcodone-acetaminophen (NORCO) 7.5-325 MG per tablet; Take 1 tablet by mouth Daily As Needed for Severe Pain.  Dispense: 20 tablet; Refill: 0    4. Acute left-sided low back pain with left-sided sciatica  -     HYDROcodone-acetaminophen (NORCO) 7.5-325 MG per tablet; Take 1 tablet by mouth Daily As Needed for Severe Pain.  Dispense: 20 tablet; Refill: 0          Assessment & Plan  Nausea and vomiting  - Symptoms attributed to azathioprine  - Keep f/u with GI this coming week  - Bentyl 20 mg and Phenergan 12.5 mg advised prophylactically, one hour before azathioprine dose to try and minimize cramping and nausea   - Encourage hydration, morning probiotics, dietary monitoring  - Refills for diazepam and hydrocodone sent to pharmacy      Follow Up   Pending above     Patient or patient representative verbalized consent for the use of Ambient Listening during the visit with  Trevor Dyer DO for chart documentation. 5/28/2025  16:08 EDT

## 2025-06-24 NOTE — THERAPY TREATMENT NOTE
"Subjective: Pt agreeable to therapeutic plan of care. Pt with daughter present during session. Pt discussed d/c planning with OT. Pt inquired about compensatory strategies for tremor.     Cognition: oriented to Person, Place, Time and Situation    Objective:     Bed Mobility: Independent.   Functional Transfers: CGA     Balance: unsupported, dynamic and standing CGA  Functional Ambulation: CGA      Vitals: WNL    Pain: 0 VAS  Location: N/A  Interventions for pain: N/A  Education: Provided education on the importance of mobility in the acute care setting, Verbal/Tactile Cues and ADL training Education on compensatory strategies and AE for tremors once d/c home.       Assessment: Gideon Fields presents with ADL impairments affecting function including endurance / activity tolerance and motor control. Demonstrated functioning below baseline abilities indicate the need for continued skilled intervention while inpatient. Pt comes to standing with less assistance this date, requiring CGA without AD. Pt tolerates dynamic standing task with SBA without AD as well. Pt educated on compensatory strategies and AE when d/c home. Pt expresses desire to return home with family assistance. Pt demos increased independence throughout session and likely to d/c home safe with family assist and OP OT. OT changing recommendations at this time. Pt would benefit from RW at d/c for increased safety. Tolerating session today without incident. Will continue to follow and progress as tolerated.     Plan/Recommendations:   Low Intensity Therapy recommended post-acute care - This is recommended as therapy feels this patient would require 2-3 visits per week. OP or HH would be the best option depending on patient's home bound status. Consider, if the patient has other  \"skilled\" needs such as wounds, IV antibiotics, etc. Combined with \"low intensity\" could also equate to a SNF. If patient is medically complex, consider LTAC... Pt requires rolling " walker at discharge.     Pt desires Home with family assist and Outpatient therapy at discharge. Pt cooperative; agreeable to therapeutic recommendations and plan of care.     Modified [place]: N/A = No pre-op stroke/TIA    Post-Tx Position: Supine with HOB Elevated, Alarms activated and Call light and personal items within reach  PPE: gloves     No

## 2025-07-16 RX ORDER — DULOXETIN HYDROCHLORIDE 60 MG/1
60 CAPSULE, DELAYED RELEASE ORAL EVERY 12 HOURS
Qty: 60 CAPSULE | Refills: 1 | Status: SHIPPED | OUTPATIENT
Start: 2025-07-16

## 2025-07-23 ENCOUNTER — TELEPHONE (OUTPATIENT)
Dept: FAMILY MEDICINE CLINIC | Facility: CLINIC | Age: 55
End: 2025-07-23
Payer: COMMERCIAL

## 2025-07-23 DIAGNOSIS — L98.9 SKIN LESION: Primary | ICD-10-CM

## 2025-07-23 NOTE — TELEPHONE ENCOUNTER
Caller: Gideon Fields    Relationship: Self    Best call back number:       What was the call regarding: PATIENT CALLING WANTED TO GET REFERRAL FOR DERMATOLOGIST HAS HAD A PLACE COME UP ON HER LEFT FOREARM THAT IS A RED SPOT BEEN THERE FOR 2 MONTHS     PLEASE GIVE CALLBACK IF REFERRAL SENT SO SHE CAN SCHEDULED

## 2025-08-12 ENCOUNTER — OFFICE VISIT (OUTPATIENT)
Dept: FAMILY MEDICINE CLINIC | Facility: CLINIC | Age: 55
End: 2025-08-12
Payer: COMMERCIAL

## 2025-08-12 VITALS
DIASTOLIC BLOOD PRESSURE: 80 MMHG | RESPIRATION RATE: 18 BRPM | SYSTOLIC BLOOD PRESSURE: 126 MMHG | HEIGHT: 65 IN | BODY MASS INDEX: 27.32 KG/M2 | HEART RATE: 90 BPM | WEIGHT: 164 LBS | OXYGEN SATURATION: 98 %

## 2025-08-12 DIAGNOSIS — M54.42 ACUTE LEFT-SIDED LOW BACK PAIN WITH LEFT-SIDED SCIATICA: ICD-10-CM

## 2025-08-12 DIAGNOSIS — M54.16 LUMBAR RADICULOPATHY: ICD-10-CM

## 2025-08-12 RX ORDER — METHYLPREDNISOLONE 4 MG/1
TABLET ORAL
Qty: 21 TABLET | Refills: 0 | Status: SHIPPED | OUTPATIENT
Start: 2025-08-12

## 2025-08-12 RX ORDER — HYDROCODONE BITARTRATE AND ACETAMINOPHEN 7.5; 325 MG/1; MG/1
1 TABLET ORAL DAILY PRN
Qty: 20 TABLET | Refills: 0 | Status: SHIPPED | OUTPATIENT
Start: 2025-08-12

## 2025-08-12 RX ORDER — METHYLPREDNISOLONE ACETATE 80 MG/ML
120 INJECTION, SUSPENSION INTRA-ARTICULAR; INTRALESIONAL; INTRAMUSCULAR; SOFT TISSUE ONCE
Status: COMPLETED | OUTPATIENT
Start: 2025-08-12 | End: 2025-08-12

## 2025-08-12 RX ORDER — TIZANIDINE 2 MG/1
4 TABLET ORAL EVERY 8 HOURS PRN
Qty: 30 TABLET | Refills: 1 | Status: SHIPPED | OUTPATIENT
Start: 2025-08-12

## 2025-08-12 RX ADMIN — METHYLPREDNISOLONE ACETATE 120 MG: 80 INJECTION, SUSPENSION INTRA-ARTICULAR; INTRALESIONAL; INTRAMUSCULAR; SOFT TISSUE at 08:37

## (undated) DEVICE — MYNXGRIP 5F: Brand: MYNXGRIP

## (undated) DEVICE — PINNACLE INTRODUCER SHEATH: Brand: PINNACLE

## (undated) DEVICE — PK ENDO GI 50

## (undated) DEVICE — PK TRY HEART CATH 50

## (undated) DEVICE — CATH DIAG IMPULSE PIG 5F 100CM

## (undated) DEVICE — BAPTIST FLOYD BRONCHOSCOPY: Brand: MEDLINE INDUSTRIES, INC.

## (undated) DEVICE — BITEBLOCK ENDO W/STRAP 60F A/ LF DISP

## (undated) DEVICE — CATH DIAG IMPULSE FL4 5F 100CM

## (undated) DEVICE — GW PTFE EMERALD HEPCOAT FC J TIP STD .035 3MM 150CM

## (undated) DEVICE — PTCH SENSR INREACH PULM GUIDANCE

## (undated) DEVICE — ADAPT BRONCH EDGE FOR USE W/OLYMPUS SCOPE

## (undated) DEVICE — NDL PULM SUPERDIMENSION ARCPOINT 21G

## (undated) DEVICE — ADAPT SWVL FIBROPTIC BRONCH

## (undated) DEVICE — KT BRONCH NAV EDGE 180D EXT WO/ ADAPT OLYMPUS

## (undated) DEVICE — CATH DIAG IMPULSE FR4 5F 100CM